# Patient Record
Sex: MALE | Race: WHITE | Employment: UNEMPLOYED | URBAN - METROPOLITAN AREA
[De-identification: names, ages, dates, MRNs, and addresses within clinical notes are randomized per-mention and may not be internally consistent; named-entity substitution may affect disease eponyms.]

---

## 2019-05-08 ENCOUNTER — HOSPITAL ENCOUNTER (EMERGENCY)
Age: 54
Discharge: HOME | End: 2019-05-09
Payer: COMMERCIAL

## 2019-05-08 DIAGNOSIS — Z76.0 MEDICATION REFILL: Primary | ICD-10-CM

## 2019-05-08 LAB
ANION GAP SERPL CALC-SCNC: 10 MMOL/L (ref 7–16)
BASOPHILS # BLD: 0.1 K/UL (ref 0–0.2)
BASOPHILS NFR BLD: 1 % (ref 0–2)
BUN SERPL-MCNC: 13 MG/DL (ref 6–23)
CALCIUM SERPL-MCNC: 9.7 MG/DL (ref 8.3–10.4)
CHLORIDE SERPL-SCNC: 95 MMOL/L (ref 98–107)
CO2 SERPL-SCNC: 27 MMOL/L (ref 21–32)
CREAT SERPL-MCNC: 1.03 MG/DL (ref 0.8–1.5)
DIFFERENTIAL METHOD BLD: NORMAL
EOSINOPHIL # BLD: 0.2 K/UL (ref 0–0.8)
EOSINOPHIL NFR BLD: 2 % (ref 0.5–7.8)
ERYTHROCYTE [DISTWIDTH] IN BLOOD BY AUTOMATED COUNT: 12.9 % (ref 11.9–14.6)
GLUCOSE BLD STRIP.AUTO-MCNC: 428 MG/DL (ref 65–100)
GLUCOSE SERPL-MCNC: 496 MG/DL (ref 65–100)
HCT VFR BLD AUTO: 48.5 % (ref 41.1–50.3)
HGB BLD-MCNC: 16.9 G/DL (ref 13.6–17.2)
IMM GRANULOCYTES # BLD AUTO: 0 K/UL (ref 0–0.5)
IMM GRANULOCYTES NFR BLD AUTO: 0 % (ref 0–5)
LYMPHOCYTES # BLD: 2.5 K/UL (ref 0.5–4.6)
LYMPHOCYTES NFR BLD: 29 % (ref 13–44)
MCH RBC QN AUTO: 30.2 PG (ref 26.1–32.9)
MCHC RBC AUTO-ENTMCNC: 34.8 G/DL (ref 31.4–35)
MCV RBC AUTO: 86.8 FL (ref 79.6–97.8)
MONOCYTES # BLD: 0.5 K/UL (ref 0.1–1.3)
MONOCYTES NFR BLD: 6 % (ref 4–12)
NEUTS SEG # BLD: 5.4 K/UL (ref 1.7–8.2)
NEUTS SEG NFR BLD: 63 % (ref 43–78)
NRBC # BLD: 0 K/UL (ref 0–0.2)
PLATELET # BLD AUTO: 218 K/UL (ref 150–450)
PMV BLD AUTO: 10.8 FL (ref 9.4–12.3)
POTASSIUM SERPL-SCNC: 3.7 MMOL/L (ref 3.5–5.1)
RBC # BLD AUTO: 5.59 M/UL (ref 4.23–5.6)
SODIUM SERPL-SCNC: 132 MMOL/L (ref 136–145)
WBC # BLD AUTO: 8.7 K/UL (ref 4.3–11.1)

## 2019-05-08 PROCEDURE — 80048 BASIC METABOLIC PNL TOTAL CA: CPT

## 2019-05-08 PROCEDURE — 96361 HYDRATE IV INFUSION ADD-ON: CPT

## 2019-05-08 PROCEDURE — 96374 THER/PROPH/DIAG INJ IV PUSH: CPT

## 2019-05-08 PROCEDURE — 74011250636 HC RX REV CODE- 250/636: Performed by: EMERGENCY MEDICINE

## 2019-05-08 PROCEDURE — 81003 URINALYSIS AUTO W/O SCOPE: CPT

## 2019-05-08 PROCEDURE — 74011250636 HC RX REV CODE- 250/636

## 2019-05-08 PROCEDURE — 82962 GLUCOSE BLOOD TEST: CPT

## 2019-05-08 PROCEDURE — 85025 COMPLETE CBC W/AUTO DIFF WBC: CPT

## 2019-05-08 PROCEDURE — 74011636637 HC RX REV CODE- 636/637

## 2019-05-08 PROCEDURE — 99284 EMERGENCY DEPT VISIT MOD MDM: CPT

## 2019-05-08 RX ORDER — SODIUM CHLORIDE 9 MG/ML
1000 INJECTION, SOLUTION INTRAVENOUS ONCE
Status: COMPLETED | OUTPATIENT
Start: 2019-05-08 | End: 2019-05-09

## 2019-05-08 RX ORDER — GABAPENTIN 300 MG/1
300 CAPSULE ORAL 3 TIMES DAILY
COMMUNITY
End: 2020-07-13 | Stop reason: ALTCHOICE

## 2019-05-08 RX ORDER — LOSARTAN POTASSIUM 50 MG/1
50 TABLET ORAL DAILY
COMMUNITY
End: 2019-05-08

## 2019-05-08 RX ORDER — LOSARTAN POTASSIUM 50 MG/1
50 TABLET ORAL DAILY
Qty: 14 TAB | Refills: 0 | Status: SHIPPED | OUTPATIENT
Start: 2019-05-08 | End: 2020-07-13 | Stop reason: ALTCHOICE

## 2019-05-08 RX ADMIN — SODIUM CHLORIDE 1000 ML: 900 INJECTION, SOLUTION INTRAVENOUS at 23:34

## 2019-05-08 RX ADMIN — SODIUM CHLORIDE 1000 ML: 900 INJECTION, SOLUTION INTRAVENOUS at 22:40

## 2019-05-08 RX ADMIN — INSULIN HUMAN 10 UNITS: 100 INJECTION, SOLUTION PARENTERAL at 23:36

## 2019-05-09 VITALS
TEMPERATURE: 97.5 F | WEIGHT: 165 LBS | HEIGHT: 73 IN | DIASTOLIC BLOOD PRESSURE: 93 MMHG | SYSTOLIC BLOOD PRESSURE: 141 MMHG | HEART RATE: 67 BPM | BODY MASS INDEX: 21.87 KG/M2 | OXYGEN SATURATION: 100 % | RESPIRATION RATE: 16 BRPM

## 2019-05-09 LAB — GLUCOSE BLD STRIP.AUTO-MCNC: 199 MG/DL (ref 65–100)

## 2019-05-09 PROCEDURE — 82962 GLUCOSE BLOOD TEST: CPT

## 2019-05-09 NOTE — ED TRIAGE NOTES
Pt states that he is out of his meds, insulin and gabapentin,.   States that he is unable to get his prescriptions because he doesn't have a PMD

## 2019-05-09 NOTE — ED NOTES
I have reviewed discharge instructions with the patient. The patient verbalized understanding. Patient left ED via Discharge Method: ambulatory to Home with  self). Opportunity for questions and clarification provided. Patient given 4 scripts. To continue your aftercare when you leave the hospital, you may receive an automated call from our care team to check in on how you are doing. This is a free service and part of our promise to provide the best care and service to meet your aftercare needs.  If you have questions, or wish to unsubscribe from this service please call [phone]. Thank you for Choosing our [employer] Emergency Department.

## 2019-05-09 NOTE — DISCHARGE INSTRUCTIONS
Patient Education   Please obtain a primary care physician for follow-up and continuity of care. Please not use the emergency department to refill medications. This is best done by your primary care physician to ensure proper dosing and adjustments. Due to the high volume of patients in the emergency department refilling medications from the emergency department is not the best option. Medication Refill: Care Instructions  Your Care Instructions    Medicines are a big part of treatment for many health problems. So it can be upsetting to run out of your medicine. It may even be dangerous to stop a medicine suddenly. The doctor may have given you enough medicine to help you until you can see your regular doctor. The doctor has checked you carefully, but problems can develop later. If you notice any problems or new symptoms, get medical treatment right away. Follow-up care is a key part of your treatment and safety. Be sure to make and go to all appointments, and call your doctor if you are having problems. It's also a good idea to know your test results and keep a list of the medicines you take. How can you care for yourself at home? · Be safe with medicines. Take your medicines exactly as prescribed. · Know when you will run out of your medicine. Use a calendar to remind you to get a refill. Don't wait until you have only a few pills left. · Talk with your doctor or pharmacist about your medicine. Find out what to do if you miss a dose. When should you call for help? Call your doctor now or seek immediate medical care if:    · You have problems with your medicine.    Watch closely for changes in your health, and be sure to contact your doctor if you have any problems. Where can you learn more? Go to http://misael-yazmin.info/. Enter K326 in the search box to learn more about \"Medication Refill: Care Instructions. \"  Current as of: March 28, 2018  Content Version: 11.9  © 0292-1952 [employer]. Care instructions adapted under license by [employer] (which disclaims liability or warranty for this information). If you have questions about a medical condition or this instruction, always ask your healthcare professional. [address] any warranty or liability for your use of this information.

## 2019-05-09 NOTE — ED PROVIDER NOTES
73-year-old and something but diabetic comes to the ED wanting his medications refilled. Patient states that he was told to come to the ED by [facility]. He presents to the ED Wednesday night at 11 PM. He states He states he's been out of his Medications for quite some time. The history is provided by the patient. High Blood Sugar This is a chronic problem. The current episode started more than 1 week ago. The problem occurs constantly. Past Medical History:  
Diagnosis Date  Diabetes ([facility] [address]  GERD (gastroesophageal reflux disease)   
 controlled w/med  HIV (human immunodeficiency virus infection) ([facility]  Ill-defined condition  Infectious disease  Symptomatic cholelithiasis Past Surgical History:  
Procedure Laterality Date  HX APPENDECTOMY  HX CHOLECYSTECTOMY Family History:  
Problem Relation Age of Onset  Cancer Father   
     colon  Heart Disease Brother  Hypertension Brother  Stroke Maternal Grandmother  Cancer Maternal Grandmother   
     brain Social History Socioeconomic History  Marital status: SINGLE Spouse name: Not on file  Number of children: Not on file  Years of education: Not on file  Highest education level: Not on file Occupational History  Not on file Social Needs  Financial resource strain: Not on file  Food insecurity:  
  Worry: Not on file Inability: Not on file  Transportation needs:  
  Medical: Not on file Non-medical: Not on file Tobacco Use  Smoking status: Current Every Day Smoker Packs/day: 1.00 Years: 25.00 Pack years: 25.00  Smokeless tobacco: Never Used Substance and Sexual Activity  Alcohol use: No  
 Drug use: No  
 Sexual activity: Not Currently Lifestyle  Physical activity:  
  Days per week: Not on file Minutes per session: Not on file  Stress: Not on file Relationships  Social connections: Talks on phone: Not on file Gets together: Not on file Attends Hindu service: Not on file Active member of club or organization: Not on file Attends meetings of clubs or organizations: Not on file Relationship status: Not on file  Intimate partner violence:  
  Fear of current or ex partner: Not on file Emotionally abused: Not on file Physically abused: Not on file Forced sexual activity: Not on file Other Topics Concern  Not on file Social History Narrative  Not on file ALLERGIES: Patient has no known allergies. Review of Systems Constitutional: Negative. Negative for activity change. HENT: Negative. Eyes: Negative. Respiratory: Negative. Cardiovascular: Negative. Gastrointestinal: Negative. Genitourinary: Negative. Musculoskeletal: Negative. Skin: Negative. Neurological: Negative. Psychiatric/Behavioral: Negative. All other systems reviewed and are negative. Vitals:  
 05/08/19 2207 BP: 139/90 Pulse: 91  
Resp: 16 Temp: 97.5 °F (36.4 °C) SpO2: 99% Weight: 74.8 kg (165 lb) Height: 6' 1\" (1.854 m) Physical Exam  
Constitutional: He is oriented to person, place, and time. He appears well-developed and well-nourished. No distress. HENT:  
Head: Normocephalic and atraumatic. Right Ear: External ear normal.  
Left Ear: External ear normal.  
Nose: Nose normal.  
Mouth/Throat: Oropharynx is clear and moist. No oropharyngeal exudate. Eyes: Pupils are equal, round, and reactive to light. Conjunctivae and EOM are normal. Right eye exhibits no discharge. Left eye exhibits no discharge. No scleral icterus. Neck: Normal range of motion. Neck supple. No JVD present. No tracheal deviation present. Cardiovascular: Normal rate, regular rhythm and intact distal pulses. Pulmonary/Chest: Effort normal and breath sounds normal. No stridor.  No respiratory distress. He has no wheezes. He exhibits no tenderness. Abdominal: Soft. Bowel sounds are normal. He exhibits no distension and no mass. There is no tenderness. Musculoskeletal: Normal range of motion. He exhibits no edema or tenderness. Neurological: He is alert and oriented to person, place, and time. No cranial nerve deficit. Skin: Skin is warm and dry. No rash noted. He is not diaphoretic. No erythema. No pallor. Psychiatric: He has a normal mood and affect. His behavior is normal. Thought content normal.  
Nursing note and vitals reviewed. MDM Number of Diagnoses or Management Options Diagnosis management comments: Refill of essential medications. Amount and/or Complexity of Data Reviewed Clinical lab tests: ordered and reviewed Tests in the medicine section of CPT®: ordered and reviewed Risk of Complications, Morbidity, and/or Mortality Presenting problems: low Diagnostic procedures: minimal 
Management options: moderate Patient Progress Patient progress: stable Procedures

## 2020-07-13 PROBLEM — Z79.4 TYPE 2 DIABETES MELLITUS WITH HYPERGLYCEMIA, WITH LONG-TERM CURRENT USE OF INSULIN (HCC): Status: ACTIVE | Noted: 2020-07-13

## 2020-07-13 PROBLEM — E78.00 HYPERCHOLESTEROLEMIA: Status: ACTIVE | Noted: 2020-07-13

## 2020-07-13 PROBLEM — B20 HUMAN IMMUNODEFICIENCY VIRUS (HCC): Status: ACTIVE | Noted: 2020-07-13

## 2020-07-13 PROBLEM — E11.65 TYPE 2 DIABETES MELLITUS WITH HYPERGLYCEMIA, WITH LONG-TERM CURRENT USE OF INSULIN (HCC): Status: ACTIVE | Noted: 2020-07-13

## 2020-07-13 PROBLEM — E11.42 PERIPHERAL SENSORY NEUROPATHY DUE TO TYPE 2 DIABETES MELLITUS (HCC): Status: ACTIVE | Noted: 2020-07-13

## 2020-08-05 ENCOUNTER — HOME HEALTH ADMISSION (OUTPATIENT)
Dept: HOME HEALTH SERVICES | Facility: HOME HEALTH | Age: 55
End: 2020-08-05

## 2021-05-26 ENCOUNTER — APPOINTMENT (OUTPATIENT)
Dept: PHYSICAL THERAPY | Age: 56
End: 2021-05-26

## 2021-06-02 ENCOUNTER — APPOINTMENT (OUTPATIENT)
Dept: PHYSICAL THERAPY | Age: 56
End: 2021-06-02

## 2021-06-07 ENCOUNTER — APPOINTMENT (OUTPATIENT)
Dept: PHYSICAL THERAPY | Age: 56
End: 2021-06-07

## 2021-06-09 ENCOUNTER — APPOINTMENT (OUTPATIENT)
Dept: PHYSICAL THERAPY | Age: 56
End: 2021-06-09

## 2021-06-11 ENCOUNTER — APPOINTMENT (OUTPATIENT)
Dept: PHYSICAL THERAPY | Age: 56
End: 2021-06-11

## 2021-06-14 ENCOUNTER — APPOINTMENT (OUTPATIENT)
Dept: PHYSICAL THERAPY | Age: 56
End: 2021-06-14

## 2021-06-16 ENCOUNTER — APPOINTMENT (OUTPATIENT)
Dept: PHYSICAL THERAPY | Age: 56
End: 2021-06-16

## 2021-06-18 ENCOUNTER — APPOINTMENT (OUTPATIENT)
Dept: PHYSICAL THERAPY | Age: 56
End: 2021-06-18

## 2021-06-23 ENCOUNTER — APPOINTMENT (OUTPATIENT)
Dept: PHYSICAL THERAPY | Age: 56
End: 2021-06-23

## 2021-06-28 ENCOUNTER — APPOINTMENT (OUTPATIENT)
Dept: PHYSICAL THERAPY | Age: 56
End: 2021-06-28

## 2021-06-30 ENCOUNTER — APPOINTMENT (OUTPATIENT)
Dept: PHYSICAL THERAPY | Age: 56
End: 2021-06-30

## 2022-03-18 PROBLEM — E78.00 HYPERCHOLESTEROLEMIA: Status: ACTIVE | Noted: 2020-07-13

## 2022-03-19 PROBLEM — E11.65 TYPE 2 DIABETES MELLITUS WITH HYPERGLYCEMIA, WITH LONG-TERM CURRENT USE OF INSULIN (HCC): Status: ACTIVE | Noted: 2020-07-13

## 2022-03-19 PROBLEM — E11.42 PERIPHERAL SENSORY NEUROPATHY DUE TO TYPE 2 DIABETES MELLITUS (HCC): Status: ACTIVE | Noted: 2020-07-13

## 2022-03-19 PROBLEM — Z79.4 TYPE 2 DIABETES MELLITUS WITH HYPERGLYCEMIA, WITH LONG-TERM CURRENT USE OF INSULIN (HCC): Status: ACTIVE | Noted: 2020-07-13

## 2022-03-19 PROBLEM — B20 HUMAN IMMUNODEFICIENCY VIRUS (HCC): Status: ACTIVE | Noted: 2020-07-13

## 2022-05-09 ENCOUNTER — TELEPHONE (OUTPATIENT)
Dept: DIABETES SERVICES | Age: 57
End: 2022-05-09

## 2022-05-09 NOTE — TELEPHONE ENCOUNTER
Received referral for diabetes education. Referral stated we were to call Gabbi to schedule. She wants us to check insurance coverage.
DISPLAY PLAN FREE TEXT

## 2022-05-11 ENCOUNTER — TELEPHONE (OUTPATIENT)
Dept: DIABETES SERVICES | Age: 57
End: 2022-05-11

## 2022-05-11 NOTE — TELEPHONE ENCOUNTER
Obtained insurance coverage info for diabetes education. Called and left message asking for a call back to schedule. Pt is covered at 100%.

## 2022-05-18 ENCOUNTER — TELEPHONE (OUTPATIENT)
Dept: DIABETES SERVICES | Age: 57
End: 2022-05-18

## 2022-05-18 NOTE — TELEPHONE ENCOUNTER
Second call after obtaining insurance coverage. Left message asking for a return call. Pt should be covered at 100%.

## 2022-05-23 ENCOUNTER — FOLLOWUP TELEPHONE ENCOUNTER (OUTPATIENT)
Dept: DIABETES SERVICES | Age: 57
End: 2022-05-23

## 2022-05-23 NOTE — TELEPHONE ENCOUNTER
Third attempt to contact pt regarding diabetes referral. [facility] is full. Will send a letter 5/23/22. If we don't hear by 6/7/22 we will close.

## 2022-06-01 DIAGNOSIS — E11.65 TYPE 2 DIABETES MELLITUS WITH HYPERGLYCEMIA, WITH LONG-TERM CURRENT USE OF INSULIN (HCC): Primary | ICD-10-CM

## 2022-06-01 DIAGNOSIS — Z79.4 TYPE 2 DIABETES MELLITUS WITH HYPERGLYCEMIA, WITH LONG-TERM CURRENT USE OF INSULIN (HCC): Primary | ICD-10-CM

## 2022-06-06 ENCOUNTER — SCHEDULED TELEPHONE ENCOUNTER (OUTPATIENT)
Dept: ENDOCRINOLOGY | Age: 57
End: 2022-06-06
Payer: MEDICARE

## 2022-06-06 DIAGNOSIS — E11.65 TYPE 2 DIABETES MELLITUS WITH HYPERGLYCEMIA, WITH LONG-TERM CURRENT USE OF INSULIN (HCC): Primary | ICD-10-CM

## 2022-06-06 DIAGNOSIS — Z79.4 TYPE 2 DIABETES MELLITUS WITH HYPERGLYCEMIA, WITH LONG-TERM CURRENT USE OF INSULIN (HCC): Primary | ICD-10-CM

## 2022-06-06 PROCEDURE — 99443 PR PHYS/QHP TELEPHONE EVALUATION 21-30 MIN: CPT | Performed by: PHYSICIAN ASSISTANT

## 2022-06-06 RX ORDER — BUPROPION HYDROCHLORIDE 150 MG/1
300 TABLET ORAL DAILY
COMMUNITY
Start: 2022-05-06 | End: 2022-06-06 | Stop reason: DRUGHIGH

## 2022-06-06 RX ORDER — ATORVASTATIN CALCIUM 80 MG/1
1 TABLET, FILM COATED ORAL DAILY
COMMUNITY
Start: 2022-05-31

## 2022-06-06 RX ORDER — INSULIN LISPRO 100 U/ML
INJECTION, SOLUTION SUBCUTANEOUS
Qty: 45 ML | Refills: 3
Start: 2022-06-06 | End: 2022-06-21

## 2022-06-06 RX ORDER — INSULIN DETEMIR 100 [IU]/ML
50 INJECTION, SOLUTION SUBCUTANEOUS NIGHTLY
COMMUNITY
End: 2022-06-06 | Stop reason: SDUPTHER

## 2022-06-06 RX ORDER — TAMSULOSIN HYDROCHLORIDE 0.4 MG/1
1 CAPSULE ORAL DAILY
COMMUNITY
Start: 2022-05-31

## 2022-06-06 RX ORDER — LANCETS 33 GAUGE
EACH MISCELLANEOUS
COMMUNITY
Start: 2020-10-13

## 2022-06-06 RX ORDER — INSULIN LISPRO 100 U/ML
INJECTION, SOLUTION SUBCUTANEOUS
COMMUNITY
End: 2022-06-06 | Stop reason: SDUPTHER

## 2022-06-06 RX ORDER — DICLOFENAC POTASSIUM 50 MG/1
50 TABLET, FILM COATED ORAL AS NEEDED
COMMUNITY
Start: 2022-03-23

## 2022-06-06 RX ORDER — INSULIN DETEMIR 100 [IU]/ML
25 INJECTION, SOLUTION SUBCUTANEOUS 2 TIMES DAILY
Qty: 45 ML | Refills: 3
Start: 2022-06-06 | End: 2022-06-20 | Stop reason: SDUPTHER

## 2022-06-06 RX ORDER — PEN NEEDLE, DIABETIC 32GX 5/32"
NEEDLE, DISPOSABLE MISCELLANEOUS
COMMUNITY
Start: 2021-09-17 | End: 2022-08-18 | Stop reason: SDUPTHER

## 2022-06-06 RX ORDER — TRAMADOL HYDROCHLORIDE 50 MG/1
1 TABLET ORAL AS NEEDED
COMMUNITY
Start: 2022-05-07

## 2022-06-06 RX ORDER — CHLORAL HYDRATE 500 MG
1 CAPSULE ORAL DAILY
COMMUNITY

## 2022-06-06 NOTE — PROGRESS NOTES
Arcenio Verdin is a 65 y.o. male evaluated via audio-only technology on 6/6/2022 for Diabetes (type 2)  . Pt with historically poor compliance meets for virtual follow up. At last visit and his support person, a cousin named Yary Groves came to his appointment and they left with additional information about glycemic control. At that time patient was taking Levemir 3 times per day, he continues to take Levemir 3 times per day with recurrent hypoglycemia. He spends much of the visit complaining about both his chronic pain and frustrations with medical providers. Patient reports that he has not been taking his short acting insulin due to hypoglycemia but is taking his long-acting insulin 3 times per day at a high dose    Current Regimen:  levemir 50 units three times daily  humalog- poor compliance due to hypoglycemia and poor oral intake    Continues to take [place] and [place] without side effects    Eating once a day or less, due to frequent nausea    frequent hypoglycemia in 40-60s  As high as 160        Assessment & Plan:   Type 2 diabetes mellitus with hyperglycemia, with long-term current use of insulin ([facility])  -     ADMELOG SOLOSTAR 100 UNIT/ML SOPN; 10 units with meals plus 2 units per 50 >150 AC/HS (up to 50 units per day), Disp-45 mL, R-3, DAWNO PRINT  -     LEVEMIR FLEXTOUCH 100 UNIT/ML injection pen; Inject 25 Units into the skin 2 times daily, Disp-45 mL, R-3, DAWNO PRINT    1. Type 2 diabetes mellitus with hyperglycemia, with long-term current use of insulin [facility]  Patient with uncontrolled type 2 diabetes who continues to take his insulin incorrectly. Patient is still taking his basal insulin 3 times per day with rare and intermittent use of his prandial insulin. Of note he eats very irregularly and only small amounts. I have asked him to take 25 units of Levemir twice daily as previously discussed.   Although he would certainly benefit from a correction scale for her short acting insulin even with the help of shot sheets he appears to have difficulty comprehending this. Have asked him to take 10 units of prandial insulin before meals and to maintain an adequate blood glucose log for review    - ADMELOG SOLOSTAR 100 UNIT/ML SOPN; 10 units with meals plus 2 units per 50 >150 AC/HS (up to 50 units per day)  Dispense: 45 mL; Refill: 3  - LEVEMIR FLEXTOUCH 100 UNIT/ML injection pen; Inject 25 Units into the skin 2 times daily  Dispense: 45 mL; Refill: 3        Return as scheduled, for Diabetes DM2 Follow-Up. Subjective:       Prior to Admission medications    Medication Sig Start Date End Date Taking? Authorizing Provider   blood glucose test strips (ASCENSIA AUTODISC VI;ONE TOUCH ULTRA TEST VI) strip Check blood glucose 6 times per day. Dx E11.65 10/13/20  Yes Historical Provider, MD   OneTouch Delica Lancets 54Y MISC Check blood glucose 6 times per day. Dx E11.65 10/13/20  Yes Historical Provider, MD   Insulin Pen Needle (BD PEN NEEDLE LUCILLE 2ND GEN) 32G X 4 MM MISC 5 insulin injections per day. Dx E11.65 9/17/21  Yes Historical Provider, MD   [place]-3 1000 MG CAPS Take 1 capsule by mouth daily   Yes Historical Provider, MD   atorvastatin (LIPITOR) 80 MG tablet Take 1 tablet by mouth daily 5/31/22  Yes Historical Provider, MD   diclofenac (CATAFLAM) 50 MG tablet Take 50 mg by mouth as needed Three times a day 3/23/22  Yes Historical Provider, MD   tamsulosin (FLOMAX) 0.4 mg capsule Take 1 capsule by mouth daily 5/31/22  Yes Historical Provider, MD   traMADol (ULTRAM) 50 MG tablet Take 1 tablet by mouth as needed.  Twice a day 5/7/22  Yes Historical Provider, MD   ADMELOG SOLOSTAR 100 UNIT/ML SOPN 10 units with meals plus 2 units per 50 >150 AC/HS (up to 50 units per day) 6/6/22  Yes [place] JOANIE Perez   LEVEMIR FLEXTOUCH 100 UNIT/ML injection pen Inject 25 Units into the skin 2 times daily 6/6/22  Yes Marylee Borg A [place], PA-C   Abacavir-Dolutegravir-Lamivud (TRIUMEQ) 600- MG TABS TAKE ONE TABLET BY MOUTH EVERY DAY Orally Once a day for 30 day(s) 7/16/18  Yes Ar Automatic Reconciliation   buPROPion (WELLBUTRIN XL) 300 MG extended release tablet Take 300 mg by mouth   Yes Ar Automatic Reconciliation   famotidine (PEPCID) 20 MG tablet Take 20 mg by mouth 2 times daily   Yes Ar Automatic Reconciliation   gabapentin (NEURONTIN) 800 MG tablet Take 800 mg by mouth daily. 7/13/20  Yes Ar Automatic Reconciliation   losartan (COZAAR) 25 MG tablet 1 tablet Orally Once a day for 90 days   Yes Ar Automatic Reconciliation   SITagliptin (JANUVIA) 50 MG tablet Take 50 mg by mouth daily 9/17/21  Yes Ar Automatic Reconciliation     Past Medical History:   Diagnosis Date    GERD (gastroesophageal reflux disease)     Human immunodeficiency virus ([facility] [address] 2017    Hypercholesterolemia     Hypertriglyceridemia     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 ([facility] [address] [facility]     Type 2 diabetes mellitus ([facility]      Review of Systems    No data recorded    Charles Tejeda was evaluated through a patient-initiated, synchronous (real-time) audio only encounter. He (or guardian if applicable) is aware that it is a billable service, which includes applicable co-pays, with coverage as determined by his insurance carrier. This visit was conducted with the patient's (and/or Sheron Dandy guardian's) verbal consent. He has not had a related appointment within my department in the past 7 days or scheduled within the next 24 hours. The patient was located in a state where the provider was licensed to provide care. The patient was located at: Home: [address] [place] [address]  The provider was located at:  Facility (Appt Dept): [address] [place] [address] Dr Jessica Saenz [address] [place] [address]    Total Time: minutes: 24 minutes    Catie Acosta PA-C

## 2022-06-20 ENCOUNTER — TELEPHONE (OUTPATIENT)
Dept: ENDOCRINOLOGY | Age: 57
End: 2022-06-20

## 2022-06-20 DIAGNOSIS — Z79.4 TYPE 2 DIABETES MELLITUS WITH HYPERGLYCEMIA, WITH LONG-TERM CURRENT USE OF INSULIN (HCC): ICD-10-CM

## 2022-06-20 DIAGNOSIS — E11.65 TYPE 2 DIABETES MELLITUS WITH HYPERGLYCEMIA, WITH LONG-TERM CURRENT USE OF INSULIN (HCC): ICD-10-CM

## 2022-06-20 RX ORDER — INSULIN DETEMIR 100 [IU]/ML
25 INJECTION, SOLUTION SUBCUTANEOUS 2 TIMES DAILY
Qty: 45 ML | Refills: 3 | Status: SHIPPED | OUTPATIENT
Start: 2022-06-20

## 2022-06-21 ENCOUNTER — TELEPHONE (OUTPATIENT)
Dept: ENDOCRINOLOGY | Age: 57
End: 2022-06-21

## 2022-06-21 DIAGNOSIS — E11.65 TYPE 2 DIABETES MELLITUS WITH HYPERGLYCEMIA, WITH LONG-TERM CURRENT USE OF INSULIN (HCC): Primary | ICD-10-CM

## 2022-06-21 DIAGNOSIS — Z79.4 TYPE 2 DIABETES MELLITUS WITH HYPERGLYCEMIA, WITH LONG-TERM CURRENT USE OF INSULIN (HCC): Primary | ICD-10-CM

## 2022-06-21 RX ORDER — INSULIN LISPRO 100 [IU]/ML
INJECTION, SOLUTION INTRAVENOUS; SUBCUTANEOUS
Qty: 45 ML | Refills: 3 | Status: SHIPPED | OUTPATIENT
Start: 2022-06-21

## 2022-06-21 NOTE — TELEPHONE ENCOUNTER
Called patient and let him know that Humalog was approved and sent to [place] [id number] on file. Patient verbalized understanding and states he is taking medication according to scale.

## 2022-06-21 NOTE — TELEPHONE ENCOUNTER
Patient's insurance faxed over an approval for Humalog u100. Patient has since then been put on Admelog. Patient's pa is approved through 12/31/2022 approved on June 10th.

## 2022-06-21 NOTE — TELEPHONE ENCOUNTER
humalog sent. Please make sure pt understands that the humalog replaces the admelog (in the yellow pen) to take before meals based on the info in the chart.      Please ensure he is taking his insulin as prescribed as this has been an issue in the past.

## 2022-08-15 DIAGNOSIS — E11.65 TYPE 2 DIABETES MELLITUS WITH HYPERGLYCEMIA (HCC): ICD-10-CM

## 2022-08-15 RX ORDER — BLOOD SUGAR DIAGNOSTIC
STRIP MISCELLANEOUS
Qty: 600 STRIP | Refills: 3 | OUTPATIENT
Start: 2022-08-15

## 2022-08-15 RX ORDER — SITAGLIPTIN 50 MG/1
TABLET, FILM COATED ORAL
Qty: 90 TABLET | Refills: 3 | OUTPATIENT
Start: 2022-08-15

## 2022-08-18 DIAGNOSIS — E11.65 TYPE 2 DIABETES MELLITUS WITH HYPERGLYCEMIA (HCC): ICD-10-CM

## 2022-08-18 RX ORDER — BUPROPION HYDROCHLORIDE 150 MG/1
TABLET ORAL
COMMUNITY
Start: 2022-08-14

## 2022-08-18 RX ORDER — SITAGLIPTIN 50 MG/1
50 TABLET, FILM COATED ORAL DAILY
Qty: 90 TABLET | Refills: 3 | Status: SHIPPED | OUTPATIENT
Start: 2022-08-18

## 2022-08-18 RX ORDER — SITAGLIPTIN 50 MG/1
TABLET, FILM COATED ORAL
Qty: 90 TABLET | Refills: 3 | OUTPATIENT
Start: 2022-08-18

## 2022-08-18 RX ORDER — BLOOD SUGAR DIAGNOSTIC
STRIP MISCELLANEOUS
Qty: 600 STRIP | Refills: 3 | OUTPATIENT
Start: 2022-08-18

## 2022-08-18 RX ORDER — PEN NEEDLE, DIABETIC 32GX 5/32"
NEEDLE, DISPOSABLE MISCELLANEOUS
Qty: 500 EACH | Refills: 3 | Status: SHIPPED | OUTPATIENT
Start: 2022-08-18

## 2022-08-18 NOTE — TELEPHONE ENCOUNTER
Patient called and requested refills of his Januvia 50 mg, one touch test strips, and bd pen needles. Patient's pharmacy on file is [facility].

## 2022-08-24 ENCOUNTER — TELEPHONE (OUTPATIENT)
Dept: ENDOCRINOLOGY | Age: 57
End: 2022-08-24

## 2022-08-24 DIAGNOSIS — B20 HUMAN IMMUNODEFICIENCY VIRUS (HCC): ICD-10-CM

## 2022-08-24 DIAGNOSIS — E11.42 PERIPHERAL SENSORY NEUROPATHY DUE TO TYPE 2 DIABETES MELLITUS (HCC): Primary | ICD-10-CM

## 2022-08-24 NOTE — TELEPHONE ENCOUNTER
Sotero Mejia called and stated that Patient's B-12 is being rejected by insurance due to Diagnosis code. Please advise on a new diagnosis code if available.

## 2022-08-29 NOTE — TELEPHONE ENCOUNTER
Returned call to update ICD 10 codes for patient's lab work. Principal  stated that patient does not have a balance at this time and new ICD 10 code is not needed.

## 2022-08-31 ENCOUNTER — OFFICE VISIT (OUTPATIENT)
Dept: ENDOCRINOLOGY | Age: 57
End: 2022-08-31
Payer: MEDICARE

## 2022-08-31 VITALS
WEIGHT: 167 LBS | HEART RATE: 111 BPM | BODY MASS INDEX: 22.13 KG/M2 | SYSTOLIC BLOOD PRESSURE: 120 MMHG | HEIGHT: 73 IN | DIASTOLIC BLOOD PRESSURE: 80 MMHG | OXYGEN SATURATION: 99 %

## 2022-08-31 DIAGNOSIS — E11.42 PERIPHERAL SENSORY NEUROPATHY DUE TO TYPE 2 DIABETES MELLITUS (HCC): ICD-10-CM

## 2022-08-31 DIAGNOSIS — E78.00 HYPERCHOLESTEROLEMIA: ICD-10-CM

## 2022-08-31 DIAGNOSIS — Z79.4 TYPE 2 DIABETES MELLITUS WITH HYPERGLYCEMIA, WITH LONG-TERM CURRENT USE OF INSULIN (HCC): Primary | ICD-10-CM

## 2022-08-31 DIAGNOSIS — E11.65 TYPE 2 DIABETES MELLITUS WITH HYPERGLYCEMIA, WITH LONG-TERM CURRENT USE OF INSULIN (HCC): Primary | ICD-10-CM

## 2022-08-31 DIAGNOSIS — E78.1 HYPERTRIGLYCERIDEMIA: ICD-10-CM

## 2022-08-31 LAB — HBA1C MFR BLD: 11.1 %

## 2022-08-31 PROCEDURE — 4004F PT TOBACCO SCREEN RCVD TLK: CPT | Performed by: PHYSICIAN ASSISTANT

## 2022-08-31 PROCEDURE — 3046F HEMOGLOBIN A1C LEVEL >9.0%: CPT | Performed by: PHYSICIAN ASSISTANT

## 2022-08-31 PROCEDURE — 3017F COLORECTAL CA SCREEN DOC REV: CPT | Performed by: PHYSICIAN ASSISTANT

## 2022-08-31 PROCEDURE — 99214 OFFICE O/P EST MOD 30 MIN: CPT | Performed by: PHYSICIAN ASSISTANT

## 2022-08-31 PROCEDURE — 2022F DILAT RTA XM EVC RTNOPTHY: CPT | Performed by: PHYSICIAN ASSISTANT

## 2022-08-31 PROCEDURE — G8420 CALC BMI NORM PARAMETERS: HCPCS | Performed by: PHYSICIAN ASSISTANT

## 2022-08-31 PROCEDURE — G8427 DOCREV CUR MEDS BY ELIG CLIN: HCPCS | Performed by: PHYSICIAN ASSISTANT

## 2022-08-31 PROCEDURE — 83036 HEMOGLOBIN GLYCOSYLATED A1C: CPT | Performed by: PHYSICIAN ASSISTANT

## 2022-08-31 NOTE — PROGRESS NOTES
[facility]    Jose R Ziegler PA-C  [facility]  [address], [address]  [place]  Phone [phone]  Facsimile [phone]          Gm Veliz is a 65 y.o. male seen 8/31/2022 for follow up evaluation of insulin dependent diabetes         Assessment and Plan:    In office COVID-19 PPE worn and precautions taken    1. Type 2 diabetes mellitus with hyperglycemia, with long-term current use of insulin (HCC)  Patient with uncontrolled diabetes tolerating 40 units of basal insulin per day without recurrent hypoglycemia as he previously noted when taking basal insulin 3 times daily at a higher dose. Patient is only taking his prandial insulin once daily and not adjusting for correction. Given a shot sheet showing 10 units of prandial insulin plus a 1 per 25 greater than 150 correction as well as a correction only shot sheet of 1 per 25 greater than 50 to use fasting and at bedtime as needed. Appropriate use of insulin reviewed at length    At today's visit we discussed sequela associated with uncontrolled diabetes including increased risk of stroke, heart attack, kidney failure, amputation, retinopathy, neuropathy, and nephropathy. Patient was strongly encouraged to comply with treatment regimen as well as dietary and exercise recommendations to aid in control of this chronic disease to help prevent complications associated with uncontrolled diabetes. - AMB POC HEMOGLOBIN A1C    2. 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 Aggravated by profound hypoglycemia at last visit. Now under care of neurology. Would certainly benefit from improved glycemic control    3. Hypercholesterolemia  Would benefit from future fasting labs, defer to PCP    4.  Hypertriglyceridemia  Would benefit from future fasting labs, defer to PCP          López Ortega was seen today for diabetes. 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 He reports good compliance to twice daily Levemir and Januvia. Although he is access to prandial insulin with correction scale he is taking 10 units of prandial insulin before his once daily meal.  His A1c is improved but suboptimal, his frequent and recurrent hypoglycemia has resolved. Current regimen: Levemir 20 units times daily,  Januvia 50mg, Admelog 10 units before one meal a day       5/6/2022   Patient with diabetes on basal bolus insulin regimen plus DPP-4 presents to clinic for follow-up in care of Sarwat sykes. Previous visits have been virtual and patient has reported frustration and poor understanding of his disease process. He suffers from pain that his neurologist today has attributed to his blood sugar including rapid changes in blood sugar. He has difficulty relating how he is taking his insulin appears to be taking 30 units of both long-acting and short acting multiple  times per day with and without meals with no specific regimen      In care of Sarwat sykes today                 9/17/2021   VIRTUAL VISIT   Ajit Paris is a 55 y.o. male who was seen by synchronous (real-time) audio-video technology on 9/17/2021 . The patient provided consent for this audio-video interaction. The [employer] platform was used. Patient was located at their home  and provider in the office. Patient frustrated by delay in timing of virtual visit. Spends much of visit c/o profound neuropathic pain and chronic diarrhea.  Has issues obtaining medication and supplies and reports this impacts his medication compliance       \"i'm on medicaid, the don't give a damn about you on medicaid\" \"i'm unable to get my medicine\"        Current regimen: Levemir 30 units three times daily, Januvia 50mg, Admelog 30 units           6/9/2021   VIRTUAL VISIT   Miguel Flowers is a 55 y.o. male who was seen by synchronous (real-time) audio-video technology on 6/9/2021 . The patient provided consent for this audio-video interaction. The [employer] platform was used. Patient and provider  were located at their individual homes. Has been out of [address] [place] [address] for 1 month, has not received from pharmacy, reports did not have any insulin either, got insulin 3 days ago       suffering from shingles, on valacyclovir            Reason for visit: Miguel Flowers return for follow up evaluation and management of type 2 diabetes. History of Present Illness:       10/5/2020   VIRTUAL VISIT   Miguel Flowers is a 48 y.o. male who was seen by synchronous (real-time) audio-video technology on 10/5/2020 . The patient provided consent for this audio-video interaction. The [employer] platform was used. Patient and provider  were located at their individual homes. Pt with flight of ideas, difficulty to interview, upset with 300mg welbutrin, would prefer 150mg thus discontinued taking. Was seeing someone at [facility] for anxiety, poor access with COVID. Under great stress and isolation due to pandemic. Taking insulin regularly some issues relating dose, frustrated by inability to obtain test strips           DIABETES MELLITUS   Miguel Flowers is here for follow up evaluation and treatment of type 2 diabetes mellitus. Of note, he has HIV and takes antiretroviral therapy.            Previous therapies: Victoza, metformin, januvia       Date of diagnosis: ~2016       Diet: mostly restaurant food       Exercise:  minimal       Body weight trend: decreasing steadily (10 pounds in the last 2 months)       Diabetes education: The patient has received formal diabetes education ([address]). Diabetic complications:                    Retinopathy: Last eye exam was end 2022 and demonstrated no diabetic retinopathy. [facility]  care [facility]. Albuminuria/nephropathy:                           5/8/2019: Serum creatinine 1.03. Urine microalbumin unknown. 05/06/2022 Cr 0.69, , microalbumin/Cr ratio 47                     Neuropathy:  He has numbness in hands and feet and frequently drops objects. He wears  braces on his feet due to foot drop; he falls frequently. He also has hyperesthesia which he attributes to herpes zoster. Home blood glucose monitoring frequency: 1-2 times per day    By recall     Typical Standard Deviation   Fasting Upper 100s As low as 160   AC lunch     AC supper 180    Bedtime 260      Blood glucose levels are uncontrolled, most significant elevations are post prandial        Hypoglycemia: never       Hemoglobin A1c:   3/10/2016: 12.8%.   /24/2017: 9.4%. 7/27/2017: 10.2%. 5/4/2018: 10.9%. 9/7/2018: 14.3%.   12/31/2018: 12.0%.   3/21/2019: 13.2%. 9/30/2019: Greater than 15.5%. 3/6/2020: Greater than 14%. 03/09/2021: 14.1%   05/06/2022: 9.5%  08/31/2022: 11.1%           Other pertinent labs:                   Lipids:                            9/7/2018: Total cholesterol 211, triglycerides 352, HDL 36, . Thyroid:                            5/4/2018: TSH 2.125.     05/06/2022 1.850        Allergies & Medications:  Reviewed in chart. Review of Systems    Vital Signs:  /80   Pulse (!) 111   Ht 6' 1\" (1.854 m)   Wt 167 lb (75.8 kg)   SpO2 99%   BMI 22.03 kg/m²       Physical Exam  Constitutional:       Appearance: Normal appearance. Neck:      Thyroid: No thyromegaly. Cardiovascular:      Rate and Rhythm: Normal rate and regular rhythm.    Pulmonary:      Effort: Pulmonary effort is normal.      Breath sounds: Normal breath sounds. Abdominal:      Palpations: Abdomen is soft. Feet:      Right foot:      Protective Sensation: 3 sites tested. 3 sites sensed. Left foot:      Protective Sensation: 3 sites tested. 3 sites sensed. Lymphadenopathy:      Cervical: No cervical adenopathy. Skin:     General: Skin is warm and dry. Comments: Healing papules of bilateral lower ankles, L>R after mowing lawn   Neurological:      General: No focal deficit present. Mental Status: He is alert. Psychiatric:         Mood and Affect: Mood normal.         Behavior: Behavior normal.         Thought Content: Thought content normal.         Judgment: Judgment normal.           Return 3-4 mo, for Diabetes DM2 Follow-Up. Portions of this note were generated with the assistance of voice recogniton software. As such, some errors in transcription may be present.

## 2022-09-19 RX ORDER — LANCETS 33 GAUGE
EACH MISCELLANEOUS
Refills: 3 | OUTPATIENT
Start: 2022-09-19

## 2022-09-20 RX ORDER — LANCETS 33 GAUGE
EACH MISCELLANEOUS
Refills: 3 | OUTPATIENT
Start: 2022-09-20

## 2022-12-14 ENCOUNTER — PATIENT MESSAGE (OUTPATIENT)
Dept: ENDOCRINOLOGY | Age: 57
End: 2022-12-14

## 2022-12-19 NOTE — TELEPHONE ENCOUNTER
Raghu response:    Congratulations on your improving A1c! In order to be eligible for the continuous meter that checks your sugar all the time or an insulin pump, we need to document in your next visit on 1/10/2023 that you are checking your sugars MULTIPLE times a day and using this information to adjust your insulin by the chart. Bring your meter and any log you use to track your sugars to our visit next month and we will work to get you new supplies. Congratulation on your progress!     Happy [place]!  Rajendra Bose

## 2022-12-19 NOTE — TELEPHONE ENCOUNTER
From: Get Rosa  To: Joe Alaniz  Sent: 12/14/2022 12:19 PM EST  Subject: A1c     Hey today I got a home visit through my insurance provider they did a health assessment and tested a1c which was 8.7 the best score I had I need to ask you a question to see if I could qualify for one of those insulin pumps no sticks to fingers to help me manage better the a1c test score should be assessable for you in my records bc nurses asked me did I want to share my results with medical providers thank you

## 2023-01-10 ENCOUNTER — OFFICE VISIT (OUTPATIENT)
Dept: ENDOCRINOLOGY | Age: 58
End: 2023-01-10
Payer: MEDICARE

## 2023-01-10 VITALS
SYSTOLIC BLOOD PRESSURE: 140 MMHG | BODY MASS INDEX: 23.75 KG/M2 | DIASTOLIC BLOOD PRESSURE: 86 MMHG | OXYGEN SATURATION: 98 % | WEIGHT: 180 LBS | HEART RATE: 93 BPM

## 2023-01-10 DIAGNOSIS — Z79.4 TYPE 2 DIABETES MELLITUS WITH HYPERGLYCEMIA, WITH LONG-TERM CURRENT USE OF INSULIN (HCC): Primary | ICD-10-CM

## 2023-01-10 DIAGNOSIS — E78.00 HYPERCHOLESTEROLEMIA: ICD-10-CM

## 2023-01-10 DIAGNOSIS — E11.42 PERIPHERAL SENSORY NEUROPATHY DUE TO TYPE 2 DIABETES MELLITUS (HCC): ICD-10-CM

## 2023-01-10 DIAGNOSIS — E78.1 HYPERTRIGLYCERIDEMIA: ICD-10-CM

## 2023-01-10 DIAGNOSIS — E11.65 TYPE 2 DIABETES MELLITUS WITH HYPERGLYCEMIA, WITH LONG-TERM CURRENT USE OF INSULIN (HCC): Primary | ICD-10-CM

## 2023-01-10 PROCEDURE — 4004F PT TOBACCO SCREEN RCVD TLK: CPT | Performed by: PHYSICIAN ASSISTANT

## 2023-01-10 PROCEDURE — G8484 FLU IMMUNIZE NO ADMIN: HCPCS | Performed by: PHYSICIAN ASSISTANT

## 2023-01-10 PROCEDURE — 3046F HEMOGLOBIN A1C LEVEL >9.0%: CPT | Performed by: PHYSICIAN ASSISTANT

## 2023-01-10 PROCEDURE — 2022F DILAT RTA XM EVC RTNOPTHY: CPT | Performed by: PHYSICIAN ASSISTANT

## 2023-01-10 PROCEDURE — 3017F COLORECTAL CA SCREEN DOC REV: CPT | Performed by: PHYSICIAN ASSISTANT

## 2023-01-10 PROCEDURE — 99214 OFFICE O/P EST MOD 30 MIN: CPT | Performed by: PHYSICIAN ASSISTANT

## 2023-01-10 PROCEDURE — G8427 DOCREV CUR MEDS BY ELIG CLIN: HCPCS | Performed by: PHYSICIAN ASSISTANT

## 2023-01-10 PROCEDURE — G8420 CALC BMI NORM PARAMETERS: HCPCS | Performed by: PHYSICIAN ASSISTANT

## 2023-01-10 RX ORDER — DULOXETIN HYDROCHLORIDE 30 MG/1
CAPSULE, DELAYED RELEASE ORAL
COMMUNITY
Start: 2022-11-11

## 2023-01-10 RX ORDER — INSULIN LISPRO 100 [IU]/ML
INJECTION, SOLUTION INTRAVENOUS; SUBCUTANEOUS
Qty: 45 ML | Refills: 3 | Status: SHIPPED | OUTPATIENT
Start: 2023-01-10

## 2023-01-10 NOTE — PROGRESS NOTES
[facility]   [facility]   [facility]ROID [facility]IN[facility]    Ozzy Rascon PA-C  [facility]  [address], [address]  [place]  Phone [phone]  Facsimile [phone]          Monroe Marroquin is a 65 y.o. male seen 1/10/2023 for follow up evaluation of type 2 diabetes        Assessment and Plan:    In office COVID-19 PPE worn and precautions taken    1. Type 2 diabetes mellitus with hyperglycemia, with long-term current use of insulin ([address]  Patient with type 2 diabetes exhibiting vastly improved glycemic control with compliance to 25 units of Levemir twice daily and prandial insulin plus correction. Patient is fearful of using prandial insulin when his blood sugar is in range due to resulting hypoglycemia. Blood glucose data limited. We will reduce prandial insulin from 10 units to 5 units June and encourage good compliance. I have given patient a 1 per 25 greater than 150 correction and lieu of his 2 per 50 greater than 150 correction. He was encouraged to use this at the 4 before's. Patient was given shot sheets to help guide treatment regimen. Patient denies any overnight hypoglycemia but I suspect he remains over basal lysed and we will consider down titrating basal insulin as indicated. Focusing on taking prandial insulin appropriately is our current goal.    Patient's progress was applauded and encouraged. He appears motivated to improve but overwhelmed    The beneficiary requires a therapeutic CGM for treatment and management of diabetes mellitus. The beneficiary has been using a home blood glucose monitor and performing multiple daily blood glucose testing. The beneficiary is insulin treated with 3 or more daily injections of insulin or a continuous subcutaneous insulin infusion pump.   The beneficiary's insulin treatment regimen requires frequent adjustments by the beneficiary on the basis of therapeutic CGM testing results.    - HUMALOG KWIKPEN 100 UNIT/ML SOPN; 5 units with meals plus 1 units per 25 >150 AC/HS (up to 50 units per day),  Dispense: 45 mL; Refill: 3    2. Hypercholesterolemia  Would benefit from future fasting labs, defer to PCP    3. Hypertriglyceridemia  Would benefit from future fasting labs, defer to PCP    4. 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 [facility])  Home foot care discussed          Kelly Sawyer was seen today for diabetes. Diagnoses and all orders for this visit:    Type 2 diabetes mellitus with hyperglycemia, with long-term current use of insulin (HCC)  -     HUMALOG KWIKPEN 100 UNIT/ML SOPN; 5 units with meals plus 1 units per 25 >150 AC/HS (up to 50 units per day),    Hypercholesterolemia    Hypertriglyceridemia    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 ([address]          History of Present Illness:        1/10/2023   Interim diabetes HPI:    Doing better. Taking insulin as prescribed. Fearful of taking insulin in glucose is in range    Interim medical history changes:     Lifestyle Update:  Improving appetite     Current Regimen: januvia 50 mg, Levemir 25 units BID, admelog 10 units TIDAC plus correction 1/25>150    Glucose data:    Home blood glucose monitoring frequency: 6 times per day    By recall     Typical Standard Deviation   Fasting ~140    AC lunch ~160    AC supper ~140    Bedtime High 100      Blood glucose levels are uncontrolled, most significant elevations are post prandial    Failed past therapies:     Relevant co morbidities:    Denies HX pancreatitis, DKA, gastroparesis, foot ulcer    Optho:    Last eye exam was end 2021 and demonstrated no diabetic retinopathy. [facility]  care specialist is [facility]. Obesity:         Body mass index is 23.75 kg/m².        increasing steadily with insulin compliance      Wt Readings from Last 3 Encounters:   01/10/23 180 lb (81.6 kg)   08/31/22 167 lb (75.8 kg)   05/06/22 170 lb 6.4 oz (77.3 kg) CardioVascular:    None     Renal:    Under care of nephro? no    On ARB/ACE-I  losartan - 25 MG     5/8/2019: Serum creatinine 1.03. Urine microalbumin unknown. 05/06/2022      Cr 0.69, , microalbumin/Cr ratio 47       Lipids:     Current therapy atorvastatin - 80 MG with good compliance   9/7/2018: Total cholesterol 211, triglycerides 352, HDL 36, . No results found for: CHOL  No results found for: LDLCHOLESTEROL, LDLCALC No results found for: LABVLDL, VLDL No results found for: HDL No results found for: HDL No results found for: TRIG  No results found for: CHOLHDLRATIO      Hemoglobin A1c:  3/10/2016: 12.8%.   /24/2017: 9.4%. 7/27/2017: 10.2%. 5/4/2018: 10.9%. 9/7/2018: 14.3%.   12/31/2018: 12.0%.   3/21/2019: 13.2%. 9/30/2019: Greater than 15.5%. 3/6/2020: Greater than 14%. 03/09/2021: 14.1%   05/06/2022: 9.5%  08/31/2022: 11.1%   Dec 2022 8.7% per insurance eval  Hemoglobin A1C, POC   Date Value Ref Range Status   08/31/2022 11.1 % Final   05/06/2022 9.5 % Final        Thyroid:                     5/4/2018: TSH 2.125.                                  05/06/2022      1.850  Lab Results   Component Value Date/Time    TSH 1.850 05/06/2022 03:23 PM             Allergies & Medications:  Reviewed in chart. Review of Systems    Vital Signs:  BP (!) 140/86 (Site: Left Upper Arm, Position: Sitting)   Pulse 93   Wt 180 lb (81.6 kg)   SpO2 98%   BMI 23.75 kg/m²       Physical Exam  Constitutional:       Appearance: Normal appearance. Neck:      Thyroid: No thyromegaly. Cardiovascular:      Rate and Rhythm: Normal rate and regular rhythm. Pulmonary:      Effort: Pulmonary effort is normal.      Breath sounds: Normal breath sounds. Abdominal:      Palpations: Abdomen is soft. Feet:      Right foot:      Protective Sensation: 3 sites tested. 3 sites sensed. Left foot:      Protective Sensation: 3 sites tested. 3 sites sensed. Lymphadenopathy:      Cervical: No cervical adenopathy. Skin:     General: Skin is warm and dry. Neurological:      General: No focal deficit present. Mental Status: He is alert. Gait: Gait abnormal.      Comments: Ataxic gait, ambulates with cane   Psychiatric:         Mood and Affect: Mood normal.         Behavior: Behavior normal.         Thought Content: Thought content normal.         Judgment: Judgment normal.           Return in about 3 months (around 4/10/2023) for Diabetes DM2 Follow-Up. Portions of this note were generated with the assistance of voice recogniton software. As such, some errors in transcription may be present.

## 2023-03-13 ENCOUNTER — OFFICE VISIT (OUTPATIENT)
Dept: UROLOGY | Age: 58
End: 2023-03-13
Payer: MEDICARE

## 2023-03-13 ENCOUNTER — TELEPHONE (OUTPATIENT)
Dept: UROLOGY | Age: 58
End: 2023-03-13

## 2023-03-13 DIAGNOSIS — N48.6 PEYRONIE'S DISEASE: ICD-10-CM

## 2023-03-13 DIAGNOSIS — N40.1 BENIGN PROSTATIC HYPERPLASIA WITH LOWER URINARY TRACT SYMPTOMS, SYMPTOM DETAILS UNSPECIFIED: Primary | ICD-10-CM

## 2023-03-13 LAB
BILIRUBIN, URINE, POC: NEGATIVE
BLOOD URINE, POC: NORMAL
GLUCOSE URINE, POC: 500
KETONES, URINE, POC: NORMAL
LEUKOCYTE ESTERASE, URINE, POC: NEGATIVE
NITRITE, URINE, POC: NEGATIVE
PH, URINE, POC: 5.5 (ref 4.6–8)
PROTEIN,URINE, POC: NEGATIVE
PSA SERPL-MCNC: 0.3 NG/ML
PVR, POC: 5 CC
SPECIFIC GRAVITY, URINE, POC: 1.02 (ref 1–1.03)
URINALYSIS CLARITY, POC: NORMAL
URINALYSIS COLOR, POC: NORMAL
UROBILINOGEN, POC: NORMAL

## 2023-03-13 PROCEDURE — 3017F COLORECTAL CA SCREEN DOC REV: CPT | Performed by: UROLOGY

## 2023-03-13 PROCEDURE — G8427 DOCREV CUR MEDS BY ELIG CLIN: HCPCS | Performed by: UROLOGY

## 2023-03-13 PROCEDURE — 99204 OFFICE O/P NEW MOD 45 MIN: CPT | Performed by: UROLOGY

## 2023-03-13 PROCEDURE — G8420 CALC BMI NORM PARAMETERS: HCPCS | Performed by: UROLOGY

## 2023-03-13 PROCEDURE — G8484 FLU IMMUNIZE NO ADMIN: HCPCS | Performed by: UROLOGY

## 2023-03-13 PROCEDURE — 81003 URINALYSIS AUTO W/O SCOPE: CPT | Performed by: UROLOGY

## 2023-03-13 PROCEDURE — 4004F PT TOBACCO SCREEN RCVD TLK: CPT | Performed by: UROLOGY

## 2023-03-13 PROCEDURE — 51798 US URINE CAPACITY MEASURE: CPT | Performed by: UROLOGY

## 2023-03-13 ASSESSMENT — ENCOUNTER SYMPTOMS
BACK PAIN: 0
VOMITING: 1
HEARTBURN: 1
COUGH: 1
ABDOMINAL PAIN: 1
EYE PAIN: 0
BLOOD IN STOOL: 0
WHEEZING: 0
NAUSEA: 1
DIARRHEA: 1
SHORTNESS OF BREATH: 0
SKIN LESIONS: 0
INDIGESTION: 1
EYE DISCHARGE: 0
CONSTIPATION: 1

## 2023-03-13 NOTE — PROGRESS NOTES
[facility]  [address] [place] [address] [address], [address] [facility]  [phone]          Aron Freitas  : 1965    Chief Complaint   Patient presents with    New Patient    Benign Prostatic Hypertrophy          HPI     Aron Freitas is a 65 y.o.  male with a PMH of BPH/LUTS and peyronie's disease who is referred to clinic for evaluation. He reports bothersome weak stream, nocturia 5-6, difficulty emptying, urgency, frequency dysuria. He started flomax 0.4 mg QHS with his PCP and has noticed NO improvement. He is very bothered by his symptoms. No hematuria. No retention. No Utis. No urinary incontinence. No history of  surgeries. Has not tried other treatment. As for his peyronie's disease, he reports that he can achieve erections with difficulty and states that they are painful with L 30 degree lateral curvature. Denies any penile trauma/ injury. Denies known cause. States curve is painful and actively changing still. Has been present about 1 year. Of note, he does have a PMH of DM and HIV and is trying to get medical conditions under better control.   Has not had PSA    PVR: 5 cc     IPSS: 32     Lab Results   Component Value Date    PSA 0.3 2023       Past Medical History:   Diagnosis Date    GERD (gastroesophageal reflux disease)     Human immunodeficiency virus ([facility] [address] 2017    Hypercholesterolemia     Hypertriglyceridemia     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 (HCC)     Type 2 diabetes mellitus (HCC)      Past Surgical History:   Procedure Laterality Date    APPENDECTOMY      CHOLECYSTECTOMY       Current Outpatient Medications   Medication Sig Dispense Refill    DULoxetine (CYMBALTA) 30 MG extended release capsule TAKE ONE CAPSULE BY MOUTH EVERY DAY      HUMALOG KWIKPEN 100 UNIT/ML SOPN 5 units with meals plus 1 units per 25 >150 AC/HS (up to 50 units per day), 45 mL 3    buPROPion (WELLBUTRIN XL) 150 MG extended release tablet       Insulin Pen Needle (BD PEN NEEDLE LUCILLE 2ND GEN) 32G X 4 MM MISC 5 insulin injections per day. Dx E11.65 500 each 3    blood glucose test strips (ASCENSIA AUTODISC VI;ONE TOUCH ULTRA TEST VI) strip Check blood glucose 6 times per day. Dx E11.65 600 each 3    JANUVIA 50 MG tablet Take 1 tablet by mouth daily E11.65 90 tablet 3    LEVEMIR FLEXTOUCH 100 UNIT/ML injection pen Inject 25 Units into the skin 2 times daily 45 mL 3    OneTouch Delica Lancets 10T MISC Check blood glucose 6 times per day. Dx E11.65      atorvastatin (LIPITOR) 80 MG tablet Take 1 tablet by mouth daily      diclofenac (CATAFLAM) 50 MG tablet Take 50 mg by mouth as needed Three times a day      tamsulosin (FLOMAX) 0.4 mg capsule Take 1 capsule by mouth daily      Abacavir-Dolutegravir-Lamivud (TRIUMEQ) 600- MG TABS TAKE ONE TABLET BY MOUTH EVERY DAY Orally Once a day for 30 day(s)      famotidine (PEPCID) 20 MG tablet Take 20 mg by mouth 2 times daily      gabapentin (NEURONTIN) 800 MG tablet Take 800 mg by mouth daily. losartan (COZAAR) 25 MG tablet 1 tablet Orally Once a day for 90 days      Omega-3 1000 MG CAPS Take 1 capsule by mouth daily (Patient not taking: Reported on 3/13/2023)       No current facility-administered medications for this visit.      No Known Allergies  Social History     Socioeconomic History    Marital status: Single     Spouse name: Not on file    Number of children: Not on file    Years of education: Not on file    Highest education level: Not on file   Occupational History    Not on file   Tobacco Use    Smoking status: Every Day     Packs/day: 1.00     Types: Cigarettes    Smokeless tobacco: Never   Substance and Sexual Activity    Alcohol use: No    Drug use: No    Sexual activity: Not on file   Other Topics Concern    Not on file   Social History Narrative    Not on file     Social Determinants of Health     Financial Resource Strain: Not on file   Food Insecurity: Not on file Transportation Needs: Not on file   Physical Activity: Not on file   Stress: Not on file   Social Connections: Not on file   Intimate Partner Violence: Not on file   Housing Stability: Not on file     Family History   Problem Relation Age of Onset    Heart Disease Brother     Colon Cancer Father     Hypertension Brother     Stroke Maternal Grandmother     Cancer Maternal Grandmother         brain    Diabetes Paternal Aunt        Review of Systems  Constitutional: Positive for chills, appetite change, malaise/fatigue and headaches. Negative for fever and weight loss. Skin: Positive for itching. Negative for skin lesions and rash. Eyes:  Negative for visual disturbance, eye pain and eye discharge. ENT: Positive for difficulty articulating words, pain swallowing and dry mouth. Negative for high frequency hearing loss. Respiratory: Positive for cough. Negative for blood in sputum, shortness of breath and wheezing. Cardiovascular:  Negative for chest pain, hypertension, irregular heartbeat, leg pain, leg swelling, regular rate and rhythm and varicose veins. GI: Positive for nausea, vomiting, abdominal pain, constipation, diarrhea, indigestion and heartburn. Negative for blood in stool. Genitourinary: Positive for urinary burning, nocturia, slower stream, straining, urgency, leakage w/ urge, frequent urination, incomplete emptying, erectile dysfunction and testicular pain. Negative for hematuria, flank pain, recurrent UTIs, history of urolithiasis, sexually transmitted disease, discharge and urethral stricture. Musculoskeletal: Positive for tenderness, muscle weakness and neck pain. Negative for back pain, bone pain and arthralgias. Neurological: Positive for dizziness, focal weakness and numbness. Negative for seizures and tremors. Psychological: Positive for depression. Negative for psychiatric problem. Endocrine: Positive for cold intolerance, thirst, excessive urination and fatigue.  Negative for heat intolerance. Hem/Lymphatic: Positive for easy bruising. Negative for easy bleeding and frequent infections. Urinalysis  UA - Dipstick  Results for orders placed or performed in visit on 03/13/23   AMB POC URINALYSIS DIP STICK AUTO W/O MICRO   Result Value Ref Range    Color (UA POC)      Clarity (UA POC)      Glucose, Urine,  Negative    Bilirubin, Urine, POC Negative Negative    KETONES, Urine, POC Trace Negative    Specific Gravity, Urine, POC 1.020 1.001 - 1.035    Blood (UA POC) Trace-intact Negative    pH, Urine, POC 5.5 4.6 - 8.0    Protein, Urine, POC Negative Negative    Urobilinogen, POC 0.2 mg/dL     Nitrite, Urine, POC Negative Negative    Leukocyte Esterase, Urine, POC Negative Negative       UA - Micro  WBC - 0  RBC - 0  Bacteria - 0  Epith - 0    There were no vitals taken for this visit. GENERAL: No acute distress, Awake, Alert, Oriented X 3, Gait normal  HEENT: Trachea midline, No gross visual or auditory impairments  CARDIAC: regular rate and rhythm  CHEST AND LUNG: Easy work of breathing, clear to auscultation bilaterally, no cyanosis  ABDOMEN: soft, non tender, non-distended, positive bowel sounds, no organomegaly, no palpable masses, no guarding, no rebound tenderness  : Circumcised phallus with peyronie's plaque at lateral left mid-shaft abnormality, Testicles descended in scrotum bilaterally and without palpable mass/nodule, non-tender, no edema, no skin erythema, vas deferens palpable bilaterally, no hydrocele, no inguinal hernias, no inguinal lymphadenopathy  JORGE LUIS: 20 gram symmetric, smooth without nodules  SKIN: No rash, no erythema, no lacerations or abrasions, no ecchymosis  NEUROLOGIC: cranial nerves 2-12 grossly intact       Assessment and Plan    ICD-10-CM    1.  Benign prostatic hyperplasia with lower urinary tract symptoms, symptom details unspecified  N40.1 AMB POC URINALYSIS DIP STICK AUTO W/O MICRO     AMB POC PVR, KOBY,POST-VOID RES,US,NON-IMAGING     PSA, Diagnostic     PSA, Diagnostic      2. Peyronie's disease  N48.6 AMB POC URINALYSIS DIP STICK AUTO W/O MICRO        LUTS:     We had a long discussion of LUTS today and whether they are due to TRUONG vs. Overactive bladder. He has had NO response to flomax and relatively normal size prostate on JORGE LUIS so we discussed that I think cystoscopy may be the best option to rule out urethral stricture and evaluate for other possible source of his LUTS besides BPH. This will let me know how to better direct his care / treatment. Peyronie's disease:  I discussed the patho-physiology of Peyronie's with pt. today. We then discussed treatment options including doing nothing, NSAID therapy, Xiaflex plaque injection, plication, and excision and grafting procedures. Chances of success and risks of each were discussed. Patient has decided to pursue no treatment at this time, as he is in the active phase of the disease process. I have spent 45 minutes today reviewing previous notes, test results and face to face with the patient as well as documenting. Everardo Mancia M.D.     [facility]  [place] [address] [address] [facility]  Phone: [phone]  Fax: [phone]

## 2023-03-30 ENCOUNTER — PROCEDURE VISIT (OUTPATIENT)
Dept: UROLOGY | Age: 58
End: 2023-03-30
Payer: MEDICARE

## 2023-03-30 DIAGNOSIS — N40.1 BENIGN PROSTATIC HYPERPLASIA WITH LOWER URINARY TRACT SYMPTOMS, SYMPTOM DETAILS UNSPECIFIED: Primary | ICD-10-CM

## 2023-03-30 DIAGNOSIS — N39.41 URGE URINARY INCONTINENCE: ICD-10-CM

## 2023-03-30 DIAGNOSIS — R39.9 LOWER URINARY TRACT SYMPTOMS: ICD-10-CM

## 2023-03-30 LAB
BILIRUBIN, URINE, POC: NEGATIVE
BLOOD URINE, POC: NORMAL
GLUCOSE URINE, POC: >=1000
KETONES, URINE, POC: NEGATIVE
LEUKOCYTE ESTERASE, URINE, POC: NEGATIVE
NITRITE, URINE, POC: NEGATIVE
PH, URINE, POC: 5.5 (ref 4.6–8)
PROTEIN,URINE, POC: NEGATIVE
SPECIFIC GRAVITY, URINE, POC: 1 (ref 1–1.03)
URINALYSIS CLARITY, POC: NORMAL
URINALYSIS COLOR, POC: NORMAL
UROBILINOGEN, POC: NORMAL

## 2023-03-30 PROCEDURE — G8484 FLU IMMUNIZE NO ADMIN: HCPCS | Performed by: UROLOGY

## 2023-03-30 PROCEDURE — G8427 DOCREV CUR MEDS BY ELIG CLIN: HCPCS | Performed by: UROLOGY

## 2023-03-30 PROCEDURE — 99214 OFFICE O/P EST MOD 30 MIN: CPT | Performed by: UROLOGY

## 2023-03-30 PROCEDURE — G8420 CALC BMI NORM PARAMETERS: HCPCS | Performed by: UROLOGY

## 2023-03-30 PROCEDURE — 52000 CYSTOURETHROSCOPY: CPT | Performed by: UROLOGY

## 2023-03-30 PROCEDURE — 3017F COLORECTAL CA SCREEN DOC REV: CPT | Performed by: UROLOGY

## 2023-03-30 PROCEDURE — 4004F PT TOBACCO SCREEN RCVD TLK: CPT | Performed by: UROLOGY

## 2023-03-30 PROCEDURE — 81003 URINALYSIS AUTO W/O SCOPE: CPT | Performed by: UROLOGY

## 2023-03-30 RX ORDER — OXYBUTYNIN CHLORIDE 10 MG/1
10 TABLET, EXTENDED RELEASE ORAL DAILY
Qty: 30 TABLET | Refills: 3 | Status: SHIPPED | OUTPATIENT
Start: 2023-03-30

## 2023-03-30 NOTE — PROGRESS NOTES
System  [address] [facility], [address]  Phone: [phone]  Fax: [phone]

## 2023-05-04 DIAGNOSIS — E11.65 TYPE 2 DIABETES MELLITUS WITH HYPERGLYCEMIA (HCC): ICD-10-CM

## 2023-05-04 RX ORDER — SITAGLIPTIN 50 MG/1
TABLET, FILM COATED ORAL
Qty: 90 TABLET | Refills: 3 | OUTPATIENT
Start: 2023-05-04

## 2023-05-11 ENCOUNTER — OFFICE VISIT (OUTPATIENT)
Dept: ENDOCRINOLOGY | Age: 58
End: 2023-05-11
Payer: MEDICARE

## 2023-05-11 VITALS
DIASTOLIC BLOOD PRESSURE: 81 MMHG | BODY MASS INDEX: 23.22 KG/M2 | HEART RATE: 92 BPM | OXYGEN SATURATION: 97 % | WEIGHT: 176 LBS | SYSTOLIC BLOOD PRESSURE: 137 MMHG

## 2023-05-11 DIAGNOSIS — E11.42 PERIPHERAL SENSORY NEUROPATHY DUE TO TYPE 2 DIABETES MELLITUS (HCC): ICD-10-CM

## 2023-05-11 DIAGNOSIS — Z79.4 TYPE 2 DIABETES MELLITUS WITH HYPERGLYCEMIA, WITH LONG-TERM CURRENT USE OF INSULIN (HCC): Primary | ICD-10-CM

## 2023-05-11 DIAGNOSIS — K59.09 OTHER CONSTIPATION: ICD-10-CM

## 2023-05-11 DIAGNOSIS — E11.65 TYPE 2 DIABETES MELLITUS WITH HYPERGLYCEMIA, WITH LONG-TERM CURRENT USE OF INSULIN (HCC): Primary | ICD-10-CM

## 2023-05-11 DIAGNOSIS — R13.10 DYSPHAGIA, UNSPECIFIED TYPE: ICD-10-CM

## 2023-05-11 LAB
GLUCOSE, POC: 264 MG/DL
HBA1C MFR BLD: 13.5 %

## 2023-05-11 PROCEDURE — 99214 OFFICE O/P EST MOD 30 MIN: CPT | Performed by: PHYSICIAN ASSISTANT

## 2023-05-11 PROCEDURE — 3046F HEMOGLOBIN A1C LEVEL >9.0%: CPT | Performed by: PHYSICIAN ASSISTANT

## 2023-05-11 PROCEDURE — 4004F PT TOBACCO SCREEN RCVD TLK: CPT | Performed by: PHYSICIAN ASSISTANT

## 2023-05-11 PROCEDURE — 82962 GLUCOSE BLOOD TEST: CPT | Performed by: PHYSICIAN ASSISTANT

## 2023-05-11 PROCEDURE — G8427 DOCREV CUR MEDS BY ELIG CLIN: HCPCS | Performed by: PHYSICIAN ASSISTANT

## 2023-05-11 PROCEDURE — 2022F DILAT RTA XM EVC RTNOPTHY: CPT | Performed by: PHYSICIAN ASSISTANT

## 2023-05-11 PROCEDURE — 3017F COLORECTAL CA SCREEN DOC REV: CPT | Performed by: PHYSICIAN ASSISTANT

## 2023-05-11 PROCEDURE — G8420 CALC BMI NORM PARAMETERS: HCPCS | Performed by: PHYSICIAN ASSISTANT

## 2023-05-11 PROCEDURE — 83036 HEMOGLOBIN GLYCOSYLATED A1C: CPT | Performed by: PHYSICIAN ASSISTANT

## 2023-05-11 PROCEDURE — 95251 CONT GLUC MNTR ANALYSIS I&R: CPT | Performed by: PHYSICIAN ASSISTANT

## 2023-05-11 RX ORDER — INSULIN LISPRO 100 [IU]/ML
INJECTION, SOLUTION INTRAVENOUS; SUBCUTANEOUS
Qty: 45 ML | Refills: 3 | Status: SHIPPED | OUTPATIENT
Start: 2023-05-11

## 2023-05-11 RX ORDER — INSULIN DETEMIR 100 [IU]/ML
30 INJECTION, SOLUTION SUBCUTANEOUS 2 TIMES DAILY
Qty: 45 ML | Refills: 3 | Status: SHIPPED | OUTPATIENT
Start: 2023-05-11

## 2023-05-11 RX ORDER — GLUCAGON INJECTION, SOLUTION 1 MG/.2ML
1 INJECTION, SOLUTION SUBCUTANEOUS PRN
Qty: 2 EACH | Refills: 1 | Status: SHIPPED | OUTPATIENT
Start: 2023-05-11

## 2023-05-11 ASSESSMENT — ENCOUNTER SYMPTOMS
DIARRHEA: 1
NAUSEA: 1
VOICE CHANGE: 1
VOMITING: 1
CONSTIPATION: 1
TROUBLE SWALLOWING: 1

## 2023-05-11 NOTE — PROGRESS NOTES
PCP, have a low threshold to seek further evaluation and treatment for this foot wound if condition changes    Blood glucose 264 during today's office visit at 1:52 PM      - AMB POC HEMOGLOBIN A1C  - AMB POC GLUCOSE BLOOD, BY GLUCOSE MONITORING DEVICE  - [address] [facility]  - HM DIABETES FOOT EXAM  - ID CONTINUOUS GLUCOSE MONITORING ANALYSIS I&R  - HUMALOG KWIKPEN 100 UNIT/ML SOPN; 5 units AC snacks,10 units AC meals plus 1 units per 25 >150 AC/HS (up to 50 units per day),  Dispense: 45 mL; Refill: 3  - LEVEMIR FLEXTOUCH 100 UNIT/ML injection pen; Inject 30 Units into the skin 2 times daily  Dispense: 45 mL; Refill: 3      2. 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 ([address]    He is having some GI issues with severe constipation lasting days to weeks. He was unable to do the colon prep that was required of him. We will refer to local GI for further evaluation and treatment I am suspicious he may have autonomic neuropathy and potentially gastroparesis  - Arie Olson MD, Gastroenterology, [place]    3. Other constipation    He is having some GI issues with severe constipation lasting days to weeks. He was unable to do the colon prep that was required of him. We will refer to local GI for further evaluation and treatment I am suspicious he may have autonomic neuropathy and potentially gastroparesis  - Arie Olson MD, Gastroenterology, [place]    4. Dysphagia, unspecified type  Reports dysphagia of pills, liquids, solids. Refer to ENT for further evaluation  - Abelardo Mejia MD, Otolaryngology, [place] was seen today for diabetes.     Diagnoses and all orders for this visit:    Type 2 diabetes mellitus with hyperglycemia, with long-term current use of insulin ([facility])  -     AMB POC HEMOGLOBIN A1C  -     AMB POC GLUCOSE BLOOD, BY GLUCOSE MONITORING DEVICE  -     Cancel: AMB POC GLUCOSE BLOOD, BY GLUCOSE MONITORING DEVICE  -     [address]

## 2023-05-25 ENCOUNTER — FOLLOWUP TELEPHONE ENCOUNTER (OUTPATIENT)
Dept: DIABETES SERVICES | Age: 58
End: 2023-05-25

## 2023-05-31 ENCOUNTER — OFFICE VISIT (OUTPATIENT)
Dept: ENT CLINIC | Age: 58
End: 2023-05-31
Payer: MEDICARE

## 2023-05-31 VITALS — WEIGHT: 175 LBS | HEIGHT: 73 IN | BODY MASS INDEX: 23.19 KG/M2

## 2023-05-31 DIAGNOSIS — R13.10 DYSPHAGIA, UNSPECIFIED TYPE: Primary | ICD-10-CM

## 2023-05-31 PROCEDURE — 4004F PT TOBACCO SCREEN RCVD TLK: CPT | Performed by: OTOLARYNGOLOGY

## 2023-05-31 PROCEDURE — 31575 DIAGNOSTIC LARYNGOSCOPY: CPT | Performed by: OTOLARYNGOLOGY

## 2023-05-31 PROCEDURE — 3017F COLORECTAL CA SCREEN DOC REV: CPT | Performed by: OTOLARYNGOLOGY

## 2023-05-31 PROCEDURE — G8428 CUR MEDS NOT DOCUMENT: HCPCS | Performed by: OTOLARYNGOLOGY

## 2023-05-31 PROCEDURE — 99204 OFFICE O/P NEW MOD 45 MIN: CPT | Performed by: OTOLARYNGOLOGY

## 2023-05-31 PROCEDURE — G8420 CALC BMI NORM PARAMETERS: HCPCS | Performed by: OTOLARYNGOLOGY

## 2023-05-31 RX ORDER — ABACAVIR/LAMIVUDINE/ZIDOVUDINE 150-300 MG
1 TABLET ORAL
COMMUNITY

## 2023-05-31 RX ORDER — FAMOTIDINE 40 MG/1
40 TABLET, FILM COATED ORAL 2 TIMES DAILY
COMMUNITY
Start: 2023-05-03

## 2023-05-31 RX ORDER — DULOXETIN HYDROCHLORIDE 60 MG/1
60 CAPSULE, DELAYED RELEASE ORAL EVERY MORNING
COMMUNITY
Start: 2023-04-05

## 2023-05-31 RX ORDER — BUPROPION HYDROCHLORIDE 300 MG/1
300 TABLET ORAL DAILY
COMMUNITY
Start: 2023-05-08

## 2023-05-31 ASSESSMENT — ENCOUNTER SYMPTOMS
VOMITING: 0
EYE PAIN: 0
COUGH: 0
WHEEZING: 0
COLOR CHANGE: 0
DIARRHEA: 0
TROUBLE SWALLOWING: 1

## 2023-05-31 NOTE — PROGRESS NOTES
injection pen Inject 30 Units into the skin 2 times daily    oxybutynin (DITROPAN XL) 10 MG extended release tablet Take 1 tablet by mouth daily    DULoxetine (CYMBALTA) 30 MG extended release capsule TAKE ONE CAPSULE BY MOUTH EVERY DAY    buPROPion (WELLBUTRIN XL) 150 MG extended release tablet     Insulin Pen Needle (BD PEN NEEDLE LUCILLE 2ND GEN) 32G X 4 MM MISC 5 insulin injections per day. Dx E11.65    blood glucose test strips (ASCENSIA AUTODISC VI;ONE TOUCH ULTRA TEST VI) strip Check blood glucose 6 times per day. Dx E11.65    JANUVIA 50 MG tablet Take 1 tablet by mouth daily V94.88    OneTouch Delica Lancets 29C MISC Check blood glucose 6 times per day. Dx E11.65    Omega-3 1000 MG CAPS Take 1 capsule by mouth daily    atorvastatin (LIPITOR) 80 MG tablet Take 1 tablet by mouth daily    diclofenac (CATAFLAM) 50 MG tablet Take 1 tablet by mouth as needed Three times a day    tamsulosin (FLOMAX) 0.4 mg capsule Take 1 capsule by mouth daily    Abacavir-Dolutegravir-Lamivud (TRIUMEQ) 600- MG TABS TAKE ONE TABLET BY MOUTH EVERY DAY Orally Once a day for 30 day(s)    famotidine (PEPCID) 20 MG tablet Take 1 tablet by mouth 2 times daily    gabapentin (NEURONTIN) 800 MG tablet Take 1 tablet by mouth daily. losartan (COZAAR) 25 MG tablet 1 tablet Orally Once a day for 90 days     No current facility-administered medications for this visit.      Past Medical History:   Diagnosis Date    GERD (gastroesophageal reflux disease)     Human immunodeficiency virus ([facility] 2017    Hypercholesterolemia     Hypertriglyceridemia     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 ([facility]     Type 2 diabetes mellitus (HCC)      Social History     Tobacco Use    Smoking status: Every Day     Packs/day: 1.00     Types: Cigarettes    Smokeless tobacco: Never   Substance Use Topics    Alcohol use: No     Past Surgical History:   Procedure Laterality Date    APPENDECTOMY      CHOLECYSTECTOMY       Family History

## 2023-06-28 ENCOUNTER — FOLLOWUP TELEPHONE ENCOUNTER (OUTPATIENT)
Dept: DIABETES SERVICES | Age: 58
End: 2023-06-28

## 2023-07-25 DIAGNOSIS — N39.41 URGE URINARY INCONTINENCE: ICD-10-CM

## 2023-07-25 DIAGNOSIS — E11.65 TYPE 2 DIABETES MELLITUS WITH HYPERGLYCEMIA (HCC): ICD-10-CM

## 2023-07-25 RX ORDER — SITAGLIPTIN 50 MG/1
TABLET, FILM COATED ORAL
Qty: 90 TABLET | Refills: 3 | OUTPATIENT
Start: 2023-07-25

## 2023-07-25 RX ORDER — BLOOD SUGAR DIAGNOSTIC
STRIP MISCELLANEOUS
Qty: 600 STRIP | Refills: 3 | OUTPATIENT
Start: 2023-07-25

## 2023-07-25 RX ORDER — OXYBUTYNIN CHLORIDE 10 MG/1
TABLET, EXTENDED RELEASE ORAL
Qty: 30 TABLET | Refills: 3 | Status: SHIPPED | OUTPATIENT
Start: 2023-07-25

## 2023-07-26 ENCOUNTER — PATIENT MESSAGE (OUTPATIENT)
Dept: ENDOCRINOLOGY | Age: 58
End: 2023-07-26

## 2023-07-26 DIAGNOSIS — E11.65 TYPE 2 DIABETES MELLITUS WITH HYPERGLYCEMIA (HCC): ICD-10-CM

## 2023-07-26 RX ORDER — SITAGLIPTIN 50 MG/1
50 TABLET, FILM COATED ORAL DAILY
Qty: 90 TABLET | Refills: 1 | Status: SHIPPED | OUTPATIENT
Start: 2023-07-26

## 2023-07-26 NOTE — TELEPHONE ENCOUNTER
From: Bear Negro  To: Alfa Mckoy  Sent: 7/26/2023 9:23 AM EDT  Subject: Meds     My pharmacy called me told me my refills needed authorization test strips and Januvia 50 mg so can you please send authorization

## 2023-07-26 NOTE — TELEPHONE ENCOUNTER
Parul's patient. Maddie is out of the office until mid-August.    I believe patient is requesting a regular refill request. His last rx was written a year ago.   Last OV: 5/11/23  Next OV: 10/11/23

## 2023-08-18 ENCOUNTER — HOSPITAL ENCOUNTER (OUTPATIENT)
Dept: GENERAL RADIOLOGY | Age: 58
End: 2023-08-18
Attending: OTOLARYNGOLOGY
Payer: MEDICARE

## 2023-08-18 DIAGNOSIS — R13.10 DYSPHAGIA, UNSPECIFIED TYPE: ICD-10-CM

## 2023-08-18 PROCEDURE — 74221 X-RAY XM ESOPHAGUS 2CNTRST: CPT

## 2023-08-18 PROCEDURE — 2500000003 HC RX 250 WO HCPCS: Performed by: OTOLARYNGOLOGY

## 2023-08-18 PROCEDURE — 6370000000 HC RX 637 (ALT 250 FOR IP): Performed by: OTOLARYNGOLOGY

## 2023-08-18 RX ADMIN — BARIUM SULFATE 50 ML: 0.6 SUSPENSION ORAL at 13:43

## 2023-08-18 RX ADMIN — BARIUM SULFATE 1 TABLET: 700 TABLET ORAL at 13:43

## 2023-08-18 RX ADMIN — BARIUM SULFATE 140 ML: 980 POWDER, FOR SUSPENSION ORAL at 13:43

## 2023-08-18 RX ADMIN — ANTACID/ANTIFLATULENT 1 EACH: 380; 550; 10; 10 GRANULE, EFFERVESCENT ORAL at 13:43

## 2023-08-22 ENCOUNTER — TELEPHONE (OUTPATIENT)
Dept: ENT CLINIC | Age: 58
End: 2023-08-22

## 2023-08-22 RX ORDER — LANCETS 33 GAUGE
EACH MISCELLANEOUS
Qty: 600 EACH | Refills: 3 | OUTPATIENT
Start: 2023-08-22

## 2023-08-22 NOTE — TELEPHONE ENCOUNTER
Called patient with results of barium swallow study. There was mild reflux and esophageal dysmotility, but no concerning strictures or other lesions. We discussed the possibility of a GI referral for consideration of EGD, but will hold off for now. He will continue to monitor symptoms and also follow conservative measures like slowing down when eating, and taking smaller bites. All of his questions were answered.

## 2023-08-25 ENCOUNTER — PATIENT MESSAGE (OUTPATIENT)
Dept: ENDOCRINOLOGY | Age: 58
End: 2023-08-25

## 2023-08-25 DIAGNOSIS — E11.65 TYPE 2 DIABETES MELLITUS WITH HYPERGLYCEMIA (HCC): ICD-10-CM

## 2023-09-13 RX ORDER — LANCETS 33 GAUGE
EACH MISCELLANEOUS
Qty: 600 EACH | Refills: 3 | Status: SHIPPED | OUTPATIENT
Start: 2023-09-13

## 2023-09-21 ENCOUNTER — HOSPITAL ENCOUNTER (OUTPATIENT)
Dept: WOUND CARE | Age: 58
Discharge: HOME | End: 2023-09-21
Payer: MEDICARE

## 2023-09-21 VITALS
RESPIRATION RATE: 20 BRPM | TEMPERATURE: 97.2 F | SYSTOLIC BLOOD PRESSURE: 160 MMHG | DIASTOLIC BLOOD PRESSURE: 92 MMHG | BODY MASS INDEX: 23.86 KG/M2 | WEIGHT: 180 LBS | HEIGHT: 73 IN | HEART RATE: 83 BPM

## 2023-09-21 DIAGNOSIS — Z79.4 TYPE 2 DIABETES MELLITUS WITH HYPERGLYCEMIA, WITH LONG-TERM CURRENT USE OF INSULIN (HCC): ICD-10-CM

## 2023-09-21 DIAGNOSIS — E11.42 PERIPHERAL SENSORY NEUROPATHY DUE TO TYPE 2 DIABETES MELLITUS (HCC): ICD-10-CM

## 2023-09-21 DIAGNOSIS — E11.621 DIABETIC ULCER OF TOE OF RIGHT FOOT ASSOCIATED WITH TYPE 2 DIABETES MELLITUS, LIMITED TO BREAKDOWN OF SKIN (HCC): ICD-10-CM

## 2023-09-21 DIAGNOSIS — F17.210 CIGARETTE SMOKER: ICD-10-CM

## 2023-09-21 DIAGNOSIS — B20 HUMAN IMMUNODEFICIENCY VIRUS (HCC): ICD-10-CM

## 2023-09-21 DIAGNOSIS — E11.621 DIABETIC ULCER OF TOE OF LEFT FOOT ASSOCIATED WITH TYPE 2 DIABETES MELLITUS, WITH FAT LAYER EXPOSED (HCC): Primary | ICD-10-CM

## 2023-09-21 DIAGNOSIS — E11.65 TYPE 2 DIABETES MELLITUS WITH HYPERGLYCEMIA, WITH LONG-TERM CURRENT USE OF INSULIN (HCC): ICD-10-CM

## 2023-09-21 DIAGNOSIS — L97.522 DIABETIC ULCER OF TOE OF LEFT FOOT ASSOCIATED WITH TYPE 2 DIABETES MELLITUS, WITH FAT LAYER EXPOSED (HCC): Primary | ICD-10-CM

## 2023-09-21 DIAGNOSIS — L97.511 DIABETIC ULCER OF TOE OF RIGHT FOOT ASSOCIATED WITH TYPE 2 DIABETES MELLITUS, LIMITED TO BREAKDOWN OF SKIN (HCC): ICD-10-CM

## 2023-09-21 PROCEDURE — 99203 OFFICE O/P NEW LOW 30 MIN: CPT

## 2023-09-21 PROCEDURE — 11042 DBRDMT SUBQ TIS 1ST 20SQCM/<: CPT

## 2023-09-21 PROCEDURE — 99213 OFFICE O/P EST LOW 20 MIN: CPT

## 2023-09-21 RX ORDER — LIDOCAINE 50 MG/G
OINTMENT TOPICAL ONCE
OUTPATIENT
Start: 2023-09-21 | End: 2023-09-21

## 2023-09-21 RX ORDER — GINSENG 100 MG
CAPSULE ORAL ONCE
OUTPATIENT
Start: 2023-09-21 | End: 2023-09-21

## 2023-09-21 RX ORDER — GENTAMICIN SULFATE 1 MG/G
OINTMENT TOPICAL ONCE
OUTPATIENT
Start: 2023-09-21 | End: 2023-09-21

## 2023-09-21 RX ORDER — LIDOCAINE HYDROCHLORIDE 20 MG/ML
JELLY TOPICAL ONCE
OUTPATIENT
Start: 2023-09-21 | End: 2023-09-21

## 2023-09-21 RX ORDER — LIDOCAINE HYDROCHLORIDE 40 MG/ML
SOLUTION TOPICAL ONCE
OUTPATIENT
Start: 2023-09-21 | End: 2023-09-21

## 2023-09-21 RX ORDER — BACITRACIN ZINC AND POLYMYXIN B SULFATE 500; 1000 [USP'U]/G; [USP'U]/G
OINTMENT TOPICAL ONCE
OUTPATIENT
Start: 2023-09-21 | End: 2023-09-21

## 2023-09-21 RX ORDER — TRIAMCINOLONE ACETONIDE 1 MG/G
OINTMENT TOPICAL ONCE
OUTPATIENT
Start: 2023-09-21 | End: 2023-09-21

## 2023-09-21 RX ORDER — IBUPROFEN 200 MG
TABLET ORAL ONCE
OUTPATIENT
Start: 2023-09-21 | End: 2023-09-21

## 2023-09-21 RX ORDER — LIDOCAINE 40 MG/G
CREAM TOPICAL ONCE
OUTPATIENT
Start: 2023-09-21 | End: 2023-09-21

## 2023-09-21 RX ORDER — SODIUM CHLOR/HYPOCHLOROUS ACID 0.033 %
SOLUTION, IRRIGATION IRRIGATION ONCE
OUTPATIENT
Start: 2023-09-21 | End: 2023-09-21

## 2023-09-21 RX ORDER — BETAMETHASONE DIPROPIONATE 0.05 %
OINTMENT (GRAM) TOPICAL ONCE
OUTPATIENT
Start: 2023-09-21 | End: 2023-09-21

## 2023-09-21 RX ORDER — CLOBETASOL PROPIONATE 0.5 MG/G
OINTMENT TOPICAL ONCE
OUTPATIENT
Start: 2023-09-21 | End: 2023-09-21

## 2023-09-21 RX ORDER — LIDOCAINE HYDROCHLORIDE 20 MG/ML
JELLY TOPICAL ONCE
Status: COMPLETED | OUTPATIENT
Start: 2023-09-21 | End: 2023-09-21

## 2023-09-21 RX ORDER — TRAZODONE HYDROCHLORIDE 50 MG/1
50 TABLET ORAL NIGHTLY
COMMUNITY

## 2023-09-21 RX ADMIN — LIDOCAINE HYDROCHLORIDE: 20 JELLY TOPICAL at 15:28

## 2023-09-21 ASSESSMENT — PAIN DESCRIPTION - DESCRIPTORS: DESCRIPTORS: THROBBING

## 2023-09-21 ASSESSMENT — PAIN DESCRIPTION - LOCATION: LOCATION: TOE (COMMENT WHICH ONE)

## 2023-09-21 ASSESSMENT — PAIN SCALES - GENERAL: PAINLEVEL_OUTOF10: 4

## 2023-09-21 ASSESSMENT — PAIN DESCRIPTION - ORIENTATION: ORIENTATION: RIGHT;LEFT

## 2023-09-21 NOTE — WOUND CARE
Discharge Instructions for  [address]  [facility]  [address], [address]  Phone [phone]   Fax [phone]      NAME:  Debora Mckenzie OF BIRTH:  1965  MEDICAL RECORD NUMBER:  [id number]  DATE:  9/21/2023    Return Appointment:   1 week with Dustin Alba MD      Instructions: Bilateral great toe:  Clean with normal saline or wound cleanser  Apply layer of iodosorb to wounds  Cover with ABD, secure with roll gauze  Change daily and as needed    Please increase dietary protein to at least 60 grams per day to support cell rejuvenation. May use supplements such as Ensure Max, Arya, & Glucerna (samples & coupons provided at first visit). Be sure to spread intake throughout the day for improved absorption. Should you experience increased redness, swelling, pain, foul odor, size of wound(s), or have a temperature over 101 degrees please contact the [address] [place] [address] [address] [place] at [phone] or if after hours contact your primary care physician or go to the hospital emergency department. PLEASE NOTE: IF YOU ARE UNABLE TO OBTAIN WOUND SUPPLIES, CONTINUE TO USE THE SUPPLIES YOU HAVE AVAILABLE UNTIL YOU ARE ABLE TO REACH US. IT IS MOST IMPORTANT TO KEEP THE WOUND COVERED AT ALL TIMES.     Electronically signed Naomi Farr RN on 9/21/2023 at 2:45 PM

## 2023-09-21 NOTE — PROGRESS NOTES
09/21/23 1349   Wound Depth (cm) 0.1 cm 09/21/23 1349   Wound Surface Area (cm^2) 0.5 cm^2 09/21/23 1349   Wound Volume (cm^3) 0.05 cm^3 09/21/23 1349   Post-Procedure Length (cm) 0.5 cm 09/21/23 1349   Post-Procedure Width (cm) 0.1 cm 09/21/23 1349   Post-Procedure Depth (cm) 0.1 cm 09/21/23 1349   Post-Procedure Surface Area (cm^2) 0.05 cm^2 09/21/23 1349   Post-Procedure Volume (cm^3) 0.005 cm^3 09/21/23 1349   Wound Assessment Eschar dry 09/21/23 1349   Drainage Amount None (dry) 09/21/23 1349   Stella-wound Assessment Hyperkeratosis (callous) 09/21/23 1349   Wound Thickness Description not for Pressure Injury Full thickness 09/21/23 1349   Number of days:                       9/21/2023 Initial [facility] visit pre and post debride      Amount and/or Complexity of Data Reviewed and Analyzed:  I reviewed and analyzed all of the unique labs and radiologic studies that are shown below as well as any that are in the HPI, and any that are in the expanded problem list below  *Each unique test, order, or document contributes to the combination of 2 or combination of 3 in Category 1 below. I also independently reviewed radiology images for studies I described above in the HPI or studies I have ordered. For this visit I also reviewed old records and prior notes. No results for input(s): \"WBC\", \"HGB\", \"PLT\", \"NA\", \"K\", \"CL\", \"CO2\", \"BUN\", \"CREA\", \"GLU\", \"INR\", \"APTT\", \"ALT\", \"AML\", \"LCAD\", \"PH\", \"PCO2\", \"PO2\", \"HCO3\" in the last 72 hours. Invalid input(s): \"PTP\", \"TBIL\", \"TBILI\", \"CBIL\", \"SGOT\", \"GPT\", \"AP\", \"LPSE\", \"NH4\", \"TROPT\", \"TROIQ\"  No results for input(s): \"INR\", \"APTT\", \"ALT\", \"AML\" in the last 72 hours.     Invalid input(s): \"PTP\", \"TBIL\", \"TBILI\", \"CBIL\", \"SGOT\", \"GPT\", \"AP\", \"LPSE\"    Most recent blood counts (CBC) review  Lab Results   Component Value Date    WBC 9.4 05/06/2022    HGB 17.1 05/06/2022    HCT 47.9 05/06/2022    MCV 88 05/06/2022     05/06/2022     Most recent chemistry (BMP) test review

## 2023-09-21 NOTE — FLOWSHEET NOTE
09/21/23 1349   Wound 09/21/23 Toe (Comment  which one) Right;Dorsal #1   Date First Assessed/Time First Assessed: 09/21/23 1403   Present on Original Admission: Yes  Wound Approximate Age at First Assessment (Weeks): 6 weeks  Primary Wound Type: Diabetic Ulcer  Location: Toe (Comment  which one)  Wound Location Orientation. .. Wound Image     Wound Etiology Diabetic Gavin 1   Dressing Status Other (Comment)  (open to air)   Wound Cleansed Cleansed with saline   Dressing/Treatment Open to air   Offloading for Diabetic Foot Ulcers Offloading not ordered   Wound Length (cm) 0.6 cm   Wound Width (cm) 0.6 cm   Wound Depth (cm) 0.2 cm   Wound Surface Area (cm^2) 0.36 cm^2   Wound Volume (cm^3) 0.072 cm^3   Post-Procedure Length (cm) 0.2 cm   Post-Procedure Width (cm) 0.2 cm   Post-Procedure Depth (cm) 0.1 cm   Post-Procedure Surface Area (cm^2) 0.04 cm^2   Post-Procedure Volume (cm^3) 0.004 cm^3   Wound Assessment Pink/red   Drainage Amount Small (< 25%)   Drainage Description Serosanguinous   Odor None   Stella-wound Assessment Hyperkeratosis (callous)   Wound Thickness Description not for Pressure Injury Full thickness   Wound 09/21/23 Toe (Comment  which one) Left;Dorsal #2   Date First Assessed/Time First Assessed: 09/21/23 1404   Present on Original Admission: Yes  Wound Approximate Age at First Assessment (Weeks): 6 weeks  Primary Wound Type: Diabetic Ulcer  Location: Toe (Comment  which one)  Wound Location Orientation. ..    Wound Image     Wound Etiology Diabetic Gavin 1   Dressing Status Other (Comment)  (open to air)   Wound Cleansed Cleansed with saline   Dressing/Treatment Open to air   Offloading for Diabetic Foot Ulcers Offloading not ordered   Wound Length (cm) 1.2 cm   Wound Width (cm) 0.6 cm   Wound Depth (cm) 0.8 cm   Wound Surface Area (cm^2) 0.72 cm^2   Wound Volume (cm^3) 0.576 cm^3   Post-Procedure Length (cm) 1.2 cm   Post-Procedure Width (cm) 0.6 cm   Post-Procedure Depth (cm) 0.8 cm

## 2023-10-02 ENCOUNTER — HOSPITAL ENCOUNTER (OUTPATIENT)
Dept: WOUND CARE | Age: 58
Discharge: HOME | End: 2023-10-02
Payer: MEDICARE

## 2023-10-02 VITALS
DIASTOLIC BLOOD PRESSURE: 79 MMHG | BODY MASS INDEX: 23.72 KG/M2 | WEIGHT: 179 LBS | RESPIRATION RATE: 18 BRPM | SYSTOLIC BLOOD PRESSURE: 152 MMHG | HEART RATE: 71 BPM | HEIGHT: 73 IN | TEMPERATURE: 97.8 F

## 2023-10-02 DIAGNOSIS — E11.621 DIABETIC ULCER OF TOE OF RIGHT FOOT ASSOCIATED WITH TYPE 2 DIABETES MELLITUS, LIMITED TO BREAKDOWN OF SKIN (HCC): ICD-10-CM

## 2023-10-02 DIAGNOSIS — E11.621 DIABETIC ULCER OF TOE OF LEFT FOOT ASSOCIATED WITH TYPE 2 DIABETES MELLITUS, WITH FAT LAYER EXPOSED (HCC): Primary | ICD-10-CM

## 2023-10-02 DIAGNOSIS — Z79.4 TYPE 2 DIABETES MELLITUS WITH HYPERGLYCEMIA, WITH LONG-TERM CURRENT USE OF INSULIN (HCC): ICD-10-CM

## 2023-10-02 DIAGNOSIS — L97.511 DIABETIC ULCER OF TOE OF RIGHT FOOT ASSOCIATED WITH TYPE 2 DIABETES MELLITUS, LIMITED TO BREAKDOWN OF SKIN (HCC): ICD-10-CM

## 2023-10-02 DIAGNOSIS — E11.65 TYPE 2 DIABETES MELLITUS WITH HYPERGLYCEMIA, WITH LONG-TERM CURRENT USE OF INSULIN (HCC): ICD-10-CM

## 2023-10-02 DIAGNOSIS — F17.210 CIGARETTE SMOKER: ICD-10-CM

## 2023-10-02 DIAGNOSIS — B20 HUMAN IMMUNODEFICIENCY VIRUS (HCC): ICD-10-CM

## 2023-10-02 DIAGNOSIS — L97.522 DIABETIC ULCER OF TOE OF LEFT FOOT ASSOCIATED WITH TYPE 2 DIABETES MELLITUS, WITH FAT LAYER EXPOSED (HCC): Primary | ICD-10-CM

## 2023-10-02 DIAGNOSIS — E11.42 PERIPHERAL SENSORY NEUROPATHY DUE TO TYPE 2 DIABETES MELLITUS (HCC): ICD-10-CM

## 2023-10-02 PROCEDURE — 11042 DBRDMT SUBQ TIS 1ST 20SQCM/<: CPT

## 2023-10-02 PROCEDURE — 99214 OFFICE O/P EST MOD 30 MIN: CPT | Performed by: SURGERY

## 2023-10-02 NOTE — WOUND CARE
[facility]  [address] [facility]  [address], [address]  Phone [phone]   Fax [phone]      NAME:  Meredith Cabrera OF BIRTH:  1965  MEDICAL RECORD NUMBER:  [id number]  DATE:  10/2/2023    Return Appointment:   1 week with Una Huitron MD      Instructions: Right great toe and left plantar great toe:  Clean with normal saline or wound cleanser  Apply layer of iodosorb to wounds  Cover with 2x2, secure with roll gauze  Change daily and as needed    Left medial great toe:  Clean wounds with saline. Apply acticoat to wound bed. Cover with 2x2 and wrap with rolled gauze. Change 3 times weekly. Be careful to keep wound dressings dry and intact. Please increase dietary protein to at least 60 grams per day to support cell rejuvenation. May use supplements such as Ensure Max, Arya, & Glucerna (samples & coupons provided at first visit). Be sure to spread intake throughout the day for improved absorption. Should you experience increased redness, swelling, pain, foul odor, size of wound(s), or have a temperature over 101 degrees please contact the [facility] [address] [place] at [phone] or if after hours contact your primary care physician or go to the hospital emergency department. PLEASE NOTE: IF YOU ARE UNABLE TO OBTAIN WOUND SUPPLIES, CONTINUE TO USE THE SUPPLIES YOU HAVE AVAILABLE UNTIL YOU ARE ABLE TO REACH US. IT IS MOST IMPORTANT TO KEEP THE WOUND COVERED AT ALL TIMES.     Electronically signed Ida Cates RN on 10/2/2023 at 11:31 AM

## 2023-10-02 NOTE — FLOWSHEET NOTE
10/02/23 1105   Wound 09/21/23 Toe (Comment  which one) Right;Dorsal #1   Date First Assessed/Time First Assessed: 09/21/23 1403   Present on Original Admission: Yes  Wound Approximate Age at First Assessment (Weeks): 6 weeks  Primary Wound Type: Diabetic Ulcer  Location: Toe (Comment  which one)  Wound Location Orientation. .. Wound Image    Wound Etiology Diabetic Gavin 1   Dressing Status Intact   Wound Cleansed Cleansed with saline   Dressing/Treatment Betadine swabs/povidone iodine   Offloading for Diabetic Foot Ulcers Offloading ordered   Wound Length (cm) 0 cm   Wound Width (cm) 0 cm   Wound Depth (cm) 0 cm   Wound Surface Area (cm^2) 0 cm^2   Change in Wound Size % (l*w) 100   Wound Volume (cm^3) 0 cm^3   Wound Healing % 100   Wound Assessment Epithelialization   Drainage Amount None (dry)   Odor None   Stella-wound Assessment Hyperkeratosis (callous)   Wound Thickness Description not for Pressure Injury Full thickness   Wound 09/21/23 Toe (Comment  which one) Left;Dorsal #2   Date First Assessed/Time First Assessed: 09/21/23 1404   Present on Original Admission: Yes  Wound Approximate Age at First Assessment (Weeks): 6 weeks  Primary Wound Type: Diabetic Ulcer  Location: Toe (Comment  which one)  Wound Location Orientation. ..    Wound Image    Wound Etiology Diabetic Gavin 2   Dressing Status Old drainage noted   Wound Cleansed Cleansed with saline   Dressing/Treatment Betadine swabs/povidone iodine   Offloading for Diabetic Foot Ulcers Offloading ordered   Wound Length (cm) 1.6 cm   Wound Width (cm) 0.8 cm   Wound Depth (cm) 0.8 cm   Wound Surface Area (cm^2) 1.28 cm^2   Change in Wound Size % (l*w) -77.78   Wound Volume (cm^3) 1.024 cm^3   Wound Healing % -78   Wound Assessment Slough;Pink/red   Drainage Amount Small (< 25%)   Drainage Description Serosanguinous   Odor None   Stella-wound Assessment Hyperkeratosis (callous)   Wound Thickness Description not for Pressure Injury Full thickness   Wound Toe

## 2023-10-02 NOTE — PROGRESS NOTES
[facility]  Consult Note/H&P/Progress Note      Damon Conrad RECORD NUMBER:  [id number]  AGE: 63 y.o. RACE White (non-)  GENDER: male  : 1965  EPISODE DATE:  10/2/2023       Subjective:      CC (Chief Complaint) and HISTORY of PRESENT ILLNESS (HPI):    Giovani Hurley is a 63 y.o. male who presents today for wound/ulcer evaluation. He is seen on 2023, with greater than 5-month history of open wounds of both feet on the great toes. The started out as calluses which he picked and they developed into ulcer wounds. Since early on, he has been treating these wounds with hydrogen peroxide and Neosporin on a daily basis. His wounds have remained nonhealing. His condition is complicated by uncontrolled type 2 diabetes with hyperglycemia with long-term insulin use. Last hemoglobin A1c in May was greater than 13. Health is also complicated by HIV being treated with antivirals. He smokes several cigarettes per day. He had plain films of the left foot showed no evidence of osteomyelitis or foreign body. He has strong palpable pulses and excellent capillary refill. He has not had vascular studies. Debridements were performed today and well-tolerated. He returns on 10/2/2023. He has stopped using peroxide and Neosporin, but is getting his dressings wet and dirty by working in the yard and poor shoes that are not protecting his feet. He is changing his dressings 6 or 7 times per day and tearing off loose skin creating new wounds. The right toe is fortunately superficial.  He has a new superficial wound on the plantar left great toe. The medial left great toe wound is deep and down into the deep fat. This is a worrisome wound. He continues to smoke. He is also having issues with chronic falls. He is not wearing proper footwear.         PAST MEDICAL HISTORY  Past Medical History:   Diagnosis Date    GERD

## 2023-10-09 ENCOUNTER — HOSPITAL ENCOUNTER (OUTPATIENT)
Dept: WOUND CARE | Age: 58
Discharge: HOME | End: 2023-10-09
Payer: MEDICARE

## 2023-10-09 VITALS
TEMPERATURE: 97.9 F | DIASTOLIC BLOOD PRESSURE: 106 MMHG | RESPIRATION RATE: 18 BRPM | WEIGHT: 178 LBS | SYSTOLIC BLOOD PRESSURE: 144 MMHG | BODY MASS INDEX: 23.59 KG/M2 | HEART RATE: 74 BPM | HEIGHT: 73 IN

## 2023-10-09 DIAGNOSIS — B20 HUMAN IMMUNODEFICIENCY VIRUS (HCC): ICD-10-CM

## 2023-10-09 DIAGNOSIS — E11.42 PERIPHERAL SENSORY NEUROPATHY DUE TO TYPE 2 DIABETES MELLITUS (HCC): ICD-10-CM

## 2023-10-09 DIAGNOSIS — L97.511 DIABETIC ULCER OF TOE OF RIGHT FOOT ASSOCIATED WITH TYPE 2 DIABETES MELLITUS, LIMITED TO BREAKDOWN OF SKIN (HCC): ICD-10-CM

## 2023-10-09 DIAGNOSIS — E11.621 DIABETIC ULCER OF TOE OF RIGHT FOOT ASSOCIATED WITH TYPE 2 DIABETES MELLITUS, LIMITED TO BREAKDOWN OF SKIN (HCC): ICD-10-CM

## 2023-10-09 DIAGNOSIS — F17.210 CIGARETTE SMOKER: ICD-10-CM

## 2023-10-09 DIAGNOSIS — E11.65 TYPE 2 DIABETES MELLITUS WITH HYPERGLYCEMIA, WITH LONG-TERM CURRENT USE OF INSULIN (HCC): ICD-10-CM

## 2023-10-09 DIAGNOSIS — L97.522 DIABETIC ULCER OF TOE OF LEFT FOOT ASSOCIATED WITH TYPE 2 DIABETES MELLITUS, WITH FAT LAYER EXPOSED (HCC): Primary | ICD-10-CM

## 2023-10-09 DIAGNOSIS — E11.621 DIABETIC ULCER OF TOE OF LEFT FOOT ASSOCIATED WITH TYPE 2 DIABETES MELLITUS, WITH FAT LAYER EXPOSED (HCC): Primary | ICD-10-CM

## 2023-10-09 DIAGNOSIS — Z79.4 TYPE 2 DIABETES MELLITUS WITH HYPERGLYCEMIA, WITH LONG-TERM CURRENT USE OF INSULIN (HCC): ICD-10-CM

## 2023-10-09 PROCEDURE — 11042 DBRDMT SUBQ TIS 1ST 20SQCM/<: CPT

## 2023-10-09 PROCEDURE — 99214 OFFICE O/P EST MOD 30 MIN: CPT | Performed by: SURGERY

## 2023-10-09 NOTE — WOUND CARE
[facility]  [facility]  [address], [address]  Phone [phone]   Fax [phone]      NAME:  Marj Gorman OF BIRTH:  1965  MEDICAL RECORD NUMBER:  [id number]  DATE:  10/9/2023    Return Appointment:   1 week with Loretta Mcgill MD      Instructions: Right great toe and left great toe wounds  Clean with Vashe. Apply layer of silvasorb to wounds and cover with xeroform. Cover with 2x2, secure with roll gauze  Change daily and as needed    Apply vaseline to dry skin. Be careful to keep wound dressings dry and intact. Please increase dietary protein to at least 60 grams per day to support cell rejuvenation. May use supplements such as Ensure Max, Arya, & Glucerna (samples & coupons provided at first visit). Be sure to spread intake throughout the day for improved absorption. Should you experience increased redness, swelling, pain, foul odor, size of wound(s), or have a temperature over 101 degrees please contact the [facility] at [phone] or if after hours contact your primary care physician or go to the hospital emergency department. PLEASE NOTE: IF YOU ARE UNABLE TO OBTAIN WOUND SUPPLIES, CONTINUE TO USE THE SUPPLIES YOU HAVE AVAILABLE UNTIL YOU ARE ABLE TO REACH US. IT IS MOST IMPORTANT TO KEEP THE WOUND COVERED AT ALL TIMES.     Electronically signed Hector Brock RN on 10/9/2023 at 11:41 AM

## 2023-10-09 NOTE — FLOWSHEET NOTE
10/09/23 1111   Wound 10/02/23 Toe (Comment  which one) Left;Plantar great toe #4   Date First Assessed/Time First Assessed: 10/02/23 1133   Present on Original Admission: Yes  Primary Wound Type: Diabetic Ulcer  Location: Toe (Comment  which one)  Wound Location Orientation: Left;Plantar  Wound Description (Comments): great toe #4   Wound Image    Wound Etiology Diabetic Gavin 1   Wound Cleansed Cleansed with saline   Dressing/Treatment Alginate with Ag   Offloading for Diabetic Foot Ulcers Offloading ordered   Wound Length (cm) 0 cm   Wound Width (cm) 0 cm   Wound Depth (cm) 0 cm   Wound Surface Area (cm^2) 0 cm^2   Change in Wound Size % (l*w) 100   Wound Volume (cm^3) 0 cm^3   Wound Healing % 100   Wound Assessment Epithelialization   Drainage Amount None (dry)   Odor None   Wound 09/21/23 Toe (Comment  which one) Right;Dorsal #1   Date First Assessed/Time First Assessed: 09/21/23 1403   Present on Original Admission: Yes  Wound Approximate Age at First Assessment (Weeks): 6 weeks  Primary Wound Type: Diabetic Ulcer  Location: Toe (Comment  which one)  Wound Location Orientation. .. Wound Length (cm)  --    Wound Width (cm)  --    Wound Depth (cm)  --    Wound Surface Area (cm^2)  --    Change in Wound Size % (l*w)  --    Wound Volume (cm^3)  --    Wound Healing %  --    Wound 09/21/23 Toe (Comment  which one) Left;Dorsal #2   Date First Assessed/Time First Assessed: 09/21/23 1404   Present on Original Admission: Yes  Wound Approximate Age at First Assessment (Weeks): 6 weeks  Primary Wound Type: Diabetic Ulcer  Location: Toe (Comment  which one)  Wound Location Orientation. ..    Wound Image    Wound Etiology Diabetic Gavin 2   Dressing Status Old drainage noted   Wound Cleansed Cleansed with saline   Dressing/Treatment Alginate   Offloading for Diabetic Foot Ulcers Offloading ordered   Wound Length (cm) 1.3 cm   Wound Width (cm) 0.4 cm   Wound Depth (cm) 0.8 cm   Wound Surface Area (cm^2) 0.52 cm^2

## 2023-10-09 NOTE — PROGRESS NOTES
performed by another physician/other qualified health care professional (not separately reported);     or  Category 3: Discussion of management or test interpretation  ? Discussion of management or test interpretation with external physician/other qualified health care professional/appropriate source (not separately reported)   Moderate risk of morbidity from additional diagnostic testing or treatment  Examples only:  ?Prescription drug management - advanced wound care prescription Rx wound care products  (eg Iodosorb, hydrofera, silvadene, collagen, puracol plus, optiloc, biologics - placenta, Theraskin, Apligraf). Note also that if home health care is involved, they will not apply topical therapies without a prescription/order from physician      ? Decision regarding minor surgery with identified patient or procedure risk factors  ? Decision regarding elective major surgery without identified patient or procedure risk factors   ? Diagnosis or treatment significantly limited by social determinants of health       [id number]  90059 High High  ? 1or more chronic illnesses with severe exacerbation, progression, or side effects of treatment;    or  ?1 acute or chronic illness or injury that poses a threat to life or bodily function   Extensive  (Must meet the requirements of at least 2 out of 3 categories)  Category 1: Tests, documents, or independent historian(s)  ? Any combination of 3 from the following:   ?Review of prior external note(s) from each unique source*;  ?Review of the result(s) of each unique test*;   ?Ordering of each unique test*;   ?Assessment requiring an independent historian(s)    or   Category 2: Independent interpretation of tests   ? Independent interpretation of a test performed by another physician/other qualified health care professional (not separately reported);     or  Category 3: Discussion of management or test interpretation  ? Discussion of management or test interpretation with external

## 2023-10-11 ENCOUNTER — OFFICE VISIT (OUTPATIENT)
Dept: ENDOCRINOLOGY | Age: 58
End: 2023-10-11

## 2023-10-11 VITALS
WEIGHT: 181.6 LBS | DIASTOLIC BLOOD PRESSURE: 89 MMHG | HEART RATE: 88 BPM | SYSTOLIC BLOOD PRESSURE: 128 MMHG | OXYGEN SATURATION: 99 % | BODY MASS INDEX: 23.96 KG/M2

## 2023-10-11 DIAGNOSIS — F17.210 CIGARETTE SMOKER: ICD-10-CM

## 2023-10-11 DIAGNOSIS — Z79.4 TYPE 2 DIABETES MELLITUS WITH HYPERGLYCEMIA, WITH LONG-TERM CURRENT USE OF INSULIN (HCC): Primary | ICD-10-CM

## 2023-10-11 DIAGNOSIS — L97.522 DIABETIC ULCER OF TOE OF LEFT FOOT ASSOCIATED WITH TYPE 2 DIABETES MELLITUS, WITH FAT LAYER EXPOSED (HCC): ICD-10-CM

## 2023-10-11 DIAGNOSIS — L97.511 DIABETIC ULCER OF TOE OF RIGHT FOOT ASSOCIATED WITH TYPE 2 DIABETES MELLITUS, LIMITED TO BREAKDOWN OF SKIN (HCC): ICD-10-CM

## 2023-10-11 DIAGNOSIS — E11.621 DIABETIC ULCER OF TOE OF LEFT FOOT ASSOCIATED WITH TYPE 2 DIABETES MELLITUS, WITH FAT LAYER EXPOSED (HCC): ICD-10-CM

## 2023-10-11 DIAGNOSIS — E11.65 TYPE 2 DIABETES MELLITUS WITH HYPERGLYCEMIA, WITH LONG-TERM CURRENT USE OF INSULIN (HCC): Primary | ICD-10-CM

## 2023-10-11 DIAGNOSIS — E11.621 DIABETIC ULCER OF TOE OF RIGHT FOOT ASSOCIATED WITH TYPE 2 DIABETES MELLITUS, LIMITED TO BREAKDOWN OF SKIN (HCC): ICD-10-CM

## 2023-10-11 LAB — HBA1C MFR BLD: 11.5 %

## 2023-10-11 RX ORDER — INSULIN DEGLUDEC 200 U/ML
INJECTION, SOLUTION SUBCUTANEOUS
Qty: 9 ML | Refills: 11 | Status: SHIPPED | OUTPATIENT
Start: 2023-10-11

## 2023-10-11 RX ORDER — SITAGLIPTIN 50 MG/1
50 TABLET, FILM COATED ORAL DAILY
Qty: 90 TABLET | Refills: 1 | Status: SHIPPED | OUTPATIENT
Start: 2023-10-11

## 2023-10-11 ASSESSMENT — ENCOUNTER SYMPTOMS
DIARRHEA: 1
TROUBLE SWALLOWING: 1
NAUSEA: 1
VOMITING: 1
VOICE CHANGE: 1
CONSTIPATION: 1

## 2023-10-11 NOTE — PROGRESS NOTES
[facility]    Bruno De La Cruz PA-C  [facility]  [address] [facility], [address] [facility], [address] [place] [address] Rd,[address]  Phone [phone]  Facsimile [phone]          Joanna Allan is a 63 y.o. male seen 10/11/2023 for follow up evaluation of type 2 diabetes        Assessment and Plan:    Interpretation of 72 hour glucose monitor: At least 72 hours of data were reviewed. The patient utilizes a abdi 2 continuous glucose monitoring system. The average glucose during the reviewed timeframe was 271. There is a pattern of frequent postprandial hyperglycemia after meals and snacks. 1. Type 2 diabetes mellitus with hyperglycemia, with long-term current use of insulin (HCC)  Glycemic control is suboptimal.  Patient is on low-dose DPP 4 and basal bolus insulin regimen. Unfortunately he is trying to take his Levemir 30 units twice daily. I believe this probably impacts good compliance. We will change this to [place] once daily with plans to start at his current 30 unit dose and slowly uptitrate by 2 units every 4 days until fasting blood glucose is at goal 80-1 25 focus on taking 10 units of prandial insulin before meals, 5 units of insulin before snacks and use a 1 per 25 greater than 150 correction scale at the 4 before's shot sheets were given to help guide insulin treatment decisions. Patient demonstrates proficiency in use during today's visit. Focus on taking insulin BEFORE meals    I will refer to case management to help provide support I believe diabetes education will be helpful however he has multiple barriers to care    At today's visit we discussed sequela associated with uncontrolled diabetes including increased risk of stroke, heart attack, kidney failure, amputation, retinopathy, neuropathy, and nephropathy.   Patient was strongly encouraged to comply with treatment regimen as well as dietary and exercise

## 2023-10-12 ENCOUNTER — CARE COORDINATION (OUTPATIENT)
Dept: CARE COORDINATION | Facility: CLINIC | Age: 58
End: 2023-10-12

## 2023-10-16 ENCOUNTER — HOSPITAL ENCOUNTER (OUTPATIENT)
Dept: WOUND CARE | Age: 58
Discharge: HOME | End: 2023-10-16
Payer: MEDICARE

## 2023-10-16 ENCOUNTER — CARE COORDINATION (OUTPATIENT)
Dept: CARE COORDINATION | Facility: CLINIC | Age: 58
End: 2023-10-16

## 2023-10-16 VITALS
TEMPERATURE: 97.7 F | DIASTOLIC BLOOD PRESSURE: 92 MMHG | HEART RATE: 77 BPM | HEIGHT: 73 IN | BODY MASS INDEX: 24.39 KG/M2 | WEIGHT: 184 LBS | RESPIRATION RATE: 18 BRPM | SYSTOLIC BLOOD PRESSURE: 136 MMHG

## 2023-10-16 DIAGNOSIS — E11.621 DIABETIC ULCER OF TOE OF LEFT FOOT ASSOCIATED WITH TYPE 2 DIABETES MELLITUS, WITH FAT LAYER EXPOSED (HCC): Primary | ICD-10-CM

## 2023-10-16 DIAGNOSIS — E11.621 DIABETIC ULCER OF TOE OF RIGHT FOOT ASSOCIATED WITH TYPE 2 DIABETES MELLITUS, LIMITED TO BREAKDOWN OF SKIN (HCC): ICD-10-CM

## 2023-10-16 DIAGNOSIS — B20 HUMAN IMMUNODEFICIENCY VIRUS (HCC): ICD-10-CM

## 2023-10-16 DIAGNOSIS — L97.522 DIABETIC ULCER OF TOE OF LEFT FOOT ASSOCIATED WITH TYPE 2 DIABETES MELLITUS, WITH FAT LAYER EXPOSED (HCC): Primary | ICD-10-CM

## 2023-10-16 DIAGNOSIS — E11.42 PERIPHERAL SENSORY NEUROPATHY DUE TO TYPE 2 DIABETES MELLITUS (HCC): ICD-10-CM

## 2023-10-16 DIAGNOSIS — F17.210 CIGARETTE SMOKER: ICD-10-CM

## 2023-10-16 DIAGNOSIS — Z79.4 TYPE 2 DIABETES MELLITUS WITH HYPERGLYCEMIA, WITH LONG-TERM CURRENT USE OF INSULIN (HCC): ICD-10-CM

## 2023-10-16 DIAGNOSIS — L97.511 DIABETIC ULCER OF TOE OF RIGHT FOOT ASSOCIATED WITH TYPE 2 DIABETES MELLITUS, LIMITED TO BREAKDOWN OF SKIN (HCC): ICD-10-CM

## 2023-10-16 DIAGNOSIS — E11.65 TYPE 2 DIABETES MELLITUS WITH HYPERGLYCEMIA, WITH LONG-TERM CURRENT USE OF INSULIN (HCC): ICD-10-CM

## 2023-10-16 PROCEDURE — 99214 OFFICE O/P EST MOD 30 MIN: CPT | Performed by: SURGERY

## 2023-10-16 PROCEDURE — 11042 DBRDMT SUBQ TIS 1ST 20SQCM/<: CPT

## 2023-10-16 RX ORDER — TRIAMCINOLONE ACETONIDE 1 MG/G
OINTMENT TOPICAL ONCE
OUTPATIENT
Start: 2023-10-16 | End: 2023-10-16

## 2023-10-16 RX ORDER — GENTAMICIN SULFATE 1 MG/G
OINTMENT TOPICAL ONCE
OUTPATIENT
Start: 2023-10-16 | End: 2023-10-16

## 2023-10-16 RX ORDER — BETAMETHASONE DIPROPIONATE 0.05 %
OINTMENT (GRAM) TOPICAL ONCE
OUTPATIENT
Start: 2023-10-16 | End: 2023-10-16

## 2023-10-16 RX ORDER — CLOBETASOL PROPIONATE 0.5 MG/G
OINTMENT TOPICAL ONCE
OUTPATIENT
Start: 2023-10-16 | End: 2023-10-16

## 2023-10-16 RX ORDER — LIDOCAINE 50 MG/G
OINTMENT TOPICAL ONCE
OUTPATIENT
Start: 2023-10-16 | End: 2023-10-16

## 2023-10-16 RX ORDER — LIDOCAINE 40 MG/G
CREAM TOPICAL ONCE
OUTPATIENT
Start: 2023-10-16 | End: 2023-10-16

## 2023-10-16 RX ORDER — LIDOCAINE HYDROCHLORIDE 20 MG/ML
JELLY TOPICAL ONCE
OUTPATIENT
Start: 2023-10-16 | End: 2023-10-16

## 2023-10-16 RX ORDER — BACITRACIN ZINC AND POLYMYXIN B SULFATE 500; 1000 [USP'U]/G; [USP'U]/G
OINTMENT TOPICAL ONCE
OUTPATIENT
Start: 2023-10-16 | End: 2023-10-16

## 2023-10-16 RX ORDER — GINSENG 100 MG
CAPSULE ORAL ONCE
OUTPATIENT
Start: 2023-10-16 | End: 2023-10-16

## 2023-10-16 RX ORDER — LIDOCAINE HYDROCHLORIDE 40 MG/ML
SOLUTION TOPICAL ONCE
OUTPATIENT
Start: 2023-10-16 | End: 2023-10-16

## 2023-10-16 RX ORDER — SODIUM CHLOR/HYPOCHLOROUS ACID 0.033 %
SOLUTION, IRRIGATION IRRIGATION ONCE
OUTPATIENT
Start: 2023-10-16 | End: 2023-10-16

## 2023-10-16 RX ORDER — IBUPROFEN 200 MG
TABLET ORAL ONCE
OUTPATIENT
Start: 2023-10-16 | End: 2023-10-16

## 2023-10-16 RX ORDER — LIDOCAINE HYDROCHLORIDE 20 MG/ML
JELLY TOPICAL ONCE
Status: COMPLETED | OUTPATIENT
Start: 2023-10-16 | End: 2023-10-16

## 2023-10-16 RX ADMIN — LIDOCAINE HYDROCHLORIDE: 20 JELLY TOPICAL at 09:28

## 2023-10-16 NOTE — DISCHARGE INSTRUCTIONS
Discharge Instructions for  [address], [address] [place] [address]  Phone [phone]   Fax [phone]      NAME:  Jude Santana OF BIRTH:  1965  MEDICAL RECORD NUMBER:  [id number]  DATE:  @ED@    Return Appointment:   2 weeks with Frandy Moya MD      Instructions: Right great toe and left great toe wounds  Clean with Vashe. Apply layer of silvasorb to wounds and cover with xeroform. Cover with 2x2, secure with roll gauze  Change daily and as needed     Apply vaseline to dry skin. Be careful to keep wound dressings dry and intact. Please increase dietary protein to at least 60 grams per day to support cell rejuvenation. May use supplements such as Ensure Max, Arya, & Glucerna (samples & coupons provided at first visit). Be sure to spread intake throughout the day for improved absorption. Should you experience increased redness, swelling, pain, foul odor, size of wound(s), or have a temperature over 101 degrees please contact the [address] [place] [address] [facility] at [phone] or if after hours contact your primary care physician or go to the hospital emergency department. PLEASE NOTE: IF YOU ARE UNABLE TO OBTAIN WOUND SUPPLIES, CONTINUE TO USE THE SUPPLIES YOU HAVE AVAILABLE UNTIL YOU ARE ABLE TO REACH US. IT IS MOST IMPORTANT TO KEEP THE WOUND COVERED AT ALL TIMES.     Electronically signed Jean-Paul García RN on 10/16/2023 at 9:46 AM

## 2023-10-16 NOTE — PROGRESS NOTES
LOCATION:        [facility] XR CHEST 2 VW ACCESSION NO: [id number]  ORDERED BY: Mohsen Alexandra MD    DATE OF EXAM: 2018 16:45  REASON FOR EXAM: R06.02^Shortness of breath^I10~R55^Syncope and collapse^I10    ADMISSION DATE: 2018 16:48        EXAM:  TWO-VIEW CHEST    INDICATION:  Shortness of breath, syncope    COMPARISON:  None      FINDINGS:  Heart size is normal.  Mediastinum is not widened. The lungs are clear. I see no pneumothorax of the pleural abnormality. There has been apparent prior cholecystectomy but no acute intra-abdominal findings. Mild thoracic scoliosis is seen. Bony structures and soft tissues are otherwise unremarkable. Impression  NO ACUTE PROCESS IS DEMONSTRATED        : rafael  TRANSCRIBE TIME/DATE: 2018 05:25 pm  READ BY: BRIANNA Majano MD    THIS IS AN ELECTRONICALLY VERIFIED REPORT  2018 5:25 PM:  BRIANNA Majano MD    The above report was produced by using a computer based typing program that converts spoken words to text. Random mistakes in words identified by the computer are very often initially present. Despite careful review some mistakes may still be found in the final released report. All CT scans at this facility use specific methods available to minimize radiation exposure to our patients. We use  techniques such as dose modulation, iterative reconstruction, and/or weight based dosing when appropriate to reduce radiation dose to as low as reasonably achievable.         CT Result (most recent):  CT ABDOMEN PELVIS W CONTRAST 2018    Narrative  Patient Name: Gregorio Majano  MRN: [id number]    Patient Name: Gregorio Majano MRN: [id number] Page 2 of 2  Diagnostic Imaging Report  CONTAINS CONFIDENTIAL INFO  NOT TO BE DISTRIBUTED TO UNAUTHORIZED [address] [place] [address]    PATIENT NAME: Gregorio Majano MED. REC. NO.: [id number]  : 1965  SEX: Yogesh Drake

## 2023-10-16 NOTE — FLOWSHEET NOTE
Post-Procedure Volume (cm^3) 0.09 cm^3   Wound Assessment Eschar dry   Drainage Amount Small (< 25%)   Drainage Description Serosanguinous   Odor None   Stella-wound Assessment Hyperkeratosis (callous)   Wound Thickness Description not for Pressure Injury Full thickness

## 2023-10-16 NOTE — WOUND CARE
Discharge Instructions for  [address] [place] [id number] [place] [address], [address] [facility]  Phone [phone]   Fax [phone]      NAME:  Alonzo Card OF BIRTH:  1965  MEDICAL RECORD NUMBER:  [id number]  DATE:  10/16/2023    Return Appointment:   2 weeks with Matt Real MD      Instructions: Right great toe and left great toe wounds  Clean with Vashe. Apply layer of silvasorb to wounds and cover with xeroform. Cover with 2x2, secure with roll gauze  Change daily and as needed    Apply vaseline to dry skin. Be careful to keep wound dressings dry and intact. Please increase dietary protein to at least 60 grams per day to support cell rejuvenation. May use supplements such as Ensure Max, Arya, & Glucerna (samples & coupons provided at first visit). Be sure to spread intake throughout the day for improved absorption. Should you experience increased redness, swelling, pain, foul odor, size of wound(s), or have a temperature over 101 degrees please contact the [address] [place] [address] [facility] at [phone] or if after hours contact your primary care physician or go to the hospital emergency department. PLEASE NOTE: IF YOU ARE UNABLE TO OBTAIN WOUND SUPPLIES, CONTINUE TO USE THE SUPPLIES YOU HAVE AVAILABLE UNTIL YOU ARE ABLE TO REACH US. IT IS MOST IMPORTANT TO KEEP THE WOUND COVERED AT ALL TIMES.     Electronically signed Esequiel Marr RN on 10/16/2023 at 9:25 AM

## 2023-10-16 NOTE — CARE COORDINATION
Ambulatory Care Coordination Note  10/16/2023  Multiple messages exchanged with patient beginning 10/14/2023 through 10/15/2023/    Patient Current Location:  Home:   [address] [address]     Incoming text messages from patient who states he experiencing N/Diarrhea following insulin administration which he is doing before meals as instructed. Previously had been taking insulin following his meals which did not precipitate this reaction. Known to this ACM that endocrinology provider out of town for the weekend. Advised patient to resume taking insulin after meals until this ACM reached Endo provider. Also discussed foods to eat that might help stop the diarrhea. 10/16/2023 - spoke with Dereje Mello this a.m. after patient texted he is seeing his PCP early today. Penny Anderson will communicate with PCP on patient's behalf. PLAN:  This ACM will follow up with patient tomorrow. Of note patient also has wound appointment today. Challenges to be reviewed by the provider   Additional needs identified to be addressed with provider: No  See note above. Method of communication with provider: chart routing.     ACM: Sherita Vaughn RN      Future Appointments   Date Time Provider [address]   2/12/2024  2:30 PM Rosa Perez PA-C [facility]

## 2023-10-19 ENCOUNTER — CARE COORDINATION (OUTPATIENT)
Dept: CARE COORDINATION | Facility: CLINIC | Age: 58
End: 2023-10-19

## 2023-10-19 ASSESSMENT — PATIENT HEALTH QUESTIONNAIRE - PHQ9
1. LITTLE INTEREST OR PLEASURE IN DOING THINGS: 1
SUM OF ALL RESPONSES TO PHQ QUESTIONS 1-9: 2
2. FEELING DOWN, DEPRESSED OR HOPELESS: 1
SUM OF ALL RESPONSES TO PHQ QUESTIONS 1-9: 2
SUM OF ALL RESPONSES TO PHQ9 QUESTIONS 1 & 2: 2
SUM OF ALL RESPONSES TO PHQ QUESTIONS 1-9: 2
SUM OF ALL RESPONSES TO PHQ QUESTIONS 1-9: 2

## 2023-10-19 NOTE — CARE COORDINATION
Ambulatory Care Coordination Note  10/19/2023    Patient Current Location:  Home: [address] [address]       Weekly outreach with patient   - diarrhea has lessened   - new medication has come in the mail, patient has started it   - attending all medical appointments   - PCP appointment next week   - keeping a food log, BS improving - advised to please take food log to PCP appointment   - reports he thinks that foot wounds are worsening, saw wound clinic 10/16, today is going to [facility] appointment   - has a partner, Keily Bertrand (together 12 years) but not living together; Keily Bertrand new POA   - psych support through [facility] and has recently joined a group therapy     PLAN:  follow up next week  Patient would really like to see Endocrinology one more time this year > will ask. ACM: Robbi Zambrano RN    Challenges to be reviewed by the provider   Additional needs identified to be addressed with provider: Yes  Is it possible for endocrinology to see him again before the end of the year? Method of communication with provider: chart routing. Lab Results       None                 Goals Addressed                   This Visit's Progress     Patient verbalizes understanding of self -management goals of living with Diabetes. On track     Initial Goal - week 1. Patient will eat small meals/snack 4x daily (has not been eating at all) and will keep a food log.               Future Appointments   Date Time Provider [address]   10/30/2023 11:10 AM Collin Anderson MD [facility]   2/12/2024  2:30 PM Key Perez PA-C [facility]

## 2023-10-23 ENCOUNTER — CARE COORDINATION (OUTPATIENT)
Dept: CARE COORDINATION | Facility: CLINIC | Age: 58
End: 2023-10-23

## 2023-10-25 ENCOUNTER — HOSPITAL ENCOUNTER (OUTPATIENT)
Dept: WOUND CARE | Age: 58
Discharge: HOME | End: 2023-10-25
Payer: MEDICARE

## 2023-10-25 VITALS
WEIGHT: 182 LBS | SYSTOLIC BLOOD PRESSURE: 143 MMHG | HEART RATE: 83 BPM | RESPIRATION RATE: 18 BRPM | DIASTOLIC BLOOD PRESSURE: 90 MMHG | TEMPERATURE: 98.4 F | BODY MASS INDEX: 24.12 KG/M2 | HEIGHT: 73 IN

## 2023-10-25 DIAGNOSIS — E11.42 PERIPHERAL SENSORY NEUROPATHY DUE TO TYPE 2 DIABETES MELLITUS (HCC): ICD-10-CM

## 2023-10-25 DIAGNOSIS — E11.621 DIABETIC ULCER OF TOE OF RIGHT FOOT ASSOCIATED WITH TYPE 2 DIABETES MELLITUS, LIMITED TO BREAKDOWN OF SKIN (HCC): ICD-10-CM

## 2023-10-25 DIAGNOSIS — Z79.4 TYPE 2 DIABETES MELLITUS WITH HYPERGLYCEMIA, WITH LONG-TERM CURRENT USE OF INSULIN (HCC): ICD-10-CM

## 2023-10-25 DIAGNOSIS — F17.210 CIGARETTE SMOKER: ICD-10-CM

## 2023-10-25 DIAGNOSIS — L97.522 DIABETIC ULCER OF TOE OF LEFT FOOT ASSOCIATED WITH TYPE 2 DIABETES MELLITUS, WITH FAT LAYER EXPOSED (HCC): Primary | ICD-10-CM

## 2023-10-25 DIAGNOSIS — L97.514 DIABETIC ULCER OF TOE OF RIGHT FOOT ASSOCIATED WITH TYPE 2 DIABETES MELLITUS, WITH NECROSIS OF BONE (HCC): ICD-10-CM

## 2023-10-25 DIAGNOSIS — L03.032 CELLULITIS OF GREAT TOE OF LEFT FOOT: ICD-10-CM

## 2023-10-25 DIAGNOSIS — E11.621 DIABETIC ULCER OF TOE OF LEFT FOOT ASSOCIATED WITH TYPE 2 DIABETES MELLITUS, WITH FAT LAYER EXPOSED (HCC): Primary | ICD-10-CM

## 2023-10-25 DIAGNOSIS — B20 HUMAN IMMUNODEFICIENCY VIRUS (HCC): ICD-10-CM

## 2023-10-25 DIAGNOSIS — E11.621 DIABETIC ULCER OF TOE OF RIGHT FOOT ASSOCIATED WITH TYPE 2 DIABETES MELLITUS, WITH NECROSIS OF BONE (HCC): ICD-10-CM

## 2023-10-25 DIAGNOSIS — E11.65 TYPE 2 DIABETES MELLITUS WITH HYPERGLYCEMIA, WITH LONG-TERM CURRENT USE OF INSULIN (HCC): ICD-10-CM

## 2023-10-25 DIAGNOSIS — L97.511 DIABETIC ULCER OF TOE OF RIGHT FOOT ASSOCIATED WITH TYPE 2 DIABETES MELLITUS, LIMITED TO BREAKDOWN OF SKIN (HCC): ICD-10-CM

## 2023-10-25 PROCEDURE — 87070 CULTURE OTHR SPECIMN AEROBIC: CPT

## 2023-10-25 PROCEDURE — 87075 CULTR BACTERIA EXCEPT BLOOD: CPT

## 2023-10-25 PROCEDURE — 87176 TISSUE HOMOGENIZATION CULTR: CPT

## 2023-10-25 PROCEDURE — 87077 CULTURE AEROBIC IDENTIFY: CPT

## 2023-10-25 PROCEDURE — 87205 SMEAR GRAM STAIN: CPT

## 2023-10-25 PROCEDURE — 11042 DBRDMT SUBQ TIS 1ST 20SQCM/<: CPT

## 2023-10-25 PROCEDURE — 87186 SC STD MICRODIL/AGAR DIL: CPT

## 2023-10-25 RX ORDER — AMOXICILLIN AND CLAVULANATE POTASSIUM 875; 125 MG/1; MG/1
1 TABLET, FILM COATED ORAL 2 TIMES DAILY
Qty: 20 TABLET | Refills: 0 | Status: SHIPPED | OUTPATIENT
Start: 2023-10-25 | End: 2023-11-04

## 2023-10-25 RX ORDER — GENTAMICIN SULFATE 1 MG/G
OINTMENT TOPICAL ONCE
OUTPATIENT
Start: 2023-10-25 | End: 2023-10-25

## 2023-10-25 RX ORDER — GINSENG 100 MG
CAPSULE ORAL ONCE
OUTPATIENT
Start: 2023-10-25 | End: 2023-10-25

## 2023-10-25 RX ORDER — LIDOCAINE HYDROCHLORIDE 20 MG/ML
JELLY TOPICAL ONCE
Status: COMPLETED | OUTPATIENT
Start: 2023-10-25 | End: 2023-10-25

## 2023-10-25 RX ORDER — CLOBETASOL PROPIONATE 0.5 MG/G
OINTMENT TOPICAL ONCE
OUTPATIENT
Start: 2023-10-25 | End: 2023-10-25

## 2023-10-25 RX ORDER — LIDOCAINE 50 MG/G
OINTMENT TOPICAL ONCE
OUTPATIENT
Start: 2023-10-25 | End: 2023-10-25

## 2023-10-25 RX ORDER — BETAMETHASONE DIPROPIONATE 0.05 %
OINTMENT (GRAM) TOPICAL ONCE
OUTPATIENT
Start: 2023-10-25 | End: 2023-10-25

## 2023-10-25 RX ORDER — LIDOCAINE HYDROCHLORIDE 20 MG/ML
JELLY TOPICAL ONCE
OUTPATIENT
Start: 2023-10-25 | End: 2023-10-25

## 2023-10-25 RX ORDER — DOXYCYCLINE HYCLATE 100 MG
100 TABLET ORAL 2 TIMES DAILY
Qty: 20 TABLET | Refills: 0 | Status: SHIPPED | OUTPATIENT
Start: 2023-10-25 | End: 2023-11-04

## 2023-10-25 RX ORDER — BACITRACIN ZINC AND POLYMYXIN B SULFATE 500; 1000 [USP'U]/G; [USP'U]/G
OINTMENT TOPICAL ONCE
OUTPATIENT
Start: 2023-10-25 | End: 2023-10-25

## 2023-10-25 RX ORDER — TRIAMCINOLONE ACETONIDE 1 MG/G
OINTMENT TOPICAL ONCE
OUTPATIENT
Start: 2023-10-25 | End: 2023-10-25

## 2023-10-25 RX ORDER — SODIUM CHLOR/HYPOCHLOROUS ACID 0.033 %
SOLUTION, IRRIGATION IRRIGATION ONCE
OUTPATIENT
Start: 2023-10-25 | End: 2023-10-25

## 2023-10-25 RX ORDER — IBUPROFEN 200 MG
TABLET ORAL ONCE
OUTPATIENT
Start: 2023-10-25 | End: 2023-10-25

## 2023-10-25 RX ORDER — LIDOCAINE HYDROCHLORIDE 40 MG/ML
SOLUTION TOPICAL ONCE
OUTPATIENT
Start: 2023-10-25 | End: 2023-10-25

## 2023-10-25 RX ORDER — LIDOCAINE 40 MG/G
CREAM TOPICAL ONCE
OUTPATIENT
Start: 2023-10-25 | End: 2023-10-25

## 2023-10-25 RX ADMIN — LIDOCAINE HYDROCHLORIDE: 20 JELLY TOPICAL at 09:30

## 2023-10-25 ASSESSMENT — PAIN DESCRIPTION - LOCATION: LOCATION: TOE (COMMENT WHICH ONE)

## 2023-10-25 ASSESSMENT — PAIN DESCRIPTION - DESCRIPTORS: DESCRIPTORS: THROBBING

## 2023-10-25 ASSESSMENT — PAIN SCALES - GENERAL: PAINLEVEL_OUTOF10: 6

## 2023-10-25 ASSESSMENT — PAIN DESCRIPTION - ORIENTATION: ORIENTATION: RIGHT;LEFT

## 2023-10-25 NOTE — WOUND CARE
over 101 degrees please contact the [address] at [phone] or if after hours contact your primary care physician or go to the hospital emergency department. PLEASE NOTE: IF YOU ARE UNABLE TO OBTAIN WOUND SUPPLIES, CONTINUE TO USE THE SUPPLIES YOU HAVE AVAILABLE UNTIL YOU ARE ABLE TO REACH US. IT IS MOST IMPORTANT TO KEEP THE WOUND COVERED AT ALL TIMES.     Electronically signed Abdirizak Segovia RN on 10/25/2023 at 8:36 AM

## 2023-10-25 NOTE — FLOWSHEET NOTE
10/25/23 0813   Wound 09/21/23 Toe (Comment  which one) Left;Dorsal #2   Date First Assessed/Time First Assessed: 09/21/23 1404   Present on Original Admission: Yes  Wound Approximate Age at First Assessment (Weeks): 6 weeks  Primary Wound Type: Diabetic Ulcer  Location: Toe (Comment  which one)  Wound Location Orientation. .. Wound Image     Wound Etiology Diabetic Gavin 2   Dressing Status Old drainage noted   Wound Cleansed Vashe   Dressing/Treatment Silver dressing   Offloading for Diabetic Foot Ulcers Offloading ordered   Wound Length (cm) 1.5 cm   Wound Width (cm) 0.9 cm   Wound Depth (cm) 1.5 cm   Wound Surface Area (cm^2) 1.35 cm^2   Change in Wound Size % (l*w) -87.5   Wound Volume (cm^3) 2.025 cm^3   Wound Healing % -252   Post-Procedure Length (cm) 1.5 cm   Post-Procedure Width (cm) 0.9 cm   Post-Procedure Depth (cm) 1.5 cm   Post-Procedure Surface Area (cm^2) 1.35 cm^2   Post-Procedure Volume (cm^3) 2.025 cm^3   Wound Assessment Pink/red; Exposed structure bone   Drainage Amount Moderate (25-50%)   Drainage Description Serosanguinous   Odor None   Stella-wound Assessment Blanchable erythema   Wound Thickness Description not for Pressure Injury Full thickness   Wound Toe (Comment  which one) Right; Anterior #3 Tip of toe   No Date First Assessed or Time First Assessed found. Wound Approximate Age at First Assessment (Weeks): 6 weeks  Primary Wound Type: Diabetic Ulcer  Location: Toe (Comment  which one)  Wound Location Orientation: Right; Anterior  Wound Description (C...    Wound Image     Wound Etiology Diabetic Gavin 2   Dressing Status Old drainage noted   Wound Cleansed Vashe   Dressing/Treatment Silver dressing   Offloading for Diabetic Foot Ulcers Offloading ordered   Wound Length (cm) 0.2 cm   Wound Width (cm) 1.5 cm   Wound Depth (cm) 0.2 cm   Wound Surface Area (cm^2) 0.3 cm^2   Change in Wound Size % (l*w) 40   Wound Volume (cm^3) 0.06 cm^3   Wound Healing % -20   Post-Procedure Length (cm)

## 2023-10-27 ENCOUNTER — CARE COORDINATION (OUTPATIENT)
Dept: CARE COORDINATION | Facility: CLINIC | Age: 58
End: 2023-10-27

## 2023-10-27 LAB
BACTERIA SPEC CULT: NORMAL
SERVICE CMNT-IMP: NORMAL

## 2023-10-27 NOTE — CARE COORDINATION
Incoming texted picture from patient. Asking if he should go to the ED. Picture if of left great toe  Patient says foot is hot to the touch, skin peeling off and is very painful. Call to wound center spoke with Marcelina Gonzalez RN. She will call the patient. PLAN - follow up early next week.

## 2023-10-27 NOTE — CARE COORDINATION
Incoming text from patient reporting his blood sugars. - 180 fasting this morning   - 160 after medications, including insulin   - no nausea today    Foot wound very painful  Culture indicating gram negative bacteria   - doxycycline   - augmentin have both been prescribed.     Reminded patient to keep feet clean (no walking barefoot - which he says he is not), bandages as directed by wound care    Referral today to SW - resources as needed for food insecurity, housing, bandage supplies

## 2023-10-28 LAB
BACTERIA SPEC CULT: ABNORMAL
GRAM STN SPEC: ABNORMAL
SERVICE CMNT-IMP: ABNORMAL

## 2023-10-30 ENCOUNTER — HOSPITAL ENCOUNTER (OUTPATIENT)
Dept: WOUND CARE | Age: 58
Discharge: HOME | End: 2023-10-30
Payer: MEDICARE

## 2023-10-30 VITALS
DIASTOLIC BLOOD PRESSURE: 90 MMHG | HEART RATE: 70 BPM | BODY MASS INDEX: 24.78 KG/M2 | SYSTOLIC BLOOD PRESSURE: 141 MMHG | WEIGHT: 187 LBS | HEIGHT: 73 IN

## 2023-10-30 DIAGNOSIS — L03.032 CELLULITIS OF GREAT TOE OF LEFT FOOT: Chronic | ICD-10-CM

## 2023-10-30 DIAGNOSIS — B20 HUMAN IMMUNODEFICIENCY VIRUS (HCC): ICD-10-CM

## 2023-10-30 DIAGNOSIS — E11.621 DIABETIC ULCER OF TOE OF LEFT FOOT ASSOCIATED WITH TYPE 2 DIABETES MELLITUS, WITH NECROSIS OF BONE (HCC): Primary | ICD-10-CM

## 2023-10-30 DIAGNOSIS — E11.621 DIABETIC ULCER OF TOE OF RIGHT FOOT ASSOCIATED WITH TYPE 2 DIABETES MELLITUS, WITH FAT LAYER EXPOSED (HCC): ICD-10-CM

## 2023-10-30 DIAGNOSIS — E11.65 TYPE 2 DIABETES MELLITUS WITH HYPERGLYCEMIA, WITH LONG-TERM CURRENT USE OF INSULIN (HCC): ICD-10-CM

## 2023-10-30 DIAGNOSIS — L97.524 DIABETIC ULCER OF TOE OF LEFT FOOT ASSOCIATED WITH TYPE 2 DIABETES MELLITUS, WITH NECROSIS OF BONE (HCC): Primary | ICD-10-CM

## 2023-10-30 DIAGNOSIS — E11.42 PERIPHERAL SENSORY NEUROPATHY DUE TO TYPE 2 DIABETES MELLITUS (HCC): ICD-10-CM

## 2023-10-30 DIAGNOSIS — F17.210 CIGARETTE SMOKER: ICD-10-CM

## 2023-10-30 DIAGNOSIS — L97.512 DIABETIC ULCER OF TOE OF RIGHT FOOT ASSOCIATED WITH TYPE 2 DIABETES MELLITUS, WITH FAT LAYER EXPOSED (HCC): ICD-10-CM

## 2023-10-30 DIAGNOSIS — Z79.4 TYPE 2 DIABETES MELLITUS WITH HYPERGLYCEMIA, WITH LONG-TERM CURRENT USE OF INSULIN (HCC): ICD-10-CM

## 2023-10-30 PROCEDURE — 11042 DBRDMT SUBQ TIS 1ST 20SQCM/<: CPT

## 2023-10-30 PROCEDURE — 11044 DBRDMT BONE 1ST 20 SQ CM/<: CPT | Performed by: SURGERY

## 2023-10-30 PROCEDURE — 88311 DECALCIFY TISSUE: CPT

## 2023-10-30 PROCEDURE — 87205 SMEAR GRAM STAIN: CPT

## 2023-10-30 PROCEDURE — 88307 TISSUE EXAM BY PATHOLOGIST: CPT

## 2023-10-30 PROCEDURE — 88304 TISSUE EXAM BY PATHOLOGIST: CPT

## 2023-10-30 PROCEDURE — 87075 CULTR BACTERIA EXCEPT BLOOD: CPT

## 2023-10-30 PROCEDURE — 87077 CULTURE AEROBIC IDENTIFY: CPT

## 2023-10-30 PROCEDURE — 87176 TISSUE HOMOGENIZATION CULTR: CPT

## 2023-10-30 PROCEDURE — 87070 CULTURE OTHR SPECIMN AEROBIC: CPT

## 2023-10-30 PROCEDURE — 87186 SC STD MICRODIL/AGAR DIL: CPT

## 2023-10-30 NOTE — PROGRESS NOTES
[address] [place], [place]  Consult Note/H&P/Progress Note      Damon Conrad RECORD NUMBER:  [id number]  AGE: 63 y.o. RACE White (non-)  GENDER: male  : 1965  EPISODE DATE:  10/30/2023       Subjective:      CC (Chief Complaint) and HISTORY of PRESENT ILLNESS (HPI):    David Lu is a 63 y.o. male who presents today for wound/ulcer evaluation. He is seen on 2023, with greater than 5-month history of open wounds of both feet on the great toes. The started out as calluses which he picked and they developed into ulcer wounds. Since early on, he has been treating these wounds with hydrogen peroxide and Neosporin on a daily basis. His wounds have remained nonhealing. His condition is complicated by uncontrolled type 2 diabetes with hyperglycemia with long-term insulin use. Last hemoglobin A1c in May was greater than 13. Health is also complicated by HIV being treated with antivirals. He smokes several cigarettes per day. He had plain films of the left foot showed no evidence of osteomyelitis or foreign body. He has strong palpable pulses and excellent capillary refill. He has not had vascular studies. Debridements were performed today and well-tolerated. He returns on 10/2/2023. He has stopped using peroxide and Neosporin, but is getting his dressings wet and dirty by working in the yard and poor shoes that are not protecting his feet. He is changing his dressings 6 or 7 times per day and tearing off loose skin creating new wounds. The right toe is fortunately superficial.  He has a new superficial wound on the plantar left great toe. The medial left great toe wound is deep and down into the deep fat. This is a worrisome wound. He continues to smoke. He is also having issues with chronic falls. He is not wearing proper footwear. He returns on 10/9/2023. There is minimal improvement.   He is avoiding

## 2023-10-30 NOTE — DISCHARGE INSTRUCTIONS
[facility] at [phone] or if after hours contact your primary care physician or go to the hospital emergency department. PLEASE NOTE: IF YOU ARE UNABLE TO OBTAIN WOUND SUPPLIES, CONTINUE TO USE THE SUPPLIES YOU HAVE AVAILABLE UNTIL YOU ARE ABLE TO REACH US. IT IS MOST IMPORTANT TO KEEP THE WOUND COVERED AT ALL TIMES.     Electronically signed Katie Campbell RN on 10/30/2023 at 12:10 PM

## 2023-10-30 NOTE — FLOWSHEET NOTE
10/30/23 1130   Wound 10/02/23 Toe (Comment  which one) Left;Plantar great toe #4   Date First Assessed/Time First Assessed: 10/02/23 1133   Present on Original Admission: Yes  Primary Wound Type: Diabetic Ulcer  Location: Toe (Comment  which one)  Wound Location Orientation: Left;Plantar  Wound Description (Comments): great toe #4   Wound Image    Wound Etiology Diabetic Gavin 3   Dressing Status Intact   Wound Cleansed Cleansed with saline   Dressing/Treatment Gauze dressing/dressing sponge   Offloading for Diabetic Foot Ulcers Offloading ordered   Wound Length (cm) 2 cm   Wound Width (cm) 1.5 cm   Wound Depth (cm) 0.5 cm   Wound Surface Area (cm^2) 3 cm^2   Change in Wound Size % (l*w) -100   Wound Volume (cm^3) 1.5 cm^3   Wound Healing % -900   Wound Assessment Crary/red;Slough   Drainage Amount Large (50-75% saturated)   Drainage Description Serosanguinous   Odor Mild   Stella-wound Assessment Edematous; Hyperkeratosis (callous); Maceration   Wound Thickness Description not for Pressure Injury Full thickness   Wound 09/21/23 Toe (Comment  which one) Right;Dorsal #1   Date First Assessed/Time First Assessed: 09/21/23 1403   Present on Original Admission: Yes  Wound Approximate Age at First Assessment (Weeks): 6 weeks  Primary Wound Type: Diabetic Ulcer  Location: Toe (Comment  which one)  Wound Location Orientation. ..    Wound Image    Wound Etiology Diabetic Gavin 1   Dressing Status Intact   Wound Cleansed Cleansed with saline   Dressing/Treatment Xeroform   Offloading for Diabetic Foot Ulcers Offloading ordered   Wound Length (cm) 0.2 cm   Wound Width (cm) 2 cm   Wound Depth (cm) 0.1 cm   Wound Surface Area (cm^2) 0.4 cm^2   Change in Wound Size % (l*w) -11.11   Wound Volume (cm^3) 0.04 cm^3   Wound Healing % 44   Wound Assessment Pink/red   Drainage Amount Scant (moist but unmeasurable)   Drainage Description Serosanguinous   Odor None   Stella-wound Assessment Hyperkeratosis (callous)   Wound Thickness

## 2023-10-30 NOTE — WOUND CARE
Discharge Instructions for  [address], [address] [place] [address]  Phone [phone]   Fax [phone]      NAME:  Emily Black OF BIRTH:  1965  MEDICAL RECORD NUMBER:  [id number]  DATE:  10/30/2023    Return Appointment:  1 week with Levi Crespo MD      Instructions: Right great toe and left great toe wounds  Clean with Vashe. Right great toe:  Apply xeroform    Left great toe:  Pack with silver alginate, cut to wound size. Cover with xeroform    Cover with 2x2, secure with roll gauze  Change daily and as needed    Apply vaseline to dry skin. Be careful to keep wound dressings dry and intact. Please increase dietary protein to at least 60 grams per day to support cell rejuvenation. May use supplements such as Ensure Max, Arya, & Glucerna (samples & coupons provided at first visit). Be sure to spread intake throughout the day for improved absorption. Bone culture obtained today, continue antibiotics and will call if need to change once new culture finalizes  MRI ordered. Please call [phone] to speak directly with a  to schedule your appointment if you have not received an automated call within 24 hours. Patient to offload wound with DARCO SHOE WITH PEG ASSIST INSERT while standing or weight bearing. Do not get wound wet. May purchase cast cover at local pharmacy to keep dry in shower. Wound healing is greatly slowed when blood glucose levels are greater than 200. Monitor glucose levels daily to ensure tight glucose control. Smoking greatly slows wound healing by restricting essential blow flow. Decrease smoking with a plan to quit. Increase protein intake to promote wound healing. Protein supplements such as Arya and Ensure are great options. Silver nitrate stick used at today's visit.     Should you experience increased redness, swelling, pain, foul odor, size of wound(s), or have a

## 2023-10-31 ENCOUNTER — HOSPITAL ENCOUNTER (OUTPATIENT)
Dept: MRI IMAGING | Age: 58
Discharge: HOME | End: 2023-11-03
Attending: FAMILY MEDICINE
Payer: MEDICARE

## 2023-10-31 DIAGNOSIS — L03.032 CELLULITIS OF GREAT TOE OF LEFT FOOT: ICD-10-CM

## 2023-10-31 DIAGNOSIS — E11.621 DIABETIC ULCER OF TOE OF LEFT FOOT ASSOCIATED WITH TYPE 2 DIABETES MELLITUS, WITH FAT LAYER EXPOSED (HCC): ICD-10-CM

## 2023-10-31 DIAGNOSIS — L97.522 DIABETIC ULCER OF TOE OF LEFT FOOT ASSOCIATED WITH TYPE 2 DIABETES MELLITUS, WITH FAT LAYER EXPOSED (HCC): ICD-10-CM

## 2023-10-31 PROCEDURE — 73720 MRI LWR EXTREMITY W/O&W/DYE: CPT

## 2023-10-31 PROCEDURE — A9579 GAD-BASE MR CONTRAST NOS,1ML: HCPCS | Performed by: FAMILY MEDICINE

## 2023-10-31 PROCEDURE — 2580000003 HC RX 258: Performed by: FAMILY MEDICINE

## 2023-10-31 PROCEDURE — 6360000004 HC RX CONTRAST MEDICATION: Performed by: FAMILY MEDICINE

## 2023-10-31 RX ORDER — SODIUM CHLORIDE 0.9 % (FLUSH) 0.9 %
10 SYRINGE (ML) INJECTION AS NEEDED
Status: DISCONTINUED | OUTPATIENT
Start: 2023-10-31 | End: 2023-11-04 | Stop reason: HOSPADM

## 2023-10-31 RX ADMIN — GADOTERIDOL 17 ML: 279.3 INJECTION, SOLUTION INTRAVENOUS at 21:32

## 2023-10-31 RX ADMIN — SODIUM CHLORIDE, PRESERVATIVE FREE 10 ML: 5 INJECTION INTRAVENOUS at 21:32

## 2023-11-01 ENCOUNTER — CARE COORDINATION (OUTPATIENT)
Dept: CARE COORDINATION | Facility: CLINIC | Age: 58
End: 2023-11-01

## 2023-11-01 LAB
BACTERIA SPEC CULT: NORMAL
SERVICE CMNT-IMP: NORMAL

## 2023-11-01 NOTE — CARE COORDINATION
Initial Contact Social Work Note - Ambulatory  11/1/2023      Date of referral: 11/27/23  Referral received from: RN Care Management   Reason for referral: Community Resources     Previous SW referral: No  If yes, brief summary of outcome:       Two Identifiers Verified: Yes    Insurance Provider: [facility] Medicare/ Medicaid / [address]:  [place]    The patient is connected with [facility]  The patient does have a family friend who also prepares meals for him as well. [place] Status:  No    Community Providers: SNAP/Food [place]    ADL Assistance Needed: N/A    Housing/Living Concerns or Home Modification Needs: Not at this time. Transportation Concern: Not at this time. Patient has a car. Medication Cost Concern: Not at this time. Medication Adherence Concern: Yes     Financial Concern(s): Yes, but he states that he is doing ok with what his is getting. Income (only if applicable):     Ability to Read/Write: Yes    Advance Care Plan:  Not on file; educated patient    Other:     Identified Needs:  Protein Shake from Medicaid. Social Work Plan:  Contact  with Medicaid   Next Steps:     Method of Communication With Provider (if appropriate): Chart Routing       Goals Addressed                   This Visit's Progress     Supportive resources in place to maintain patient in the community (ie.  Home Health, DME equipment, refer to, medication assistant plan, etc.)        Patient to connect with Medicaid  for help with access to Protein Shakes 12/1/23

## 2023-11-02 ENCOUNTER — ANESTHESIA EVENT (OUTPATIENT)
Dept: SURGERY | Age: 58
End: 2023-11-02
Payer: MEDICARE

## 2023-11-02 ENCOUNTER — CARE COORDINATION (OUTPATIENT)
Dept: CARE COORDINATION | Facility: CLINIC | Age: 58
End: 2023-11-02

## 2023-11-02 ENCOUNTER — HOSPITAL ENCOUNTER (OUTPATIENT)
Dept: WOUND CARE | Age: 58
Discharge: HOME | End: 2023-11-02
Payer: MEDICARE

## 2023-11-02 ENCOUNTER — HOSPITAL ENCOUNTER (INPATIENT)
Age: 58
LOS: 5 days | Discharge: HOME HEALTH | DRG: 629 | End: 2023-11-07
Attending: HOSPITALIST | Admitting: HOSPITALIST
Payer: MEDICARE

## 2023-11-02 VITALS
SYSTOLIC BLOOD PRESSURE: 147 MMHG | DIASTOLIC BLOOD PRESSURE: 124 MMHG | HEART RATE: 85 BPM | HEIGHT: 73 IN | BODY MASS INDEX: 24.65 KG/M2 | WEIGHT: 186 LBS

## 2023-11-02 DIAGNOSIS — L03.032 CELLULITIS OF GREAT TOE OF LEFT FOOT: Chronic | ICD-10-CM

## 2023-11-02 DIAGNOSIS — E11.65 TYPE 2 DIABETES MELLITUS WITH HYPERGLYCEMIA, WITH LONG-TERM CURRENT USE OF INSULIN (HCC): ICD-10-CM

## 2023-11-02 DIAGNOSIS — F17.210 CIGARETTE SMOKER: ICD-10-CM

## 2023-11-02 DIAGNOSIS — M86.172 ACUTE OSTEOMYELITIS OF TOE OF LEFT FOOT (HCC): Primary | ICD-10-CM

## 2023-11-02 DIAGNOSIS — Z79.4 TYPE 2 DIABETES MELLITUS WITH HYPERGLYCEMIA, WITH LONG-TERM CURRENT USE OF INSULIN (HCC): ICD-10-CM

## 2023-11-02 DIAGNOSIS — B20 HUMAN IMMUNODEFICIENCY VIRUS (HCC): ICD-10-CM

## 2023-11-02 DIAGNOSIS — M86.172 ACUTE OSTEOMYELITIS OF TOE OF LEFT FOOT (HCC): ICD-10-CM

## 2023-11-02 DIAGNOSIS — L97.524 DIABETIC ULCER OF TOE OF LEFT FOOT ASSOCIATED WITH TYPE 2 DIABETES MELLITUS, WITH NECROSIS OF BONE (HCC): Primary | ICD-10-CM

## 2023-11-02 DIAGNOSIS — E11.621 DIABETIC ULCER OF TOE OF LEFT FOOT ASSOCIATED WITH TYPE 2 DIABETES MELLITUS, WITH NECROSIS OF BONE (HCC): Primary | ICD-10-CM

## 2023-11-02 DIAGNOSIS — E11.42 PERIPHERAL SENSORY NEUROPATHY DUE TO TYPE 2 DIABETES MELLITUS (HCC): ICD-10-CM

## 2023-11-02 PROBLEM — E78.00 HYPERCHOLESTEROLEMIA: Status: ACTIVE | Noted: 2020-07-13

## 2023-11-02 LAB
ALBUMIN SERPL-MCNC: 2.9 G/DL (ref 3.5–5)
ALBUMIN/GLOB SERPL: 0.6 (ref 0.4–1.6)
ALP SERPL-CCNC: 170 U/L (ref 50–136)
ALT SERPL-CCNC: 12 U/L (ref 12–65)
ANION GAP SERPL CALC-SCNC: 6 MMOL/L (ref 2–11)
AST SERPL-CCNC: 10 U/L (ref 15–37)
BASOPHILS # BLD: 0.1 K/UL (ref 0–0.2)
BASOPHILS NFR BLD: 1 % (ref 0–2)
BILIRUB SERPL-MCNC: 0.2 MG/DL (ref 0.2–1.1)
BUN SERPL-MCNC: 13 MG/DL (ref 6–23)
CALCIUM SERPL-MCNC: 9.5 MG/DL (ref 8.3–10.4)
CHLORIDE SERPL-SCNC: 102 MMOL/L (ref 101–110)
CO2 SERPL-SCNC: 27 MMOL/L (ref 21–32)
CREAT SERPL-MCNC: 1 MG/DL (ref 0.8–1.5)
CRP SERPL-MCNC: 3.1 MG/DL (ref 0–0.9)
DIFFERENTIAL METHOD BLD: ABNORMAL
EOSINOPHIL # BLD: 0.2 K/UL (ref 0–0.8)
EOSINOPHIL NFR BLD: 2 % (ref 0.5–7.8)
ERYTHROCYTE [DISTWIDTH] IN BLOOD BY AUTOMATED COUNT: 13.7 % (ref 11.9–14.6)
ERYTHROCYTE [SEDIMENTATION RATE] IN BLOOD: 31 MM/HR
EST. AVERAGE GLUCOSE BLD GHB EST-MCNC: 260 MG/DL
GLOBULIN SER CALC-MCNC: 4.8 G/DL (ref 2.8–4.5)
GLUCOSE BLD STRIP.AUTO-MCNC: 242 MG/DL (ref 65–100)
GLUCOSE BLD STRIP.AUTO-MCNC: 429 MG/DL (ref 65–100)
GLUCOSE SERPL-MCNC: 360 MG/DL (ref 65–100)
HBA1C MFR BLD: 10.7 % (ref 4.8–5.6)
HCT VFR BLD AUTO: 47.8 % (ref 41.1–50.3)
HGB BLD-MCNC: 15.8 G/DL (ref 13.6–17.2)
IMM GRANULOCYTES # BLD AUTO: 0.1 K/UL (ref 0–0.5)
IMM GRANULOCYTES NFR BLD AUTO: 0 % (ref 0–5)
LACTATE SERPL-SCNC: 1.8 MMOL/L (ref 0.4–2)
LYMPHOCYTES # BLD: 2.2 K/UL (ref 0.5–4.6)
LYMPHOCYTES NFR BLD: 16 % (ref 13–44)
MCH RBC QN AUTO: 29.8 PG (ref 26.1–32.9)
MCHC RBC AUTO-ENTMCNC: 33.1 G/DL (ref 31.4–35)
MCV RBC AUTO: 90.2 FL (ref 82–102)
MONOCYTES # BLD: 0.5 K/UL (ref 0.1–1.3)
MONOCYTES NFR BLD: 4 % (ref 4–12)
NEUTS SEG # BLD: 10.3 K/UL (ref 1.7–8.2)
NEUTS SEG NFR BLD: 77 % (ref 43–78)
NRBC # BLD: 0 K/UL (ref 0–0.2)
PLATELET # BLD AUTO: 398 K/UL (ref 150–450)
PMV BLD AUTO: 10.8 FL (ref 9.4–12.3)
POTASSIUM SERPL-SCNC: 3.9 MMOL/L (ref 3.5–5.1)
PROCALCITONIN SERPL-MCNC: <0.05 NG/ML (ref 0–0.49)
PROT SERPL-MCNC: 7.7 G/DL (ref 6.3–8.2)
RBC # BLD AUTO: 5.3 M/UL (ref 4.23–5.6)
SERVICE CMNT-IMP: ABNORMAL
SERVICE CMNT-IMP: ABNORMAL
SODIUM SERPL-SCNC: 135 MMOL/L (ref 133–143)
WBC # BLD AUTO: 13.4 K/UL (ref 4.3–11.1)

## 2023-11-02 PROCEDURE — 85025 COMPLETE CBC W/AUTO DIFF WBC: CPT

## 2023-11-02 PROCEDURE — 1100000000 HC RM PRIVATE

## 2023-11-02 PROCEDURE — 80053 COMPREHEN METABOLIC PANEL: CPT

## 2023-11-02 PROCEDURE — 2500000003 HC RX 250 WO HCPCS: Performed by: HOSPITALIST

## 2023-11-02 PROCEDURE — 86140 C-REACTIVE PROTEIN: CPT

## 2023-11-02 PROCEDURE — 83605 ASSAY OF LACTIC ACID: CPT

## 2023-11-02 PROCEDURE — 82962 GLUCOSE BLOOD TEST: CPT

## 2023-11-02 PROCEDURE — 6370000000 HC RX 637 (ALT 250 FOR IP): Performed by: HOSPITALIST

## 2023-11-02 PROCEDURE — 87040 BLOOD CULTURE FOR BACTERIA: CPT

## 2023-11-02 PROCEDURE — 99213 OFFICE O/P EST LOW 20 MIN: CPT

## 2023-11-02 PROCEDURE — 84145 PROCALCITONIN (PCT): CPT

## 2023-11-02 PROCEDURE — 6360000002 HC RX W HCPCS: Performed by: HOSPITALIST

## 2023-11-02 PROCEDURE — 36415 COLL VENOUS BLD VENIPUNCTURE: CPT

## 2023-11-02 PROCEDURE — 83036 HEMOGLOBIN GLYCOSYLATED A1C: CPT

## 2023-11-02 PROCEDURE — 99214 OFFICE O/P EST MOD 30 MIN: CPT | Performed by: SURGERY

## 2023-11-02 PROCEDURE — 85652 RBC SED RATE AUTOMATED: CPT

## 2023-11-02 PROCEDURE — 2580000003 HC RX 258: Performed by: HOSPITALIST

## 2023-11-02 RX ORDER — ACETAMINOPHEN 325 MG/1
650 TABLET ORAL EVERY 6 HOURS PRN
Status: DISCONTINUED | OUTPATIENT
Start: 2023-11-02 | End: 2023-11-07 | Stop reason: HOSPADM

## 2023-11-02 RX ORDER — SODIUM CHLORIDE, SODIUM LACTATE, POTASSIUM CHLORIDE, CALCIUM CHLORIDE 600; 310; 30; 20 MG/100ML; MG/100ML; MG/100ML; MG/100ML
INJECTION, SOLUTION INTRAVENOUS CONTINUOUS
Status: ACTIVE | OUTPATIENT
Start: 2023-11-02 | End: 2023-11-03

## 2023-11-02 RX ORDER — ACETAMINOPHEN 650 MG/1
650 SUPPOSITORY RECTAL EVERY 6 HOURS PRN
Status: DISCONTINUED | OUTPATIENT
Start: 2023-11-02 | End: 2023-11-07 | Stop reason: HOSPADM

## 2023-11-02 RX ORDER — TRAZODONE HYDROCHLORIDE 50 MG/1
50 TABLET ORAL NIGHTLY
Status: DISCONTINUED | OUTPATIENT
Start: 2023-11-02 | End: 2023-11-07 | Stop reason: HOSPADM

## 2023-11-02 RX ORDER — ONDANSETRON 2 MG/ML
4 INJECTION INTRAMUSCULAR; INTRAVENOUS EVERY 6 HOURS PRN
Status: DISCONTINUED | OUTPATIENT
Start: 2023-11-02 | End: 2023-11-07 | Stop reason: HOSPADM

## 2023-11-02 RX ORDER — BUPROPION HYDROCHLORIDE 300 MG/1
300 TABLET ORAL DAILY
Status: DISCONTINUED | OUTPATIENT
Start: 2023-11-02 | End: 2023-11-07 | Stop reason: HOSPADM

## 2023-11-02 RX ORDER — ABACAVIR 300 MG/1
600 TABLET ORAL DAILY
Status: DISCONTINUED | OUTPATIENT
Start: 2023-11-03 | End: 2023-11-07 | Stop reason: HOSPADM

## 2023-11-02 RX ORDER — POTASSIUM CHLORIDE 7.45 MG/ML
10 INJECTION INTRAVENOUS PRN
Status: DISCONTINUED | OUTPATIENT
Start: 2023-11-02 | End: 2023-11-07 | Stop reason: HOSPADM

## 2023-11-02 RX ORDER — INSULIN LISPRO 100 [IU]/ML
0-8 INJECTION, SOLUTION INTRAVENOUS; SUBCUTANEOUS
Status: DISCONTINUED | OUTPATIENT
Start: 2023-11-02 | End: 2023-11-07 | Stop reason: HOSPADM

## 2023-11-02 RX ORDER — SODIUM CHLORIDE 9 MG/ML
INJECTION, SOLUTION INTRAVENOUS PRN
Status: DISCONTINUED | OUTPATIENT
Start: 2023-11-02 | End: 2023-11-07 | Stop reason: HOSPADM

## 2023-11-02 RX ORDER — DEXTROSE MONOHYDRATE 100 MG/ML
INJECTION, SOLUTION INTRAVENOUS CONTINUOUS PRN
Status: DISCONTINUED | OUTPATIENT
Start: 2023-11-02 | End: 2023-11-07 | Stop reason: HOSPADM

## 2023-11-02 RX ORDER — LAMIVUDINE 150 MG/1
300 TABLET, FILM COATED ORAL DAILY
Status: DISCONTINUED | OUTPATIENT
Start: 2023-11-03 | End: 2023-11-07 | Stop reason: HOSPADM

## 2023-11-02 RX ORDER — INSULIN LISPRO 100 [IU]/ML
0-4 INJECTION, SOLUTION INTRAVENOUS; SUBCUTANEOUS NIGHTLY
Status: DISCONTINUED | OUTPATIENT
Start: 2023-11-02 | End: 2023-11-07 | Stop reason: HOSPADM

## 2023-11-02 RX ORDER — MAGNESIUM SULFATE IN WATER 40 MG/ML
2000 INJECTION, SOLUTION INTRAVENOUS PRN
Status: DISCONTINUED | OUTPATIENT
Start: 2023-11-02 | End: 2023-11-07 | Stop reason: HOSPADM

## 2023-11-02 RX ORDER — DULOXETIN HYDROCHLORIDE 30 MG/1
60 CAPSULE, DELAYED RELEASE ORAL EVERY MORNING
Status: DISCONTINUED | OUTPATIENT
Start: 2023-11-03 | End: 2023-11-07 | Stop reason: HOSPADM

## 2023-11-02 RX ORDER — GABAPENTIN 400 MG/1
800 CAPSULE ORAL 3 TIMES DAILY
Status: DISCONTINUED | OUTPATIENT
Start: 2023-11-02 | End: 2023-11-07 | Stop reason: HOSPADM

## 2023-11-02 RX ORDER — INSULIN GLARGINE 100 [IU]/ML
20 INJECTION, SOLUTION SUBCUTANEOUS DAILY
Status: DISCONTINUED | OUTPATIENT
Start: 2023-11-02 | End: 2023-11-03

## 2023-11-02 RX ORDER — SODIUM CHLORIDE 0.9 % (FLUSH) 0.9 %
5-40 SYRINGE (ML) INJECTION PRN
Status: DISCONTINUED | OUTPATIENT
Start: 2023-11-02 | End: 2023-11-07 | Stop reason: HOSPADM

## 2023-11-02 RX ORDER — POTASSIUM CHLORIDE 20 MEQ/1
40 TABLET, EXTENDED RELEASE ORAL PRN
Status: DISCONTINUED | OUTPATIENT
Start: 2023-11-02 | End: 2023-11-07 | Stop reason: HOSPADM

## 2023-11-02 RX ORDER — HYDRALAZINE HYDROCHLORIDE 20 MG/ML
10 INJECTION INTRAMUSCULAR; INTRAVENOUS EVERY 6 HOURS PRN
Status: DISCONTINUED | OUTPATIENT
Start: 2023-11-02 | End: 2023-11-07 | Stop reason: HOSPADM

## 2023-11-02 RX ORDER — OXYBUTYNIN CHLORIDE 10 MG/1
1 TABLET, EXTENDED RELEASE ORAL DAILY
Status: DISCONTINUED | OUTPATIENT
Start: 2023-11-02 | End: 2023-11-07 | Stop reason: HOSPADM

## 2023-11-02 RX ORDER — TAMSULOSIN HYDROCHLORIDE 0.4 MG/1
0.4 CAPSULE ORAL DAILY
Status: DISCONTINUED | OUTPATIENT
Start: 2023-11-02 | End: 2023-11-07 | Stop reason: HOSPADM

## 2023-11-02 RX ORDER — ENOXAPARIN SODIUM 100 MG/ML
40 INJECTION SUBCUTANEOUS EVERY 24 HOURS
Status: DISCONTINUED | OUTPATIENT
Start: 2023-11-02 | End: 2023-11-07 | Stop reason: HOSPADM

## 2023-11-02 RX ORDER — FAMOTIDINE 20 MG/1
20 TABLET, FILM COATED ORAL 2 TIMES DAILY
Status: DISCONTINUED | OUTPATIENT
Start: 2023-11-02 | End: 2023-11-07 | Stop reason: HOSPADM

## 2023-11-02 RX ORDER — SODIUM CHLORIDE 0.9 % (FLUSH) 0.9 %
5-40 SYRINGE (ML) INJECTION EVERY 12 HOURS SCHEDULED
Status: DISCONTINUED | OUTPATIENT
Start: 2023-11-02 | End: 2023-11-07 | Stop reason: HOSPADM

## 2023-11-02 RX ORDER — POLYETHYLENE GLYCOL 3350 17 G/17G
17 POWDER, FOR SOLUTION ORAL DAILY PRN
Status: DISCONTINUED | OUTPATIENT
Start: 2023-11-02 | End: 2023-11-07 | Stop reason: HOSPADM

## 2023-11-02 RX ORDER — ATORVASTATIN CALCIUM 40 MG/1
80 TABLET, FILM COATED ORAL EVERY EVENING
Status: DISCONTINUED | OUTPATIENT
Start: 2023-11-02 | End: 2023-11-07 | Stop reason: HOSPADM

## 2023-11-02 RX ORDER — ONDANSETRON 4 MG/1
4 TABLET, ORALLY DISINTEGRATING ORAL EVERY 8 HOURS PRN
Status: DISCONTINUED | OUTPATIENT
Start: 2023-11-02 | End: 2023-11-07 | Stop reason: HOSPADM

## 2023-11-02 RX ADMIN — ATORVASTATIN CALCIUM 80 MG: 40 TABLET, FILM COATED ORAL at 17:15

## 2023-11-02 RX ADMIN — TAMSULOSIN HYDROCHLORIDE 0.4 MG: 0.4 CAPSULE ORAL at 18:08

## 2023-11-02 RX ADMIN — OXYBUTYNIN CHLORIDE 10 MG: 10 TABLET, EXTENDED RELEASE ORAL at 18:08

## 2023-11-02 RX ADMIN — INSULIN GLARGINE 20 UNITS: 100 INJECTION, SOLUTION SUBCUTANEOUS at 18:08

## 2023-11-02 RX ADMIN — TRAZODONE HYDROCHLORIDE 50 MG: 50 TABLET ORAL at 22:09

## 2023-11-02 RX ADMIN — FAMOTIDINE 20 MG: 20 TABLET, FILM COATED ORAL at 22:09

## 2023-11-02 RX ADMIN — GABAPENTIN 800 MG: 400 CAPSULE ORAL at 18:08

## 2023-11-02 RX ADMIN — CEFEPIME 2000 MG: 2 INJECTION, POWDER, FOR SOLUTION INTRAVENOUS at 19:37

## 2023-11-02 RX ADMIN — ENOXAPARIN SODIUM 40 MG: 100 INJECTION SUBCUTANEOUS at 17:15

## 2023-11-02 RX ADMIN — SODIUM CHLORIDE, SODIUM LACTATE, POTASSIUM CHLORIDE, AND CALCIUM CHLORIDE: 600; 310; 30; 20 INJECTION, SOLUTION INTRAVENOUS at 18:46

## 2023-11-02 RX ADMIN — BUPROPION HYDROCHLORIDE 300 MG: 300 TABLET, EXTENDED RELEASE ORAL at 18:08

## 2023-11-02 RX ADMIN — GABAPENTIN 800 MG: 400 CAPSULE ORAL at 22:09

## 2023-11-02 RX ADMIN — VANCOMYCIN HYDROCHLORIDE 2000 MG: 10 INJECTION, POWDER, LYOPHILIZED, FOR SOLUTION INTRAVENOUS at 19:10

## 2023-11-02 RX ADMIN — TUBERCULIN PURIFIED PROTEIN DERIVATIVE 5 UNITS: 5 INJECTION, SOLUTION INTRADERMAL at 18:09

## 2023-11-02 RX ADMIN — INSULIN LISPRO 8 UNITS: 100 INJECTION, SOLUTION INTRAVENOUS; SUBCUTANEOUS at 17:18

## 2023-11-02 ASSESSMENT — PAIN DESCRIPTION - ORIENTATION: ORIENTATION: LEFT

## 2023-11-02 ASSESSMENT — PAIN SCALES - GENERAL: PAINLEVEL_OUTOF10: 3

## 2023-11-02 ASSESSMENT — PAIN DESCRIPTION - LOCATION: LOCATION: TOE (COMMENT WHICH ONE)

## 2023-11-02 ASSESSMENT — PAIN DESCRIPTION - DESCRIPTORS: DESCRIPTORS: THROBBING

## 2023-11-02 NOTE — PROGRESS NOTES
historian(s)  (For the categories of independent interpretation of tests and discussion of management or test interpretation, see moderate or high) Low risk of morbidity from additional diagnostic testing or treatment     94064  43223 Mod Moderate  ? 1or more chronic illnesses with exacerbation, progression, or side effects of treatment;    or  ?2or more stable chronic illnesses;    or  ?1undiagnosed new problem with uncertain prognosis;    or  ?1acute illness with systemic symptoms;    or  ?1acute complicated injury   Moderate  (Must meet the requirements of at least 1 out of 3 categories)  Category 1: Tests, documents, or independent historian(s)  ? Any combination of 3 from the following:   ?Review of prior external note(s) from each unique source*;  ?Review of the result(s) of each unique test*;  ?Ordering of each unique test*;  ?Assessment requiring an independent historian(s)    or  Category 2: Independent interpretation of tests   ? Independent interpretation of a test performed by another physician/other qualified health care professional (not separately reported);     or  Category 3: Discussion of management or test interpretation  ? Discussion of management or test interpretation with external physician/other qualified health care professional/appropriate source (not separately reported)   Moderate risk of morbidity from additional diagnostic testing or treatment  Examples only:  ?Prescription drug management - advanced wound care prescription Rx wound care products  (eg Iodosorb, hydrofera, silvadene, collagen, puracol plus, optiloc, biologics - placenta, Theraskin, Apligraf). Note also that if home health care is involved, they will not apply topical therapies without a prescription/order from physician      ? Decision regarding minor surgery with identified patient or procedure risk factors  ? Decision regarding elective major surgery without identified patient or procedure risk factors   ? Diagnosis or

## 2023-11-02 NOTE — WOUND CARE
Discharge Instructions for  [address] [place] [id number] [place] [address], [address] [facility]  Phone [phone]   Fax [phone]      NAME:  Fran Walker OF BIRTH:  1965  MEDICAL RECORD NUMBER:  [id number]  DATE:  11/2/2023    Return Appointment:  after hospital discharge with Alon Day MD      Instructions: Right great toe and left great toe wounds  Clean with Vashe. Right great toe:  Vaseline daily    Left great toe:  Pack with acticoat      Cover with 2x2, secure with roll gauze  Change daily and as needed    Apply vaseline to dry skin. Be careful to keep wound dressings dry and intact. Scheduled for surgery tomorrow with Dr. Ruben Chavez to wait on phone call from hospital with timing of bed availability for direct admit              Should you experience increased redness, swelling, pain, foul odor, size of wound(s), or have a temperature over 101 degrees please contact the [address] [place] [address] Robinson Middleton at [phone] or if after hours contact your primary care physician or go to the hospital emergency department. PLEASE NOTE: IF YOU ARE UNABLE TO OBTAIN WOUND SUPPLIES, CONTINUE TO USE THE SUPPLIES YOU HAVE AVAILABLE UNTIL YOU ARE ABLE TO REACH US. IT IS MOST IMPORTANT TO KEEP THE WOUND COVERED AT ALL TIMES.     Electronically signed John Goodwin RN on 11/2/2023 at 1:07 PM

## 2023-11-02 NOTE — CARE COORDINATION
Care Coordination Call -    - Ry Beverly RN -6th floor IP CM   - requested Diabetes stop in to see patient, particularly re: nutrition. Patient direct admission for IV antibiotics r/t osteomyelitis Left Great Toe  Planned surgical debridement of same.     PLAN: follow for discharge

## 2023-11-02 NOTE — H&P
Hospitalist History and Physical   Admit Date:  2023  2:50 PM   Name:  Luciano Potter   Age:  63 y.o. Sex:  male  :  1965   MRN:  [id number]   Room:  [facility]    Presenting/Chief Complaint: No chief complaint on file. Reason(s) for Admission: Acute osteomyelitis of toe of left foot [facility]) [M86.172]     History of Present Illness:   Luciano Potter is a 63 y.o. male with medical history of hypertension, insulin-dependent diabetes mellitus type 2, HIV on antiretroviral therapy, tobacco use, was admitted to the hospital as a direct admit from wound clinic. Patient has been followed in the wound clinic for his diabetic foot ulcer. He has 5-month history of open wounds on both feet of the great toes. Started as calluses and then he picked on it developed into an ulcer wound. He has been treated at the wound clinic with hydrogen peroxide Neosporin on a daily basis. However the wound was nonhealing. He also has poorly controlled diabetes with A1c of greater than 13 in May. X-rays of the left foot was done and showed no evidence of osteomyelitis. Due to poor wound healing, he returned to the wound clinic on 10/2/2023. He has stopped using his peroxide and Neosporin and getting his dressings wet and dirty by working in the yard and poor shoes that are not protecting his feet. He has been doing home dressing 6-7 times a day and tearing of loose skin creating new wounds. Debridements were performed at the wound clinic and patient returned on 10/16/2023. He was seen again on 10/25/2023 with increased swelling of the left great toe. Cultures were obtained and positive for Pseudomonas, Klebsiella, group B streptococcus. Was placed on a 10-day course of Augmentin and doxycycline. He returns on 10/30/2023 with still little improvement. His foot and great toe are more swollen and there is increased drainage and involvement of the distal interphalangeal joint.   Bone is easily palpated

## 2023-11-02 NOTE — DISCHARGE INSTRUCTIONS
Discharge Instructions for  [address] [facility]  [address] [facility], [address] [place] [address]  Phone [phone]   Fax [phone]      NAME:  Meredith Cabrera OF BIRTH:  1965  MEDICAL RECORD NUMBER:  [id number]  DATE:  @ED@    Return Appointment:    after discharge from hospital  with Una Huitron MD      Instructions: Right great toe and left great toe wounds  Clean with Vashe. Right great toe:  Vaseline daily     Left great toe:  Pack with acticoat        Cover with 2x2, secure with roll gauze  Change daily and as needed     Apply vaseline to dry skin. Be careful to keep wound dressings dry and intact. Scheduled for surgery tomorrow with Dr. Cj Babcock to wait on phone call from hospital with timing of bed availability for direct admit                     Should you experience increased redness, swelling, pain, foul odor, size of wound(s), or have a temperature over 101 degrees please contact the [address] [place] [address] Robinson Middleton at [phone] or if after hours contact your primary care physician or go to the hospital emergency department. PLEASE NOTE: IF YOU ARE UNABLE TO OBTAIN WOUND SUPPLIES, CONTINUE TO USE THE SUPPLIES YOU HAVE AVAILABLE UNTIL YOU ARE ABLE TO REACH US. IT IS MOST IMPORTANT TO KEEP THE WOUND COVERED AT ALL TIMES.     Electronically signed Vivian Roger RN on 11/2/2023 at 1:09 PM

## 2023-11-02 NOTE — FLOWSHEET NOTE
11/02/23 1248   Wound 10/02/23 Toe (Comment  which one) Left;Plantar great toe #4   Date First Assessed/Time First Assessed: 10/02/23 1133   Present on Original Admission: Yes  Primary Wound Type: Diabetic Ulcer  Location: Toe (Comment  which one)  Wound Location Orientation: Left;Plantar  Wound Description (Comments): great toe #4   Wound Image     Wound Etiology Diabetic Gavin 3   Dressing Status Dry; Old drainage noted   Wound Cleansed Cleansed with saline   Dressing/Treatment Gauze dressing/dressing sponge   Offloading for Diabetic Foot Ulcers Offloading ordered   Wound Length (cm) 1.7 cm   Wound Width (cm) 1 cm   Wound Depth (cm) 1 cm   Wound Surface Area (cm^2) 1.7 cm^2   Change in Wound Size % (l*w) -13.33   Wound Volume (cm^3) 1.7 cm^3   Wound Healing % -1033   Wound Assessment Pink/red;Slough; Exposed structure bone   Drainage Amount Large (50-75% saturated)   Drainage Description Serosanguinous   Odor Mild   Stella-wound Assessment Edematous; Hyperkeratosis (callous); Maceration   Wound Thickness Description not for Pressure Injury Full thickness   Wound 09/21/23 Toe (Comment  which one) Right;Dorsal #1   Date First Assessed/Time First Assessed: 09/21/23 1403   Present on Original Admission: Yes  Wound Approximate Age at First Assessment (Weeks): 6 weeks  Primary Wound Type: Diabetic Ulcer  Location: Toe (Comment  which one)  Wound Location Orientation. ..    Wound Image    Wound Etiology Diabetic Gavin 1   Dressing Status Intact   Wound Cleansed Cleansed with saline   Dressing/Treatment   (vaseline)   Offloading for Diabetic Foot Ulcers Offloading ordered   Wound Length (cm) 0 cm   Wound Width (cm) 0 cm   Wound Depth (cm) 0 cm   Wound Surface Area (cm^2) 0 cm^2   Change in Wound Size % (l*w) 100   Wound Volume (cm^3) 0 cm^3   Wound Healing % 100   Wound Assessment Epithelialization   Drainage Amount None (dry)   Odor None   Stella-wound Assessment Hyperkeratosis (callous)

## 2023-11-02 NOTE — CARE COORDINATION
Multiple calls/texts with patient   - MRI showing osteomyelitis left great toe   - Dr. Lianne Flores will plan to debride patient's toe wound under anesthesia   - explained IV antibiotics to patient (per Dr. Zana Hernandez message to this ACM). - patient to be seen in wound clinic today   - hospital/surgery for tomorrow. PLAN:  outreach as needed and post operatively for certain.

## 2023-11-03 ENCOUNTER — ANESTHESIA (OUTPATIENT)
Dept: SURGERY | Age: 58
End: 2023-11-03
Payer: MEDICARE

## 2023-11-03 LAB
ANION GAP SERPL CALC-SCNC: 5 MMOL/L (ref 2–11)
BACTERIA SPEC CULT: ABNORMAL
BACTERIA SPEC CULT: NORMAL
BASOPHILS # BLD: 0.1 K/UL (ref 0–0.2)
BASOPHILS NFR BLD: 1 % (ref 0–2)
BUN SERPL-MCNC: 16 MG/DL (ref 6–23)
CALCIUM SERPL-MCNC: 8.6 MG/DL (ref 8.3–10.4)
CHLORIDE SERPL-SCNC: 108 MMOL/L (ref 101–110)
CO2 SERPL-SCNC: 26 MMOL/L (ref 21–32)
CREAT SERPL-MCNC: 0.8 MG/DL (ref 0.8–1.5)
DIFFERENTIAL METHOD BLD: NORMAL
EOSINOPHIL # BLD: 0.3 K/UL (ref 0–0.8)
EOSINOPHIL NFR BLD: 3 % (ref 0.5–7.8)
ERYTHROCYTE [DISTWIDTH] IN BLOOD BY AUTOMATED COUNT: 13.7 % (ref 11.9–14.6)
GLUCOSE BLD STRIP.AUTO-MCNC: 188 MG/DL (ref 65–100)
GLUCOSE BLD STRIP.AUTO-MCNC: 287 MG/DL (ref 65–100)
GLUCOSE SERPL-MCNC: 272 MG/DL (ref 65–100)
GRAM STN SPEC: ABNORMAL
GRAM STN SPEC: ABNORMAL
HCT VFR BLD AUTO: 41.4 % (ref 41.1–50.3)
HGB BLD-MCNC: 13.7 G/DL (ref 13.6–17.2)
IMM GRANULOCYTES # BLD AUTO: 0 K/UL (ref 0–0.5)
IMM GRANULOCYTES NFR BLD AUTO: 0 % (ref 0–5)
INR PPP: 0.9
LYMPHOCYTES # BLD: 2.4 K/UL (ref 0.5–4.6)
LYMPHOCYTES NFR BLD: 26 % (ref 13–44)
MCH RBC QN AUTO: 30.2 PG (ref 26.1–32.9)
MCHC RBC AUTO-ENTMCNC: 33.1 G/DL (ref 31.4–35)
MCV RBC AUTO: 91.2 FL (ref 82–102)
MM INDURATION, POC: 0 MM (ref 0–5)
MONOCYTES # BLD: 0.6 K/UL (ref 0.1–1.3)
MONOCYTES NFR BLD: 6 % (ref 4–12)
NEUTS SEG # BLD: 6 K/UL (ref 1.7–8.2)
NEUTS SEG NFR BLD: 64 % (ref 43–78)
NRBC # BLD: 0 K/UL (ref 0–0.2)
PLATELET # BLD AUTO: 375 K/UL (ref 150–450)
PMV BLD AUTO: 10.7 FL (ref 9.4–12.3)
POTASSIUM SERPL-SCNC: 3.9 MMOL/L (ref 3.5–5.1)
PPD, POC: NEGATIVE
PROTHROMBIN TIME: 12.9 SEC (ref 12.6–14.3)
RBC # BLD AUTO: 4.54 M/UL (ref 4.23–5.6)
SERVICE CMNT-IMP: ABNORMAL
SERVICE CMNT-IMP: NORMAL
SODIUM SERPL-SCNC: 139 MMOL/L (ref 133–143)
WBC # BLD AUTO: 9.5 K/UL (ref 4.3–11.1)

## 2023-11-03 PROCEDURE — 6370000000 HC RX 637 (ALT 250 FOR IP): Performed by: FAMILY MEDICINE

## 2023-11-03 PROCEDURE — 36415 COLL VENOUS BLD VENIPUNCTURE: CPT

## 2023-11-03 PROCEDURE — C1713 ANCHOR/SCREW BN/BN,TIS/BN: HCPCS | Performed by: SURGERY

## 2023-11-03 PROCEDURE — 88304 TISSUE EXAM BY PATHOLOGIST: CPT

## 2023-11-03 PROCEDURE — 88311 DECALCIFY TISSUE: CPT

## 2023-11-03 PROCEDURE — 3E0102A INTRODUCTION OF ANTI-INFECTIVE ENVELOPE INTO SUBCUTANEOUS TISSUE, OPEN APPROACH: ICD-10-PCS | Performed by: SURGERY

## 2023-11-03 PROCEDURE — 6360000002 HC RX W HCPCS: Performed by: HOSPITALIST

## 2023-11-03 PROCEDURE — 76942 ECHO GUIDE FOR BIOPSY: CPT | Performed by: ANESTHESIOLOGY

## 2023-11-03 PROCEDURE — 2580000003 HC RX 258: Performed by: HOSPITALIST

## 2023-11-03 PROCEDURE — 2580000003 HC RX 258: Performed by: SURGERY

## 2023-11-03 PROCEDURE — 6360000002 HC RX W HCPCS: Performed by: REGISTERED NURSE

## 2023-11-03 PROCEDURE — 3700000000 HC ANESTHESIA ATTENDED CARE: Performed by: SURGERY

## 2023-11-03 PROCEDURE — A4217 STERILE WATER/SALINE, 500 ML: HCPCS | Performed by: SURGERY

## 2023-11-03 PROCEDURE — 2500000003 HC RX 250 WO HCPCS: Performed by: REGISTERED NURSE

## 2023-11-03 PROCEDURE — 85025 COMPLETE CBC W/AUTO DIFF WBC: CPT

## 2023-11-03 PROCEDURE — 7100000000 HC PACU RECOVERY - FIRST 15 MIN: Performed by: SURGERY

## 2023-11-03 PROCEDURE — 6370000000 HC RX 637 (ALT 250 FOR IP): Performed by: HOSPITALIST

## 2023-11-03 PROCEDURE — 80048 BASIC METABOLIC PNL TOTAL CA: CPT

## 2023-11-03 PROCEDURE — 6360000002 HC RX W HCPCS: Performed by: ANESTHESIOLOGY

## 2023-11-03 PROCEDURE — 85610 PROTHROMBIN TIME: CPT

## 2023-11-03 PROCEDURE — 1100000000 HC RM PRIVATE

## 2023-11-03 PROCEDURE — 0QBR0ZZ EXCISION OF LEFT TOE PHALANX, OPEN APPROACH: ICD-10-PCS | Performed by: SURGERY

## 2023-11-03 PROCEDURE — 3600000002 HC SURGERY LEVEL 2 BASE: Performed by: SURGERY

## 2023-11-03 PROCEDURE — 82962 GLUCOSE BLOOD TEST: CPT

## 2023-11-03 PROCEDURE — 7100000001 HC PACU RECOVERY - ADDTL 15 MIN: Performed by: SURGERY

## 2023-11-03 PROCEDURE — 2580000003 HC RX 258: Performed by: REGISTERED NURSE

## 2023-11-03 PROCEDURE — 3700000001 HC ADD 15 MINUTES (ANESTHESIA): Performed by: SURGERY

## 2023-11-03 PROCEDURE — 2709999900 HC NON-CHARGEABLE SUPPLY: Performed by: SURGERY

## 2023-11-03 PROCEDURE — 3600000012 HC SURGERY LEVEL 2 ADDTL 15MIN: Performed by: SURGERY

## 2023-11-03 DEVICE — STIMULAN® RAPID CURE PROVIDED STERILE FOR SINGLE PATIENT USE. STIMULAN® RAPID CURE CONTAINS CALCIUM SULFATE POWDER AND MIXING SOLUTION IN PRE-MEASURED QUANTITIES SO THAT WHEN MIXED TOGETHER IN A STERILE MIXING BOWL, THE RESULTANT PASTE IS TO BE DIGITALLY PACKED INTO OPEN BONE VOID/GAP TO SET INSITU OR PLACED INTO THE MOULD PROVIDED, THE MIXTURE SETS TO FORM BEADS. THE BIODEGRADABLE, RADIOPAQUE BEADS ARE RESORBED IN APPROXIMATELY 30 – 60 DAYS WHEN USED IN ACCORDANCE WITH THE DEVICE LABELLING. STIMULAN® RAPID CURE IS MANUFACTURED FROM SYNTHETIC IMPLANT GRADE CALCIUM SULFATE DIHYDRATE(CASO4.2H2O) THAT RESORBS AND IS REPLACED WITH BONE DURING THE HEALING PROCESS. ALSO, AS THE BONE VOID FILLER BEADS ARE BIODEGRADABLE AND BIOCOMPATIBLE, THEY MAY BE USED AT AN INFECTED SITE.
Type: IMPLANTABLE DEVICE | Site: FOOT | Status: FUNCTIONAL
Brand: STIMULAN® RAPID CURE

## 2023-11-03 RX ORDER — PROCHLORPERAZINE EDISYLATE 5 MG/ML
5 INJECTION INTRAMUSCULAR; INTRAVENOUS
Status: DISCONTINUED | OUTPATIENT
Start: 2023-11-03 | End: 2023-11-03 | Stop reason: HOSPADM

## 2023-11-03 RX ORDER — HYDROMORPHONE HYDROCHLORIDE 2 MG/ML
0.5 INJECTION, SOLUTION INTRAMUSCULAR; INTRAVENOUS; SUBCUTANEOUS EVERY 5 MIN PRN
Status: DISCONTINUED | OUTPATIENT
Start: 2023-11-03 | End: 2023-11-03 | Stop reason: HOSPADM

## 2023-11-03 RX ORDER — LOSARTAN POTASSIUM 25 MG/1
25 TABLET ORAL DAILY
Status: DISCONTINUED | OUTPATIENT
Start: 2023-11-03 | End: 2023-11-07 | Stop reason: HOSPADM

## 2023-11-03 RX ORDER — INSULIN GLARGINE 100 [IU]/ML
25 INJECTION, SOLUTION SUBCUTANEOUS DAILY
Status: DISCONTINUED | OUTPATIENT
Start: 2023-11-04 | End: 2023-11-07 | Stop reason: HOSPADM

## 2023-11-03 RX ORDER — MAGNESIUM HYDROXIDE 1200 MG/15ML
LIQUID ORAL CONTINUOUS PRN
Status: COMPLETED | OUTPATIENT
Start: 2023-11-03 | End: 2023-11-03

## 2023-11-03 RX ORDER — ROPIVACAINE HYDROCHLORIDE 5 MG/ML
INJECTION, SOLUTION EPIDURAL; INFILTRATION; PERINEURAL
Status: COMPLETED | OUTPATIENT
Start: 2023-11-03 | End: 2023-11-03

## 2023-11-03 RX ORDER — OXYCODONE HYDROCHLORIDE 5 MG/1
5 TABLET ORAL
Status: DISCONTINUED | OUTPATIENT
Start: 2023-11-03 | End: 2023-11-03 | Stop reason: HOSPADM

## 2023-11-03 RX ORDER — LIDOCAINE HYDROCHLORIDE 20 MG/ML
INJECTION, SOLUTION EPIDURAL; INFILTRATION; INTRACAUDAL; PERINEURAL PRN
Status: DISCONTINUED | OUTPATIENT
Start: 2023-11-03 | End: 2023-11-03 | Stop reason: SDUPTHER

## 2023-11-03 RX ORDER — PROPOFOL 10 MG/ML
INJECTION, EMULSION INTRAVENOUS PRN
Status: DISCONTINUED | OUTPATIENT
Start: 2023-11-03 | End: 2023-11-03 | Stop reason: SDUPTHER

## 2023-11-03 RX ADMIN — ABACAVIR 600 MG: 300 TABLET, FILM COATED ORAL at 08:47

## 2023-11-03 RX ADMIN — ACETAMINOPHEN 650 MG: 325 TABLET ORAL at 15:42

## 2023-11-03 RX ADMIN — DULOXETINE HYDROCHLORIDE 60 MG: 30 CAPSULE, DELAYED RELEASE ORAL at 08:47

## 2023-11-03 RX ADMIN — INSULIN LISPRO 4 UNITS: 100 INJECTION, SOLUTION INTRAVENOUS; SUBCUTANEOUS at 08:48

## 2023-11-03 RX ADMIN — SODIUM CHLORIDE, SODIUM LACTATE, POTASSIUM CHLORIDE, AND CALCIUM CHLORIDE: 600; 310; 30; 20 INJECTION, SOLUTION INTRAVENOUS at 03:55

## 2023-11-03 RX ADMIN — GABAPENTIN 800 MG: 400 CAPSULE ORAL at 21:23

## 2023-11-03 RX ADMIN — LIDOCAINE HYDROCHLORIDE 60 MG: 20 INJECTION, SOLUTION EPIDURAL; INFILTRATION; INTRACAUDAL; PERINEURAL at 12:37

## 2023-11-03 RX ADMIN — SODIUM CHLORIDE, SODIUM LACTATE, POTASSIUM CHLORIDE, AND CALCIUM CHLORIDE: 600; 310; 30; 20 INJECTION, SOLUTION INTRAVENOUS at 12:28

## 2023-11-03 RX ADMIN — ONDANSETRON 4 MG: 2 INJECTION INTRAMUSCULAR; INTRAVENOUS at 14:54

## 2023-11-03 RX ADMIN — INSULIN GLARGINE 20 UNITS: 100 INJECTION, SOLUTION SUBCUTANEOUS at 08:49

## 2023-11-03 RX ADMIN — ENOXAPARIN SODIUM 40 MG: 100 INJECTION SUBCUTANEOUS at 17:47

## 2023-11-03 RX ADMIN — VANCOMYCIN HYDROCHLORIDE 1000 MG: 1 INJECTION, POWDER, LYOPHILIZED, FOR SOLUTION INTRAVENOUS at 18:00

## 2023-11-03 RX ADMIN — GABAPENTIN 800 MG: 400 CAPSULE ORAL at 08:47

## 2023-11-03 RX ADMIN — TAMSULOSIN HYDROCHLORIDE 0.4 MG: 0.4 CAPSULE ORAL at 08:47

## 2023-11-03 RX ADMIN — CEFEPIME 2000 MG: 2 INJECTION, POWDER, FOR SOLUTION INTRAVENOUS at 21:18

## 2023-11-03 RX ADMIN — BUPROPION HYDROCHLORIDE 300 MG: 300 TABLET, EXTENDED RELEASE ORAL at 08:47

## 2023-11-03 RX ADMIN — LAMIVUDINE 300 MG: 150 TABLET, FILM COATED ORAL at 08:47

## 2023-11-03 RX ADMIN — LOSARTAN POTASSIUM 25 MG: 25 TABLET, FILM COATED ORAL at 21:25

## 2023-11-03 RX ADMIN — OXYBUTYNIN CHLORIDE 10 MG: 10 TABLET, EXTENDED RELEASE ORAL at 08:47

## 2023-11-03 RX ADMIN — CEFEPIME 2000 MG: 2 INJECTION, POWDER, FOR SOLUTION INTRAVENOUS at 04:00

## 2023-11-03 RX ADMIN — DOLUTEGRAVIR SODIUM 50 MG: 50 TABLET, FILM COATED ORAL at 09:12

## 2023-11-03 RX ADMIN — ATORVASTATIN CALCIUM 80 MG: 40 TABLET, FILM COATED ORAL at 17:48

## 2023-11-03 RX ADMIN — GABAPENTIN 800 MG: 400 CAPSULE ORAL at 15:04

## 2023-11-03 RX ADMIN — CEFEPIME 2000 MG: 2 INJECTION, POWDER, FOR SOLUTION INTRAVENOUS at 12:40

## 2023-11-03 RX ADMIN — PROPOFOL 150 MCG/KG/MIN: 10 INJECTION, EMULSION INTRAVENOUS at 12:38

## 2023-11-03 RX ADMIN — FAMOTIDINE 20 MG: 20 TABLET, FILM COATED ORAL at 08:47

## 2023-11-03 RX ADMIN — VANCOMYCIN HYDROCHLORIDE 1000 MG: 1 INJECTION, POWDER, LYOPHILIZED, FOR SOLUTION INTRAVENOUS at 06:36

## 2023-11-03 RX ADMIN — SODIUM CHLORIDE, PRESERVATIVE FREE 10 ML: 5 INJECTION INTRAVENOUS at 08:48

## 2023-11-03 RX ADMIN — FAMOTIDINE 20 MG: 20 TABLET, FILM COATED ORAL at 21:23

## 2023-11-03 RX ADMIN — PROPOFOL 60 MG: 10 INJECTION, EMULSION INTRAVENOUS at 12:37

## 2023-11-03 RX ADMIN — TRAZODONE HYDROCHLORIDE 50 MG: 50 TABLET ORAL at 21:23

## 2023-11-03 RX ADMIN — MEPIVACAINE HYDROCHLORIDE 10 ML: 15 INJECTION, SOLUTION EPIDURAL; INFILTRATION at 11:29

## 2023-11-03 RX ADMIN — ROPIVACAINE HYDROCHLORIDE 20 ML: 5 INJECTION, SOLUTION EPIDURAL; INFILTRATION; PERINEURAL at 11:29

## 2023-11-03 ASSESSMENT — PAIN SCALES - GENERAL
PAINLEVEL_OUTOF10: 0
PAINLEVEL_OUTOF10: 4
PAINLEVEL_OUTOF10: 0
PAINLEVEL_OUTOF10: 4

## 2023-11-03 ASSESSMENT — PAIN DESCRIPTION - LOCATION
LOCATION: HEAD
LOCATION: HEAD

## 2023-11-03 ASSESSMENT — LIFESTYLE VARIABLES: SMOKING_STATUS: 1

## 2023-11-03 ASSESSMENT — PAIN DESCRIPTION - DESCRIPTORS
DESCRIPTORS: DISCOMFORT
DESCRIPTORS: ACHING;DISCOMFORT

## 2023-11-03 ASSESSMENT — PAIN - FUNCTIONAL ASSESSMENT: PAIN_FUNCTIONAL_ASSESSMENT: 0-10

## 2023-11-03 ASSESSMENT — PAIN DESCRIPTION - ORIENTATION
ORIENTATION: POSTERIOR
ORIENTATION: RIGHT;LEFT

## 2023-11-03 NOTE — CONSULTS
Infectious Disease Consult      Today's Date: 11/3/2023   Admit Date: 11/2/2023  YOB: 1965    Impression:   L DFI/great toe with SA, OM  L great toe wound/tissue culture 10/25/23  positive for Pseudomonas, Klebsiella oxytoca, group B beta hemolytic strep. L great toe wound/tissue culture 10/30/23 positive for Klebsiella oxytoca, pseudomonas aeruginosa, MRSA, and group B beta hemolytic strep. Pathology with acute OM    MRI L foot 11/1/23 with soft tissue ulceration, OM, SA of L great toe, along with abscess with communicating sinus tract/ulcer. HIV  Triumeq at baseline. CD4 571 (33.6%), VL < 20 on 8/15/23   Established with [facility] [address]   DM:  Uncontrolled. Hgb A1C 10.7 on 11/2/23  Tobacco use    Plan:   Continue Vanc and Cefepime for now  Patient pending OR today. Please send operative cultures. Continue abacavir, dolutegravir, and Lamivudine as Triumeq is not on formulary. Follow blood cultures   Recommend optimal blood glucose control and nutrition, along with smoking cessation. ID following      Anti-infectives:   Cefepime   Vancomycin   Augmentin and Doxycycline PTA     Subjective:   Date of Consultation:  November 3, 2023  Date of Admission: 11/2/2023   Referring Provider: Ivonne Major MD    Patient is a 63 y.o. male with PMH of HIV, DM, and tobacco use that was admitted to [facility] as a direct admit from the wound clinic secondary to concern for nonhealing L DFU. Per chart review, patient has had open wounds on the bilateral feet/great toes for at least the past 5 months. Patient has been following with the wound clinic, previously receiving treatment with hydrogen peroxide and Neosporin daily. Patient reportedly stopped utilizing peroxide and Neosporin. Patient had been doing multiple dressing changes per day at home, with the develop of new wounds/skin tearing. Debridements were being done at the wound clinic.   Patient was noted to have increased swelling of the left
Debridement which allowed for bone culture and bone pathology specimens was performed today and tolerated. We will adjust his antibiotic regimen and culture results. Offloading was again emphasized and potential for amputation was discussed. Contact us for any other deterioration. Smoking cessation was again emphasized. He returns on 11/2/2023. He had his MRI which is identifying acute osteomyelitis of the left great toe. There is edema and effusion present. We have arranged for admission to the hospitalist service downtown and will obtain ID consult for antibiotic regimen. I will take him to the operating room tomorrow for debridement with possible placement of absorbable antibiotic beads and wound VAC. We will attempt to salvage his toe though he knows there is a high likelihood that his toe will require amputation. He was admitted to the hospital last evening by the hospitalist service. He was started on IV antibiotics vancomycin and cefepime. His blood glucose is markedly elevated. Plan is for or debridement today. I will plan to place absorbable antibiotic beads as well as a VAC dressing. We discussed proceeding with debridement of left foot, placement of antibiotic beads and VAC placement. I discussed the patient's condition and treatment options with the patient. I discussed risks of surgery in language the patient could understand including bleeding, infection, need for further surgery, abscess, nonhealing, need for amputation, DVT, PE, heart attack, stroke, renal failure, respiratory failure, ventilatory dependence, and death. The patient voiced understanding of all this and all questions were answered. Alternatives to surgery were discussed also and risks of the alternatives. The patient requested that we proceed with surgery. Informed consent was obtained.       Wound Care  Orders Placed This Encounter   Procedures    Culture, Blood 1     Standing Status:   Standing     Number of

## 2023-11-03 NOTE — PLAN OF CARE
Problem: Discharge Planning  Goal: Discharge to home or other facility with appropriate resources  11/3/2023 0200 by Aida Alvarenga RN  Outcome: Progressing  11/2/2023 2008 by Aida Alvarenga RN  Outcome: Progressing  11/2/2023 1815 by Hakan Niño RN  Outcome: Progressing     Problem: Safety - Adult  Goal: Free from fall injury  11/3/2023 0200 by Aida Alvarenga RN  Outcome: Progressing  11/2/2023 2008 by Aida Alvarenga RN  Outcome: Progressing  11/2/2023 1815 by Hakan Niño RN  Outcome: Progressing     Problem: ABCDS Injury Assessment  Goal: Absence of physical injury  11/3/2023 0200 by Aida Alvarenga RN  Outcome: Progressing  11/2/2023 2008 by Aida Alvarenga RN  Outcome: Progressing  11/2/2023 1815 by Hakan Niño RN  Outcome: Progressing     Problem: Pain  Goal: Verbalizes/displays adequate comfort level or baseline comfort level  11/3/2023 0200 by Aida Alvarenga RN  Outcome: Progressing  11/2/2023 2008 by Aida Alvarenga RN  Outcome: Progressing  11/2/2023 1815 by Hakan Niño RN  Outcome: Progressing

## 2023-11-03 NOTE — ANESTHESIA PROCEDURE NOTES
Peripheral Block    Patient location during procedure: pre-op  Reason for block: post-op pain management and at surgeon's request  Start time: 11/3/2023 11:29 AM  End time: 11/3/2023 11:38 AM  Staffing  Performed: anesthesiologist   Anesthesiologist: Tara Ruiz MD  Performed by: Tara Ruiz MD  Authorized by: Tara Ruiz MD    Preanesthetic Checklist  Completed: patient identified, site marked, risks and benefits discussed, equipment checked, pre-op evaluation, timeout performed, anesthesia consent given, oxygen available and monitors applied/VS acknowledged  Peripheral Block   Prep: ChloraPrep  Provider prep: mask and sterile gloves  Patient monitoring: cardiac monitor, continuous pulse ox, oxygen, IV access, frequent blood pressure checks and responsive to questions  Block type: Ankle  Laterality: left  Injection technique: single-shot  Guidance: ultrasound guided    Needle   Needle type: insulated echogenic nerve stimulator needle   Needle gauge: 20 G  Needle localization: ultrasound guidance  Needle length: 5 cm  Assessment   Injection assessment: negative aspiration for heme, no paresthesia on injection, local visualized surrounding nerve on ultrasound and no intravascular symptoms  Slow fractionated injection: yes  Hemodynamics: stable  Real-time US image taken/store: yes  Outcomes: patient tolerated procedure well and uncomplicated    Additional Notes  Risks/benefits/alternatives discussed including damage to nerve or muscle. 3 cc 1% lidocaine local injected at needle insertion site. Needle inserted and placed in close proximity to the nerve under real time ultrasound guidance. Ultrasound was used to visualize the spread of local anesthetic in close proximity to the nerve being blocked. The nerve appeared anatomically normal and there were no abnormal findings. Ultrasound imaged printed and placed on chart.       Medications Administered  mepivacaine (CARBOCAINE) injection 1.5%

## 2023-11-03 NOTE — BRIEF OP NOTE
Brief Postoperative Note      Patient: Jimmy Hammer  YOB: 1965  MRN: [id number]    Date of Procedure: 11/3/2023    Pre-Op Diagnosis Codes:     * Diabetic ulcer of toe of left foot associated with diabetes mellitus type 2, with necrosis of bone and acute osteomyelitis    Post-Op Diagnosis: Same       Procedure(s):  LEFT FOOT DEBRIDEMENT INCISION AND DRAINAGE WITH ANTIBIOTIC BEADS; WOUND VAC PLACEMENT LEFT GREAT TOE    Surgeon(s):  Janeth Mullen MD    Assistant:  * No surgical staff found *    Anesthesia: General    Estimated Blood Loss (mL): Minimal    Complications: None    Specimens:   ID Type Source Tests Collected by Time Destination   1 : Left Great Toe Bone Tissue Tissue CULTURE, ANAEROBIC, CULTURE, TISSUE Navi Gramajo MD 11/3/2023 1304        Implants:  Implant Name Type Inv. Item Serial No.  Lot No. LRB No. Used Action   GRAFT BNE SUB 5CC CA SULF STIMULAN RAP CURE - [id number]  GRAFT BNE SUB 5CC CA SULF STIMULAN RAP CURE  [employer] [id number] Left 1 Implanted         Drains: * No LDAs found *    Findings: Viable toe with medial ulcer and exposed bone. No joint space remaining. Many bone fragments from distal phalanx removed and sent to path. Other soft tissues debrided. No remaining abscess pocket. Copiously irrigated. Calcium sulfate beads with vancomycin and tobramycin placed into deep wound. Adaptic over beads and then VAC at -125 mmHg. Specimen to be sent to pathology. No additional cultures required.     Electronically signed by Janeth Mullen MD on 11/3/2023 at 2:02 PM

## 2023-11-04 LAB
ANION GAP SERPL CALC-SCNC: 3 MMOL/L (ref 2–11)
BASOPHILS # BLD: 0.1 K/UL (ref 0–0.2)
BASOPHILS NFR BLD: 1 % (ref 0–2)
BUN SERPL-MCNC: 11 MG/DL (ref 6–23)
CALCIUM SERPL-MCNC: 8.5 MG/DL (ref 8.3–10.4)
CHLORIDE SERPL-SCNC: 106 MMOL/L (ref 101–110)
CO2 SERPL-SCNC: 29 MMOL/L (ref 21–32)
CREAT SERPL-MCNC: 0.6 MG/DL (ref 0.8–1.5)
DIFFERENTIAL METHOD BLD: ABNORMAL
EOSINOPHIL # BLD: 0.3 K/UL (ref 0–0.8)
EOSINOPHIL NFR BLD: 3 % (ref 0.5–7.8)
ERYTHROCYTE [DISTWIDTH] IN BLOOD BY AUTOMATED COUNT: 13.6 % (ref 11.9–14.6)
GLUCOSE BLD STRIP.AUTO-MCNC: 137 MG/DL (ref 65–100)
GLUCOSE BLD STRIP.AUTO-MCNC: 160 MG/DL (ref 65–100)
GLUCOSE BLD STRIP.AUTO-MCNC: 163 MG/DL (ref 65–100)
GLUCOSE BLD STRIP.AUTO-MCNC: 183 MG/DL (ref 65–100)
GLUCOSE BLD STRIP.AUTO-MCNC: 199 MG/DL (ref 65–100)
GLUCOSE BLD STRIP.AUTO-MCNC: 205 MG/DL (ref 65–100)
GLUCOSE SERPL-MCNC: 132 MG/DL (ref 65–100)
HCT VFR BLD AUTO: 42.7 % (ref 41.1–50.3)
HGB BLD-MCNC: 14.1 G/DL (ref 13.6–17.2)
IMM GRANULOCYTES # BLD AUTO: 0.1 K/UL (ref 0–0.5)
IMM GRANULOCYTES NFR BLD AUTO: 0 % (ref 0–5)
LYMPHOCYTES # BLD: 2.7 K/UL (ref 0.5–4.6)
LYMPHOCYTES NFR BLD: 22 % (ref 13–44)
MCH RBC QN AUTO: 29.9 PG (ref 26.1–32.9)
MCHC RBC AUTO-ENTMCNC: 33 G/DL (ref 31.4–35)
MCV RBC AUTO: 90.5 FL (ref 82–102)
MM INDURATION, POC: 0 MM (ref 0–5)
MONOCYTES # BLD: 0.6 K/UL (ref 0.1–1.3)
MONOCYTES NFR BLD: 5 % (ref 4–12)
NEUTS SEG # BLD: 8.5 K/UL (ref 1.7–8.2)
NEUTS SEG NFR BLD: 69 % (ref 43–78)
NRBC # BLD: 0 K/UL (ref 0–0.2)
PLATELET # BLD AUTO: 367 K/UL (ref 150–450)
PMV BLD AUTO: 10.2 FL (ref 9.4–12.3)
POTASSIUM SERPL-SCNC: 4.2 MMOL/L (ref 3.5–5.1)
PPD, POC: NEGATIVE
RBC # BLD AUTO: 4.72 M/UL (ref 4.23–5.6)
SERVICE CMNT-IMP: ABNORMAL
SODIUM SERPL-SCNC: 138 MMOL/L (ref 133–143)
VANCOMYCIN SERPL-MCNC: 8.4 UG/ML
WBC # BLD AUTO: 12.2 K/UL (ref 4.3–11.1)

## 2023-11-04 PROCEDURE — 6360000002 HC RX W HCPCS: Performed by: HOSPITALIST

## 2023-11-04 PROCEDURE — 85025 COMPLETE CBC W/AUTO DIFF WBC: CPT

## 2023-11-04 PROCEDURE — 80048 BASIC METABOLIC PNL TOTAL CA: CPT

## 2023-11-04 PROCEDURE — 6360000002 HC RX W HCPCS: Performed by: FAMILY MEDICINE

## 2023-11-04 PROCEDURE — 6370000000 HC RX 637 (ALT 250 FOR IP): Performed by: SURGERY

## 2023-11-04 PROCEDURE — 2580000003 HC RX 258: Performed by: FAMILY MEDICINE

## 2023-11-04 PROCEDURE — 80202 ASSAY OF VANCOMYCIN: CPT

## 2023-11-04 PROCEDURE — 6370000000 HC RX 637 (ALT 250 FOR IP): Performed by: HOSPITALIST

## 2023-11-04 PROCEDURE — 6370000000 HC RX 637 (ALT 250 FOR IP): Performed by: FAMILY MEDICINE

## 2023-11-04 PROCEDURE — 2580000003 HC RX 258: Performed by: HOSPITALIST

## 2023-11-04 PROCEDURE — 97162 PT EVAL MOD COMPLEX 30 MIN: CPT

## 2023-11-04 PROCEDURE — 97530 THERAPEUTIC ACTIVITIES: CPT

## 2023-11-04 PROCEDURE — 36415 COLL VENOUS BLD VENIPUNCTURE: CPT

## 2023-11-04 PROCEDURE — 1100000000 HC RM PRIVATE

## 2023-11-04 PROCEDURE — 82962 GLUCOSE BLOOD TEST: CPT

## 2023-11-04 RX ADMIN — BUPROPION HYDROCHLORIDE 300 MG: 300 TABLET, EXTENDED RELEASE ORAL at 09:23

## 2023-11-04 RX ADMIN — INSULIN GLARGINE 25 UNITS: 100 INJECTION, SOLUTION SUBCUTANEOUS at 09:23

## 2023-11-04 RX ADMIN — FAMOTIDINE 20 MG: 20 TABLET, FILM COATED ORAL at 09:23

## 2023-11-04 RX ADMIN — VANCOMYCIN HYDROCHLORIDE 1000 MG: 1 INJECTION, POWDER, LYOPHILIZED, FOR SOLUTION INTRAVENOUS at 06:51

## 2023-11-04 RX ADMIN — GABAPENTIN 800 MG: 400 CAPSULE ORAL at 09:22

## 2023-11-04 RX ADMIN — CEFEPIME 2000 MG: 2 INJECTION, POWDER, FOR SOLUTION INTRAVENOUS at 20:33

## 2023-11-04 RX ADMIN — GABAPENTIN 800 MG: 400 CAPSULE ORAL at 14:11

## 2023-11-04 RX ADMIN — TRAZODONE HYDROCHLORIDE 50 MG: 50 TABLET ORAL at 20:32

## 2023-11-04 RX ADMIN — OXYBUTYNIN CHLORIDE 10 MG: 10 TABLET, EXTENDED RELEASE ORAL at 09:22

## 2023-11-04 RX ADMIN — CEFEPIME 2000 MG: 2 INJECTION, POWDER, FOR SOLUTION INTRAVENOUS at 12:35

## 2023-11-04 RX ADMIN — DULOXETINE HYDROCHLORIDE 60 MG: 30 CAPSULE, DELAYED RELEASE ORAL at 09:22

## 2023-11-04 RX ADMIN — LOSARTAN POTASSIUM 25 MG: 25 TABLET, FILM COATED ORAL at 09:22

## 2023-11-04 RX ADMIN — ATORVASTATIN CALCIUM 80 MG: 40 TABLET, FILM COATED ORAL at 20:41

## 2023-11-04 RX ADMIN — ACETAMINOPHEN 650 MG: 325 TABLET ORAL at 20:32

## 2023-11-04 RX ADMIN — INSULIN LISPRO 2 UNITS: 100 INJECTION, SOLUTION INTRAVENOUS; SUBCUTANEOUS at 17:48

## 2023-11-04 RX ADMIN — DOLUTEGRAVIR SODIUM 50 MG: 50 TABLET, FILM COATED ORAL at 10:40

## 2023-11-04 RX ADMIN — ABACAVIR 600 MG: 300 TABLET, FILM COATED ORAL at 09:22

## 2023-11-04 RX ADMIN — TAMSULOSIN HYDROCHLORIDE 0.4 MG: 0.4 CAPSULE ORAL at 09:22

## 2023-11-04 RX ADMIN — SODIUM CHLORIDE, PRESERVATIVE FREE 10 ML: 5 INJECTION INTRAVENOUS at 20:33

## 2023-11-04 RX ADMIN — GABAPENTIN 800 MG: 400 CAPSULE ORAL at 20:32

## 2023-11-04 RX ADMIN — ENOXAPARIN SODIUM 40 MG: 100 INJECTION SUBCUTANEOUS at 17:49

## 2023-11-04 RX ADMIN — CEFEPIME 2000 MG: 2 INJECTION, POWDER, FOR SOLUTION INTRAVENOUS at 04:21

## 2023-11-04 RX ADMIN — LAMIVUDINE 300 MG: 150 TABLET, FILM COATED ORAL at 09:21

## 2023-11-04 RX ADMIN — VANCOMYCIN HYDROCHLORIDE 1750 MG: 10 INJECTION, POWDER, LYOPHILIZED, FOR SOLUTION INTRAVENOUS at 17:47

## 2023-11-04 RX ADMIN — FAMOTIDINE 20 MG: 20 TABLET, FILM COATED ORAL at 20:32

## 2023-11-04 RX ADMIN — SODIUM CHLORIDE, PRESERVATIVE FREE 10 ML: 5 INJECTION INTRAVENOUS at 09:31

## 2023-11-04 ASSESSMENT — PAIN SCALES - GENERAL
PAINLEVEL_OUTOF10: 5
PAINLEVEL_OUTOF10: 3
PAINLEVEL_OUTOF10: 6

## 2023-11-04 ASSESSMENT — PAIN DESCRIPTION - LOCATION: LOCATION: HEAD

## 2023-11-05 LAB
ANION GAP SERPL CALC-SCNC: 7 MMOL/L (ref 2–11)
BASOPHILS # BLD: 0.1 K/UL (ref 0–0.2)
BASOPHILS NFR BLD: 1 % (ref 0–2)
BUN SERPL-MCNC: 11 MG/DL (ref 6–23)
CALCIUM SERPL-MCNC: 8.8 MG/DL (ref 8.3–10.4)
CHLORIDE SERPL-SCNC: 103 MMOL/L (ref 101–110)
CO2 SERPL-SCNC: 26 MMOL/L (ref 21–32)
CREAT SERPL-MCNC: 0.7 MG/DL (ref 0.8–1.5)
DIFFERENTIAL METHOD BLD: NORMAL
EOSINOPHIL # BLD: 0.3 K/UL (ref 0–0.8)
EOSINOPHIL NFR BLD: 3 % (ref 0.5–7.8)
ERYTHROCYTE [DISTWIDTH] IN BLOOD BY AUTOMATED COUNT: 13.5 % (ref 11.9–14.6)
GLUCOSE BLD STRIP.AUTO-MCNC: 135 MG/DL (ref 65–100)
GLUCOSE BLD STRIP.AUTO-MCNC: 160 MG/DL (ref 65–100)
GLUCOSE BLD STRIP.AUTO-MCNC: 174 MG/DL (ref 65–100)
GLUCOSE BLD STRIP.AUTO-MCNC: 199 MG/DL (ref 65–100)
GLUCOSE BLD STRIP.AUTO-MCNC: 233 MG/DL (ref 65–100)
GLUCOSE SERPL-MCNC: 218 MG/DL (ref 65–100)
HCT VFR BLD AUTO: 44.1 % (ref 41.1–50.3)
HGB BLD-MCNC: 14.5 G/DL (ref 13.6–17.2)
IMM GRANULOCYTES # BLD AUTO: 0 K/UL (ref 0–0.5)
IMM GRANULOCYTES NFR BLD AUTO: 0 % (ref 0–5)
LYMPHOCYTES # BLD: 2.2 K/UL (ref 0.5–4.6)
LYMPHOCYTES NFR BLD: 23 % (ref 13–44)
MCH RBC QN AUTO: 29.6 PG (ref 26.1–32.9)
MCHC RBC AUTO-ENTMCNC: 32.9 G/DL (ref 31.4–35)
MCV RBC AUTO: 90 FL (ref 82–102)
MM INDURATION, POC: 0 MM (ref 0–5)
MONOCYTES # BLD: 0.6 K/UL (ref 0.1–1.3)
MONOCYTES NFR BLD: 7 % (ref 4–12)
NEUTS SEG # BLD: 6.4 K/UL (ref 1.7–8.2)
NEUTS SEG NFR BLD: 66 % (ref 43–78)
NRBC # BLD: 0 K/UL (ref 0–0.2)
PLATELET # BLD AUTO: 373 K/UL (ref 150–450)
PMV BLD AUTO: 10.5 FL (ref 9.4–12.3)
POTASSIUM SERPL-SCNC: 3.7 MMOL/L (ref 3.5–5.1)
PPD, POC: NEGATIVE
RBC # BLD AUTO: 4.9 M/UL (ref 4.23–5.6)
SERVICE CMNT-IMP: ABNORMAL
SODIUM SERPL-SCNC: 136 MMOL/L (ref 133–143)
WBC # BLD AUTO: 9.7 K/UL (ref 4.3–11.1)

## 2023-11-05 PROCEDURE — 6370000000 HC RX 637 (ALT 250 FOR IP): Performed by: HOSPITALIST

## 2023-11-05 PROCEDURE — 2580000003 HC RX 258: Performed by: HOSPITALIST

## 2023-11-05 PROCEDURE — 80048 BASIC METABOLIC PNL TOTAL CA: CPT

## 2023-11-05 PROCEDURE — 97530 THERAPEUTIC ACTIVITIES: CPT

## 2023-11-05 PROCEDURE — 85025 COMPLETE CBC W/AUTO DIFF WBC: CPT

## 2023-11-05 PROCEDURE — 2580000003 HC RX 258: Performed by: FAMILY MEDICINE

## 2023-11-05 PROCEDURE — 6360000002 HC RX W HCPCS: Performed by: FAMILY MEDICINE

## 2023-11-05 PROCEDURE — 6370000000 HC RX 637 (ALT 250 FOR IP): Performed by: INTERNAL MEDICINE

## 2023-11-05 PROCEDURE — 6370000000 HC RX 637 (ALT 250 FOR IP): Performed by: SURGERY

## 2023-11-05 PROCEDURE — 36415 COLL VENOUS BLD VENIPUNCTURE: CPT

## 2023-11-05 PROCEDURE — 1100000000 HC RM PRIVATE

## 2023-11-05 PROCEDURE — 97165 OT EVAL LOW COMPLEX 30 MIN: CPT

## 2023-11-05 PROCEDURE — 6370000000 HC RX 637 (ALT 250 FOR IP): Performed by: FAMILY MEDICINE

## 2023-11-05 PROCEDURE — 6360000002 HC RX W HCPCS: Performed by: HOSPITALIST

## 2023-11-05 RX ORDER — LEVOFLOXACIN 500 MG/1
750 TABLET, FILM COATED ORAL DAILY
Status: DISCONTINUED | OUTPATIENT
Start: 2023-11-05 | End: 2023-11-07 | Stop reason: HOSPADM

## 2023-11-05 RX ADMIN — TRAZODONE HYDROCHLORIDE 50 MG: 50 TABLET ORAL at 21:19

## 2023-11-05 RX ADMIN — BUPROPION HYDROCHLORIDE 300 MG: 300 TABLET, EXTENDED RELEASE ORAL at 09:19

## 2023-11-05 RX ADMIN — ABACAVIR 600 MG: 300 TABLET, FILM COATED ORAL at 09:19

## 2023-11-05 RX ADMIN — GABAPENTIN 800 MG: 400 CAPSULE ORAL at 09:19

## 2023-11-05 RX ADMIN — CEFEPIME 2000 MG: 2 INJECTION, POWDER, FOR SOLUTION INTRAVENOUS at 05:00

## 2023-11-05 RX ADMIN — VANCOMYCIN HYDROCHLORIDE 1750 MG: 10 INJECTION, POWDER, LYOPHILIZED, FOR SOLUTION INTRAVENOUS at 05:47

## 2023-11-05 RX ADMIN — SODIUM CHLORIDE, PRESERVATIVE FREE 10 ML: 5 INJECTION INTRAVENOUS at 21:18

## 2023-11-05 RX ADMIN — ACETAMINOPHEN 650 MG: 325 TABLET ORAL at 21:19

## 2023-11-05 RX ADMIN — FAMOTIDINE 20 MG: 20 TABLET, FILM COATED ORAL at 09:19

## 2023-11-05 RX ADMIN — GABAPENTIN 800 MG: 400 CAPSULE ORAL at 21:19

## 2023-11-05 RX ADMIN — ATORVASTATIN CALCIUM 80 MG: 40 TABLET, FILM COATED ORAL at 17:41

## 2023-11-05 RX ADMIN — INSULIN GLARGINE 25 UNITS: 100 INJECTION, SOLUTION SUBCUTANEOUS at 09:23

## 2023-11-05 RX ADMIN — INSULIN LISPRO 2 UNITS: 100 INJECTION, SOLUTION INTRAVENOUS; SUBCUTANEOUS at 09:17

## 2023-11-05 RX ADMIN — LEVOFLOXACIN 750 MG: 500 TABLET, FILM COATED ORAL at 15:03

## 2023-11-05 RX ADMIN — DOLUTEGRAVIR SODIUM 50 MG: 50 TABLET, FILM COATED ORAL at 09:18

## 2023-11-05 RX ADMIN — SODIUM CHLORIDE, PRESERVATIVE FREE 10 ML: 5 INJECTION INTRAVENOUS at 09:23

## 2023-11-05 RX ADMIN — ENOXAPARIN SODIUM 40 MG: 100 INJECTION SUBCUTANEOUS at 17:43

## 2023-11-05 RX ADMIN — CEFEPIME 2000 MG: 2 INJECTION, POWDER, FOR SOLUTION INTRAVENOUS at 11:54

## 2023-11-05 RX ADMIN — LOSARTAN POTASSIUM 25 MG: 25 TABLET, FILM COATED ORAL at 09:19

## 2023-11-05 RX ADMIN — VANCOMYCIN HYDROCHLORIDE 1750 MG: 10 INJECTION, POWDER, LYOPHILIZED, FOR SOLUTION INTRAVENOUS at 17:54

## 2023-11-05 RX ADMIN — DULOXETINE HYDROCHLORIDE 60 MG: 30 CAPSULE, DELAYED RELEASE ORAL at 09:19

## 2023-11-05 RX ADMIN — GABAPENTIN 800 MG: 400 CAPSULE ORAL at 15:02

## 2023-11-05 RX ADMIN — TAMSULOSIN HYDROCHLORIDE 0.4 MG: 0.4 CAPSULE ORAL at 09:19

## 2023-11-05 RX ADMIN — OXYBUTYNIN CHLORIDE 10 MG: 10 TABLET, EXTENDED RELEASE ORAL at 09:19

## 2023-11-05 RX ADMIN — ONDANSETRON 4 MG: 2 INJECTION INTRAMUSCULAR; INTRAVENOUS at 18:28

## 2023-11-05 RX ADMIN — FAMOTIDINE 20 MG: 20 TABLET, FILM COATED ORAL at 21:19

## 2023-11-05 RX ADMIN — LAMIVUDINE 300 MG: 150 TABLET, FILM COATED ORAL at 09:18

## 2023-11-05 ASSESSMENT — PAIN SCALES - GENERAL: PAINLEVEL_OUTOF10: 4

## 2023-11-06 LAB
ANION GAP SERPL CALC-SCNC: 3 MMOL/L (ref 2–11)
BACTERIA SPEC CULT: NORMAL
BASOPHILS # BLD: 0.1 K/UL (ref 0–0.2)
BASOPHILS NFR BLD: 1 % (ref 0–2)
BUN SERPL-MCNC: 11 MG/DL (ref 6–23)
CALCIUM SERPL-MCNC: 9.2 MG/DL (ref 8.3–10.4)
CHLORIDE SERPL-SCNC: 105 MMOL/L (ref 101–110)
CO2 SERPL-SCNC: 31 MMOL/L (ref 21–32)
CREAT SERPL-MCNC: 0.8 MG/DL (ref 0.8–1.5)
DIFFERENTIAL METHOD BLD: NORMAL
EOSINOPHIL # BLD: 0.4 K/UL (ref 0–0.8)
EOSINOPHIL NFR BLD: 4 % (ref 0.5–7.8)
ERYTHROCYTE [DISTWIDTH] IN BLOOD BY AUTOMATED COUNT: 13.6 % (ref 11.9–14.6)
GLUCOSE BLD STRIP.AUTO-MCNC: 123 MG/DL (ref 65–100)
GLUCOSE BLD STRIP.AUTO-MCNC: 142 MG/DL (ref 65–100)
GLUCOSE BLD STRIP.AUTO-MCNC: 147 MG/DL (ref 65–100)
GLUCOSE BLD STRIP.AUTO-MCNC: 147 MG/DL (ref 65–100)
GLUCOSE SERPL-MCNC: 129 MG/DL (ref 65–100)
HCT VFR BLD AUTO: 45.3 % (ref 41.1–50.3)
HGB BLD-MCNC: 14.9 G/DL (ref 13.6–17.2)
IMM GRANULOCYTES # BLD AUTO: 0 K/UL (ref 0–0.5)
IMM GRANULOCYTES NFR BLD AUTO: 0 % (ref 0–5)
LYMPHOCYTES # BLD: 2.1 K/UL (ref 0.5–4.6)
LYMPHOCYTES NFR BLD: 22 % (ref 13–44)
MCH RBC QN AUTO: 30 PG (ref 26.1–32.9)
MCHC RBC AUTO-ENTMCNC: 32.9 G/DL (ref 31.4–35)
MCV RBC AUTO: 91.1 FL (ref 82–102)
MONOCYTES # BLD: 0.6 K/UL (ref 0.1–1.3)
MONOCYTES NFR BLD: 7 % (ref 4–12)
NEUTS SEG # BLD: 6.5 K/UL (ref 1.7–8.2)
NEUTS SEG NFR BLD: 66 % (ref 43–78)
NRBC # BLD: 0 K/UL (ref 0–0.2)
PLATELET # BLD AUTO: 363 K/UL (ref 150–450)
PMV BLD AUTO: 10.4 FL (ref 9.4–12.3)
POTASSIUM SERPL-SCNC: 3.7 MMOL/L (ref 3.5–5.1)
RBC # BLD AUTO: 4.97 M/UL (ref 4.23–5.6)
SERVICE CMNT-IMP: ABNORMAL
SERVICE CMNT-IMP: NORMAL
SODIUM SERPL-SCNC: 139 MMOL/L (ref 133–143)
VANCOMYCIN SERPL-MCNC: 18.7 UG/ML
WBC # BLD AUTO: 9.7 K/UL (ref 4.3–11.1)

## 2023-11-06 PROCEDURE — 80202 ASSAY OF VANCOMYCIN: CPT

## 2023-11-06 PROCEDURE — 82962 GLUCOSE BLOOD TEST: CPT

## 2023-11-06 PROCEDURE — 6360000002 HC RX W HCPCS: Performed by: HOSPITALIST

## 2023-11-06 PROCEDURE — 80048 BASIC METABOLIC PNL TOTAL CA: CPT

## 2023-11-06 PROCEDURE — 6370000000 HC RX 637 (ALT 250 FOR IP): Performed by: INTERNAL MEDICINE

## 2023-11-06 PROCEDURE — 02HV33Z INSERTION OF INFUSION DEVICE INTO SUPERIOR VENA CAVA, PERCUTANEOUS APPROACH: ICD-10-PCS | Performed by: INTERNAL MEDICINE

## 2023-11-06 PROCEDURE — 6360000002 HC RX W HCPCS: Performed by: FAMILY MEDICINE

## 2023-11-06 PROCEDURE — 6370000000 HC RX 637 (ALT 250 FOR IP): Performed by: HOSPITALIST

## 2023-11-06 PROCEDURE — 36573 INSJ PICC RS&I 5 YR+: CPT

## 2023-11-06 PROCEDURE — C1751 CATH, INF, PER/CENT/MIDLINE: HCPCS

## 2023-11-06 PROCEDURE — 6370000000 HC RX 637 (ALT 250 FOR IP): Performed by: FAMILY MEDICINE

## 2023-11-06 PROCEDURE — 97530 THERAPEUTIC ACTIVITIES: CPT

## 2023-11-06 PROCEDURE — 85025 COMPLETE CBC W/AUTO DIFF WBC: CPT

## 2023-11-06 PROCEDURE — 2580000003 HC RX 258: Performed by: FAMILY MEDICINE

## 2023-11-06 PROCEDURE — 1100000000 HC RM PRIVATE

## 2023-11-06 PROCEDURE — 97605 NEG PRS WND THER DME<=50SQCM: CPT

## 2023-11-06 PROCEDURE — 36415 COLL VENOUS BLD VENIPUNCTURE: CPT

## 2023-11-06 PROCEDURE — 2580000003 HC RX 258: Performed by: HOSPITALIST

## 2023-11-06 PROCEDURE — 6370000000 HC RX 637 (ALT 250 FOR IP): Performed by: SURGERY

## 2023-11-06 RX ORDER — SODIUM CHLORIDE 9 MG/ML
25 INJECTION, SOLUTION INTRAVENOUS PRN
Status: DISCONTINUED | OUTPATIENT
Start: 2023-11-06 | End: 2023-11-07 | Stop reason: HOSPADM

## 2023-11-06 RX ORDER — SODIUM CHLORIDE 0.9 % (FLUSH) 0.9 %
5-40 SYRINGE (ML) INJECTION PRN
Status: DISCONTINUED | OUTPATIENT
Start: 2023-11-06 | End: 2023-11-07 | Stop reason: HOSPADM

## 2023-11-06 RX ORDER — SODIUM CHLORIDE 0.9 % (FLUSH) 0.9 %
5-40 SYRINGE (ML) INJECTION EVERY 12 HOURS SCHEDULED
Status: DISCONTINUED | OUTPATIENT
Start: 2023-11-06 | End: 2023-11-07 | Stop reason: HOSPADM

## 2023-11-06 RX ADMIN — TRAZODONE HYDROCHLORIDE 50 MG: 50 TABLET ORAL at 20:44

## 2023-11-06 RX ADMIN — ATORVASTATIN CALCIUM 80 MG: 40 TABLET, FILM COATED ORAL at 17:49

## 2023-11-06 RX ADMIN — GABAPENTIN 800 MG: 400 CAPSULE ORAL at 14:08

## 2023-11-06 RX ADMIN — LAMIVUDINE 300 MG: 150 TABLET, FILM COATED ORAL at 09:35

## 2023-11-06 RX ADMIN — LEVOFLOXACIN 750 MG: 500 TABLET, FILM COATED ORAL at 09:35

## 2023-11-06 RX ADMIN — VANCOMYCIN HYDROCHLORIDE 1750 MG: 10 INJECTION, POWDER, LYOPHILIZED, FOR SOLUTION INTRAVENOUS at 17:48

## 2023-11-06 RX ADMIN — BUPROPION HYDROCHLORIDE 300 MG: 300 TABLET, EXTENDED RELEASE ORAL at 09:35

## 2023-11-06 RX ADMIN — ENOXAPARIN SODIUM 40 MG: 100 INJECTION SUBCUTANEOUS at 17:49

## 2023-11-06 RX ADMIN — SODIUM CHLORIDE, PRESERVATIVE FREE 10 ML: 5 INJECTION INTRAVENOUS at 20:44

## 2023-11-06 RX ADMIN — DOLUTEGRAVIR SODIUM 50 MG: 50 TABLET, FILM COATED ORAL at 09:42

## 2023-11-06 RX ADMIN — FAMOTIDINE 20 MG: 20 TABLET, FILM COATED ORAL at 20:44

## 2023-11-06 RX ADMIN — ABACAVIR 600 MG: 300 TABLET, FILM COATED ORAL at 09:37

## 2023-11-06 RX ADMIN — SODIUM CHLORIDE, PRESERVATIVE FREE 10 ML: 5 INJECTION INTRAVENOUS at 10:08

## 2023-11-06 RX ADMIN — VANCOMYCIN HYDROCHLORIDE 1750 MG: 10 INJECTION, POWDER, LYOPHILIZED, FOR SOLUTION INTRAVENOUS at 05:57

## 2023-11-06 RX ADMIN — FAMOTIDINE 20 MG: 20 TABLET, FILM COATED ORAL at 09:37

## 2023-11-06 RX ADMIN — GABAPENTIN 800 MG: 400 CAPSULE ORAL at 09:38

## 2023-11-06 RX ADMIN — DULOXETINE HYDROCHLORIDE 60 MG: 30 CAPSULE, DELAYED RELEASE ORAL at 09:35

## 2023-11-06 RX ADMIN — INSULIN GLARGINE 25 UNITS: 100 INJECTION, SOLUTION SUBCUTANEOUS at 09:37

## 2023-11-06 RX ADMIN — TAMSULOSIN HYDROCHLORIDE 0.4 MG: 0.4 CAPSULE ORAL at 10:10

## 2023-11-06 RX ADMIN — LOSARTAN POTASSIUM 25 MG: 25 TABLET, FILM COATED ORAL at 09:35

## 2023-11-06 RX ADMIN — GABAPENTIN 800 MG: 400 CAPSULE ORAL at 20:44

## 2023-11-06 RX ADMIN — OXYBUTYNIN CHLORIDE 10 MG: 10 TABLET, EXTENDED RELEASE ORAL at 09:35

## 2023-11-06 ASSESSMENT — PAIN SCALES - GENERAL
PAINLEVEL_OUTOF10: 0

## 2023-11-06 NOTE — DISCHARGE SUMMARY
Hospitalist Discharge Summary   Admit Date:  2023  2:50 PM   DC Note date: 2023  Name:  Urbano Kern   Age:  63 y.o. Sex:  male  :  1965   MRN:  [id number]   Room:  [facility]  PCP:  Shaye Carreno MD    Presenting Complaint: No chief complaint on file. Initial Admission Diagnosis: Acute osteomyelitis of toe of left foot ([address]) [M86.172]     Problem List for this Hospitalization (present on admission):    Principal Problem:    Acute osteomyelitis of toe of left foot ([address])  Active Problems:    Hypercholesterolemia    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 ([address])    Human immunodeficiency virus ([address])    Type 2 diabetes mellitus with hyperglycemia, with long-term current use of insulin (HCC)    Diabetic ulcer of toe of left foot associated with type 2 diabetes mellitus, with necrosis of bone ([address])  Resolved Problems:    * No resolved hospital problems. *      Hospital Course:  Urbano Kern is a 63 y.o. male with medical history of hypertension, insulin-dependent diabetes mellitus type 2, HIV on antiretroviral therapy, tobacco use, was admitted to the hospital as a direct admit from wound clinic. Patient has been followed in the wound clinic for his diabetic foot ulcer. He has 5-month history of open wounds on both feet of the great toes. Started as calluses and then he picked on it developed into an ulcer wound. He has been treated at the wound clinic with hydrogen peroxide Neosporin on a daily basis. However the wound was nonhealing. He also has poorly controlled diabetes with A1c of greater than 13 in May. X-rays of the left foot was done and showed no evidence of osteomyelitis. Due to poor wound healing, he returned to the wound clinic on 10/2/2023. He has stopped using his peroxide and Neosporin and getting his dressings wet and dirty by working in the yard and poor shoes that are not protecting his feet.   He has been doing home dressing 6-7 times a day

## 2023-11-06 NOTE — DISCHARGE INSTRUCTIONS
Wound vacuum management per home health wound care. IV  antibiotics per infectious disease recommendation. Follow up with Dr. Kacie Springer at the wound clinic Monday 11/13/23 9 am.   Follow up with ID based on the appointment time they schedule for you after discharge - you should receive a phone call.     Contact a provider for uncontrolled pain, fever >=100.4 or other questions/concerns

## 2023-11-06 NOTE — WOUND CARE
Patient seen for wound VAC dressing change. Used layer of adaptic over antibiotic beads then black foam, tracked to dorsal foot. Achieved seal with use of paste strip. Patient tolerated well. Completed clinical portion of VAC form and left for  to complete. Discussed with Dr Del Angel Severe this am prior to dressing change. Will continue to follow. Answered all patient questions. Left great toe

## 2023-11-07 VITALS
WEIGHT: 186 LBS | OXYGEN SATURATION: 91 % | HEART RATE: 58 BPM | BODY MASS INDEX: 24.65 KG/M2 | DIASTOLIC BLOOD PRESSURE: 87 MMHG | TEMPERATURE: 97.7 F | HEIGHT: 73 IN | SYSTOLIC BLOOD PRESSURE: 135 MMHG | RESPIRATION RATE: 16 BRPM

## 2023-11-07 LAB
ANION GAP SERPL CALC-SCNC: 4 MMOL/L (ref 2–11)
BACTERIA SPEC CULT: NORMAL
BACTERIA SPEC CULT: NORMAL
BASOPHILS # BLD: 0.1 K/UL (ref 0–0.2)
BASOPHILS NFR BLD: 1 % (ref 0–2)
BUN SERPL-MCNC: 13 MG/DL (ref 6–23)
CALCIUM SERPL-MCNC: 9.3 MG/DL (ref 8.3–10.4)
CHLORIDE SERPL-SCNC: 106 MMOL/L (ref 101–110)
CO2 SERPL-SCNC: 27 MMOL/L (ref 21–32)
CREAT SERPL-MCNC: 0.7 MG/DL (ref 0.8–1.5)
DIFFERENTIAL METHOD BLD: NORMAL
EOSINOPHIL # BLD: 0.4 K/UL (ref 0–0.8)
EOSINOPHIL NFR BLD: 4 % (ref 0.5–7.8)
ERYTHROCYTE [DISTWIDTH] IN BLOOD BY AUTOMATED COUNT: 13.7 % (ref 11.9–14.6)
GLUCOSE BLD STRIP.AUTO-MCNC: 124 MG/DL (ref 65–100)
GLUCOSE BLD STRIP.AUTO-MCNC: 189 MG/DL (ref 65–100)
GLUCOSE SERPL-MCNC: 118 MG/DL (ref 65–100)
HCT VFR BLD AUTO: 45.2 % (ref 41.1–50.3)
HGB BLD-MCNC: 15.4 G/DL (ref 13.6–17.2)
IMM GRANULOCYTES # BLD AUTO: 0 K/UL (ref 0–0.5)
IMM GRANULOCYTES NFR BLD AUTO: 0 % (ref 0–5)
LYMPHOCYTES # BLD: 2 K/UL (ref 0.5–4.6)
LYMPHOCYTES NFR BLD: 21 % (ref 13–44)
MCH RBC QN AUTO: 30.5 PG (ref 26.1–32.9)
MCHC RBC AUTO-ENTMCNC: 34.1 G/DL (ref 31.4–35)
MCV RBC AUTO: 89.5 FL (ref 82–102)
MONOCYTES # BLD: 0.7 K/UL (ref 0.1–1.3)
MONOCYTES NFR BLD: 7 % (ref 4–12)
NEUTS SEG # BLD: 6.4 K/UL (ref 1.7–8.2)
NEUTS SEG NFR BLD: 67 % (ref 43–78)
NRBC # BLD: 0 K/UL (ref 0–0.2)
PLATELET # BLD AUTO: 332 K/UL (ref 150–450)
PMV BLD AUTO: 10 FL (ref 9.4–12.3)
POTASSIUM SERPL-SCNC: 3.7 MMOL/L (ref 3.5–5.1)
RBC # BLD AUTO: 5.05 M/UL (ref 4.23–5.6)
SERVICE CMNT-IMP: ABNORMAL
SERVICE CMNT-IMP: ABNORMAL
SERVICE CMNT-IMP: NORMAL
SERVICE CMNT-IMP: NORMAL
SODIUM SERPL-SCNC: 137 MMOL/L (ref 133–143)
WBC # BLD AUTO: 9.6 K/UL (ref 4.3–11.1)

## 2023-11-07 PROCEDURE — 2580000003 HC RX 258: Performed by: FAMILY MEDICINE

## 2023-11-07 PROCEDURE — 6370000000 HC RX 637 (ALT 250 FOR IP): Performed by: HOSPITALIST

## 2023-11-07 PROCEDURE — 80048 BASIC METABOLIC PNL TOTAL CA: CPT

## 2023-11-07 PROCEDURE — 82962 GLUCOSE BLOOD TEST: CPT

## 2023-11-07 PROCEDURE — 6370000000 HC RX 637 (ALT 250 FOR IP): Performed by: FAMILY MEDICINE

## 2023-11-07 PROCEDURE — 85025 COMPLETE CBC W/AUTO DIFF WBC: CPT

## 2023-11-07 PROCEDURE — C1751 CATH, INF, PER/CENT/MIDLINE: HCPCS

## 2023-11-07 PROCEDURE — 6360000002 HC RX W HCPCS: Performed by: FAMILY MEDICINE

## 2023-11-07 PROCEDURE — 36573 INSJ PICC RS&I 5 YR+: CPT

## 2023-11-07 PROCEDURE — 6370000000 HC RX 637 (ALT 250 FOR IP): Performed by: INTERNAL MEDICINE

## 2023-11-07 PROCEDURE — 2580000003 HC RX 258: Performed by: HOSPITALIST

## 2023-11-07 PROCEDURE — 6370000000 HC RX 637 (ALT 250 FOR IP): Performed by: SURGERY

## 2023-11-07 PROCEDURE — 97530 THERAPEUTIC ACTIVITIES: CPT

## 2023-11-07 RX ORDER — POLYETHYLENE GLYCOL 3350 17 G/17G
17 POWDER, FOR SOLUTION ORAL DAILY PRN
Qty: 7 EACH | Refills: 0 | Status: ON HOLD | OUTPATIENT
Start: 2023-11-07 | End: 2023-11-14

## 2023-11-07 RX ORDER — LEVOFLOXACIN 750 MG/1
750 TABLET ORAL DAILY
Qty: 38 TABLET | Refills: 0 | Status: ON HOLD | OUTPATIENT
Start: 2023-11-07 | End: 2023-12-15

## 2023-11-07 RX ORDER — OXYCODONE HYDROCHLORIDE 5 MG/1
5 TABLET ORAL EVERY 6 HOURS PRN
Qty: 12 TABLET | Refills: 0 | Status: ON HOLD | OUTPATIENT
Start: 2023-11-07 | End: 2023-11-10

## 2023-11-07 RX ADMIN — LAMIVUDINE 300 MG: 150 TABLET, FILM COATED ORAL at 09:13

## 2023-11-07 RX ADMIN — SODIUM CHLORIDE: 9 INJECTION, SOLUTION INTRAVENOUS at 05:54

## 2023-11-07 RX ADMIN — INSULIN GLARGINE 25 UNITS: 100 INJECTION, SOLUTION SUBCUTANEOUS at 09:18

## 2023-11-07 RX ADMIN — GABAPENTIN 800 MG: 400 CAPSULE ORAL at 09:24

## 2023-11-07 RX ADMIN — LOSARTAN POTASSIUM 25 MG: 25 TABLET, FILM COATED ORAL at 09:14

## 2023-11-07 RX ADMIN — FAMOTIDINE 20 MG: 20 TABLET, FILM COATED ORAL at 09:14

## 2023-11-07 RX ADMIN — ONDANSETRON 4 MG: 4 TABLET, ORALLY DISINTEGRATING ORAL at 05:51

## 2023-11-07 RX ADMIN — DULOXETINE HYDROCHLORIDE 60 MG: 30 CAPSULE, DELAYED RELEASE ORAL at 09:13

## 2023-11-07 RX ADMIN — ABACAVIR 600 MG: 300 TABLET, FILM COATED ORAL at 09:13

## 2023-11-07 RX ADMIN — SODIUM CHLORIDE, PRESERVATIVE FREE 10 ML: 5 INJECTION INTRAVENOUS at 09:29

## 2023-11-07 RX ADMIN — BUPROPION HYDROCHLORIDE 300 MG: 300 TABLET, EXTENDED RELEASE ORAL at 09:14

## 2023-11-07 RX ADMIN — VANCOMYCIN HYDROCHLORIDE 1750 MG: 10 INJECTION, POWDER, LYOPHILIZED, FOR SOLUTION INTRAVENOUS at 05:54

## 2023-11-07 RX ADMIN — GABAPENTIN 800 MG: 400 CAPSULE ORAL at 14:26

## 2023-11-07 RX ADMIN — LEVOFLOXACIN 750 MG: 500 TABLET, FILM COATED ORAL at 09:13

## 2023-11-07 RX ADMIN — DOLUTEGRAVIR SODIUM 50 MG: 50 TABLET, FILM COATED ORAL at 09:25

## 2023-11-07 RX ADMIN — OXYBUTYNIN CHLORIDE 10 MG: 10 TABLET, EXTENDED RELEASE ORAL at 09:13

## 2023-11-07 RX ADMIN — TAMSULOSIN HYDROCHLORIDE 0.4 MG: 0.4 CAPSULE ORAL at 09:14

## 2023-11-07 RX ADMIN — SODIUM CHLORIDE, PRESERVATIVE FREE 10 ML: 5 INJECTION INTRAVENOUS at 09:28

## 2023-11-07 NOTE — PLAN OF CARE
Problem: Discharge Planning  Goal: Discharge to home or other facility with appropriate resources  Outcome: Adequate for Discharge     Problem: Safety - Adult  Goal: Free from fall injury  Outcome: Adequate for Discharge     Problem: ABCDS Injury Assessment  Goal: Absence of physical injury  Outcome: Adequate for Discharge     Problem: Pain  Goal: Verbalizes/displays adequate comfort level or baseline comfort level  Outcome: Adequate for Discharge     Problem: Chronic Conditions and Co-morbidities  Goal: Patient's chronic conditions and co-morbidity symptoms are monitored and maintained or improved  Outcome: Adequate for Discharge

## 2023-11-07 NOTE — WOUND CARE
Home wound vac applied to current dressing, nurse updated. Seal achieved and machine working well. To Discharge home today with home health.

## 2023-11-07 NOTE — CARE COORDINATION
Patient chart reviewed for continued stay. Per MD, surgery and ID to clear patient for discharge. Home health has been set up through [facility]. Faxed information and clinicals to [facility] for delivery of home wound vac. Per Esaw Rides at [address] will likely be delivered tomorrow. Will continue to follow patient's plan of care and assist further with supportive care needs as appropriate.
Pt is for discharge home today with home health, wound vac and infusion services. Referral sent to [facility] yesterday for [address] PT/OT/RN services. Confirmed with Manish Mishra that they are able to accommodate and service patient at discharge. Wound vac delivered to patient's room by [facility]. [facility] [facility] did teaching with patient this AM at bedside. Confirmed with [facility] that they would be delivering abx to patient's home this evening. No additional CM orders received or supportive care needs expressed at this time.
No   Type of Home Care Services None   Patient expects to be discharged to: House   One/Two Story Residence One story   History of falls? 0   Condition of Participation: Discharge Planning   The Plan for Transition of Care is related to the following treatment goals: The patient will return home.

## 2023-11-07 NOTE — PLAN OF CARE
Problem: Discharge Planning  Goal: Discharge to home or other facility with appropriate resources  11/7/2023 1502 by Monica Yang RN  Outcome: Adequate for Discharge  11/7/2023 1501 by Monica Yang RN  Outcome: Adequate for Discharge     Problem: Safety - Adult  Goal: Free from fall injury  11/7/2023 1502 by Monica Yang RN  Outcome: Adequate for Discharge  11/7/2023 1501 by Monica Yang RN  Outcome: Adequate for Discharge     Problem: ABCDS Injury Assessment  Goal: Absence of physical injury  11/7/2023 1502 by Monica Yang RN  Outcome: Adequate for Discharge  11/7/2023 1501 by Monica Yang RN  Outcome: Adequate for Discharge     Problem: Pain  Goal: Verbalizes/displays adequate comfort level or baseline comfort level  11/7/2023 1502 by Monica Yang RN  Outcome: Adequate for Discharge  11/7/2023 1501 by Monica Yang RN  Outcome: Adequate for Discharge     Problem: Chronic Conditions and Co-morbidities  Goal: Patient's chronic conditions and co-morbidity symptoms are monitored and maintained or improved  11/7/2023 1502 by Monica Yang RN  Outcome: Adequate for Discharge  11/7/2023 1501 by Monica Yang RN  Outcome: Adequate for Discharge     Problem: Discharge Planning  Goal: Discharge to home or other facility with appropriate resources  11/7/2023 1502 by Monica Yang RN  Outcome: Adequate for Discharge  11/7/2023 1501 by Monica Yang RN  Outcome: Adequate for Discharge

## 2023-11-07 NOTE — DIABETES MGMT
Patient admitted with Acute osteomyelitis of toe of left foot. Blood glucose ranged 242-429 yesterday with patient receiving Lantus 20 units and Humalog 8 units. Blood glucose this morning was 287. Reviewed patient current regimen: Lantus 20 units daily and Humalog correctional insulin. Noted patient NPO for scheduled procedure today. Patient would likely benefit from an increase in basal insulin as fasting glucose is not at goal.  Provider updated via [facility] regarding recommendations and patient glycemic control. New order received for Lantus 25 units daily.
Patient admitted with acute osteomyelitis of toe of left foot. Blood glucose ranged 123-147 yesterday with patient receiving Lantus 25 units. Blood glucose this morning was 124. Creatinine 0.70. GFR >60. Reviewed patient current regimen: Lantus 25 units daily and Humalog correctional insulin. Glycemic control overall stable on current regimen. Patient previously stated he has all needed diabetes medications and supplies at home. Will follow along.
Patient admitted with acute osteomyelitis of toe of left foot. Blood glucose ranged 135-233 yesterday with patient receiving Lantus 25 units and Humalog 2 units. Blood glucose this morning was 147. Creatinine 0.80. GFR >60. Reviewed patient current regimen: Lantus 25 units daily and Humalog correctional insulin. Fasting glucose improved this AM. Patient seen for follow up diabetes education. Reviewed target blood glucose goals. Reviewed importance of good glycemic control on wound healing. Patient states he has been eating less than 25% of his meals, discussed with provider. New orders received to start Glucerna TID with meals. Patient verbalized understanding to use Glucerna as meal replacement if unable to consume his meal. Patient states his provider would like him to follow up outpatient regarding possible gastroparesis. Discussed dietary tips for gastroparesis. Encouraged patient to sit up for 1 hour post meals to try to reduce nausea. Reviewed plate method with patient. Reviewed portion control and eating small meals consistently. Reviewed basal and bolus insulin. Reviewed option of outpatient diabetes education. Reviewed target A1c as per ADA recommendations. Encouraged compliance with discharge regimen. Encouraged patient to continue to work on lifestyle modifications and to follow up with endocrinology. for further titration of regimen. Patient verbalized understanding and voices no further questions regarding diabetes management at this time.
renal, eye, nerve, and cardiovascular disease. Patient reports smoking. Cessation encouraged. Reviewed effects of smoking on wound healing. Patient voices numbness and tingling in feet. Described proper diabetic foot care and the importance of checking feet daily. Per patient they typically drink diet drinks. Encouraged to increase water intake. Reviewed effects of sweetened beverages on glycemic control and discussed alternative beverages to help improve glycemic control. Patient voices that they do not drink alcoholic beverages. Per patient they are eating every 4 hours and working with his nurse Lisa Campos who he is in contact every day regarding his blood sugars. Educated re: effects of carbohydrates on blood glucose, the \"plate method\" of healthy meal planning, basics of healthy meal plan, Consistent Carbohydrate Diet. Patient voices eating black-eyed peas and english peas. Discussed starchy vegetables have carbohydrates and educated regarding importance of increasing non-starchy vegetables. Discussed cheaper meal options and plate method for portion control and balancing meals. Educated patient regarding the benefits of physical activity (as cleared by provider) on glycemic control. Discussed chair exercises as patient states he has been told he cannot put weight on his left foot. Also explained the relationship between hyperglycemia and infection and delayed healing. Discussed target goals for blood glucose and A1C. Educated patient regarding diabetic medications including mechanism of action, timing, and possible side effects. Patient verbalizes understanding of teaching. Patient would likely benefit from continued diabetes outpatient education. Patient provided with contact information to [facility] program for free diabetes education. Discussed basal versus bolus insulin and prandial versus correctional insulin. Encouraged compliance with discharge regimen.  Encouraged patient to continue to

## 2023-11-08 ENCOUNTER — CARE COORDINATION (OUTPATIENT)
Dept: CARE COORDINATION | Facility: CLINIC | Age: 58
End: 2023-11-08

## 2023-11-08 NOTE — CARE COORDINATION
Outreach in follow up to hospital discharge yesterday:  home with [facility]:  RN, PT, OT   - patient drove himself home   - has to return to  his medications   - has a number of steps (~ 12) down into his apartment   - minimal support, but one friend is a RN who is willing to check on patient   - port in place for IV antibiotics   - wound vac in place; HH to change Q 3 days   - partner is providing food   - BS this morning 125  Reports understanding a compliance with all discharge instructions.     PLAN: follow up call for Monday 11/13

## 2023-11-09 ENCOUNTER — HOSPITAL ENCOUNTER (INPATIENT)
Age: 58
LOS: 8 days | Discharge: HOME | DRG: 683 | End: 2023-11-17
Attending: EMERGENCY MEDICINE | Admitting: STUDENT IN AN ORGANIZED HEALTH CARE EDUCATION/TRAINING PROGRAM
Payer: MEDICARE

## 2023-11-09 ENCOUNTER — HOSPITAL ENCOUNTER (OUTPATIENT)
Dept: WOUND CARE | Age: 58
Discharge: HOME | End: 2023-11-09
Payer: MEDICARE

## 2023-11-09 VITALS
HEIGHT: 73 IN | DIASTOLIC BLOOD PRESSURE: 64 MMHG | BODY MASS INDEX: 26.24 KG/M2 | SYSTOLIC BLOOD PRESSURE: 111 MMHG | HEART RATE: 81 BPM | WEIGHT: 198 LBS | TEMPERATURE: 98 F | RESPIRATION RATE: 18 BRPM

## 2023-11-09 DIAGNOSIS — L03.032 CELLULITIS OF GREAT TOE OF LEFT FOOT: ICD-10-CM

## 2023-11-09 DIAGNOSIS — M86.9 OSTEOMYELITIS OF LEFT FOOT, UNSPECIFIED TYPE (HCC): ICD-10-CM

## 2023-11-09 DIAGNOSIS — N17.9 AKI (ACUTE KIDNEY INJURY) (HCC): Primary | ICD-10-CM

## 2023-11-09 DIAGNOSIS — E11.42 PERIPHERAL SENSORY NEUROPATHY DUE TO TYPE 2 DIABETES MELLITUS (HCC): ICD-10-CM

## 2023-11-09 DIAGNOSIS — E11.69 TYPE 2 DIABETES MELLITUS WITH OTHER SPECIFIED COMPLICATION, UNSPECIFIED WHETHER LONG TERM INSULIN USE (HCC): ICD-10-CM

## 2023-11-09 DIAGNOSIS — E11.65 TYPE 2 DIABETES MELLITUS WITH HYPERGLYCEMIA, WITH LONG-TERM CURRENT USE OF INSULIN (HCC): ICD-10-CM

## 2023-11-09 DIAGNOSIS — B20 HUMAN IMMUNODEFICIENCY VIRUS (HIV) DISEASE (HCC): ICD-10-CM

## 2023-11-09 DIAGNOSIS — L97.524 DIABETIC ULCER OF TOE OF LEFT FOOT ASSOCIATED WITH TYPE 2 DIABETES MELLITUS, WITH NECROSIS OF BONE (HCC): Primary | ICD-10-CM

## 2023-11-09 DIAGNOSIS — E11.621 DIABETIC ULCER OF TOE OF RIGHT FOOT ASSOCIATED WITH TYPE 2 DIABETES MELLITUS, WITH FAT LAYER EXPOSED (HCC): ICD-10-CM

## 2023-11-09 DIAGNOSIS — E11.621 DIABETIC ULCER OF TOE OF LEFT FOOT ASSOCIATED WITH TYPE 2 DIABETES MELLITUS, WITH NECROSIS OF BONE (HCC): Primary | ICD-10-CM

## 2023-11-09 DIAGNOSIS — Z79.4 TYPE 2 DIABETES MELLITUS WITH HYPERGLYCEMIA, WITH LONG-TERM CURRENT USE OF INSULIN (HCC): ICD-10-CM

## 2023-11-09 DIAGNOSIS — L97.512 DIABETIC ULCER OF TOE OF RIGHT FOOT ASSOCIATED WITH TYPE 2 DIABETES MELLITUS, WITH FAT LAYER EXPOSED (HCC): ICD-10-CM

## 2023-11-09 DIAGNOSIS — F17.210 CIGARETTE SMOKER: ICD-10-CM

## 2023-11-09 DIAGNOSIS — B20 HUMAN IMMUNODEFICIENCY VIRUS (HCC): ICD-10-CM

## 2023-11-09 LAB
ANION GAP SERPL CALC-SCNC: 10 MMOL/L (ref 2–11)
BASOPHILS # BLD: 0.1 K/UL (ref 0–0.2)
BASOPHILS NFR BLD: 1 % (ref 0–2)
BUN SERPL-MCNC: 61 MG/DL (ref 6–23)
CALCIUM SERPL-MCNC: 9.1 MG/DL (ref 8.3–10.4)
CHLORIDE SERPL-SCNC: 102 MMOL/L (ref 101–110)
CO2 SERPL-SCNC: 24 MMOL/L (ref 21–32)
CREAT SERPL-MCNC: 5.31 MG/DL (ref 0.8–1.5)
CRP SERPL-MCNC: 6.2 MG/DL (ref 0–0.9)
DIFFERENTIAL METHOD BLD: ABNORMAL
EOSINOPHIL # BLD: 0.4 K/UL (ref 0–0.8)
EOSINOPHIL NFR BLD: 3 % (ref 0.5–7.8)
ERYTHROCYTE [DISTWIDTH] IN BLOOD BY AUTOMATED COUNT: 13.9 % (ref 11.9–14.6)
ERYTHROCYTE [SEDIMENTATION RATE] IN BLOOD: 14 MM/HR (ref 0–15)
GLUCOSE BLD STRIP.AUTO-MCNC: 165 MG/DL (ref 65–100)
GLUCOSE SERPL-MCNC: 96 MG/DL (ref 65–100)
HCT VFR BLD AUTO: 42.5 % (ref 41.1–50.3)
HGB BLD-MCNC: 14.4 G/DL (ref 13.6–17.2)
IMM GRANULOCYTES # BLD AUTO: 0.1 K/UL (ref 0–0.5)
IMM GRANULOCYTES NFR BLD AUTO: 0 % (ref 0–5)
LACTATE SERPL-SCNC: 0.6 MMOL/L (ref 0.4–2)
LYMPHOCYTES # BLD: 1.7 K/UL (ref 0.5–4.6)
LYMPHOCYTES NFR BLD: 13 % (ref 13–44)
MCH RBC QN AUTO: 29.9 PG (ref 26.1–32.9)
MCHC RBC AUTO-ENTMCNC: 33.9 G/DL (ref 31.4–35)
MCV RBC AUTO: 88.2 FL (ref 82–102)
MONOCYTES # BLD: 1.3 K/UL (ref 0.1–1.3)
MONOCYTES NFR BLD: 10 % (ref 4–12)
NEUTS SEG # BLD: 9.5 K/UL (ref 1.7–8.2)
NEUTS SEG NFR BLD: 73 % (ref 43–78)
NRBC # BLD: 0 K/UL (ref 0–0.2)
PLATELET # BLD AUTO: 290 K/UL (ref 150–450)
PMV BLD AUTO: 10.4 FL (ref 9.4–12.3)
POTASSIUM SERPL-SCNC: 3.8 MMOL/L (ref 3.5–5.1)
PROCALCITONIN SERPL-MCNC: 0.32 NG/ML (ref 0–0.49)
RBC # BLD AUTO: 4.82 M/UL (ref 4.23–5.6)
SERVICE CMNT-IMP: ABNORMAL
SODIUM SERPL-SCNC: 136 MMOL/L (ref 133–143)
WBC # BLD AUTO: 13 K/UL (ref 4.3–11.1)

## 2023-11-09 PROCEDURE — 2580000003 HC RX 258: Performed by: EMERGENCY MEDICINE

## 2023-11-09 PROCEDURE — 2580000003 HC RX 258: Performed by: STUDENT IN AN ORGANIZED HEALTH CARE EDUCATION/TRAINING PROGRAM

## 2023-11-09 PROCEDURE — 82962 GLUCOSE BLOOD TEST: CPT

## 2023-11-09 PROCEDURE — 87040 BLOOD CULTURE FOR BACTERIA: CPT

## 2023-11-09 PROCEDURE — 83605 ASSAY OF LACTIC ACID: CPT

## 2023-11-09 PROCEDURE — 86140 C-REACTIVE PROTEIN: CPT

## 2023-11-09 PROCEDURE — 1100000000 HC RM PRIVATE

## 2023-11-09 PROCEDURE — 6360000002 HC RX W HCPCS: Performed by: STUDENT IN AN ORGANIZED HEALTH CARE EDUCATION/TRAINING PROGRAM

## 2023-11-09 PROCEDURE — 80048 BASIC METABOLIC PNL TOTAL CA: CPT

## 2023-11-09 PROCEDURE — 97605 NEG PRS WND THER DME<=50SQCM: CPT

## 2023-11-09 PROCEDURE — 96360 HYDRATION IV INFUSION INIT: CPT

## 2023-11-09 PROCEDURE — 85025 COMPLETE CBC W/AUTO DIFF WBC: CPT

## 2023-11-09 PROCEDURE — 6370000000 HC RX 637 (ALT 250 FOR IP): Performed by: STUDENT IN AN ORGANIZED HEALTH CARE EDUCATION/TRAINING PROGRAM

## 2023-11-09 PROCEDURE — 85652 RBC SED RATE AUTOMATED: CPT

## 2023-11-09 PROCEDURE — 84145 PROCALCITONIN (PCT): CPT

## 2023-11-09 PROCEDURE — 99285 EMERGENCY DEPT VISIT HI MDM: CPT

## 2023-11-09 RX ORDER — HEPARIN SODIUM 5000 [USP'U]/ML
5000 INJECTION, SOLUTION INTRAVENOUS; SUBCUTANEOUS EVERY 8 HOURS SCHEDULED
Status: DISCONTINUED | OUTPATIENT
Start: 2023-11-09 | End: 2023-11-17 | Stop reason: HOSPADM

## 2023-11-09 RX ORDER — LIDOCAINE HYDROCHLORIDE 20 MG/ML
JELLY TOPICAL ONCE
OUTPATIENT
Start: 2023-11-09 | End: 2023-11-09

## 2023-11-09 RX ORDER — GENTAMICIN SULFATE 1 MG/G
OINTMENT TOPICAL ONCE
OUTPATIENT
Start: 2023-11-09 | End: 2023-11-09

## 2023-11-09 RX ORDER — FAMOTIDINE 20 MG/1
20 TABLET, FILM COATED ORAL 2 TIMES DAILY
Status: DISCONTINUED | OUTPATIENT
Start: 2023-11-09 | End: 2023-11-17

## 2023-11-09 RX ORDER — OXYBUTYNIN CHLORIDE 10 MG/1
1 TABLET, EXTENDED RELEASE ORAL DAILY
Status: DISCONTINUED | OUTPATIENT
Start: 2023-11-10 | End: 2023-11-17 | Stop reason: HOSPADM

## 2023-11-09 RX ORDER — ONDANSETRON 4 MG/1
4 TABLET, ORALLY DISINTEGRATING ORAL EVERY 8 HOURS PRN
Status: DISCONTINUED | OUTPATIENT
Start: 2023-11-09 | End: 2023-11-17 | Stop reason: HOSPADM

## 2023-11-09 RX ORDER — GABAPENTIN 400 MG/1
800 CAPSULE ORAL 3 TIMES DAILY
Status: DISCONTINUED | OUTPATIENT
Start: 2023-11-09 | End: 2023-11-17

## 2023-11-09 RX ORDER — ATORVASTATIN CALCIUM 40 MG/1
80 TABLET, FILM COATED ORAL DAILY
Status: DISCONTINUED | OUTPATIENT
Start: 2023-11-10 | End: 2023-11-17 | Stop reason: HOSPADM

## 2023-11-09 RX ORDER — BACITRACIN ZINC AND POLYMYXIN B SULFATE 500; 1000 [USP'U]/G; [USP'U]/G
OINTMENT TOPICAL ONCE
OUTPATIENT
Start: 2023-11-09 | End: 2023-11-09

## 2023-11-09 RX ORDER — TRAZODONE HYDROCHLORIDE 50 MG/1
50 TABLET ORAL NIGHTLY
Status: DISCONTINUED | OUTPATIENT
Start: 2023-11-09 | End: 2023-11-17 | Stop reason: HOSPADM

## 2023-11-09 RX ORDER — BUPROPION HYDROCHLORIDE 150 MG/1
300 TABLET ORAL DAILY
Status: DISCONTINUED | OUTPATIENT
Start: 2023-11-10 | End: 2023-11-17 | Stop reason: HOSPADM

## 2023-11-09 RX ORDER — LOSARTAN POTASSIUM 25 MG/1
25 TABLET ORAL DAILY
Status: DISCONTINUED | OUTPATIENT
Start: 2023-11-10 | End: 2023-11-17 | Stop reason: HOSPADM

## 2023-11-09 RX ORDER — POTASSIUM CHLORIDE 20 MEQ/1
40 TABLET, EXTENDED RELEASE ORAL PRN
Status: DISCONTINUED | OUTPATIENT
Start: 2023-11-09 | End: 2023-11-17 | Stop reason: HOSPADM

## 2023-11-09 RX ORDER — SODIUM CHLORIDE 9 MG/ML
INJECTION, SOLUTION INTRAVENOUS CONTINUOUS
Status: DISCONTINUED | OUTPATIENT
Start: 2023-11-09 | End: 2023-11-11

## 2023-11-09 RX ORDER — TRIAMCINOLONE ACETONIDE 1 MG/G
OINTMENT TOPICAL ONCE
OUTPATIENT
Start: 2023-11-09 | End: 2023-11-09

## 2023-11-09 RX ORDER — CHLORAL HYDRATE 500 MG
1 CAPSULE ORAL DAILY
Status: DISCONTINUED | OUTPATIENT
Start: 2023-11-10 | End: 2023-11-17 | Stop reason: HOSPADM

## 2023-11-09 RX ORDER — SODIUM CHLORIDE 0.9 % (FLUSH) 0.9 %
5-40 SYRINGE (ML) INJECTION PRN
Status: DISCONTINUED | OUTPATIENT
Start: 2023-11-09 | End: 2023-11-17 | Stop reason: HOSPADM

## 2023-11-09 RX ORDER — INSULIN GLARGINE 100 [IU]/ML
25 INJECTION, SOLUTION SUBCUTANEOUS NIGHTLY
Status: DISCONTINUED | OUTPATIENT
Start: 2023-11-09 | End: 2023-11-15

## 2023-11-09 RX ORDER — INSULIN LISPRO 100 [IU]/ML
0-4 INJECTION, SOLUTION INTRAVENOUS; SUBCUTANEOUS NIGHTLY
Status: DISCONTINUED | OUTPATIENT
Start: 2023-11-09 | End: 2023-11-17 | Stop reason: HOSPADM

## 2023-11-09 RX ORDER — OXYCODONE HYDROCHLORIDE 5 MG/1
5 TABLET ORAL EVERY 6 HOURS PRN
Status: DISCONTINUED | OUTPATIENT
Start: 2023-11-09 | End: 2023-11-17 | Stop reason: HOSPADM

## 2023-11-09 RX ORDER — BETAMETHASONE DIPROPIONATE 0.05 %
OINTMENT (GRAM) TOPICAL ONCE
OUTPATIENT
Start: 2023-11-09 | End: 2023-11-09

## 2023-11-09 RX ORDER — SODIUM CHLORIDE 9 MG/ML
INJECTION, SOLUTION INTRAVENOUS PRN
Status: DISCONTINUED | OUTPATIENT
Start: 2023-11-09 | End: 2023-11-17 | Stop reason: HOSPADM

## 2023-11-09 RX ORDER — TAMSULOSIN HYDROCHLORIDE 0.4 MG/1
0.4 CAPSULE ORAL DAILY
Status: DISCONTINUED | OUTPATIENT
Start: 2023-11-10 | End: 2023-11-17 | Stop reason: HOSPADM

## 2023-11-09 RX ORDER — DULOXETIN HYDROCHLORIDE 60 MG/1
60 CAPSULE, DELAYED RELEASE ORAL EVERY MORNING
Status: DISCONTINUED | OUTPATIENT
Start: 2023-11-10 | End: 2023-11-17 | Stop reason: HOSPADM

## 2023-11-09 RX ORDER — POLYETHYLENE GLYCOL 3350 17 G/17G
17 POWDER, FOR SOLUTION ORAL DAILY PRN
Status: DISCONTINUED | OUTPATIENT
Start: 2023-11-09 | End: 2023-11-17 | Stop reason: HOSPADM

## 2023-11-09 RX ORDER — ACETAMINOPHEN 325 MG/1
650 TABLET ORAL EVERY 6 HOURS PRN
Status: DISCONTINUED | OUTPATIENT
Start: 2023-11-09 | End: 2023-11-17 | Stop reason: HOSPADM

## 2023-11-09 RX ORDER — SODIUM CHLOR/HYPOCHLOROUS ACID 0.033 %
SOLUTION, IRRIGATION IRRIGATION ONCE
OUTPATIENT
Start: 2023-11-09 | End: 2023-11-09

## 2023-11-09 RX ORDER — LIDOCAINE 50 MG/G
OINTMENT TOPICAL ONCE
OUTPATIENT
Start: 2023-11-09 | End: 2023-11-09

## 2023-11-09 RX ORDER — GINSENG 100 MG
CAPSULE ORAL ONCE
OUTPATIENT
Start: 2023-11-09 | End: 2023-11-09

## 2023-11-09 RX ORDER — SODIUM CHLORIDE 0.9 % (FLUSH) 0.9 %
5-40 SYRINGE (ML) INJECTION EVERY 12 HOURS SCHEDULED
Status: DISCONTINUED | OUTPATIENT
Start: 2023-11-09 | End: 2023-11-17 | Stop reason: HOSPADM

## 2023-11-09 RX ORDER — IBUPROFEN 200 MG
TABLET ORAL ONCE
OUTPATIENT
Start: 2023-11-09 | End: 2023-11-09

## 2023-11-09 RX ORDER — ONDANSETRON 2 MG/ML
4 INJECTION INTRAMUSCULAR; INTRAVENOUS EVERY 6 HOURS PRN
Status: DISCONTINUED | OUTPATIENT
Start: 2023-11-09 | End: 2023-11-17 | Stop reason: HOSPADM

## 2023-11-09 RX ORDER — INSULIN LISPRO 100 [IU]/ML
0-4 INJECTION, SOLUTION INTRAVENOUS; SUBCUTANEOUS
Status: DISCONTINUED | OUTPATIENT
Start: 2023-11-09 | End: 2023-11-17 | Stop reason: HOSPADM

## 2023-11-09 RX ORDER — POTASSIUM CHLORIDE 7.45 MG/ML
10 INJECTION INTRAVENOUS PRN
Status: DISCONTINUED | OUTPATIENT
Start: 2023-11-09 | End: 2023-11-17 | Stop reason: HOSPADM

## 2023-11-09 RX ORDER — MAGNESIUM SULFATE IN WATER 40 MG/ML
2000 INJECTION, SOLUTION INTRAVENOUS PRN
Status: DISCONTINUED | OUTPATIENT
Start: 2023-11-09 | End: 2023-11-17 | Stop reason: HOSPADM

## 2023-11-09 RX ORDER — LIDOCAINE 40 MG/G
CREAM TOPICAL ONCE
OUTPATIENT
Start: 2023-11-09 | End: 2023-11-09

## 2023-11-09 RX ORDER — ACETAMINOPHEN 650 MG/1
650 SUPPOSITORY RECTAL EVERY 6 HOURS PRN
Status: DISCONTINUED | OUTPATIENT
Start: 2023-11-09 | End: 2023-11-17 | Stop reason: HOSPADM

## 2023-11-09 RX ORDER — CLOBETASOL PROPIONATE 0.5 MG/G
OINTMENT TOPICAL ONCE
OUTPATIENT
Start: 2023-11-09 | End: 2023-11-09

## 2023-11-09 RX ORDER — LIDOCAINE HYDROCHLORIDE 40 MG/ML
SOLUTION TOPICAL ONCE
OUTPATIENT
Start: 2023-11-09 | End: 2023-11-09

## 2023-11-09 RX ORDER — DEXTROSE MONOHYDRATE 100 MG/ML
INJECTION, SOLUTION INTRAVENOUS CONTINUOUS PRN
Status: DISCONTINUED | OUTPATIENT
Start: 2023-11-09 | End: 2023-11-17 | Stop reason: HOSPADM

## 2023-11-09 RX ORDER — 0.9 % SODIUM CHLORIDE 0.9 %
1000 INTRAVENOUS SOLUTION INTRAVENOUS ONCE
Status: COMPLETED | OUTPATIENT
Start: 2023-11-09 | End: 2023-11-09

## 2023-11-09 RX ADMIN — FAMOTIDINE 20 MG: 20 TABLET ORAL at 22:13

## 2023-11-09 RX ADMIN — HEPARIN SODIUM 5000 UNITS: 5000 INJECTION INTRAVENOUS; SUBCUTANEOUS at 22:13

## 2023-11-09 RX ADMIN — INSULIN GLARGINE 25 UNITS: 100 INJECTION, SOLUTION SUBCUTANEOUS at 23:04

## 2023-11-09 RX ADMIN — GABAPENTIN 800 MG: 400 CAPSULE ORAL at 22:12

## 2023-11-09 RX ADMIN — SODIUM CHLORIDE, PRESERVATIVE FREE 10 ML: 5 INJECTION INTRAVENOUS at 22:13

## 2023-11-09 RX ADMIN — SODIUM CHLORIDE 1000 ML: 9 INJECTION, SOLUTION INTRAVENOUS at 16:28

## 2023-11-09 RX ADMIN — TRAZODONE HYDROCHLORIDE 50 MG: 50 TABLET ORAL at 22:13

## 2023-11-09 RX ADMIN — SODIUM CHLORIDE: 9 INJECTION, SOLUTION INTRAVENOUS at 23:06

## 2023-11-09 ASSESSMENT — ENCOUNTER SYMPTOMS
DIARRHEA: 1
NAUSEA: 1
SHORTNESS OF BREATH: 0
VOMITING: 0
COUGH: 0
RHINORRHEA: 0
FACIAL SWELLING: 0
COLOR CHANGE: 0
ABDOMINAL PAIN: 0
EYE REDNESS: 0
EYE DISCHARGE: 0

## 2023-11-09 ASSESSMENT — LIFESTYLE VARIABLES
HOW MANY STANDARD DRINKS CONTAINING ALCOHOL DO YOU HAVE ON A TYPICAL DAY: PATIENT DOES NOT DRINK
HOW OFTEN DO YOU HAVE A DRINK CONTAINING ALCOHOL: NEVER

## 2023-11-09 ASSESSMENT — PATIENT HEALTH QUESTIONNAIRE - PHQ9
SUM OF ALL RESPONSES TO PHQ QUESTIONS 1-9: 0
SUM OF ALL RESPONSES TO PHQ9 QUESTIONS 1 & 2: 0
SUM OF ALL RESPONSES TO PHQ QUESTIONS 1-9: 0
2. FEELING DOWN, DEPRESSED OR HOPELESS: 0
SUM OF ALL RESPONSES TO PHQ QUESTIONS 1-9: 0
SUM OF ALL RESPONSES TO PHQ QUESTIONS 1-9: 0
1. LITTLE INTEREST OR PLEASURE IN DOING THINGS: 0

## 2023-11-09 ASSESSMENT — PAIN SCALES - GENERAL: PAINLEVEL_OUTOF10: 0

## 2023-11-09 ASSESSMENT — PAIN - FUNCTIONAL ASSESSMENT: PAIN_FUNCTIONAL_ASSESSMENT: NONE - DENIES PAIN

## 2023-11-09 NOTE — WOUND CARE
Patient transported via wheelchair from [address] to ER after receiving Infectious Disease phone calls (3) to report to ER for critical lab values. Infectious Disease informed by Emre Alvarez, [address], that patient was reporting to ER. This RN called patient's [address], to hold Friday and Monday Juan Alves appointments. Patient's wound vac has been ordered to stay intact until Monday when patient will follow up with Dr. Erich Ledezma at [address] [place] [address].

## 2023-11-09 NOTE — ED TRIAGE NOTES
Pt was at wound care and got a call that he had an infectious disease that had to deal with his kidneys and that he needed to come to ER

## 2023-11-09 NOTE — WOUND CARE
Negative Pressure Wound Therapy    NAME:  Titus Grullon  YOB: 1965  MEDICAL RECORD NUMBER:  [id number]  DATE:  11/9/2023    Applied Negative Pressure to Left Great Toe wound(s)/ulcer(s). [x] Applied skin barrier prep to saúl-wound. [x] Cut strips of plastic drape to picture frame wound so that saúl-wound is     covered with the drape. [x] If bridging dressing to less prominent site, cover any intact skin that will come in contact with the Negative Pressure Therapy sponge, gauze or channel drain with plastic drape. The sponge should never touch intact skin. [x] Cut sponge, gauze or channel drain to size which will fit into the wound/ulcer bed without being forced. [x] Be sure the sponge is large enough to hold the entire round plastic flange which is attached to the tubing. Never allow flange to be larger than the sponge or it will produce suction damaging intact skin. Total number of individual pieces of foam used within the wound bed: 1    [x] If bridging the dressing away from the primary site, be sure the bridge leads to a piece of sponge large enough to hold the entire flange without allowing any of the flange to overlap onto intact skin. [x] Covered sponge, gauze or channel drain with plastic drape. [x] Cut a hole in this plastic drape directly over the sponge the same size as the plastic drain tubing. [x] Removed plastic liner from flange and apply it directly over the hole you cut. [x] Removed the plastic cover from the flange. [x] Attached the tubing to the wound/ulcer Negative Pressure Therapy and turn it on to be sure a vacuum is created and that there are no leaks. [x] If air leaks occur, use plastic drape to patch them. [x] Secured Negative Pressure Therapy dressing with ace wrap loosely if located on an extremity. Maintain tubing outside of ace wrap. Tubing must not exert pressure on intact skin.     Applied per  Guidelines        Electronically

## 2023-11-09 NOTE — DISCHARGE INSTRUCTIONS
From [facility], go directly to [facility] Emergency Room related to Infectious Disease call regarding lab values. Patient to Call [phone] to inform that he has gone to ER as instructed. PLEASE PROVIDE PATIENT WITH POST OP SHOE. From [facility], go directly to [facility] Emergency Room related to Infectious Disease call regarding critical lab values. Patient to Call [phone] to inform that he has gone to ER as instructed. PLEASE PROVIDE PATIENT WITH POST OP SHOE. Instructions:   Left Great Toe:  Cleanse with Normal Saline, then Vashe  Antibiotic Beads to Wound Bed  Adaptic to cover beads  Extra Thin Duoderm to periwound for window pane  Black Foam to cover Adaptic. NPWT : continuous 125 mmHg. Please leave wound vac intact until Monday at wound center. Tubigrip to Left Lower Leg. Raman Mahajan to communicate with patient regarding status. Patient to communicate with Home Health regarding status. Patient may be in hospital tomorrow, Friday, 11/10/2023, related to critical lab values reported today at 3:00pm,11/09/2023.     Offload Left Foot with POST OP SHOE   Custom Fit Peg Assist Insole into Post Op Shoe

## 2023-11-09 NOTE — WOUND CARE
Discharge Instructions for  [address]  [facility]  [address], [address] [facility]  Phone [phone]   Fax [phone]      NAME:  Rachna Knott OF BIRTH:  1965  MEDICAL RECORD NUMBER:  [id number]  DATE:  11/9/2023    Return Appointment:   Monday, November 13, 2023 with Verner Gondola, MD    From [facility], go directly to [facility] Emergency Room related to Infectious Disease call regarding critical lab values. Patient to Call [phone] to inform that he has gone to ER as instructed. PLEASE PROVIDE PATIENT WITH POST OP SHOE. Instructions:   Left Great Toe:  Cleanse with Normal Saline, then Vashe  Antibiotic Beads to Wound Bed  Adaptic to cover beads  Extra Thin Duoderm to periwound for window pane  Black Foam to cover Adaptic. NPWT : continuous 125 mmHg. Please leave wound vac intact until Monday at wound center. Tubigrip to Left Lower Leg. [address] to communicate with patient regarding status. Patient to communicate with Home Health regarding status. Patient may be in hospital tomorrow, Friday, 11/10/2023, related to critical lab values reported today at 3:00pm,11/09/2023. Offload Left Foot with POST OP SHOE   Custom Fit Peg Assist Insole into Post Op Shoe    Should you experience increased redness, swelling, pain, foul odor, size of wound(s), or have a temperature over 101 degrees please contact the [facility] at [phone] or if after hours contact your primary care physician or go to the hospital emergency department. PLEASE NOTE: IF YOU ARE UNABLE TO OBTAIN WOUND SUPPLIES, CONTINUE TO USE THE SUPPLIES YOU HAVE AVAILABLE UNTIL YOU ARE ABLE TO REACH US. IT IS MOST IMPORTANT TO KEEP THE WOUND COVERED AT ALL TIMES. Electronically signed Gregory Cobb on 11/9/2023 at 3:05 PM

## 2023-11-09 NOTE — FLOWSHEET NOTE
11/09/23 1452   Right Leg Edema Point of Measurement   Leg circumference 36 cm   Ankle circumference 23 cm   Foot circumference 24 cm   Compression Therapy Tubular elastic support bandage   Left Leg Edema Point of Measurement   Leg circumference 38 cm   Ankle circumference 25 cm   Foot circumference 26 cm   Compression Therapy Tubular elastic support bandage   Negative Pressure Wound Therapy Toe (Comment  which one) Left   Placement Date/Time: 11/03/23 1300   Present on Admission/Arrival: Yes  Location: (c) Toe (Comment  which one)  Wound Location Orientation: Left   Wound Type Diabetic foot ulcer;Surgical   Unit Type KCI   Dressing Type Black Foam  (Adaptic followed by Black Foam)   Number of pieces used 1   Number of pieces removed 1   Cycle Continuous   Target Pressure (mmHg) 125   Vac Frequency 3 hours   Canister changed? No   Dressing Status New dressing applied   Dressing Changed Changed/New   Drainage Amount Scant   Drainage Description Serosanguinous   Wound Assessment   (Antibioitc Beads in place)   Stella-wound Assessment Edematous; Blanchable erythema   Odor None   Wound 10/02/23 Toe (Comment  which one) Left;Plantar great toe #4   Date First Assessed/Time First Assessed: 10/02/23 1133   Present on Original Admission: Yes  Primary Wound Type: Diabetic Ulcer  Location: Toe (Comment  which one)  Wound Location Orientation: Left;Plantar  Wound Description (Comments): great toe #4   Wound Image     Wound Etiology Diabetic Gavin 3   Dressing Status Intact   Wound Cleansed Cleansed with saline; Vashe   Dressing/Treatment Negative pressure wound therapy   Offloading for Diabetic Foot Ulcers Offloading ordered   Wound Length (cm) 1.3 cm   Wound Width (cm) 1.4 cm   Wound Depth (cm)   (depth undetermined; antibioitic beads in place)   Wound Surface Area (cm^2) 1.82 cm^2   Change in Wound Size % (l*w) -21.33   Wound Assessment Pink/red   Drainage Amount Small (< 25%)   Drainage Description Serosanguinous   Odor None

## 2023-11-09 NOTE — ED PROVIDER NOTES
Use    Smoking status: Every Day     Packs/day: 1     Types: Cigarettes    Smokeless tobacco: Never    Tobacco comments:     He will try to quit. Vaping Use    Vaping Use: Never used   Substance and Sexual Activity    Alcohol use: No    Drug use: No     Social Determinants of Health     Food Insecurity: Food Insecurity Present (11/2/2023)    Hunger Vital Sign     Worried About Running Out of Food in the Last Year: Sometimes true     Ran Out of Food in the Last Year: Sometimes true   Transportation Needs: No Transportation Needs (11/9/2023)    Transportation Problems ([address] [place] [address])   Recent Concern: Transportation Needs - Unmet Transportation Needs (11/2/2023)    PRAPARE - Transportation     Lack of Transportation (Medical): Yes   Housing Stability: Low Risk  (11/2/2023)    Housing Stability Vital Sign     Unable to Pay for Housing in the Last Year: No     Number of Places Lived in the Last Year: 1     Unstable Housing in the Last Year: No        Previous Medications    ABACAVIR-DOLUTEGRAVIR-LAMIVUD (TRIUMEQ) 600- MG TABS    TAKE ONE TABLET BY MOUTH EVERY DAY Orally Once a day for 30 day(s)    ATORVASTATIN (LIPITOR) 80 MG TABLET    Take 1 tablet by mouth daily    BLOOD GLUCOSE TEST STRIPS (ASCENSIA AUTODISC VI;ONE TOUCH ULTRA TEST VI) STRIP    Check blood glucose 6 times per day. Dx E11.65    BUPROPION (WELLBUTRIN XL) 300 MG EXTENDED RELEASE TABLET    Take 1 tablet by mouth daily    DULOXETINE (CYMBALTA) 60 MG EXTENDED RELEASE CAPSULE    Take 1 capsule by mouth every morning    FAMOTIDINE (PEPCID) 20 MG TABLET    Take 1 tablet by mouth 2 times daily    GABAPENTIN (NEURONTIN) 800 MG TABLET    Take 1 tablet by mouth 3 times daily.     GLUCOSE 4 G CHEWABLE TABLET    Take 4 tablets by mouth as needed for Low blood sugar    GVOKE HYPOPEN 2-PACK 1 MG/0.2ML SOAJ    Inject 1 mg into the skin as needed (severe hypoglycemia)    HUMALOG KWIKPEN 100 UNIT/ML SOPN    5 units AC snacks,10 units AC meals plus 1 units per 25

## 2023-11-10 ENCOUNTER — APPOINTMENT (OUTPATIENT)
Dept: GENERAL RADIOLOGY | Age: 58
DRG: 683 | End: 2023-11-10
Payer: MEDICARE

## 2023-11-10 ENCOUNTER — APPOINTMENT (OUTPATIENT)
Dept: ULTRASOUND IMAGING | Age: 58
DRG: 683 | End: 2023-11-10
Payer: MEDICARE

## 2023-11-10 ENCOUNTER — CARE COORDINATION (OUTPATIENT)
Dept: CARE COORDINATION | Facility: CLINIC | Age: 58
End: 2023-11-10

## 2023-11-10 LAB
ALBUMIN SERPL-MCNC: 2.2 G/DL (ref 3.5–5)
ALBUMIN/GLOB SERPL: 0.7 (ref 0.4–1.6)
ALP SERPL-CCNC: 127 U/L (ref 50–136)
ALT SERPL-CCNC: 13 U/L (ref 12–65)
ANION GAP SERPL CALC-SCNC: 12 MMOL/L (ref 2–11)
APPEARANCE UR: CLEAR
AST SERPL-CCNC: 16 U/L (ref 15–37)
BACTERIA URNS QL MICRO: NEGATIVE /HPF
BASOPHILS # BLD: 0.1 K/UL (ref 0–0.2)
BASOPHILS NFR BLD: 1 % (ref 0–2)
BILIRUB SERPL-MCNC: 0.5 MG/DL (ref 0.2–1.1)
BILIRUB UR QL: NEGATIVE
BUN SERPL-MCNC: 62 MG/DL (ref 6–23)
CALCIUM SERPL-MCNC: 8 MG/DL (ref 8.3–10.4)
CASTS URNS QL MICRO: ABNORMAL /LPF
CHLORIDE SERPL-SCNC: 107 MMOL/L (ref 101–110)
CK SERPL-CCNC: 49 U/L (ref 21–215)
CO2 SERPL-SCNC: 19 MMOL/L (ref 21–32)
COLOR UR: ABNORMAL
CREAT SERPL-MCNC: 5.82 MG/DL (ref 0.8–1.5)
DIFFERENTIAL METHOD BLD: ABNORMAL
EOSINOPHIL # BLD: 0.4 K/UL (ref 0–0.8)
EOSINOPHIL #/AREA URNS HPF: NEGATIVE
EOSINOPHIL NFR BLD: 4 % (ref 0.5–7.8)
EPI CELLS #/AREA URNS HPF: ABNORMAL /HPF
ERYTHROCYTE [DISTWIDTH] IN BLOOD BY AUTOMATED COUNT: 14.2 % (ref 11.9–14.6)
GLOBULIN SER CALC-MCNC: 3.1 G/DL (ref 2.8–4.5)
GLUCOSE BLD STRIP.AUTO-MCNC: 135 MG/DL (ref 65–100)
GLUCOSE BLD STRIP.AUTO-MCNC: 140 MG/DL (ref 65–100)
GLUCOSE BLD STRIP.AUTO-MCNC: 172 MG/DL (ref 65–100)
GLUCOSE BLD STRIP.AUTO-MCNC: 180 MG/DL (ref 65–100)
GLUCOSE SERPL-MCNC: 198 MG/DL (ref 65–100)
GLUCOSE UR STRIP.AUTO-MCNC: NEGATIVE MG/DL
HCT VFR BLD AUTO: 37.7 % (ref 41.1–50.3)
HGB BLD-MCNC: 12.7 G/DL (ref 13.6–17.2)
HGB UR QL STRIP: ABNORMAL
IMM GRANULOCYTES # BLD AUTO: 0 K/UL (ref 0–0.5)
IMM GRANULOCYTES NFR BLD AUTO: 0 % (ref 0–5)
KETONES UR QL STRIP.AUTO: NEGATIVE MG/DL
LEUKOCYTE ESTERASE UR QL STRIP.AUTO: NEGATIVE
LYMPHOCYTES # BLD: 1.9 K/UL (ref 0.5–4.6)
LYMPHOCYTES NFR BLD: 18 % (ref 13–44)
MCH RBC QN AUTO: 29.7 PG (ref 26.1–32.9)
MCHC RBC AUTO-ENTMCNC: 33.7 G/DL (ref 31.4–35)
MCV RBC AUTO: 88.3 FL (ref 82–102)
MONOCYTES # BLD: 1 K/UL (ref 0.1–1.3)
MONOCYTES NFR BLD: 10 % (ref 4–12)
NEUTS SEG # BLD: 6.6 K/UL (ref 1.7–8.2)
NEUTS SEG NFR BLD: 66 % (ref 43–78)
NITRITE UR QL STRIP.AUTO: NEGATIVE
NRBC # BLD: 0 K/UL (ref 0–0.2)
PH UR STRIP: 5 (ref 5–9)
PLATELET # BLD AUTO: 235 K/UL (ref 150–450)
PMV BLD AUTO: 10.9 FL (ref 9.4–12.3)
POTASSIUM SERPL-SCNC: 3.6 MMOL/L (ref 3.5–5.1)
PROT SERPL-MCNC: 5.3 G/DL (ref 6.3–8.2)
PROT UR STRIP-MCNC: 30 MG/DL
RBC # BLD AUTO: 4.27 M/UL (ref 4.23–5.6)
RBC #/AREA URNS HPF: ABNORMAL /HPF
SERVICE CMNT-IMP: ABNORMAL
SODIUM SERPL-SCNC: 138 MMOL/L (ref 133–143)
SP GR UR REFRACTOMETRY: 1.01 (ref 1–1.02)
UROBILINOGEN UR QL STRIP.AUTO: 0.2 EU/DL (ref 0.2–1)
VANCOMYCIN SERPL-MCNC: 37.3 UG/ML
WBC # BLD AUTO: 10 K/UL (ref 4.3–11.1)
WBC URNS QL MICRO: ABNORMAL /HPF

## 2023-11-10 PROCEDURE — 71045 X-RAY EXAM CHEST 1 VIEW: CPT

## 2023-11-10 PROCEDURE — 02HV33Z INSERTION OF INFUSION DEVICE INTO SUPERIOR VENA CAVA, PERCUTANEOUS APPROACH: ICD-10-PCS | Performed by: STUDENT IN AN ORGANIZED HEALTH CARE EDUCATION/TRAINING PROGRAM

## 2023-11-10 PROCEDURE — 82570 ASSAY OF URINE CREATININE: CPT

## 2023-11-10 PROCEDURE — 6360000002 HC RX W HCPCS: Performed by: STUDENT IN AN ORGANIZED HEALTH CARE EDUCATION/TRAINING PROGRAM

## 2023-11-10 PROCEDURE — 82962 GLUCOSE BLOOD TEST: CPT

## 2023-11-10 PROCEDURE — 82550 ASSAY OF CK (CPK): CPT

## 2023-11-10 PROCEDURE — 36415 COLL VENOUS BLD VENIPUNCTURE: CPT

## 2023-11-10 PROCEDURE — 6370000000 HC RX 637 (ALT 250 FOR IP): Performed by: STUDENT IN AN ORGANIZED HEALTH CARE EDUCATION/TRAINING PROGRAM

## 2023-11-10 PROCEDURE — 85025 COMPLETE CBC W/AUTO DIFF WBC: CPT

## 2023-11-10 PROCEDURE — 97161 PT EVAL LOW COMPLEX 20 MIN: CPT

## 2023-11-10 PROCEDURE — 87205 SMEAR GRAM STAIN: CPT

## 2023-11-10 PROCEDURE — 81001 URINALYSIS AUTO W/SCOPE: CPT

## 2023-11-10 PROCEDURE — 97165 OT EVAL LOW COMPLEX 30 MIN: CPT

## 2023-11-10 PROCEDURE — 80053 COMPREHEN METABOLIC PANEL: CPT

## 2023-11-10 PROCEDURE — 80202 ASSAY OF VANCOMYCIN: CPT

## 2023-11-10 PROCEDURE — 1100000000 HC RM PRIVATE

## 2023-11-10 PROCEDURE — 97535 SELF CARE MNGMENT TRAINING: CPT

## 2023-11-10 PROCEDURE — 82043 UR ALBUMIN QUANTITATIVE: CPT

## 2023-11-10 PROCEDURE — 97530 THERAPEUTIC ACTIVITIES: CPT

## 2023-11-10 PROCEDURE — 2580000003 HC RX 258: Performed by: STUDENT IN AN ORGANIZED HEALTH CARE EDUCATION/TRAINING PROGRAM

## 2023-11-10 PROCEDURE — 76770 US EXAM ABDO BACK WALL COMP: CPT

## 2023-11-10 RX ORDER — CIPROFLOXACIN 500 MG/1
500 TABLET, FILM COATED ORAL
Status: DISCONTINUED | OUTPATIENT
Start: 2023-11-11 | End: 2023-11-17

## 2023-11-10 RX ORDER — HYDRALAZINE HYDROCHLORIDE 20 MG/ML
10 INJECTION INTRAMUSCULAR; INTRAVENOUS EVERY 6 HOURS PRN
Status: DISCONTINUED | OUTPATIENT
Start: 2023-11-10 | End: 2023-11-17 | Stop reason: HOSPADM

## 2023-11-10 RX ADMIN — TRAZODONE HYDROCHLORIDE 50 MG: 50 TABLET ORAL at 20:08

## 2023-11-10 RX ADMIN — SODIUM CHLORIDE, PRESERVATIVE FREE 10 ML: 5 INJECTION INTRAVENOUS at 20:10

## 2023-11-10 RX ADMIN — FAMOTIDINE 20 MG: 20 TABLET ORAL at 08:46

## 2023-11-10 RX ADMIN — OXYBUTYNIN CHLORIDE 10 MG: 10 TABLET, EXTENDED RELEASE ORAL at 08:46

## 2023-11-10 RX ADMIN — ONDANSETRON 4 MG: 2 INJECTION INTRAMUSCULAR; INTRAVENOUS at 20:08

## 2023-11-10 RX ADMIN — HEPARIN SODIUM 5000 UNITS: 5000 INJECTION INTRAVENOUS; SUBCUTANEOUS at 15:02

## 2023-11-10 RX ADMIN — INSULIN GLARGINE 25 UNITS: 100 INJECTION, SOLUTION SUBCUTANEOUS at 21:09

## 2023-11-10 RX ADMIN — HEPARIN SODIUM 5000 UNITS: 5000 INJECTION INTRAVENOUS; SUBCUTANEOUS at 06:01

## 2023-11-10 RX ADMIN — GABAPENTIN 800 MG: 400 CAPSULE ORAL at 15:03

## 2023-11-10 RX ADMIN — FAMOTIDINE 20 MG: 20 TABLET ORAL at 20:07

## 2023-11-10 RX ADMIN — SODIUM CHLORIDE: 9 INJECTION, SOLUTION INTRAVENOUS at 20:15

## 2023-11-10 RX ADMIN — GABAPENTIN 800 MG: 400 CAPSULE ORAL at 08:46

## 2023-11-10 RX ADMIN — SODIUM CHLORIDE: 9 INJECTION, SOLUTION INTRAVENOUS at 09:53

## 2023-11-10 RX ADMIN — BUPROPION HYDROCHLORIDE 300 MG: 150 TABLET, EXTENDED RELEASE ORAL at 08:46

## 2023-11-10 RX ADMIN — TAMSULOSIN HYDROCHLORIDE 0.4 MG: 0.4 CAPSULE ORAL at 08:46

## 2023-11-10 RX ADMIN — ATORVASTATIN CALCIUM 80 MG: 40 TABLET, FILM COATED ORAL at 08:46

## 2023-11-10 RX ADMIN — GABAPENTIN 800 MG: 400 CAPSULE ORAL at 20:07

## 2023-11-10 RX ADMIN — DULOXETINE HYDROCHLORIDE 60 MG: 60 CAPSULE, DELAYED RELEASE ORAL at 08:46

## 2023-11-10 RX ADMIN — HEPARIN SODIUM 5000 UNITS: 5000 INJECTION INTRAVENOUS; SUBCUTANEOUS at 21:06

## 2023-11-10 NOTE — PROGRESS NOTES
Patient received as an admission from the ER to room 317. Patient A&Ox4, orientation to room/unit given. Admission database/assessment completed. Bed in low position, call bell in place, will monitor.

## 2023-11-10 NOTE — PROGRESS NOTES
cm^3 11/02/23 1248   Wound Healing % 100 11/02/23 1248   Post-Procedure Length (cm) 0.2 cm 09/21/23 1349   Post-Procedure Width (cm) 0.2 cm 09/21/23 1349   Post-Procedure Depth (cm) 0.1 cm 09/21/23 1349   Post-Procedure Surface Area (cm^2) 0.04 cm^2 09/21/23 1349   Post-Procedure Volume (cm^3) 0.004 cm^3 09/21/23 1349   Wound Assessment Epithelialization 11/02/23 1248   Drainage Amount None (dry) 11/02/23 1248   Drainage Description Serosanguinous 10/30/23 1130   Odor None 11/02/23 1248   Stella-wound Assessment Hyperkeratosis (callous) 11/02/23 1248   Wound Thickness Description not for Pressure Injury Full thickness 10/30/23 1130   Number of days: 49       Wound 09/21/23 Toe (Comment  which one) Left;Dorsal #2 (Active)   Wound Image    10/25/23 0813   Wound Etiology Diabetic Gavin 2 10/25/23 0813   Dressing Status Old drainage noted 10/25/23 0813   Wound Cleansed Vashe 10/25/23 0813   Dressing/Treatment Silver dressing 10/25/23 0813   Offloading for Diabetic Foot Ulcers Offloading ordered 10/25/23 0813   Wound Length (cm) 1.5 cm 10/25/23 0813   Wound Width (cm) 0.9 cm 10/25/23 0813   Wound Depth (cm) 1.5 cm 10/25/23 0813   Wound Surface Area (cm^2) 1.35 cm^2 10/25/23 0813   Change in Wound Size % (l*w) -87.5 10/25/23 0813   Wound Volume (cm^3) 2.025 cm^3 10/25/23 0813   Wound Healing % -252 10/25/23 0813   Post-Procedure Length (cm) 1.5 cm 10/25/23 0813   Post-Procedure Width (cm) 0.9 cm 10/25/23 0813   Post-Procedure Depth (cm) 1.5 cm 10/25/23 0813   Post-Procedure Surface Area (cm^2) 1.35 cm^2 10/25/23 0813   Post-Procedure Volume (cm^3) 2.025 cm^3 10/25/23 0813   Wound Assessment Pink/red; Exposed structure bone 10/25/23 0813   Drainage Amount Moderate (25-50%) 10/25/23 0813   Drainage Description Serosanguinous 10/25/23 0813   Odor None 10/25/23 0813   Stella-wound Assessment Blanchable erythema 10/25/23 0813   Wound Thickness Description not for Pressure Injury Full thickness 10/25/23 0813   Number of days: 49

## 2023-11-10 NOTE — PROGRESS NOTES
TRANSFER - IN REPORT:    Verbal report received from [facility] on Mian Kaaawa  being received from [facility] ED for routine progression of patient care      Report consisted of patient's Situation, Background, Assessment and   Recommendations(SBAR). Information from the following report(s) Nurse Handoff Report, ED Encounter Summary, ED SBAR, and [facility] was reviewed with the receiving nurse. Opportunity for questions and clarification was provided. Assessment completed upon patient's arrival to unit and care assumed.

## 2023-11-10 NOTE — CARE COORDINATION
In person outreach - hospital visit with patient   - allowed patient to talk about everything that has recently occurred. He admits to being very worried and scared. - Triumeq medication at home, hospital does not have this medication apparently. Patient's partner will not be able to bring in from home until later this evening. Mother unable to assist - she recently had back surgery and cannot manage the steps at patient's home. - per patient BS \"good\" this morning. See labs. Discussed with patient a different discharge plan - to please keep an open mind about this. He does not want to be in hospital and a dc plan to LTAC or similar might work best.     - [patient home alone all day, partner present in the evenings. Partner is PCG for his mother and also has a FT job. Off on Sundays only. Care Coordination with BLAZE Rosales/IP .     - [facility] is agency following patient   - made CM aware re: Triumeq unavailable. I&D may adjust dose r/t renal function   - possibility of transfer to DT   - r/o C-Diff r/t diarrhea    PLAN - patient will keep in touch. Of note - he notified this ACM last night via text of his admission. ACM will follow for discharge.

## 2023-11-10 NOTE — ED NOTES
TRANSFER - OUT REPORT:    Verbal report given to [address] on [facility]  being transferred to [place] [address] for routine progression of patient care       Report consisted of patient's Situation, Background, Assessment and   Recommendations(SBAR). Information from the following report(s) ED SBAR was reviewed with the receiving nurse. Lines:   PICC Single Lumen 20/33/61 Right Basilic (Active)       Peripheral IV 11/09/23 Left Antecubital (Active)        Opportunity for questions and clarification was provided.       Patient transported with:  Sowmya Hughes RN  11/09/23 5974

## 2023-11-10 NOTE — CARE COORDINATION
Patient is a readmission to hospital after leaving [facility] [address] [place] [address] on 11-7-23. Patient was discharged to home with [facility] RN, and multiple disciplines, home IVAB w/ intramed Plus and KCI wound vac. Pt sent back to hospital at recommendations of ID. Pt with hx of HIV and active with [facility]. I met with patient's [address] [place] [address] Jason, Rn who is following at home. She is concerned that pt does not have his Triamec HIV medication. Nursing aware. CM to follow and assist with dc plans and pursue an alternate d/c plans other than home. May need to pursue LTACH but may not have appropriate Dx codes required to meet Medicare criteria. Will follow closely. 11/10/23 3065   Service Assessment   Patient Orientation Alert and Oriented   Cognition Alert   History Provided By Patient   Primary Caregiver Self   Accompanied By/Relationship na   Support Systems Spouse/Significant Other   Patient's Healthcare Decision Maker is: Patient Declined (Legal Next of Kin Remains as Decision Maker)   PCP Verified by CM Yes   Last Visit to PCP Within last 3 months   Prior Functional Level Independent in ADLs/IADLs   Current Functional Level Independent in ADLs/IADLs   Can patient return to prior living arrangement Unknown at present   Ability to make needs known: Good   Family able to assist with home care needs: No   Would you like for me to discuss the discharge plan with any other family members/significant others, and if so, who?  No   Financial Resources [employer] None   Social/Functional History   Lives With Alone   Type of Home Mobile home

## 2023-11-10 NOTE — PROGRESS NOTES
Hospitalist Progress Note   Admit Date:  2023  3:58 PM   Name:  Haider Bustamante   Age:  63 y.o. Sex:  male  :  1965   MRN:  [id number]   Room:  [facility]/    Presenting/Chief Complaint: Abnormal Lab     Reason(s) for Admission: Human immunodeficiency virus (HIV) disease ([address] [facility]) [B20]  FAHEEM (acute kidney injury) ([facility] [address] [facility]) [N17.9]  Osteomyelitis of left foot, unspecified type ([facility]) [M86.9]  Type 2 diabetes mellitus with other specified complication, unspecified whether long term insulin use ([facility]) [E11.69]     Hospital Course:    63 y.o. male with medical history of HIV, GERD, hyperlipidemia, type 2 diabetes who presented with acute renal insufficiency. Patient just recently in the hospital 2 days ago discharged for osteomyelitis of left great toe. Patient was previously on Augmentin and doxycycline however failed outpatient antibiotics. During his hospitalization patient underwent surgery of his left great toe and was sent home on IV vancomycin and Levaquin per infectious disease recommendations. Patient was discharged on  and had a creatinine of 0.7 with GFR over 60. Patient was seen at [facility] for his wound healing center where he had dressing change and wound VAC and creatinine there was 4.57 with a GFR of 14. Patient overall not having any complaints on admission. Patient has been having diarrhea every other day with max of 2 episodes on most days. Patient denied any cardiac chest pain, shortness of breath, abdominal pain, fever/chills. Creatinine on labs Emergency Department was 5.31, WBC 13.0. Subjective & 24hr Events:   Patient was seen and examined at the bedside. No new events. Patient feeling well this morning. Without fever/chills. Denies any cardiac chest pain, shortness of breath, Dillon pain, fever/chills. No new complaints.       Assessment & Plan:   FAHEEM (acute kidney injury) ([address] [facility])  - Presentation most consistent with vancomycin nephrotoxic bicarb-citric acid (EFFER-K) effervescent tablet 40 mEq  40 mEq Oral PRN    Or    potassium chloride 10 mEq/100 mL IVPB (Peripheral Line)  10 mEq IntraVENous PRN    heparin (porcine) injection 5,000 Units  5,000 Units SubCUTAneous 3 times per day    insulin lispro (HUMALOG) injection vial 0-4 Units  0-4 Units SubCUTAneous TID WC    insulin lispro (HUMALOG) injection vial 0-4 Units  0-4 Units SubCUTAneous Nightly    insulin glargine (LANTUS) injection vial 25 Units  25 Units SubCUTAneous Nightly    abacavir-dolutegravir-lamivudine (Triumeq) 600- MG TABS- patient supplied (Patient Supplied)  1 tablet Oral Daily    glucose chewable tablet 16 g  4 tablet Oral PRN    dextrose bolus 10% 125 mL  125 mL IntraVENous PRN    Or    dextrose bolus 10% 250 mL  250 mL IntraVENous PRN    glucagon (rDNA) injection 1 mg  1 mg SubCUTAneous PRN    dextrose 10 % infusion   IntraVENous Continuous PRN       Signed:  Saray Mancilla MD    Part of this note may have been written by using a voice dictation software. The note has been proof read but may still contain some grammatical/other typographical errors.

## 2023-11-10 NOTE — WOUND CARE
Noted consult for wound to left foot, pt seen with Dr. Shila Mosher yesterday at outpatient wound clinic, wound vac dressing changed yesterday. Confirmed from Dr. Shila Mosher okay to change dressing again on Monday. Will place orders to do so. Also spoke with primary RN to make sure patient is put on hospital vac while in hospital and may be switched back to home machine upon discharge, RN verbalized understanding.

## 2023-11-11 LAB
ALBUMIN SERPL-MCNC: 2.2 G/DL (ref 3.5–5)
ALBUMIN/GLOB SERPL: 0.6 (ref 0.4–1.6)
ALP SERPL-CCNC: 113 U/L (ref 50–136)
ALT SERPL-CCNC: 13 U/L (ref 12–65)
ANION GAP SERPL CALC-SCNC: 10 MMOL/L (ref 2–11)
AST SERPL-CCNC: 13 U/L (ref 15–37)
BASOPHILS # BLD: 0.1 K/UL (ref 0–0.2)
BASOPHILS NFR BLD: 1 % (ref 0–2)
BILIRUB SERPL-MCNC: 0.3 MG/DL (ref 0.2–1.1)
BUN SERPL-MCNC: 66 MG/DL (ref 6–23)
CALCIUM SERPL-MCNC: 8.3 MG/DL (ref 8.3–10.4)
CHLORIDE SERPL-SCNC: 113 MMOL/L (ref 101–110)
CO2 SERPL-SCNC: 18 MMOL/L (ref 21–32)
CREAT SERPL-MCNC: 6.41 MG/DL (ref 0.8–1.5)
CREAT UR-MCNC: 40 MG/DL
DIFFERENTIAL METHOD BLD: ABNORMAL
EOSINOPHIL # BLD: 0.3 K/UL (ref 0–0.8)
EOSINOPHIL NFR BLD: 4 % (ref 0.5–7.8)
ERYTHROCYTE [DISTWIDTH] IN BLOOD BY AUTOMATED COUNT: 14.3 % (ref 11.9–14.6)
GLOBULIN SER CALC-MCNC: 3.4 G/DL (ref 2.8–4.5)
GLUCOSE BLD STRIP.AUTO-MCNC: 103 MG/DL (ref 65–100)
GLUCOSE BLD STRIP.AUTO-MCNC: 75 MG/DL (ref 65–100)
GLUCOSE BLD STRIP.AUTO-MCNC: 88 MG/DL (ref 65–100)
GLUCOSE BLD STRIP.AUTO-MCNC: 90 MG/DL (ref 65–100)
GLUCOSE SERPL-MCNC: 89 MG/DL (ref 65–100)
HCT VFR BLD AUTO: 37.4 % (ref 41.1–50.3)
HGB BLD-MCNC: 12.5 G/DL (ref 13.6–17.2)
IMM GRANULOCYTES # BLD AUTO: 0 K/UL (ref 0–0.5)
IMM GRANULOCYTES NFR BLD AUTO: 0 % (ref 0–5)
LYMPHOCYTES # BLD: 1.5 K/UL (ref 0.5–4.6)
LYMPHOCYTES NFR BLD: 17 % (ref 13–44)
MCH RBC QN AUTO: 29.8 PG (ref 26.1–32.9)
MCHC RBC AUTO-ENTMCNC: 33.4 G/DL (ref 31.4–35)
MCV RBC AUTO: 89.3 FL (ref 82–102)
MICROALBUMIN UR-MCNC: 32.1 MG/DL
MICROALBUMIN/CREAT UR-RTO: 803 MG/G (ref 0–30)
MONOCYTES # BLD: 0.9 K/UL (ref 0.1–1.3)
MONOCYTES NFR BLD: 10 % (ref 4–12)
NEUTS SEG # BLD: 5.9 K/UL (ref 1.7–8.2)
NEUTS SEG NFR BLD: 68 % (ref 43–78)
NRBC # BLD: 0 K/UL (ref 0–0.2)
PLATELET # BLD AUTO: 225 K/UL (ref 150–450)
PMV BLD AUTO: 11.1 FL (ref 9.4–12.3)
POTASSIUM SERPL-SCNC: 4.2 MMOL/L (ref 3.5–5.1)
PROT SERPL-MCNC: 5.6 G/DL (ref 6.3–8.2)
RBC # BLD AUTO: 4.19 M/UL (ref 4.23–5.6)
SERVICE CMNT-IMP: ABNORMAL
SERVICE CMNT-IMP: NORMAL
SODIUM SERPL-SCNC: 141 MMOL/L (ref 133–143)
VANCOMYCIN SERPL-MCNC: 43.9 UG/ML
WBC # BLD AUTO: 8.7 K/UL (ref 4.3–11.1)

## 2023-11-11 PROCEDURE — 6360000002 HC RX W HCPCS: Performed by: STUDENT IN AN ORGANIZED HEALTH CARE EDUCATION/TRAINING PROGRAM

## 2023-11-11 PROCEDURE — 80053 COMPREHEN METABOLIC PANEL: CPT

## 2023-11-11 PROCEDURE — 1100000000 HC RM PRIVATE

## 2023-11-11 PROCEDURE — 85025 COMPLETE CBC W/AUTO DIFF WBC: CPT

## 2023-11-11 PROCEDURE — 6370000000 HC RX 637 (ALT 250 FOR IP): Performed by: INTERNAL MEDICINE

## 2023-11-11 PROCEDURE — 82962 GLUCOSE BLOOD TEST: CPT

## 2023-11-11 PROCEDURE — 6370000000 HC RX 637 (ALT 250 FOR IP): Performed by: STUDENT IN AN ORGANIZED HEALTH CARE EDUCATION/TRAINING PROGRAM

## 2023-11-11 PROCEDURE — 2580000003 HC RX 258: Performed by: STUDENT IN AN ORGANIZED HEALTH CARE EDUCATION/TRAINING PROGRAM

## 2023-11-11 PROCEDURE — 80202 ASSAY OF VANCOMYCIN: CPT

## 2023-11-11 RX ORDER — MECLIZINE HYDROCHLORIDE 25 MG/1
12.5 TABLET ORAL 3 TIMES DAILY PRN
Status: DISCONTINUED | OUTPATIENT
Start: 2023-11-11 | End: 2023-11-17 | Stop reason: HOSPADM

## 2023-11-11 RX ADMIN — ATORVASTATIN CALCIUM 80 MG: 40 TABLET, FILM COATED ORAL at 09:05

## 2023-11-11 RX ADMIN — MECLIZINE HYDROCHLORIDE 12.5 MG: 25 TABLET ORAL at 14:13

## 2023-11-11 RX ADMIN — SODIUM CHLORIDE, PRESERVATIVE FREE 10 ML: 5 INJECTION INTRAVENOUS at 09:09

## 2023-11-11 RX ADMIN — SODIUM CHLORIDE: 9 INJECTION, SOLUTION INTRAVENOUS at 05:34

## 2023-11-11 RX ADMIN — OXYBUTYNIN CHLORIDE 10 MG: 10 TABLET, EXTENDED RELEASE ORAL at 09:06

## 2023-11-11 RX ADMIN — BUPROPION HYDROCHLORIDE 300 MG: 150 TABLET, EXTENDED RELEASE ORAL at 09:06

## 2023-11-11 RX ADMIN — DULOXETINE HYDROCHLORIDE 60 MG: 60 CAPSULE, DELAYED RELEASE ORAL at 09:06

## 2023-11-11 RX ADMIN — ONDANSETRON 4 MG: 2 INJECTION INTRAMUSCULAR; INTRAVENOUS at 10:29

## 2023-11-11 RX ADMIN — HEPARIN SODIUM 5000 UNITS: 5000 INJECTION INTRAVENOUS; SUBCUTANEOUS at 05:28

## 2023-11-11 RX ADMIN — CIPROFLOXACIN 500 MG: 500 TABLET, FILM COATED ORAL at 09:06

## 2023-11-11 RX ADMIN — HEPARIN SODIUM 5000 UNITS: 5000 INJECTION INTRAVENOUS; SUBCUTANEOUS at 20:17

## 2023-11-11 RX ADMIN — GABAPENTIN 800 MG: 400 CAPSULE ORAL at 20:17

## 2023-11-11 RX ADMIN — GABAPENTIN 800 MG: 400 CAPSULE ORAL at 14:12

## 2023-11-11 RX ADMIN — TRAZODONE HYDROCHLORIDE 50 MG: 50 TABLET ORAL at 20:17

## 2023-11-11 RX ADMIN — GABAPENTIN 800 MG: 400 CAPSULE ORAL at 09:06

## 2023-11-11 RX ADMIN — ONDANSETRON 4 MG: 2 INJECTION INTRAMUSCULAR; INTRAVENOUS at 20:17

## 2023-11-11 RX ADMIN — FAMOTIDINE 20 MG: 20 TABLET ORAL at 09:06

## 2023-11-11 RX ADMIN — SODIUM CHLORIDE, PRESERVATIVE FREE 10 ML: 5 INJECTION INTRAVENOUS at 20:26

## 2023-11-11 RX ADMIN — ACETAMINOPHEN 650 MG: 325 TABLET, FILM COATED ORAL at 20:17

## 2023-11-11 RX ADMIN — HEPARIN SODIUM 5000 UNITS: 5000 INJECTION INTRAVENOUS; SUBCUTANEOUS at 14:12

## 2023-11-11 RX ADMIN — TAMSULOSIN HYDROCHLORIDE 0.4 MG: 0.4 CAPSULE ORAL at 09:06

## 2023-11-11 RX ADMIN — FAMOTIDINE 20 MG: 20 TABLET ORAL at 20:27

## 2023-11-11 ASSESSMENT — PAIN DESCRIPTION - LOCATION: LOCATION: HEAD

## 2023-11-11 ASSESSMENT — PAIN - FUNCTIONAL ASSESSMENT: PAIN_FUNCTIONAL_ASSESSMENT: ACTIVITIES ARE NOT PREVENTED

## 2023-11-11 NOTE — PROGRESS NOTES
Hospitalist Progress Note   Admit Date:  2023  3:58 PM   Name:  Titus Grullon   Age:  63 y.o. Sex:  male  :  1965   MRN:  [id number]   Room:  [facility]/    Presenting/Chief Complaint: Abnormal Lab     Reason(s) for Admission: Human immunodeficiency virus (HIV) disease ([facility]) [B20]  FAHEEM (acute kidney injury) ([address] [facility]) [N17.9]  Osteomyelitis of left foot, unspecified type ([facility]) [M86.9]  Type 2 diabetes mellitus with other specified complication, unspecified whether long term insulin use ([facility]) [E11.69]     Hospital Course:    63 y.o. male with medical history of HIV, GERD, hyperlipidemia, type 2 diabetes who presented with acute renal insufficiency. Patient just recently in the hospital 2 days ago discharged for osteomyelitis of left great toe. Patient was previously on Augmentin and doxycycline however failed outpatient antibiotics. During his hospitalization patient underwent surgery of his left great toe and was sent home on IV vancomycin and Levaquin per infectious disease recommendations. Patient was discharged on  and had a creatinine of 0.7 with GFR over 60. Patient was seen at [facility] for his wound healing center where he had dressing change and wound VAC and creatinine there was 4.57 with a GFR of 14. Patient overall not having any complaints on admission. Patient has been having diarrhea every other day with max of 2 episodes on most days. Patient denied any cardiac chest pain, shortness of breath, abdominal pain, fever/chills. Creatinine on labs Emergency Department was 5.31, WBC 13.0. Subjective & 24hr Events:   Patient seen and examined at the bedside. No events. Diarrhea has stopped. Patient complaining of constipation. No other further complaints or patient. Assessment & Plan:   FAHEEM (acute kidney injury) ([address] )  - Presentation most consistent with vancomycin nephrotoxic secondary to vancomycin level  - Vancomycin on hold.   Infectious following:    Height as of this encounter: 1.854 m (6' 1\"). Weight as of this encounter: 89.8 kg (198 lb). Intake/Output Summary (Last 24 hours) at 11/11/2023 1334  Last data filed at 11/11/2023 1031  Gross per 24 hour   Intake 2031.19 ml   Output 500 ml   Net 1531.19 ml         Physical Exam:   General:    Well nourished. Head:  Normocephalic, atraumatic  Eyes:  Sclerae appear normal.  Pupils equally round. ENT:  Nares appear normal.  Moist oral mucosa  Neck:  No restricted ROM. Trachea midline   CV:   RRR. No m/r/g. No jugular venous distension. Lungs:   CTAB. No wheezing, rhonchi, or rales. Symmetric expansion. Abdomen:   Soft, nontender, nondistended. Extremities: No cyanosis or clubbing. No edema. Left foot wound with wound VAC  Skin:     No rashes and normal coloration. Warm and dry. Neuro:  CN II-XII grossly intact. Psych:  Normal mood and affect.       I have personally reviewed labs and tests:  Recent Labs:  Recent Results (from the past 48 hour(s))   CBC with Auto Differential    Collection Time: 11/09/23  4:04 PM   Result Value Ref Range    WBC 13.0 (H) 4.3 - 11.1 K/uL    RBC 4.82 4.23 - 5.6 M/uL    Hemoglobin 14.4 13.6 - 17.2 g/dL    Hematocrit 42.5 41.1 - 50.3 %    MCV 88.2 82.0 - 102.0 FL    MCH 29.9 26.1 - 32.9 PG    MCHC 33.9 31.4 - 35.0 g/dL    RDW 13.9 11.9 - 14.6 %    Platelets 890 289 - 755 K/uL    MPV 10.4 9.4 - 12.3 FL    nRBC 0.00 0.0 - 0.2 K/uL    Differential Type AUTOMATED      Neutrophils % 73 43 - 78 %    Lymphocytes % 13 13 - 44 %    Monocytes % 10 4.0 - 12.0 %    Eosinophils % 3 0.5 - 7.8 %    Basophils % 1 0.0 - 2.0 %    Immature Granulocytes 0 0.0 - 5.0 %    Neutrophils Absolute 9.5 (H) 1.7 - 8.2 K/UL    Lymphocytes Absolute 1.7 0.5 - 4.6 K/UL    Monocytes Absolute 1.3 0.1 - 1.3 K/UL    Eosinophils Absolute 0.4 0.0 - 0.8 K/UL    Basophils Absolute 0.1 0.0 - 0.2 K/UL    Absolute Immature Granulocyte 0.1 0.0 - 0.5 K/UL   Basic Metabolic Panel    Collection

## 2023-11-11 NOTE — PROGRESS NOTES
11/09/23  1604 11/10/23  0323 11/11/23  0756    138 141   K 3.8 3.6 4.2    107 113*   CO2 24 19* 18*   BUN 61* 62* 66*   CREATININE 5.31* 5.82* 6.41*   GLUCOSE 96 198* 89   CALCIUM 9.1 8.0* 8.3         Lab Results   Component Value Date    CALCIUM 8.3 11/11/2023     Lab Results   Component Value Date    LABALBU 2.2 (L) 11/11/2023         Assessment and Plan:   1. FAHEEM 2/2 Vanc toxicity - non oliguric  Baseline creatinine on November 7 was 0.7  - UA fairly benign. No RBCs, mild proteinuria - FAHEEM consistent with Vanc toxicity  - renal US unrevealing    On IVF 0.9% NS - will stop as patient should be able to have adequate PO intake. - avoid Vanc. Supportive care. No indication for dialysis at this point. Hoping for renal recovery without need for dialysis. 2.  HIV on Triumeq     3. Hypertension - stable     4. DM - per primary     5. Diarrhea - Need to r/o Cdiff     6. Metabolic acidosis - worsening. Due to FAHEEM and diarrhea. If worsens, can start oral bicarb.     High Medical Decision Making  -Patient with 1 acute illness that poses a threat to bodily function  -Reviewed 3+ labs  -Discussion of management with primary team done today      Cheryl Oneill MD  [facility]

## 2023-11-12 PROBLEM — E87.20 METABOLIC ACIDOSIS: Status: ACTIVE | Noted: 2023-11-12

## 2023-11-12 LAB
ALBUMIN SERPL-MCNC: 2.2 G/DL (ref 3.5–5)
ALBUMIN/GLOB SERPL: 0.6 (ref 0.4–1.6)
ALP SERPL-CCNC: 114 U/L (ref 50–136)
ALT SERPL-CCNC: 12 U/L (ref 12–65)
ANION GAP SERPL CALC-SCNC: 11 MMOL/L (ref 2–11)
AST SERPL-CCNC: 14 U/L (ref 15–37)
BASOPHILS # BLD: 0.1 K/UL (ref 0–0.2)
BASOPHILS NFR BLD: 1 % (ref 0–2)
BILIRUB SERPL-MCNC: 0.4 MG/DL (ref 0.2–1.1)
BUN SERPL-MCNC: 68 MG/DL (ref 6–23)
CALCIUM SERPL-MCNC: 8.3 MG/DL (ref 8.3–10.4)
CHLORIDE SERPL-SCNC: 112 MMOL/L (ref 101–110)
CO2 SERPL-SCNC: 17 MMOL/L (ref 21–32)
CREAT SERPL-MCNC: 6.7 MG/DL (ref 0.8–1.5)
DIFFERENTIAL METHOD BLD: ABNORMAL
EOSINOPHIL # BLD: 0.3 K/UL (ref 0–0.8)
EOSINOPHIL NFR BLD: 4 % (ref 0.5–7.8)
ERYTHROCYTE [DISTWIDTH] IN BLOOD BY AUTOMATED COUNT: 14.1 % (ref 11.9–14.6)
GLOBULIN SER CALC-MCNC: 3.5 G/DL (ref 2.8–4.5)
GLUCOSE BLD STRIP.AUTO-MCNC: 114 MG/DL (ref 65–100)
GLUCOSE BLD STRIP.AUTO-MCNC: 156 MG/DL (ref 65–100)
GLUCOSE BLD STRIP.AUTO-MCNC: 78 MG/DL (ref 65–100)
GLUCOSE BLD STRIP.AUTO-MCNC: 81 MG/DL (ref 65–100)
GLUCOSE BLD STRIP.AUTO-MCNC: 89 MG/DL (ref 65–100)
GLUCOSE BLD STRIP.AUTO-MCNC: 95 MG/DL (ref 65–100)
GLUCOSE SERPL-MCNC: 88 MG/DL (ref 65–100)
HCT VFR BLD AUTO: 37.5 % (ref 41.1–50.3)
HGB BLD-MCNC: 12.8 G/DL (ref 13.6–17.2)
IMM GRANULOCYTES # BLD AUTO: 0 K/UL (ref 0–0.5)
IMM GRANULOCYTES NFR BLD AUTO: 0 % (ref 0–5)
LYMPHOCYTES # BLD: 1.6 K/UL (ref 0.5–4.6)
LYMPHOCYTES NFR BLD: 17 % (ref 13–44)
MCH RBC QN AUTO: 30 PG (ref 26.1–32.9)
MCHC RBC AUTO-ENTMCNC: 34.1 G/DL (ref 31.4–35)
MCV RBC AUTO: 87.8 FL (ref 82–102)
MONOCYTES # BLD: 0.7 K/UL (ref 0.1–1.3)
MONOCYTES NFR BLD: 8 % (ref 4–12)
NEUTS SEG # BLD: 6.4 K/UL (ref 1.7–8.2)
NEUTS SEG NFR BLD: 70 % (ref 43–78)
NRBC # BLD: 0 K/UL (ref 0–0.2)
PLATELET # BLD AUTO: 205 K/UL (ref 150–450)
PMV BLD AUTO: 11 FL (ref 9.4–12.3)
POTASSIUM SERPL-SCNC: 4.2 MMOL/L (ref 3.5–5.1)
PROT SERPL-MCNC: 5.7 G/DL (ref 6.3–8.2)
RBC # BLD AUTO: 4.27 M/UL (ref 4.23–5.6)
SERVICE CMNT-IMP: ABNORMAL
SERVICE CMNT-IMP: ABNORMAL
SERVICE CMNT-IMP: NORMAL
SODIUM SERPL-SCNC: 140 MMOL/L (ref 133–143)
VANCOMYCIN SERPL-MCNC: 38.5 UG/ML
WBC # BLD AUTO: 9.2 K/UL (ref 4.3–11.1)

## 2023-11-12 PROCEDURE — 6370000000 HC RX 637 (ALT 250 FOR IP): Performed by: STUDENT IN AN ORGANIZED HEALTH CARE EDUCATION/TRAINING PROGRAM

## 2023-11-12 PROCEDURE — 1100000000 HC RM PRIVATE

## 2023-11-12 PROCEDURE — 82962 GLUCOSE BLOOD TEST: CPT

## 2023-11-12 PROCEDURE — 6370000000 HC RX 637 (ALT 250 FOR IP): Performed by: INTERNAL MEDICINE

## 2023-11-12 PROCEDURE — 2580000003 HC RX 258: Performed by: STUDENT IN AN ORGANIZED HEALTH CARE EDUCATION/TRAINING PROGRAM

## 2023-11-12 PROCEDURE — 80202 ASSAY OF VANCOMYCIN: CPT

## 2023-11-12 PROCEDURE — 6360000002 HC RX W HCPCS: Performed by: STUDENT IN AN ORGANIZED HEALTH CARE EDUCATION/TRAINING PROGRAM

## 2023-11-12 PROCEDURE — 85025 COMPLETE CBC W/AUTO DIFF WBC: CPT

## 2023-11-12 PROCEDURE — 80053 COMPREHEN METABOLIC PANEL: CPT

## 2023-11-12 RX ORDER — SODIUM BICARBONATE 650 MG/1
650 TABLET ORAL 2 TIMES DAILY
Status: DISCONTINUED | OUTPATIENT
Start: 2023-11-12 | End: 2023-11-14

## 2023-11-12 RX ORDER — PROCHLORPERAZINE MALEATE 5 MG/1
5 TABLET ORAL EVERY 6 HOURS PRN
Status: DISCONTINUED | OUTPATIENT
Start: 2023-11-12 | End: 2023-11-17 | Stop reason: HOSPADM

## 2023-11-12 RX ORDER — SODIUM BICARBONATE 650 MG/1
325 TABLET ORAL 2 TIMES DAILY
Status: DISCONTINUED | OUTPATIENT
Start: 2023-11-12 | End: 2023-11-12

## 2023-11-12 RX ADMIN — OXYBUTYNIN CHLORIDE 10 MG: 10 TABLET, EXTENDED RELEASE ORAL at 08:13

## 2023-11-12 RX ADMIN — DULOXETINE HYDROCHLORIDE 60 MG: 60 CAPSULE, DELAYED RELEASE ORAL at 08:13

## 2023-11-12 RX ADMIN — ATORVASTATIN CALCIUM 80 MG: 40 TABLET, FILM COATED ORAL at 08:12

## 2023-11-12 RX ADMIN — INSULIN GLARGINE 25 UNITS: 100 INJECTION, SOLUTION SUBCUTANEOUS at 21:29

## 2023-11-12 RX ADMIN — HEPARIN SODIUM 5000 UNITS: 5000 INJECTION INTRAVENOUS; SUBCUTANEOUS at 21:23

## 2023-11-12 RX ADMIN — ONDANSETRON 4 MG: 4 TABLET, ORALLY DISINTEGRATING ORAL at 12:09

## 2023-11-12 RX ADMIN — ONDANSETRON 4 MG: 2 INJECTION INTRAMUSCULAR; INTRAVENOUS at 02:12

## 2023-11-12 RX ADMIN — CIPROFLOXACIN 500 MG: 500 TABLET, FILM COATED ORAL at 08:12

## 2023-11-12 RX ADMIN — PROCHLORPERAZINE MALEATE 5 MG: 5 TABLET ORAL at 17:06

## 2023-11-12 RX ADMIN — TAMSULOSIN HYDROCHLORIDE 0.4 MG: 0.4 CAPSULE ORAL at 08:12

## 2023-11-12 RX ADMIN — HEPARIN SODIUM 5000 UNITS: 5000 INJECTION INTRAVENOUS; SUBCUTANEOUS at 14:26

## 2023-11-12 RX ADMIN — SODIUM CHLORIDE, PRESERVATIVE FREE 10 ML: 5 INJECTION INTRAVENOUS at 08:17

## 2023-11-12 RX ADMIN — BUPROPION HYDROCHLORIDE 300 MG: 150 TABLET, EXTENDED RELEASE ORAL at 08:13

## 2023-11-12 RX ADMIN — GABAPENTIN 800 MG: 400 CAPSULE ORAL at 08:12

## 2023-11-12 RX ADMIN — GABAPENTIN 800 MG: 400 CAPSULE ORAL at 14:27

## 2023-11-12 RX ADMIN — FAMOTIDINE 20 MG: 20 TABLET ORAL at 08:13

## 2023-11-12 RX ADMIN — SODIUM BICARBONATE 650 MG TABLET 325 MG: at 08:13

## 2023-11-12 RX ADMIN — HEPARIN SODIUM 5000 UNITS: 5000 INJECTION INTRAVENOUS; SUBCUTANEOUS at 06:01

## 2023-11-12 RX ADMIN — GABAPENTIN 800 MG: 400 CAPSULE ORAL at 21:23

## 2023-11-12 RX ADMIN — FAMOTIDINE 20 MG: 20 TABLET ORAL at 21:22

## 2023-11-12 RX ADMIN — SODIUM BICARBONATE 650 MG TABLET 650 MG: at 21:22

## 2023-11-12 RX ADMIN — TRAZODONE HYDROCHLORIDE 50 MG: 50 TABLET ORAL at 21:23

## 2023-11-13 ENCOUNTER — CARE COORDINATION (OUTPATIENT)
Dept: CARE COORDINATION | Facility: CLINIC | Age: 58
End: 2023-11-13

## 2023-11-13 DIAGNOSIS — N39.41 URGE URINARY INCONTINENCE: ICD-10-CM

## 2023-11-13 LAB
ALBUMIN SERPL-MCNC: 2.3 G/DL (ref 3.5–5)
ALBUMIN/GLOB SERPL: 0.6 (ref 0.4–1.6)
ALP SERPL-CCNC: 123 U/L (ref 50–136)
ALT SERPL-CCNC: 10 U/L (ref 12–65)
ANION GAP SERPL CALC-SCNC: 10 MMOL/L (ref 2–11)
AST SERPL-CCNC: 12 U/L (ref 15–37)
BASOPHILS # BLD: 0.1 K/UL (ref 0–0.2)
BASOPHILS NFR BLD: 1 % (ref 0–2)
BILIRUB SERPL-MCNC: 0.3 MG/DL (ref 0.2–1.1)
BUN SERPL-MCNC: 68 MG/DL (ref 6–23)
CALCIUM SERPL-MCNC: 8.4 MG/DL (ref 8.3–10.4)
CHLORIDE SERPL-SCNC: 112 MMOL/L (ref 101–110)
CO2 SERPL-SCNC: 19 MMOL/L (ref 21–32)
CREAT SERPL-MCNC: 6.83 MG/DL (ref 0.8–1.5)
DIFFERENTIAL METHOD BLD: ABNORMAL
EOSINOPHIL # BLD: 0.4 K/UL (ref 0–0.8)
EOSINOPHIL NFR BLD: 4 % (ref 0.5–7.8)
ERYTHROCYTE [DISTWIDTH] IN BLOOD BY AUTOMATED COUNT: 14 % (ref 11.9–14.6)
GLOBULIN SER CALC-MCNC: 3.7 G/DL (ref 2.8–4.5)
GLUCOSE BLD STRIP.AUTO-MCNC: 71 MG/DL (ref 65–100)
GLUCOSE BLD STRIP.AUTO-MCNC: 75 MG/DL (ref 65–100)
GLUCOSE BLD STRIP.AUTO-MCNC: 85 MG/DL (ref 65–100)
GLUCOSE BLD STRIP.AUTO-MCNC: 85 MG/DL (ref 65–100)
GLUCOSE BLD STRIP.AUTO-MCNC: 95 MG/DL (ref 65–100)
GLUCOSE SERPL-MCNC: 75 MG/DL (ref 65–100)
HCT VFR BLD AUTO: 37.8 % (ref 41.1–50.3)
HGB BLD-MCNC: 12.9 G/DL (ref 13.6–17.2)
IMM GRANULOCYTES # BLD AUTO: 0 K/UL (ref 0–0.5)
IMM GRANULOCYTES NFR BLD AUTO: 0 % (ref 0–5)
LYMPHOCYTES # BLD: 1.4 K/UL (ref 0.5–4.6)
LYMPHOCYTES NFR BLD: 16 % (ref 13–44)
MCH RBC QN AUTO: 29.9 PG (ref 26.1–32.9)
MCHC RBC AUTO-ENTMCNC: 34.1 G/DL (ref 31.4–35)
MCV RBC AUTO: 87.7 FL (ref 82–102)
MONOCYTES # BLD: 0.8 K/UL (ref 0.1–1.3)
MONOCYTES NFR BLD: 10 % (ref 4–12)
NEUTS SEG # BLD: 6.1 K/UL (ref 1.7–8.2)
NEUTS SEG NFR BLD: 69 % (ref 43–78)
NRBC # BLD: 0 K/UL (ref 0–0.2)
PLATELET # BLD AUTO: 201 K/UL (ref 150–450)
PMV BLD AUTO: 11.1 FL (ref 9.4–12.3)
POTASSIUM SERPL-SCNC: 4.3 MMOL/L (ref 3.5–5.1)
PROT SERPL-MCNC: 6 G/DL (ref 6.3–8.2)
RBC # BLD AUTO: 4.31 M/UL (ref 4.23–5.6)
SERVICE CMNT-IMP: NORMAL
SODIUM SERPL-SCNC: 141 MMOL/L (ref 133–143)
VANCOMYCIN SERPL-MCNC: 36.3 UG/ML
WBC # BLD AUTO: 8.8 K/UL (ref 4.3–11.1)

## 2023-11-13 PROCEDURE — 80053 COMPREHEN METABOLIC PANEL: CPT

## 2023-11-13 PROCEDURE — 82962 GLUCOSE BLOOD TEST: CPT

## 2023-11-13 PROCEDURE — 6370000000 HC RX 637 (ALT 250 FOR IP): Performed by: STUDENT IN AN ORGANIZED HEALTH CARE EDUCATION/TRAINING PROGRAM

## 2023-11-13 PROCEDURE — 6370000000 HC RX 637 (ALT 250 FOR IP): Performed by: INTERNAL MEDICINE

## 2023-11-13 PROCEDURE — 1100000000 HC RM PRIVATE

## 2023-11-13 PROCEDURE — 85025 COMPLETE CBC W/AUTO DIFF WBC: CPT

## 2023-11-13 PROCEDURE — 2580000003 HC RX 258: Performed by: STUDENT IN AN ORGANIZED HEALTH CARE EDUCATION/TRAINING PROGRAM

## 2023-11-13 PROCEDURE — 97607 NEG PRS WND THR NDME<=50SQCM: CPT

## 2023-11-13 PROCEDURE — 80202 ASSAY OF VANCOMYCIN: CPT

## 2023-11-13 PROCEDURE — 6360000002 HC RX W HCPCS: Performed by: STUDENT IN AN ORGANIZED HEALTH CARE EDUCATION/TRAINING PROGRAM

## 2023-11-13 RX ADMIN — HEPARIN SODIUM 5000 UNITS: 5000 INJECTION INTRAVENOUS; SUBCUTANEOUS at 05:42

## 2023-11-13 RX ADMIN — SODIUM CHLORIDE, PRESERVATIVE FREE 10 ML: 5 INJECTION INTRAVENOUS at 09:14

## 2023-11-13 RX ADMIN — GABAPENTIN 800 MG: 400 CAPSULE ORAL at 09:12

## 2023-11-13 RX ADMIN — HEPARIN SODIUM 5000 UNITS: 5000 INJECTION INTRAVENOUS; SUBCUTANEOUS at 21:06

## 2023-11-13 RX ADMIN — FAMOTIDINE 20 MG: 20 TABLET ORAL at 09:10

## 2023-11-13 RX ADMIN — CIPROFLOXACIN 500 MG: 500 TABLET, FILM COATED ORAL at 09:13

## 2023-11-13 RX ADMIN — HYDRALAZINE HYDROCHLORIDE 10 MG: 20 INJECTION, SOLUTION INTRAMUSCULAR; INTRAVENOUS at 11:36

## 2023-11-13 RX ADMIN — ATORVASTATIN CALCIUM 80 MG: 40 TABLET, FILM COATED ORAL at 09:11

## 2023-11-13 RX ADMIN — TAMSULOSIN HYDROCHLORIDE 0.4 MG: 0.4 CAPSULE ORAL at 09:10

## 2023-11-13 RX ADMIN — HEPARIN SODIUM 5000 UNITS: 5000 INJECTION INTRAVENOUS; SUBCUTANEOUS at 15:15

## 2023-11-13 RX ADMIN — SODIUM CHLORIDE, PRESERVATIVE FREE 10 ML: 5 INJECTION INTRAVENOUS at 21:06

## 2023-11-13 RX ADMIN — OXYBUTYNIN CHLORIDE 10 MG: 10 TABLET, EXTENDED RELEASE ORAL at 09:09

## 2023-11-13 RX ADMIN — TRAZODONE HYDROCHLORIDE 50 MG: 50 TABLET ORAL at 21:06

## 2023-11-13 RX ADMIN — BUPROPION HYDROCHLORIDE 300 MG: 150 TABLET, EXTENDED RELEASE ORAL at 09:11

## 2023-11-13 RX ADMIN — SODIUM BICARBONATE 650 MG TABLET 650 MG: at 09:10

## 2023-11-13 RX ADMIN — GABAPENTIN 800 MG: 400 CAPSULE ORAL at 21:06

## 2023-11-13 RX ADMIN — DULOXETINE HYDROCHLORIDE 60 MG: 60 CAPSULE, DELAYED RELEASE ORAL at 09:13

## 2023-11-13 RX ADMIN — FAMOTIDINE 20 MG: 20 TABLET ORAL at 21:06

## 2023-11-13 RX ADMIN — GABAPENTIN 800 MG: 400 CAPSULE ORAL at 15:10

## 2023-11-13 RX ADMIN — SODIUM BICARBONATE 650 MG TABLET 650 MG: at 21:06

## 2023-11-13 ASSESSMENT — PAIN SCALES - GENERAL
PAINLEVEL_OUTOF10: 0
PAINLEVEL_OUTOF10: 0

## 2023-11-13 NOTE — WOUND CARE
Right foot wound wound vac dressing changed, duoderm to periwound and to bolster seal around toe, covered toe matrix as well. Toe nail bed is clean and pink, well healing. Antibiotic beads in base, 1.2x1cm unable to probe depth with beads in base, bridged to dorsal foot for trac pad. Xeroform fenestrated with scissors to cover beads, then foam with bridge to dorsal foot.

## 2023-11-13 NOTE — PROGRESS NOTES
Hospitalist Progress Note   Admit Date:  2023  3:58 PM   Name:  Rachel Travis   Age:  63 y.o. Sex:  male  :  1965   MRN:  [id number]   Room:  [facility]/    Presenting/Chief Complaint: Abnormal Lab     Reason(s) for Admission: Human immunodeficiency virus (HIV) disease ([facility]) [B20]  FAHEEM (acute kidney injury) ([facility]) [N17.9]  Osteomyelitis of left foot, unspecified type ([address] [place] [address]) [M86.9]  Type 2 diabetes mellitus with other specified complication, unspecified whether long term insulin use ([address] [place] [address]) [E11.69]     Hospital Course:    63 y.o. male with medical history of HIV, GERD, hyperlipidemia, type 2 diabetes who presented with acute renal insufficiency. Patient just recently in the hospital 2 days ago discharged for osteomyelitis of left great toe. Patient was previously on Augmentin and doxycycline however failed outpatient antibiotics. During his hospitalization patient underwent surgery of his left great toe and was sent home on IV vancomycin and Levaquin per infectious disease recommendations. Patient was discharged on  and had a creatinine of 0.7 with GFR over 60. Patient was seen at [facility] for his wound healing center where he had dressing change and wound VAC and creatinine there was 4.57 with a GFR of 14. Patient overall not having any complaints on admission. Patient has been having diarrhea every other day with max of 2 episodes on most days. Patient denied any cardiac chest pain, shortness of breath, abdominal pain, fever/chills. Creatinine on labs Emergency Department was 5.31, WBC 13.0. Subjective & 24hr Events:   Patient was seen and examined at bedside. No overnight events. Some nausea this morning. Assessment & Plan:   FAHEEM (acute kidney injury) ([address] [facility])  - Presentation most consistent with vancomycin nephrotoxic secondary to vancomycin level  - Vancomycin on hold.   Infectious disease recommendations to resume oral Cipro at 500 mg daily  - Glucose    Collection Time: 11/12/23 11:46 AM   Result Value Ref Range    POC Glucose 156 (H) 65 - 100 mg/dL    Performed by: Anabella    POCT Glucose    Collection Time: 11/12/23  4:23 PM   Result Value Ref Range    POC Glucose 89 65 - 100 mg/dL    Performed by: Lum Ora    POCT Glucose    Collection Time: 11/12/23  8:05 PM   Result Value Ref Range    POC Glucose 95 65 - 100 mg/dL    Performed by: Souleymane    Comprehensive Metabolic Panel w/ Reflex to MG    Collection Time: 11/13/23  5:28 AM   Result Value Ref Range    Sodium 141 133 - 143 mmol/L    Potassium 4.3 3.5 - 5.1 mmol/L    Chloride 112 (H) 101 - 110 mmol/L    CO2 19 (L) 21 - 32 mmol/L    Anion Gap 10 2 - 11 mmol/L    Glucose 75 65 - 100 mg/dL    BUN 68 (H) 6 - 23 MG/DL    Creatinine 6.83 (H) 0.8 - 1.5 MG/DL    Est, Glom Filt Rate 9 (L) >60 ml/min/1.73m2    Calcium 8.4 8.3 - 10.4 MG/DL    Total Bilirubin 0.3 0.2 - 1.1 MG/DL    ALT 10 (L) 12 - 65 U/L    AST 12 (L) 15 - 37 U/L    Alk Phosphatase 123 50 - 136 U/L    Total Protein 6.0 (L) 6.3 - 8.2 g/dL    Albumin 2.3 (L) 3.5 - 5.0 g/dL    Globulin 3.7 2.8 - 4.5 g/dL    Albumin/Globulin Ratio 0.6 0.4 - 1.6     CBC with Auto Differential    Collection Time: 11/13/23  5:28 AM   Result Value Ref Range    WBC 8.8 4.3 - 11.1 K/uL    RBC 4.31 4.23 - 5.6 M/uL    Hemoglobin 12.9 (L) 13.6 - 17.2 g/dL    Hematocrit 37.8 (L) 41.1 - 50.3 %    MCV 87.7 82.0 - 102.0 FL    MCH 29.9 26.1 - 32.9 PG    MCHC 34.1 31.4 - 35.0 g/dL    RDW 14.0 11.9 - 14.6 %    Platelets 699 666 - 174 K/uL    MPV 11.1 9.4 - 12.3 FL    nRBC 0.00 0.0 - 0.2 K/uL    Differential Type AUTOMATED      Neutrophils % 69 43 - 78 %    Lymphocytes % 16 13 - 44 %    Monocytes % 10 4.0 - 12.0 %    Eosinophils % 4 0.5 - 7.8 %    Basophils % 1 0.0 - 2.0 %    Immature Granulocytes 0 0.0 - 5.0 %    Neutrophils Absolute 6.1 1.7 - 8.2 K/UL    Lymphocytes Absolute 1.4 0.5 - 4.6 K/UL    Monocytes Absolute 0.8 0.1 - 1.3 K/UL    Eosinophils

## 2023-11-13 NOTE — CARE COORDINATION
Outreach in ongoing follow up for [facility]. Patient currently inpatient  Nauseous nearly all the time  States that his partner brought in his HIV meds  Sleepy this morning - conversation brief  He does not have a dc date as yet.     PLAN:  follow for discharge, care coordination with IP CM as needed

## 2023-11-13 NOTE — PROGRESS NOTES
Infectious Disease Progress Note    Today's Date: 11/13/2023   Admit Date: 11/9/2023 1965    Chart reviewed. Patient not seen   Impression:   #Chronic foot ulcer with OM   > S/p foot debridement on 11/03/23 - cx positive for MRSA, GBS, PsA & kleb oxytoca  > Discharged on Vancomycin and levofloxacin   > Vancomycin level up to 43.9 on 11/11/23 with creatinine bump     Recommendations  > Continue with ciprofloxacin 500mg daily   > Vancomycin random level still high - 36.3 so can hold of on GPC coverage for now  > When vanc level is below therapeutic, will start linezolid    #FAHEEM - ? Vancomycin toxicity   > Creatinine now up to 6.8   > Appreciate nephrology input     # HIV - CD4 - 571 and HIV VL undetectable - 08/2023  > On fixed dose combination ABC/DTG/3TC  > Hopefully, his renal function improves. No plan for dose adjustment for DTG at this point    #Diarrhea  > Appears intermittent   > If still ongoing diarrhea, recommend C.  Diff testing           Microbiology:         Imaging:       Signed By: Natalia Antoine MD     November 13, 2023

## 2023-11-13 NOTE — CARE COORDINATION
Interdisciplinary team meeting with attending, CM, nursing, PT and nutritional services for plan of care Patient not medically stable for d/c. Monitoring renal function. D/C plan pending clinical progress. Prior to admission had [address], was on IV antibiotics with Intramed Plus and KCI wound vac. CM following.

## 2023-11-14 LAB
ALBUMIN SERPL-MCNC: 2.5 G/DL (ref 3.5–5)
ALBUMIN/GLOB SERPL: 0.7 (ref 0.4–1.6)
ALP SERPL-CCNC: 130 U/L (ref 50–136)
ALT SERPL-CCNC: 12 U/L (ref 12–65)
ANION GAP SERPL CALC-SCNC: 12 MMOL/L (ref 2–11)
AST SERPL-CCNC: 14 U/L (ref 15–37)
BACTERIA SPEC CULT: NORMAL
BACTERIA SPEC CULT: NORMAL
BASOPHILS # BLD: 0.1 K/UL (ref 0–0.2)
BASOPHILS NFR BLD: 1 % (ref 0–2)
BILIRUB SERPL-MCNC: 0.4 MG/DL (ref 0.2–1.1)
BUN SERPL-MCNC: 70 MG/DL (ref 6–23)
CALCIUM SERPL-MCNC: 8.8 MG/DL (ref 8.3–10.4)
CHLORIDE SERPL-SCNC: 109 MMOL/L (ref 101–110)
CO2 SERPL-SCNC: 18 MMOL/L (ref 21–32)
CREAT SERPL-MCNC: 6.67 MG/DL (ref 0.8–1.5)
DIFFERENTIAL METHOD BLD: ABNORMAL
EOSINOPHIL # BLD: 0.3 K/UL (ref 0–0.8)
EOSINOPHIL NFR BLD: 4 % (ref 0.5–7.8)
ERYTHROCYTE [DISTWIDTH] IN BLOOD BY AUTOMATED COUNT: 14 % (ref 11.9–14.6)
GLOBULIN SER CALC-MCNC: 3.7 G/DL (ref 2.8–4.5)
GLUCOSE BLD STRIP.AUTO-MCNC: 152 MG/DL (ref 65–100)
GLUCOSE BLD STRIP.AUTO-MCNC: 87 MG/DL (ref 65–100)
GLUCOSE BLD STRIP.AUTO-MCNC: 91 MG/DL (ref 65–100)
GLUCOSE BLD STRIP.AUTO-MCNC: 95 MG/DL (ref 65–100)
GLUCOSE SERPL-MCNC: 66 MG/DL (ref 65–100)
HCT VFR BLD AUTO: 38.2 % (ref 41.1–50.3)
HGB BLD-MCNC: 13 G/DL (ref 13.6–17.2)
IMM GRANULOCYTES # BLD AUTO: 0 K/UL (ref 0–0.5)
IMM GRANULOCYTES NFR BLD AUTO: 0 % (ref 0–5)
LYMPHOCYTES # BLD: 1.2 K/UL (ref 0.5–4.6)
LYMPHOCYTES NFR BLD: 16 % (ref 13–44)
MCH RBC QN AUTO: 29.9 PG (ref 26.1–32.9)
MCHC RBC AUTO-ENTMCNC: 34 G/DL (ref 31.4–35)
MCV RBC AUTO: 87.8 FL (ref 82–102)
MONOCYTES # BLD: 0.7 K/UL (ref 0.1–1.3)
MONOCYTES NFR BLD: 9 % (ref 4–12)
NEUTS SEG # BLD: 5.5 K/UL (ref 1.7–8.2)
NEUTS SEG NFR BLD: 70 % (ref 43–78)
NRBC # BLD: 0 K/UL (ref 0–0.2)
PLATELET # BLD AUTO: 192 K/UL (ref 150–450)
PMV BLD AUTO: 11 FL (ref 9.4–12.3)
POTASSIUM SERPL-SCNC: 4 MMOL/L (ref 3.5–5.1)
PROT SERPL-MCNC: 6.2 G/DL (ref 6.3–8.2)
RBC # BLD AUTO: 4.35 M/UL (ref 4.23–5.6)
SERVICE CMNT-IMP: ABNORMAL
SERVICE CMNT-IMP: NORMAL
SODIUM SERPL-SCNC: 139 MMOL/L (ref 133–143)
VANCOMYCIN SERPL-MCNC: 32 UG/ML
WBC # BLD AUTO: 7.9 K/UL (ref 4.3–11.1)

## 2023-11-14 PROCEDURE — 6370000000 HC RX 637 (ALT 250 FOR IP): Performed by: INTERNAL MEDICINE

## 2023-11-14 PROCEDURE — 85025 COMPLETE CBC W/AUTO DIFF WBC: CPT

## 2023-11-14 PROCEDURE — 80053 COMPREHEN METABOLIC PANEL: CPT

## 2023-11-14 PROCEDURE — 1100000000 HC RM PRIVATE

## 2023-11-14 PROCEDURE — 80202 ASSAY OF VANCOMYCIN: CPT

## 2023-11-14 PROCEDURE — 82962 GLUCOSE BLOOD TEST: CPT

## 2023-11-14 PROCEDURE — 6370000000 HC RX 637 (ALT 250 FOR IP): Performed by: STUDENT IN AN ORGANIZED HEALTH CARE EDUCATION/TRAINING PROGRAM

## 2023-11-14 PROCEDURE — 6360000002 HC RX W HCPCS: Performed by: STUDENT IN AN ORGANIZED HEALTH CARE EDUCATION/TRAINING PROGRAM

## 2023-11-14 RX ORDER — OXYBUTYNIN CHLORIDE 10 MG/1
TABLET, EXTENDED RELEASE ORAL
Qty: 30 TABLET | Refills: 3 | Status: SHIPPED | OUTPATIENT
Start: 2023-11-14

## 2023-11-14 RX ORDER — SODIUM BICARBONATE 650 MG/1
1300 TABLET ORAL 2 TIMES DAILY
Status: DISCONTINUED | OUTPATIENT
Start: 2023-11-14 | End: 2023-11-17 | Stop reason: HOSPADM

## 2023-11-14 RX ADMIN — CIPROFLOXACIN 500 MG: 500 TABLET, FILM COATED ORAL at 08:52

## 2023-11-14 RX ADMIN — TAMSULOSIN HYDROCHLORIDE 0.4 MG: 0.4 CAPSULE ORAL at 08:52

## 2023-11-14 RX ADMIN — HEPARIN SODIUM 5000 UNITS: 5000 INJECTION INTRAVENOUS; SUBCUTANEOUS at 05:23

## 2023-11-14 RX ADMIN — SODIUM BICARBONATE 650 MG TABLET 650 MG: at 08:53

## 2023-11-14 RX ADMIN — DULOXETINE HYDROCHLORIDE 60 MG: 60 CAPSULE, DELAYED RELEASE ORAL at 08:52

## 2023-11-14 RX ADMIN — ATORVASTATIN CALCIUM 80 MG: 40 TABLET, FILM COATED ORAL at 08:53

## 2023-11-14 RX ADMIN — FAMOTIDINE 20 MG: 20 TABLET ORAL at 08:53

## 2023-11-14 RX ADMIN — GABAPENTIN 800 MG: 400 CAPSULE ORAL at 08:53

## 2023-11-14 RX ADMIN — FAMOTIDINE 20 MG: 20 TABLET ORAL at 20:54

## 2023-11-14 RX ADMIN — OXYBUTYNIN CHLORIDE 10 MG: 10 TABLET, EXTENDED RELEASE ORAL at 08:52

## 2023-11-14 RX ADMIN — HEPARIN SODIUM 5000 UNITS: 5000 INJECTION INTRAVENOUS; SUBCUTANEOUS at 20:54

## 2023-11-14 RX ADMIN — HEPARIN SODIUM 5000 UNITS: 5000 INJECTION INTRAVENOUS; SUBCUTANEOUS at 14:00

## 2023-11-14 RX ADMIN — TRAZODONE HYDROCHLORIDE 50 MG: 50 TABLET ORAL at 20:54

## 2023-11-14 RX ADMIN — SODIUM BICARBONATE 650 MG TABLET 1300 MG: at 20:54

## 2023-11-14 RX ADMIN — GABAPENTIN 800 MG: 400 CAPSULE ORAL at 14:00

## 2023-11-14 RX ADMIN — GABAPENTIN 800 MG: 400 CAPSULE ORAL at 20:54

## 2023-11-14 RX ADMIN — BUPROPION HYDROCHLORIDE 300 MG: 150 TABLET, EXTENDED RELEASE ORAL at 08:53

## 2023-11-14 ASSESSMENT — PAIN SCALES - GENERAL: PAINLEVEL_OUTOF10: 0

## 2023-11-14 NOTE — PLAN OF CARE
Problem: Safety - Adult  Goal: Free from fall injury  Outcome: Progressing     Problem: Chronic Conditions and Co-morbidities  Goal: Patient's chronic conditions and co-morbidity symptoms are monitored and maintained or improved  Outcome: Progressing     Problem: Neurosensory - Adult  Goal: Achieves stable or improved neurological status  Outcome: Progressing     Problem: Respiratory - Adult  Goal: Achieves optimal ventilation and oxygenation  Outcome: Progressing     Problem: Cardiovascular - Adult  Goal: Maintains optimal cardiac output and hemodynamic stability  Outcome: Progressing

## 2023-11-14 NOTE — PROGRESS NOTES
Hospitalist Progress Note   Admit Date:  2023  3:58 PM   Name:  Alvin Solomon   Age:  63 y.o. Sex:  male  :  1965   MRN:  [id number]   Room:  [facility]/    Presenting/Chief Complaint: Abnormal Lab     Reason(s) for Admission: Human immunodeficiency virus (HIV) disease ([address]) [B20]  FAHEEM (acute kidney injury) ([address]) [N17.9]  Osteomyelitis of left foot, unspecified type ([facility] [address] [facility]) [M86.9]  Type 2 diabetes mellitus with other specified complication, unspecified whether long term insulin use ([address] [facility]) [E11.69]     Hospital Course:    63 y.o. male with medical history of HIV, GERD, hyperlipidemia, type 2 diabetes who presented with acute renal insufficiency. Patient just recently in the hospital 2 days ago discharged for osteomyelitis of left great toe. Patient was previously on Augmentin and doxycycline however failed outpatient antibiotics. During his hospitalization patient underwent surgery of his left great toe and was sent home on IV vancomycin and Levaquin per infectious disease recommendations. Patient was discharged on  and had a creatinine of 0.7 with GFR over 60. Patient was seen at [facility] for his wound healing center where he had dressing change and wound VAC and creatinine there was 4.57 with a GFR of 14. Patient overall not having any complaints on admission. Patient has been having diarrhea every other day with max of 2 episodes on most days. Patient denied any cardiac chest pain, shortness of breath, abdominal pain, fever/chills. Creatinine on labs Emergency Department was 5.31, WBC 13.0. Subjective & 24hr Events:   Patient was seen and examined at the bedside. No overnight events. Patient overall feeling much better this morning. Not feeling nauseous. Optimistic that kidney function is improving.       Assessment & Plan:   FAHEEM (acute kidney injury) ([address])  - Presentation most consistent with vancomycin nephrotoxic secondary to vancomycin level  -

## 2023-11-15 LAB
ALBUMIN SERPL-MCNC: 2.6 G/DL (ref 3.5–5)
ALBUMIN/GLOB SERPL: 0.7 (ref 0.4–1.6)
ALP SERPL-CCNC: 132 U/L (ref 50–136)
ALT SERPL-CCNC: 13 U/L (ref 12–65)
ANION GAP SERPL CALC-SCNC: 9 MMOL/L (ref 2–11)
AST SERPL-CCNC: 15 U/L (ref 15–37)
BASOPHILS # BLD: 0.1 K/UL (ref 0–0.2)
BASOPHILS NFR BLD: 1 % (ref 0–2)
BILIRUB SERPL-MCNC: 0.4 MG/DL (ref 0.2–1.1)
BUN SERPL-MCNC: 67 MG/DL (ref 6–23)
CALCIUM SERPL-MCNC: 8.8 MG/DL (ref 8.3–10.4)
CHLORIDE SERPL-SCNC: 110 MMOL/L (ref 101–110)
CO2 SERPL-SCNC: 22 MMOL/L (ref 21–32)
CREAT SERPL-MCNC: 6.48 MG/DL (ref 0.8–1.5)
DIFFERENTIAL METHOD BLD: ABNORMAL
EOSINOPHIL # BLD: 0.4 K/UL (ref 0–0.8)
EOSINOPHIL NFR BLD: 5 % (ref 0.5–7.8)
ERYTHROCYTE [DISTWIDTH] IN BLOOD BY AUTOMATED COUNT: 14.3 % (ref 11.9–14.6)
GLOBULIN SER CALC-MCNC: 3.7 G/DL (ref 2.8–4.5)
GLUCOSE BLD STRIP.AUTO-MCNC: 120 MG/DL (ref 65–100)
GLUCOSE BLD STRIP.AUTO-MCNC: 137 MG/DL (ref 65–100)
GLUCOSE BLD STRIP.AUTO-MCNC: 181 MG/DL (ref 65–100)
GLUCOSE BLD STRIP.AUTO-MCNC: 212 MG/DL (ref 65–100)
GLUCOSE SERPL-MCNC: 133 MG/DL (ref 65–100)
HCT VFR BLD AUTO: 38.2 % (ref 41.1–50.3)
HGB BLD-MCNC: 13.2 G/DL (ref 13.6–17.2)
IMM GRANULOCYTES # BLD AUTO: 0 K/UL (ref 0–0.5)
IMM GRANULOCYTES NFR BLD AUTO: 0 % (ref 0–5)
LYMPHOCYTES # BLD: 1.7 K/UL (ref 0.5–4.6)
LYMPHOCYTES NFR BLD: 22 % (ref 13–44)
MCH RBC QN AUTO: 29.9 PG (ref 26.1–32.9)
MCHC RBC AUTO-ENTMCNC: 34.6 G/DL (ref 31.4–35)
MCV RBC AUTO: 86.4 FL (ref 82–102)
MONOCYTES # BLD: 0.8 K/UL (ref 0.1–1.3)
MONOCYTES NFR BLD: 11 % (ref 4–12)
NEUTS SEG # BLD: 4.6 K/UL (ref 1.7–8.2)
NEUTS SEG NFR BLD: 60 % (ref 43–78)
NRBC # BLD: 0 K/UL (ref 0–0.2)
PLATELET # BLD AUTO: 187 K/UL (ref 150–450)
PMV BLD AUTO: 11.5 FL (ref 9.4–12.3)
POTASSIUM SERPL-SCNC: 4.3 MMOL/L (ref 3.5–5.1)
PROT SERPL-MCNC: 6.3 G/DL (ref 6.3–8.2)
RBC # BLD AUTO: 4.42 M/UL (ref 4.23–5.6)
SERVICE CMNT-IMP: ABNORMAL
SODIUM SERPL-SCNC: 141 MMOL/L (ref 133–143)
WBC # BLD AUTO: 7.6 K/UL (ref 4.3–11.1)

## 2023-11-15 PROCEDURE — 6360000002 HC RX W HCPCS: Performed by: STUDENT IN AN ORGANIZED HEALTH CARE EDUCATION/TRAINING PROGRAM

## 2023-11-15 PROCEDURE — 82962 GLUCOSE BLOOD TEST: CPT

## 2023-11-15 PROCEDURE — 97605 NEG PRS WND THER DME<=50SQCM: CPT

## 2023-11-15 PROCEDURE — 1100000000 HC RM PRIVATE

## 2023-11-15 PROCEDURE — 6370000000 HC RX 637 (ALT 250 FOR IP): Performed by: STUDENT IN AN ORGANIZED HEALTH CARE EDUCATION/TRAINING PROGRAM

## 2023-11-15 PROCEDURE — 85025 COMPLETE CBC W/AUTO DIFF WBC: CPT

## 2023-11-15 PROCEDURE — 6370000000 HC RX 637 (ALT 250 FOR IP): Performed by: INTERNAL MEDICINE

## 2023-11-15 PROCEDURE — 80053 COMPREHEN METABOLIC PANEL: CPT

## 2023-11-15 PROCEDURE — 2580000003 HC RX 258: Performed by: STUDENT IN AN ORGANIZED HEALTH CARE EDUCATION/TRAINING PROGRAM

## 2023-11-15 RX ORDER — INSULIN GLARGINE 100 [IU]/ML
15 INJECTION, SOLUTION SUBCUTANEOUS NIGHTLY
Status: DISCONTINUED | OUTPATIENT
Start: 2023-11-15 | End: 2023-11-17 | Stop reason: HOSPADM

## 2023-11-15 RX ADMIN — OXYBUTYNIN CHLORIDE 10 MG: 10 TABLET, EXTENDED RELEASE ORAL at 10:37

## 2023-11-15 RX ADMIN — INSULIN LISPRO 1 UNITS: 100 INJECTION, SOLUTION INTRAVENOUS; SUBCUTANEOUS at 12:17

## 2023-11-15 RX ADMIN — FAMOTIDINE 20 MG: 20 TABLET ORAL at 10:38

## 2023-11-15 RX ADMIN — DULOXETINE HYDROCHLORIDE 60 MG: 60 CAPSULE, DELAYED RELEASE ORAL at 10:38

## 2023-11-15 RX ADMIN — SODIUM BICARBONATE 650 MG TABLET 1300 MG: at 20:59

## 2023-11-15 RX ADMIN — TRAZODONE HYDROCHLORIDE 50 MG: 50 TABLET ORAL at 20:59

## 2023-11-15 RX ADMIN — HEPARIN SODIUM 5000 UNITS: 5000 INJECTION INTRAVENOUS; SUBCUTANEOUS at 05:24

## 2023-11-15 RX ADMIN — SODIUM CHLORIDE, PRESERVATIVE FREE 10 ML: 5 INJECTION INTRAVENOUS at 21:09

## 2023-11-15 RX ADMIN — GABAPENTIN 800 MG: 400 CAPSULE ORAL at 14:00

## 2023-11-15 RX ADMIN — HEPARIN SODIUM 5000 UNITS: 5000 INJECTION INTRAVENOUS; SUBCUTANEOUS at 20:59

## 2023-11-15 RX ADMIN — TAMSULOSIN HYDROCHLORIDE 0.4 MG: 0.4 CAPSULE ORAL at 10:37

## 2023-11-15 RX ADMIN — HEPARIN SODIUM 5000 UNITS: 5000 INJECTION INTRAVENOUS; SUBCUTANEOUS at 14:00

## 2023-11-15 RX ADMIN — CIPROFLOXACIN 500 MG: 500 TABLET, FILM COATED ORAL at 09:00

## 2023-11-15 RX ADMIN — BUPROPION HYDROCHLORIDE 300 MG: 150 TABLET, EXTENDED RELEASE ORAL at 10:38

## 2023-11-15 RX ADMIN — GABAPENTIN 800 MG: 400 CAPSULE ORAL at 20:59

## 2023-11-15 RX ADMIN — GABAPENTIN 800 MG: 400 CAPSULE ORAL at 09:00

## 2023-11-15 RX ADMIN — SODIUM BICARBONATE 650 MG TABLET 1300 MG: at 10:37

## 2023-11-15 RX ADMIN — OXYCODONE HYDROCHLORIDE 5 MG: 5 TABLET ORAL at 20:59

## 2023-11-15 RX ADMIN — FAMOTIDINE 20 MG: 20 TABLET ORAL at 20:59

## 2023-11-15 RX ADMIN — ATORVASTATIN CALCIUM 80 MG: 40 TABLET, FILM COATED ORAL at 10:37

## 2023-11-15 ASSESSMENT — PAIN - FUNCTIONAL ASSESSMENT: PAIN_FUNCTIONAL_ASSESSMENT: ACTIVITIES ARE NOT PREVENTED

## 2023-11-15 ASSESSMENT — PAIN DESCRIPTION - PAIN TYPE: TYPE: ACUTE PAIN

## 2023-11-15 ASSESSMENT — PAIN DESCRIPTION - FREQUENCY: FREQUENCY: INTERMITTENT

## 2023-11-15 ASSESSMENT — PAIN SCALES - GENERAL
PAINLEVEL_OUTOF10: 5
PAINLEVEL_OUTOF10: 0

## 2023-11-15 ASSESSMENT — PAIN DESCRIPTION - ORIENTATION: ORIENTATION: LEFT

## 2023-11-15 ASSESSMENT — PAIN DESCRIPTION - ONSET: ONSET: PROGRESSIVE

## 2023-11-15 ASSESSMENT — PAIN DESCRIPTION - DESCRIPTORS: DESCRIPTORS: ACHING

## 2023-11-15 ASSESSMENT — PAIN DESCRIPTION - LOCATION: LOCATION: LEG

## 2023-11-15 NOTE — WOUND CARE
Patient seen for routine VAC dressing change to great toe wound. Antibiotic beads remain in base. Protective layer placed and new black foam dressing placed and track pad to mid foot. Answered all patient questions. Primary nurse in room and updated. Shown how to switch to home machine should patient get discharged soon.

## 2023-11-15 NOTE — CARE COORDINATION
CM chart review; discussed in IDT rounds. Discharge plan is pending clinical course. Patient was current with [facility] and [facility] for IV antibiotics and wound vac care prior to admission. IV antibiotics are finished and patient will be discharged on oral antibiotics, per attending. Wound vac remains in place. CM will follow.

## 2023-11-15 NOTE — PROGRESS NOTES
Chart reviewed  Renal function appears to be improving   Vanc level- 32, still supra-therapeutic     Will continue to monitor  Oral options to step down to include linezolid, minocycline or clindamycin (history of diarrhea on admission appears to have improved)  > Given that he has 4 weeks to complete therapy, I think minocycline is probably the safer option   > Will start minocycline once vancomycin level less than 20 and complete 4 weeks  > Since renal function is improving will keep current fixed dose combination of ABC/3TC/DTG

## 2023-11-15 NOTE — PLAN OF CARE
Problem: Discharge Planning  Goal: Discharge to home or other facility with appropriate resources  Outcome: Progressing     Problem: Safety - Adult  Goal: Free from fall injury  Outcome: Progressing  Flowsheets (Taken 11/14/2023 2000)  Free From Fall Injury: Based on caregiver fall risk screen, instruct family/caregiver to ask for assistance with transferring infant if caregiver noted to have fall risk factors     Problem: Chronic Conditions and Co-morbidities  Goal: Patient's chronic conditions and co-morbidity symptoms are monitored and maintained or improved  Outcome: Progressing     Problem: Neurosensory - Adult  Goal: Achieves stable or improved neurological status  Outcome: Progressing     Problem: Respiratory - Adult  Goal: Achieves optimal ventilation and oxygenation  Outcome: Progressing     Problem: Cardiovascular - Adult  Goal: Maintains optimal cardiac output and hemodynamic stability  Outcome: Progressing

## 2023-11-15 NOTE — PROGRESS NOTES
(APRESOLINE) injection 10 mg  10 mg IntraVENous Q6H PRN    ciprofloxacin (CIPRO) tablet 500 mg  500 mg Oral Daily    atorvastatin (LIPITOR) tablet 80 mg  80 mg Oral Daily    buPROPion (WELLBUTRIN XL) extended release tablet 300 mg  300 mg Oral Daily    DULoxetine (CYMBALTA) extended release capsule 60 mg  60 mg Oral QAM    famotidine (PEPCID) tablet 20 mg  20 mg Oral BID    gabapentin (NEURONTIN) capsule 800 mg  800 mg Oral TID    [Held by provider] losartan (COZAAR) tablet 25 mg  25 mg Oral Daily    Omega-3 CAPS 1,000 mg (Patient Supplied)  1 capsule Oral Daily    oxybutynin (DITROPAN-XL) extended release tablet 10 mg  1 tablet Oral Daily    tamsulosin (FLOMAX) capsule 0.4 mg  0.4 mg Oral Daily    traZODone (DESYREL) tablet 50 mg  50 mg Oral Nightly    oxyCODONE (ROXICODONE) immediate release tablet 5 mg  5 mg Oral Q6H PRN    sodium chloride flush 0.9 % injection 5-40 mL  5-40 mL IntraVENous 2 times per day    sodium chloride flush 0.9 % injection 5-40 mL  5-40 mL IntraVENous PRN    0.9 % sodium chloride infusion   IntraVENous PRN    ondansetron (ZOFRAN-ODT) disintegrating tablet 4 mg  4 mg Oral Q8H PRN    Or    ondansetron (ZOFRAN) injection 4 mg  4 mg IntraVENous Q6H PRN    polyethylene glycol (GLYCOLAX) packet 17 g  17 g Oral Daily PRN    acetaminophen (TYLENOL) tablet 650 mg  650 mg Oral Q6H PRN    Or    acetaminophen (TYLENOL) suppository 650 mg  650 mg Rectal Q6H PRN    magnesium sulfate 2000 mg in 50 mL IVPB premix  2,000 mg IntraVENous PRN    potassium chloride (KLOR-CON M) extended release tablet 40 mEq  40 mEq Oral PRN    Or    potassium bicarb-citric acid (EFFER-K) effervescent tablet 40 mEq  40 mEq Oral PRN    Or    potassium chloride 10 mEq/100 mL IVPB (Peripheral Line)  10 mEq IntraVENous PRN    heparin (porcine) injection 5,000 Units  5,000 Units SubCUTAneous 3 times per day    insulin lispro (HUMALOG) injection vial 0-4 Units  0-4 Units SubCUTAneous TID WC    insulin lispro (HUMALOG) injection vial

## 2023-11-16 ENCOUNTER — CARE COORDINATION (OUTPATIENT)
Dept: CARE COORDINATION | Facility: CLINIC | Age: 58
End: 2023-11-16

## 2023-11-16 LAB
ALBUMIN SERPL-MCNC: 2.7 G/DL (ref 3.5–5)
ALBUMIN/GLOB SERPL: 0.7 (ref 0.4–1.6)
ALP SERPL-CCNC: 128 U/L (ref 50–136)
ALT SERPL-CCNC: 16 U/L (ref 12–65)
ANION GAP SERPL CALC-SCNC: 8 MMOL/L (ref 2–11)
AST SERPL-CCNC: 16 U/L (ref 15–37)
BASOPHILS # BLD: 0.1 K/UL (ref 0–0.2)
BASOPHILS NFR BLD: 1 % (ref 0–2)
BILIRUB SERPL-MCNC: 0.4 MG/DL (ref 0.2–1.1)
BUN SERPL-MCNC: 55 MG/DL (ref 6–23)
CALCIUM SERPL-MCNC: 8.5 MG/DL (ref 8.3–10.4)
CHLORIDE SERPL-SCNC: 106 MMOL/L (ref 101–110)
CO2 SERPL-SCNC: 24 MMOL/L (ref 21–32)
CREAT SERPL-MCNC: 5.99 MG/DL (ref 0.8–1.5)
DIFFERENTIAL METHOD BLD: ABNORMAL
EOSINOPHIL # BLD: 0.5 K/UL (ref 0–0.8)
EOSINOPHIL NFR BLD: 5 % (ref 0.5–7.8)
ERYTHROCYTE [DISTWIDTH] IN BLOOD BY AUTOMATED COUNT: 14.1 % (ref 11.9–14.6)
GLOBULIN SER CALC-MCNC: 3.7 G/DL (ref 2.8–4.5)
GLUCOSE BLD STRIP.AUTO-MCNC: 125 MG/DL (ref 65–100)
GLUCOSE BLD STRIP.AUTO-MCNC: 132 MG/DL (ref 65–100)
GLUCOSE BLD STRIP.AUTO-MCNC: 172 MG/DL (ref 65–100)
GLUCOSE BLD STRIP.AUTO-MCNC: 99 MG/DL (ref 65–100)
GLUCOSE SERPL-MCNC: 131 MG/DL (ref 65–100)
HCT VFR BLD AUTO: 38.3 % (ref 41.1–50.3)
HGB BLD-MCNC: 12.9 G/DL (ref 13.6–17.2)
IMM GRANULOCYTES # BLD AUTO: 0 K/UL (ref 0–0.5)
IMM GRANULOCYTES NFR BLD AUTO: 0 % (ref 0–5)
LYMPHOCYTES # BLD: 1.1 K/UL (ref 0.5–4.6)
LYMPHOCYTES NFR BLD: 11 % (ref 13–44)
MAGNESIUM SERPL-MCNC: 1.9 MG/DL (ref 1.8–2.4)
MCH RBC QN AUTO: 29.4 PG (ref 26.1–32.9)
MCHC RBC AUTO-ENTMCNC: 33.7 G/DL (ref 31.4–35)
MCV RBC AUTO: 87.2 FL (ref 82–102)
MONOCYTES # BLD: 1 K/UL (ref 0.1–1.3)
MONOCYTES NFR BLD: 10 % (ref 4–12)
NEUTS SEG # BLD: 7.4 K/UL (ref 1.7–8.2)
NEUTS SEG NFR BLD: 73 % (ref 43–78)
NRBC # BLD: 0 K/UL (ref 0–0.2)
PLATELET # BLD AUTO: 163 K/UL (ref 150–450)
PMV BLD AUTO: 11.8 FL (ref 9.4–12.3)
POTASSIUM SERPL-SCNC: 4 MMOL/L (ref 3.5–5.1)
PROT SERPL-MCNC: 6.4 G/DL (ref 6.3–8.2)
RBC # BLD AUTO: 4.39 M/UL (ref 4.23–5.6)
SERVICE CMNT-IMP: ABNORMAL
SERVICE CMNT-IMP: NORMAL
SODIUM SERPL-SCNC: 138 MMOL/L (ref 133–143)
WBC # BLD AUTO: 10 K/UL (ref 4.3–11.1)

## 2023-11-16 PROCEDURE — 6370000000 HC RX 637 (ALT 250 FOR IP): Performed by: INTERNAL MEDICINE

## 2023-11-16 PROCEDURE — 83735 ASSAY OF MAGNESIUM: CPT

## 2023-11-16 PROCEDURE — 6370000000 HC RX 637 (ALT 250 FOR IP): Performed by: HOSPITALIST

## 2023-11-16 PROCEDURE — 80053 COMPREHEN METABOLIC PANEL: CPT

## 2023-11-16 PROCEDURE — 6370000000 HC RX 637 (ALT 250 FOR IP): Performed by: STUDENT IN AN ORGANIZED HEALTH CARE EDUCATION/TRAINING PROGRAM

## 2023-11-16 PROCEDURE — 82962 GLUCOSE BLOOD TEST: CPT

## 2023-11-16 PROCEDURE — 6360000002 HC RX W HCPCS: Performed by: STUDENT IN AN ORGANIZED HEALTH CARE EDUCATION/TRAINING PROGRAM

## 2023-11-16 PROCEDURE — 2580000003 HC RX 258: Performed by: STUDENT IN AN ORGANIZED HEALTH CARE EDUCATION/TRAINING PROGRAM

## 2023-11-16 PROCEDURE — 1100000000 HC RM PRIVATE

## 2023-11-16 PROCEDURE — 85025 COMPLETE CBC W/AUTO DIFF WBC: CPT

## 2023-11-16 RX ORDER — AMLODIPINE BESYLATE 10 MG/1
10 TABLET ORAL DAILY
Status: DISCONTINUED | OUTPATIENT
Start: 2023-11-17 | End: 2023-11-17 | Stop reason: HOSPADM

## 2023-11-16 RX ORDER — AMLODIPINE BESYLATE 5 MG/1
5 TABLET ORAL DAILY
Status: DISCONTINUED | OUTPATIENT
Start: 2023-11-16 | End: 2023-11-16

## 2023-11-16 RX ADMIN — PROCHLORPERAZINE MALEATE 5 MG: 5 TABLET ORAL at 06:20

## 2023-11-16 RX ADMIN — FAMOTIDINE 20 MG: 20 TABLET ORAL at 20:21

## 2023-11-16 RX ADMIN — HEPARIN SODIUM 5000 UNITS: 5000 INJECTION INTRAVENOUS; SUBCUTANEOUS at 20:21

## 2023-11-16 RX ADMIN — TRAZODONE HYDROCHLORIDE 50 MG: 50 TABLET ORAL at 20:21

## 2023-11-16 RX ADMIN — GABAPENTIN 800 MG: 400 CAPSULE ORAL at 14:14

## 2023-11-16 RX ADMIN — SODIUM CHLORIDE, PRESERVATIVE FREE 10 ML: 5 INJECTION INTRAVENOUS at 09:29

## 2023-11-16 RX ADMIN — GABAPENTIN 800 MG: 400 CAPSULE ORAL at 20:21

## 2023-11-16 RX ADMIN — BUPROPION HYDROCHLORIDE 300 MG: 150 TABLET, EXTENDED RELEASE ORAL at 09:29

## 2023-11-16 RX ADMIN — SODIUM CHLORIDE, PRESERVATIVE FREE 10 ML: 5 INJECTION INTRAVENOUS at 20:22

## 2023-11-16 RX ADMIN — AMLODIPINE BESYLATE 5 MG: 5 TABLET ORAL at 10:34

## 2023-11-16 RX ADMIN — FAMOTIDINE 20 MG: 20 TABLET ORAL at 09:29

## 2023-11-16 RX ADMIN — SODIUM BICARBONATE 650 MG TABLET 1300 MG: at 20:21

## 2023-11-16 RX ADMIN — HEPARIN SODIUM 5000 UNITS: 5000 INJECTION INTRAVENOUS; SUBCUTANEOUS at 09:29

## 2023-11-16 RX ADMIN — HEPARIN SODIUM 5000 UNITS: 5000 INJECTION INTRAVENOUS; SUBCUTANEOUS at 14:14

## 2023-11-16 RX ADMIN — TAMSULOSIN HYDROCHLORIDE 0.4 MG: 0.4 CAPSULE ORAL at 09:29

## 2023-11-16 RX ADMIN — OXYBUTYNIN CHLORIDE 10 MG: 10 TABLET, EXTENDED RELEASE ORAL at 09:29

## 2023-11-16 RX ADMIN — DULOXETINE HYDROCHLORIDE 60 MG: 60 CAPSULE, DELAYED RELEASE ORAL at 09:29

## 2023-11-16 RX ADMIN — INSULIN GLARGINE 15 UNITS: 100 INJECTION, SOLUTION SUBCUTANEOUS at 22:13

## 2023-11-16 RX ADMIN — CIPROFLOXACIN 500 MG: 500 TABLET, FILM COATED ORAL at 09:29

## 2023-11-16 RX ADMIN — ATORVASTATIN CALCIUM 80 MG: 40 TABLET, FILM COATED ORAL at 09:29

## 2023-11-16 RX ADMIN — GABAPENTIN 800 MG: 400 CAPSULE ORAL at 09:29

## 2023-11-16 RX ADMIN — SODIUM BICARBONATE 650 MG TABLET 1300 MG: at 09:29

## 2023-11-16 ASSESSMENT — PAIN SCALES - GENERAL: PAINLEVEL_OUTOF10: 0

## 2023-11-16 NOTE — CARE COORDINATION
D/c date still unknown but pt slowly improving per MD in rounds. I went ahead and updated [facility] and sent them an order to resume care on d/c and sent chart info w/ order. CM to follow and assist with dc. Intramed updated as pt will go home on PO. Staff report pt is independent with ambulation. May not need HHPT on d/c -review closer to d/c.

## 2023-11-16 NOTE — PROGRESS NOTES
Chart reviewed  Renal function appears to be improving   Vanc level- 32, still supra-therapeutic     Will continue to monitor  > Given that he has 4 weeks to complete therapy, I think minocycline is probably the safer option   > Start minocycline 100mg BID once vancomycin level less than 20 and complete 4 weeks  > Continue ciprofloxacin and adjust dose to patient's renal function  > EOT - 12/15/23. Keep ID appointment with Dr Ezequiel Nicholson  > Since renal function is improving will keep current fixed dose combination of ABC/3TC/DTG  > Keep scheduled ID appointment.  ID will sign off

## 2023-11-16 NOTE — PROGRESS NOTES
Hospitalist Progress Note   Admit Date:  2023  3:58 PM   Name:  Keturah Corea   Age:  63 y.o. Sex:  male  :  1965   MRN:  [id number]   Room:  [facility]/    Presenting/Chief Complaint: Abnormal Lab     Reason(s) for Admission: Human immunodeficiency virus (HIV) disease ([address] [facility]) [B20]  FAHEEM (acute kidney injury) ([address] [facility]) [N17.9]  Osteomyelitis of left foot, unspecified type ([address] [place] [address]) [M86.9]  Type 2 diabetes mellitus with other specified complication, unspecified whether long term insulin use ([facility]) [E11.69]     Hospital Course: This is a 63 y.o. male with medical history of HIV, GERD, hyperlipidemia, type 2 diabetes who presented with acute renal insufficiency. Patient just recently in the hospital 2 days ago discharged for osteomyelitis of left great toe. Patient was previously on Augmentin and doxycycline however failed outpatient antibiotics. During his hospitalization patient underwent surgery of his left great toe and was sent home on IV vancomycin and Levaquin per infectious disease recommendations. Patient was discharged on  and had a creatinine of 0.7 with GFR over 60. Patient was seen at [facility] for his wound healing center where he had dressing change and wound VAC and creatinine there was 4.57 with a GFR of 14. Patient overall not having any complaints on admission. Patient has been having diarrhea every other day with max of 2 episodes on most days. Patient denied any cardiac chest pain, shortness of breath, abdominal pain, fever/chills. Creatinine on labs Emergency Department was 5.31, WBC 13.0. Subjective & 24hr Events:    Patient is resting in bed. Denies any chest pain or shortness of breath. No fever no chills. No nausea no vomiting. Good urine output. Assessment & Plan: This is  a 61y Male with:    FAHEEM (acute kidney injury) ([address] ) secondary to Vancomycin toxicity:  -Vancomycin level still 32. Nephrology on board.  Appreciate tamsulosin (FLOMAX) capsule 0.4 mg  0.4 mg Oral Daily    traZODone (DESYREL) tablet 50 mg  50 mg Oral Nightly    oxyCODONE (ROXICODONE) immediate release tablet 5 mg  5 mg Oral Q6H PRN    sodium chloride flush 0.9 % injection 5-40 mL  5-40 mL IntraVENous 2 times per day    sodium chloride flush 0.9 % injection 5-40 mL  5-40 mL IntraVENous PRN    0.9 % sodium chloride infusion   IntraVENous PRN    ondansetron (ZOFRAN-ODT) disintegrating tablet 4 mg  4 mg Oral Q8H PRN    Or    ondansetron (ZOFRAN) injection 4 mg  4 mg IntraVENous Q6H PRN    polyethylene glycol (GLYCOLAX) packet 17 g  17 g Oral Daily PRN    acetaminophen (TYLENOL) tablet 650 mg  650 mg Oral Q6H PRN    Or    acetaminophen (TYLENOL) suppository 650 mg  650 mg Rectal Q6H PRN    magnesium sulfate 2000 mg in 50 mL IVPB premix  2,000 mg IntraVENous PRN    potassium chloride (KLOR-CON M) extended release tablet 40 mEq  40 mEq Oral PRN    Or    potassium bicarb-citric acid (EFFER-K) effervescent tablet 40 mEq  40 mEq Oral PRN    Or    potassium chloride 10 mEq/100 mL IVPB (Peripheral Line)  10 mEq IntraVENous PRN    heparin (porcine) injection 5,000 Units  5,000 Units SubCUTAneous 3 times per day    insulin lispro (HUMALOG) injection vial 0-4 Units  0-4 Units SubCUTAneous TID WC    insulin lispro (HUMALOG) injection vial 0-4 Units  0-4 Units SubCUTAneous Nightly    abacavir-dolutegravir-lamivudine (Triumeq) 600- MG TABS- patient supplied (Patient Supplied)  1 tablet Oral Daily    glucose chewable tablet 16 g  4 tablet Oral PRN    dextrose bolus 10% 125 mL  125 mL IntraVENous PRN    Or    dextrose bolus 10% 250 mL  250 mL IntraVENous PRN    glucagon (rDNA) injection 1 mg  1 mg SubCUTAneous PRN    dextrose 10 % infusion   IntraVENous Continuous PRN       Signed:  Karla Connor MD    Part of this note may have been written by using a voice dictation software. The note has been proof read but may still contain some grammatical/other typographical errors.

## 2023-11-16 NOTE — PROGRESS NOTES
Comprehensive Nutrition Assessment    Type and Reason for Visit: Initial, RD Nutrition Re-Screen/LOS  Length of Stay    Nutrition Recommendations/Plan:   Meals and Snacks:  Diet: Continue current order  Nutrition Supplement Therapy:  Medical food supplement therapy:  Initiate Arya twice per day (this provides 90 kcal and 2.5 grams protein per packet) and Ensure Max twice per day (this provides 150 kcal and 30 grams protein per bottle)       Malnutrition Assessment:  Malnutrition Status: At risk for malnutrition (Comment) (wound, intermittent decreased po intake)  NFPE: No visible fat or muscle loss     Nutrition Assessment:  Nutrition History: He has been working with someone to help with his diabetes for the past ~ 1 year and she got him on the following diet. B-yogurt and Premier protein shake, D-Meat and vegetables, sometimes the meat on a sandwich , and Premier shake. He has someone that cooks his dinner meal for him. He reports intermittent periods of pain where he has associated nausea and decreased appetite and intake. He reports his partner was bringing him food during his last admission because he did not like the hospital food. His appetite and eating were close to normal for the days he was home. Weight History:   11/2/2023 186 lbs  186 lbs  186 lbs Stated   10/30/2023 187 lbs Standing scale   10/25/2023 182 lbs Standing scale   10/24/2023 180# IM OV   10/16/2023 184 lbs Standing scale   10/11/2023 181 lbs 10 oz Endo OV   10/9/2023 178 lbs Standing scale   10/2/2023 179 lbs Standing scale   9/21/2023 180 lbs Standing scale   8/29/2023 186# IM OV   6/27/2023 180# IM OV   5/31/2023 175 lbs -   5/11/2023 176 lbs Endo OV   4/18/2023 182# IM OV   1/10/2023 180 lbs Endo OV   No noted weight loss  Nutrition Background:   Wound Type: Diabetic Ulcer   PMH/PSH: HIV, GERD, HLD, DM. Recent osteomyelitis of left great toe-using wound vac.   Presented with altered renal function labs and intermittent

## 2023-11-16 NOTE — CARE COORDINATION
Ambulatory Care Coordination Note  11/16/2023    Patient Current Location:   [facility]      Incoming text from patient. Requests this ACM to intervene so that he can discharge home. Care Coordination call with IP CM Supervisor. Multiple calls with patient. Explained cannot advocate for a DC to home at present:  poor kidney function, diarrhea with r/o for C Diff, Infectious Disease not ready to transition patient to oral antibiotics as yet. Note - Vancomycin levels still high. Patient discouraged but states understanding. Encouraged him to ask questions when physicians come by to see him. Creatinine - 5.99; down from 6.34  GFR 10    PLAN - continue to follow for discharge      Method of communication with provider: chart routing. ACM: Vivek Qureshi, RN      Lab Results       None            Care Coordination Interventions    Referral from Primary Care Provider: Yes  Suggested Interventions and [address] [place]: In Process  Diabetes Education:  In Process  Fall Risk Prevention: Completed  Disease Specific Clinic: In Process          Goals Addressed    None         Future Appointments   Date Time Provider [facility] [address]   2/12/2024  2:30 PM Scott Perez PA-C END [facility]

## 2023-11-16 NOTE — PLAN OF CARE
Problem: Discharge Planning  Goal: Discharge to home or other facility with appropriate resources  Outcome: Progressing     Problem: Safety - Adult  Goal: Free from fall injury  Outcome: Progressing  Flowsheets (Taken 11/15/2023 2000)  Free From Fall Injury: Instruct family/caregiver on patient safety     Problem: Chronic Conditions and Co-morbidities  Goal: Patient's chronic conditions and co-morbidity symptoms are monitored and maintained or improved  Outcome: Progressing     Problem: Neurosensory - Adult  Goal: Achieves stable or improved neurological status  Outcome: Progressing  Flowsheets (Taken 11/15/2023 2000)  Achieves stable or improved neurological status: Assess for and report changes in neurological status

## 2023-11-17 VITALS
OXYGEN SATURATION: 93 % | SYSTOLIC BLOOD PRESSURE: 152 MMHG | WEIGHT: 198 LBS | HEIGHT: 73 IN | DIASTOLIC BLOOD PRESSURE: 82 MMHG | BODY MASS INDEX: 26.24 KG/M2 | RESPIRATION RATE: 20 BRPM | HEART RATE: 54 BPM | TEMPERATURE: 97.7 F

## 2023-11-17 LAB
ALBUMIN SERPL-MCNC: 2.8 G/DL (ref 3.5–5)
ALBUMIN/GLOB SERPL: 0.8 (ref 0.4–1.6)
ALP SERPL-CCNC: 133 U/L (ref 50–136)
ALT SERPL-CCNC: 15 U/L (ref 12–65)
ANION GAP SERPL CALC-SCNC: 6 MMOL/L (ref 2–11)
AST SERPL-CCNC: 16 U/L (ref 15–37)
BASOPHILS # BLD: 0.1 K/UL (ref 0–0.2)
BASOPHILS NFR BLD: 1 % (ref 0–2)
BILIRUB SERPL-MCNC: 0.4 MG/DL (ref 0.2–1.1)
BUN SERPL-MCNC: 54 MG/DL (ref 6–23)
CALCIUM SERPL-MCNC: 8.9 MG/DL (ref 8.3–10.4)
CHLORIDE SERPL-SCNC: 108 MMOL/L (ref 101–110)
CO2 SERPL-SCNC: 26 MMOL/L (ref 21–32)
CREAT SERPL-MCNC: 5.8 MG/DL (ref 0.8–1.5)
DIFFERENTIAL METHOD BLD: ABNORMAL
EOSINOPHIL # BLD: 0.6 K/UL (ref 0–0.8)
EOSINOPHIL NFR BLD: 8 % (ref 0.5–7.8)
ERYTHROCYTE [DISTWIDTH] IN BLOOD BY AUTOMATED COUNT: 14.2 % (ref 11.9–14.6)
GLOBULIN SER CALC-MCNC: 3.5 G/DL (ref 2.8–4.5)
GLUCOSE BLD STRIP.AUTO-MCNC: 102 MG/DL (ref 65–100)
GLUCOSE BLD STRIP.AUTO-MCNC: 166 MG/DL (ref 65–100)
GLUCOSE SERPL-MCNC: 106 MG/DL (ref 65–100)
HCT VFR BLD AUTO: 38.5 % (ref 41.1–50.3)
HGB BLD-MCNC: 13.1 G/DL (ref 13.6–17.2)
IMM GRANULOCYTES # BLD AUTO: 0 K/UL (ref 0–0.5)
IMM GRANULOCYTES NFR BLD AUTO: 0 % (ref 0–5)
LYMPHOCYTES # BLD: 1.7 K/UL (ref 0.5–4.6)
LYMPHOCYTES NFR BLD: 22 % (ref 13–44)
MCH RBC QN AUTO: 29.8 PG (ref 26.1–32.9)
MCHC RBC AUTO-ENTMCNC: 34 G/DL (ref 31.4–35)
MCV RBC AUTO: 87.5 FL (ref 82–102)
MONOCYTES # BLD: 0.8 K/UL (ref 0.1–1.3)
MONOCYTES NFR BLD: 10 % (ref 4–12)
NEUTS SEG # BLD: 4.7 K/UL (ref 1.7–8.2)
NEUTS SEG NFR BLD: 59 % (ref 43–78)
NRBC # BLD: 0 K/UL (ref 0–0.2)
PLATELET # BLD AUTO: 179 K/UL (ref 150–450)
PMV BLD AUTO: 11.1 FL (ref 9.4–12.3)
POTASSIUM SERPL-SCNC: 3.9 MMOL/L (ref 3.5–5.1)
PROT SERPL-MCNC: 6.3 G/DL (ref 6.3–8.2)
RBC # BLD AUTO: 4.4 M/UL (ref 4.23–5.6)
SERVICE CMNT-IMP: ABNORMAL
SERVICE CMNT-IMP: ABNORMAL
SODIUM SERPL-SCNC: 140 MMOL/L (ref 133–143)
VANCOMYCIN SERPL-MCNC: 18.1 UG/ML
WBC # BLD AUTO: 7.9 K/UL (ref 4.3–11.1)

## 2023-11-17 PROCEDURE — 85025 COMPLETE CBC W/AUTO DIFF WBC: CPT

## 2023-11-17 PROCEDURE — 80053 COMPREHEN METABOLIC PANEL: CPT

## 2023-11-17 PROCEDURE — 97605 NEG PRS WND THER DME<=50SQCM: CPT

## 2023-11-17 PROCEDURE — 82962 GLUCOSE BLOOD TEST: CPT

## 2023-11-17 PROCEDURE — 6370000000 HC RX 637 (ALT 250 FOR IP): Performed by: INTERNAL MEDICINE

## 2023-11-17 PROCEDURE — 6370000000 HC RX 637 (ALT 250 FOR IP): Performed by: STUDENT IN AN ORGANIZED HEALTH CARE EDUCATION/TRAINING PROGRAM

## 2023-11-17 PROCEDURE — 6370000000 HC RX 637 (ALT 250 FOR IP): Performed by: NURSE PRACTITIONER

## 2023-11-17 PROCEDURE — 6370000000 HC RX 637 (ALT 250 FOR IP): Performed by: HOSPITALIST

## 2023-11-17 PROCEDURE — 80202 ASSAY OF VANCOMYCIN: CPT

## 2023-11-17 PROCEDURE — 6360000002 HC RX W HCPCS: Performed by: STUDENT IN AN ORGANIZED HEALTH CARE EDUCATION/TRAINING PROGRAM

## 2023-11-17 RX ORDER — MECLIZINE HCL 12.5 MG/1
12.5 TABLET ORAL 3 TIMES DAILY PRN
Qty: 15 TABLET | Refills: 0 | Status: SHIPPED | OUTPATIENT
Start: 2023-11-17 | End: 2023-11-27

## 2023-11-17 RX ORDER — FAMOTIDINE 20 MG/1
20 TABLET, FILM COATED ORAL DAILY
Qty: 30 TABLET | Refills: 0 | Status: SHIPPED | OUTPATIENT
Start: 2023-11-17 | End: 2023-12-17

## 2023-11-17 RX ORDER — MINOCYCLINE HYDROCHLORIDE 100 MG/1
100 CAPSULE ORAL 2 TIMES DAILY
Qty: 55 CAPSULE | Refills: 0 | Status: SHIPPED | OUTPATIENT
Start: 2023-11-17 | End: 2023-12-15

## 2023-11-17 RX ORDER — GABAPENTIN 300 MG/1
300 CAPSULE ORAL 2 TIMES DAILY
Qty: 60 CAPSULE | Refills: 0 | Status: SHIPPED | OUTPATIENT
Start: 2023-11-17 | End: 2023-12-17

## 2023-11-17 RX ORDER — SODIUM BICARBONATE 650 MG/1
1300 TABLET ORAL 2 TIMES DAILY
Qty: 120 TABLET | Refills: 0 | Status: SHIPPED | OUTPATIENT
Start: 2023-11-17 | End: 2023-12-17

## 2023-11-17 RX ORDER — MINOCYCLINE HYDROCHLORIDE 50 MG/1
100 CAPSULE ORAL 2 TIMES DAILY
Status: DISCONTINUED | OUTPATIENT
Start: 2023-11-17 | End: 2023-11-17 | Stop reason: HOSPADM

## 2023-11-17 RX ORDER — CIPROFLOXACIN 750 MG/1
750 TABLET, FILM COATED ORAL
Qty: 27 TABLET | Refills: 0 | Status: SHIPPED | OUTPATIENT
Start: 2023-11-18 | End: 2023-12-15

## 2023-11-17 RX ORDER — AMLODIPINE BESYLATE 10 MG/1
10 TABLET ORAL DAILY
Qty: 30 TABLET | Refills: 0 | Status: SHIPPED | OUTPATIENT
Start: 2023-11-18 | End: 2023-12-18

## 2023-11-17 RX ORDER — INSULIN DEGLUDEC 200 U/ML
INJECTION, SOLUTION SUBCUTANEOUS
Qty: 9 ML | Refills: 11 | Status: SHIPPED | OUTPATIENT
Start: 2023-11-17

## 2023-11-17 RX ORDER — CIPROFLOXACIN 500 MG/1
750 TABLET, FILM COATED ORAL
Status: DISCONTINUED | OUTPATIENT
Start: 2023-11-18 | End: 2023-11-17 | Stop reason: HOSPADM

## 2023-11-17 RX ORDER — FAMOTIDINE 20 MG/1
20 TABLET, FILM COATED ORAL DAILY
Status: DISCONTINUED | OUTPATIENT
Start: 2023-11-18 | End: 2023-11-17 | Stop reason: HOSPADM

## 2023-11-17 RX ORDER — SACCHAROMYCES BOULARDII 250 MG
500 CAPSULE ORAL 2 TIMES DAILY
Qty: 112 CAPSULE | Refills: 0 | Status: SHIPPED | OUTPATIENT
Start: 2023-11-17 | End: 2023-12-15

## 2023-11-17 RX ORDER — GABAPENTIN 300 MG/1
300 CAPSULE ORAL 2 TIMES DAILY
Status: DISCONTINUED | OUTPATIENT
Start: 2023-11-17 | End: 2023-11-17 | Stop reason: HOSPADM

## 2023-11-17 RX ADMIN — HEPARIN SODIUM 5000 UNITS: 5000 INJECTION INTRAVENOUS; SUBCUTANEOUS at 15:06

## 2023-11-17 RX ADMIN — TAMSULOSIN HYDROCHLORIDE 0.4 MG: 0.4 CAPSULE ORAL at 09:48

## 2023-11-17 RX ADMIN — ATORVASTATIN CALCIUM 80 MG: 40 TABLET, FILM COATED ORAL at 09:47

## 2023-11-17 RX ADMIN — DULOXETINE HYDROCHLORIDE 60 MG: 60 CAPSULE, DELAYED RELEASE ORAL at 09:47

## 2023-11-17 RX ADMIN — FAMOTIDINE 20 MG: 20 TABLET ORAL at 09:46

## 2023-11-17 RX ADMIN — HEPARIN SODIUM 5000 UNITS: 5000 INJECTION INTRAVENOUS; SUBCUTANEOUS at 05:41

## 2023-11-17 RX ADMIN — GABAPENTIN 800 MG: 400 CAPSULE ORAL at 09:46

## 2023-11-17 RX ADMIN — MINOCYCLINE HYDROCHLORIDE 100 MG: 50 CAPSULE ORAL at 09:46

## 2023-11-17 RX ADMIN — CIPROFLOXACIN 500 MG: 500 TABLET, FILM COATED ORAL at 09:46

## 2023-11-17 RX ADMIN — AMLODIPINE BESYLATE 10 MG: 10 TABLET ORAL at 09:46

## 2023-11-17 RX ADMIN — SODIUM BICARBONATE 650 MG TABLET 1300 MG: at 09:47

## 2023-11-17 RX ADMIN — OXYBUTYNIN CHLORIDE 10 MG: 10 TABLET, EXTENDED RELEASE ORAL at 09:47

## 2023-11-17 RX ADMIN — BUPROPION HYDROCHLORIDE 300 MG: 150 TABLET, EXTENDED RELEASE ORAL at 09:46

## 2023-11-17 ASSESSMENT — PAIN SCALES - GENERAL: PAINLEVEL_OUTOF10: 0

## 2023-11-17 NOTE — PROGRESS NOTES
Patient received resting in bed. Shift assessment completed. Isolation precautions maintained. Will monitor.

## 2023-11-17 NOTE — DISCHARGE INSTRUCTIONS
Acute Kidney Injury: Care Instructions  Overview     Acute kidney injury (FAHEEM) is a sudden decrease in kidney function. This can happen over a period of hours, days or, in some cases, weeks. FAHEEM used to be called acute renal failure, but kidney failure doesn't always happen with FAHEEM. Common causes of FAHEEM are serious infection, blood loss, and some medicines. When FAHEEM happens, the kidneys have trouble removing waste and excess fluids from the body. The waste and fluids build up and become harmful. Kidney function may return to normal if the cause of FAHEEM is treated quickly. Your chance of a full recovery depends on what caused the problem, how quickly the cause was treated, and what other medical problems you have. You may have a treatment called dialysis. It does the work of healthy kidneys to remove waste and fluids for a short time. Follow-up care is a key part of your treatment and safety. Be sure to make and go to all appointments, and call your doctor if you are having problems. It's also a good idea to know your test results and keep a list of the medicines you take. How can you care for yourself at home? Talk to your doctor about how much fluid you should drink. Eat a balanced diet. Talk to your doctor or a dietitian about what type of diet may be best for you. You may need to limit sodium, potassium, and phosphorus. If you need dialysis, follow the instructions and schedule for dialysis that your doctor gives you. Do not smoke. Smoking can make your condition worse. If you need help quitting, talk to your doctor about stop-smoking programs and medicines. These can increase your chances of quitting for good. Limit alcohol. Review all of your medicines with your doctor. Do not take any medicines, including nonsteroidal anti-inflammatory drugs (NSAIDs), such as ibuprofen (Advil, Motrin) or naproxen (Aleve), unless your doctor says it is safe for you to do so.   Make sure that anyone treating you

## 2023-11-17 NOTE — WOUND CARE
Received call from [facility] they have put in a ticket and aware this is what the patient is waiting on for discharge to deliver the charging cord for home machine, pt is aware he cannot leave until home machine cord is delivered and charged enough to make it home, pt's primary RN also aware. Pt states he is able to switch to home machine when ready.

## 2023-11-17 NOTE — DISCHARGE SUMMARY
Hospitalist Discharge Summary   Admit Date:  2023  3:58 PM   DC Note date: 2023  Name:  Basil Hurt   Age:  63 y.o. Sex:  male  :  1965   MRN:  [id number]   Room:  [facility]  PCP:  Sofi Celis MD    Presenting Complaint: Abnormal Lab     Initial Admission Diagnosis: Human immunodeficiency virus (HIV) disease ([address] [facility]) [B20]  FAHEEM (acute kidney injury) ([address] [facility]) [N17.9]  Osteomyelitis of left foot, unspecified type ([address] [facility]) [M86.9]  Type 2 diabetes mellitus with other specified complication, unspecified whether long term insulin use ([facility]) [E11.69]     Problem List for this Hospitalization (present on admission):    Principal Problem:    FAHEEM (acute kidney injury) ([address] [facility])  Active Problems:    Metabolic acidosis  Resolved Problems:    * No resolved hospital problems. [facility] Course: This is a 63 y.o. male with medical history of HIV, GERD, hyperlipidemia, type 2 diabetes who presented with acute kidney injury. Patient just recently in the hospital 2 days ago discharged for osteomyelitis of left great toe. Patient was previously on Augmentin and doxycycline however failed outpatient antibiotics. During his hospitalization patient underwent surgery of his left great toe and was sent home on IV vancomycin and Levaquin per infectious disease recommendations. Patient was discharged on  and had a creatinine of 0.7 with GFR over 60. Patient was seen at [facility] for his wound healing center where he had dressing change and wound VAC and creatinine there was 4.57 with a GFR of 14. Patient overall not having any complaints on admission. Patient has been having diarrhea every other day with max of 2 episodes on most days. Patient denied any cardiac chest pain, shortness of breath, abdominal pain, fever/chills. Creatinine on labs in the Emergency Department was 5.31, WBC 13.0. Patient has acute kidney injury likely secondary to vancomycin toxicity.   Nephrology was E11.65  Qty: 600 each, Refills: 3      buPROPion (WELLBUTRIN XL) 300 MG extended release tablet Take 1 tablet by mouth daily      DULoxetine (CYMBALTA) 60 MG extended release capsule Take 1 capsule by mouth every morning      GVOKE HYPOPEN 2-PACK 1 MG/0.2ML SOAJ Inject 1 mg into the skin as needed (severe hypoglycemia)  Qty: 2 each, Refills: 1      HUMALOG KWIKPEN 100 UNIT/ML SOPN 5 units AC snacks,10 units AC meals plus 1 units per 25 >150 AC/HS (up to 50 units per day),  Qty: 45 mL, Refills: 3    Associated Diagnoses: Type 2 diabetes mellitus with hyperglycemia, with long-term current use of insulin (HCC)      Insulin Pen Needle (BD PEN NEEDLE LUCILLE 2ND GEN) 32G X 4 MM MISC 5 insulin injections per day. Dx E11.65  Qty: 500 each, Refills: 3    Associated Diagnoses: Type 2 diabetes mellitus with hyperglycemia (HCC)      Omega-3 1000 MG CAPS Take 1 capsule by mouth daily      atorvastatin (LIPITOR) 80 MG tablet Take 1 tablet by mouth daily      tamsulosin (FLOMAX) 0.4 mg capsule Take 1 capsule by mouth daily      Abacavir-Dolutegravir-Lamivud (TRIUMEQ) 600- MG TABS TAKE ONE TABLET BY MOUTH EVERY DAY Orally Once a day for 30 day(s)           STOP taking these medications       oxyCODONE (ROXICODONE) 5 MG immediate release tablet Comments:   Reason for Stopping:         levoFLOXacin (LEVAQUIN) 750 MG tablet Comments:   Reason for Stopping:         polyethylene glycol (GLYCOLAX) 17 g packet Comments:   Reason for Stopping:         gabapentin (NEURONTIN) 800 MG tablet Comments:   Reason for Stopping:         losartan (COZAAR) 25 MG tablet Comments:   Reason for Stopping:               Some of the medications may be marked as \"stop taking\" by the system; but in reality pt or family reported already being off these meds; defer to outpatient/prescribing providers.     Procedures done this admission:  * No surgery found *    Consults this admission:  IP CONSULT TO INFECTIOUS DISEASES  IP CONSULT TO PHARMACY  IP CONSULT

## 2023-11-17 NOTE — WOUND CARE
Pt seen for wound vac dressing change, noted pt has discharge orders in, home machine in room, no charging cord present pt is not sure where it is, changed dressing, and call placed to [facility] for replacement cord. Home vac does not have enough charge to hold until he gets home and there is no guarantee the cord is at home. Pt will need to be connected to hospital wound vac machine until cord and charging of home vac form can be completed. Seal achieved with hospital vac. Pt aware of plan and can only leave if home vac cord arrives and charge is enough for transit home.

## 2023-11-17 NOTE — CARE COORDINATION
Interdisciplinary team meeting with attending, CM, nursing, PT and nutritional services for plan of care Patient medically stable for d/c today. CM met with patient for d/c plan. He is current with [facility] health services. CM spoke with Sandi Samuel at [facility] and she confirms has referral and discussed adding  and she stated she would notify the nurse to follow up and they will resume service. They are aware still has wound vac and will be on oral medications at d/c. Patient aware will need to follow up with nephrology appointment and this is scheduled for Nov 22, 2023. CM notified [facility] of d/c and follow up with nephrology. Patient in agreement with d/c today. Patient states he has his car here at the wound center. He plans to drive home. Waiting for wound care to see patient to do dressing change. Patient to d/c home with [facility] and wound vac.

## 2023-11-17 NOTE — PROGRESS NOTES
Parag Garcia  Admission Date: 11/9/2023         [address] [place] [facility] Progress Note: 11/17/2023    Follow-up for: FAHEEM    The patient's chart is reviewed and the patient is discussed with the staff. Subjective:   Pt seen and examined in room he reports feeling well and eager to go home. Has home health set up apparently.      ROS:  Gen - no fever, no chills  CV - no chest pain, no palpitation  Lung - no shortness of breath, no cough  Abd - no tenderness, no nausea/vomiting  Ext - no edema    Current Facility-Administered Medications   Medication Dose Route Frequency    minocycline (MINOCIN;DYNACIN) capsule 100 mg  100 mg Oral BID    [START ON 11/18/2023] famotidine (PEPCID) tablet 20 mg  20 mg Oral Daily    gabapentin (NEURONTIN) capsule 300 mg  300 mg Oral BID    [START ON 11/18/2023] ciprofloxacin (CIPRO) tablet 750 mg  750 mg Oral Daily    amLODIPine (NORVASC) tablet 10 mg  10 mg Oral Daily    insulin glargine (LANTUS) injection vial 15 Units  15 Units SubCUTAneous Nightly    sodium bicarbonate tablet 1,300 mg  1,300 mg Oral BID    prochlorperazine (COMPAZINE) tablet 5 mg  5 mg Oral Q6H PRN    meclizine (ANTIVERT) tablet 12.5 mg  12.5 mg Oral TID PRN    hydrALAZINE (APRESOLINE) injection 10 mg  10 mg IntraVENous Q6H PRN    atorvastatin (LIPITOR) tablet 80 mg  80 mg Oral Daily    buPROPion (WELLBUTRIN XL) extended release tablet 300 mg  300 mg Oral Daily    DULoxetine (CYMBALTA) extended release capsule 60 mg  60 mg Oral QAM    [Held by provider] losartan (COZAAR) tablet 25 mg  25 mg Oral Daily    Omega-3 CAPS 1,000 mg (Patient Supplied)  1 capsule Oral Daily    oxybutynin (DITROPAN-XL) extended release tablet 10 mg  1 tablet Oral Daily    tamsulosin (FLOMAX) capsule 0.4 mg  0.4 mg Oral Daily    traZODone (DESYREL) tablet 50 mg  50 mg Oral Nightly    oxyCODONE (ROXICODONE) immediate release tablet 5 mg  5 mg Oral Q6H PRN    sodium chloride flush 0.9 % injection 5-40 mL  5-40 mL

## 2023-11-17 NOTE — PROGRESS NOTES
g/dL    Albumin 2.8 (L) 3.5 - 5.0 g/dL    Globulin 3.5 2.8 - 4.5 g/dL    Albumin/Globulin Ratio 0.8 0.4 - 1.6     POCT Glucose    Collection Time: 11/17/23  7:03 AM   Result Value Ref Range    POC Glucose 102 (H) 65 - 100 mg/dL    Performed by: Jose    POCT Glucose    Collection Time: 11/17/23 11:43 AM   Result Value Ref Range    POC Glucose 166 (H) 65 - 100 mg/dL    Performed by: Jose        Current Meds:  Current Facility-Administered Medications   Medication Dose Route Frequency    minocycline (MINOCIN;DYNACIN) capsule 100 mg  100 mg Oral BID    [START ON 11/18/2023] famotidine (PEPCID) tablet 20 mg  20 mg Oral Daily    gabapentin (NEURONTIN) capsule 300 mg  300 mg Oral BID    [START ON 11/18/2023] ciprofloxacin (CIPRO) tablet 750 mg  750 mg Oral Daily    amLODIPine (NORVASC) tablet 10 mg  10 mg Oral Daily    insulin glargine (LANTUS) injection vial 15 Units  15 Units SubCUTAneous Nightly    sodium bicarbonate tablet 1,300 mg  1,300 mg Oral BID    prochlorperazine (COMPAZINE) tablet 5 mg  5 mg Oral Q6H PRN    meclizine (ANTIVERT) tablet 12.5 mg  12.5 mg Oral TID PRN    hydrALAZINE (APRESOLINE) injection 10 mg  10 mg IntraVENous Q6H PRN    atorvastatin (LIPITOR) tablet 80 mg  80 mg Oral Daily    buPROPion (WELLBUTRIN XL) extended release tablet 300 mg  300 mg Oral Daily    DULoxetine (CYMBALTA) extended release capsule 60 mg  60 mg Oral QAM    [Held by provider] losartan (COZAAR) tablet 25 mg  25 mg Oral Daily    Omega-3 CAPS 1,000 mg (Patient Supplied)  1 capsule Oral Daily    oxybutynin (DITROPAN-XL) extended release tablet 10 mg  1 tablet Oral Daily    tamsulosin (FLOMAX) capsule 0.4 mg  0.4 mg Oral Daily    traZODone (DESYREL) tablet 50 mg  50 mg Oral Nightly    oxyCODONE (ROXICODONE) immediate release tablet 5 mg  5 mg Oral Q6H PRN    sodium chloride flush 0.9 % injection 5-40 mL  5-40 mL IntraVENous 2 times per day    sodium chloride flush 0.9 % injection 5-40 mL  5-40 mL IntraVENous PRN    0.9 % sodium chloride infusion   IntraVENous PRN    ondansetron (ZOFRAN-ODT) disintegrating tablet 4 mg  4 mg Oral Q8H PRN    Or    ondansetron (ZOFRAN) injection 4 mg  4 mg IntraVENous Q6H PRN    polyethylene glycol (GLYCOLAX) packet 17 g  17 g Oral Daily PRN    acetaminophen (TYLENOL) tablet 650 mg  650 mg Oral Q6H PRN    Or    acetaminophen (TYLENOL) suppository 650 mg  650 mg Rectal Q6H PRN    magnesium sulfate 2000 mg in 50 mL IVPB premix  2,000 mg IntraVENous PRN    potassium chloride (KLOR-CON M) extended release tablet 40 mEq  40 mEq Oral PRN    Or    potassium bicarb-citric acid (EFFER-K) effervescent tablet 40 mEq  40 mEq Oral PRN    Or    potassium chloride 10 mEq/100 mL IVPB (Peripheral Line)  10 mEq IntraVENous PRN    heparin (porcine) injection 5,000 Units  5,000 Units SubCUTAneous 3 times per day    insulin lispro (HUMALOG) injection vial 0-4 Units  0-4 Units SubCUTAneous TID WC    insulin lispro (HUMALOG) injection vial 0-4 Units  0-4 Units SubCUTAneous Nightly    abacavir-dolutegravir-lamivudine (Triumeq) 600- MG TABS- patient supplied (Patient Supplied)  1 tablet Oral Daily    glucose chewable tablet 16 g  4 tablet Oral PRN    dextrose bolus 10% 125 mL  125 mL IntraVENous PRN    Or    dextrose bolus 10% 250 mL  250 mL IntraVENous PRN    glucagon (rDNA) injection 1 mg  1 mg SubCUTAneous PRN    dextrose 10 % infusion   IntraVENous Continuous PRN       Signed:  Silvia Gilmore MD    Part of this note may have been written by using a voice dictation software. The note has been proof read but may still contain some grammatical/other typographical errors.

## 2023-11-20 ENCOUNTER — CARE COORDINATION (OUTPATIENT)
Dept: CARE COORDINATION | Facility: CLINIC | Age: 58
End: 2023-11-20

## 2023-11-20 NOTE — CARE COORDINATION
Ambulatory Care Coordination Note  11/20/2023    Patient Current Location:    [address]: [address] [address]     Hospitalized 11/9-11/17   - FAHEEM   - Metabolic acidosis    Incoming text from patient  Blood sugars running low:  68 @ 13:00 yesterday. Endocrinology notified.   - Patient taking only 25 Units of insulin (not 35)  Patient is home from the hospital as of Friday 11/18. Discharged home to [facility]. Follow up with Nephrology tomorrow 11/21/2023   - home with wound vac   - start minocycline 100 mg po daily   - amlodipine 10 mg daily    PLAN   - Schedule PCP appointment (patient has called - appt in 3 weeks   - Nephrology on Wednesday 11/22   - provided correct number for infectious disease    Challenges to be reviewed by the provider   Additional needs identified to be addressed with provider: Yes  medications-Please contact patient re: insulin parameters. Method of communication with provider: chart routing. ACM: Kavita Jara RN    Lab Results       None            Care Coordination Interventions    Referral from Primary Care Provider: Yes  Suggested Interventions and [address]: In Process  Diabetes Education: In Process  Fall Risk Prevention: Completed  Disease Specific Clinic: In Process          Goals Addressed                   This Visit's Progress     Patient verbalizes understanding of self -management goals of living with Diabetes. On track     Initial Goal - week 1. Patient will eat small meals/snack 4x daily (has not been eating at all) and will keep a food log. Goal - Week 2. Patient keeping food log, taking new insulin. Attending appointments.                 Future Appointments   Date Time Provider [address]   2/12/2024  2:30 PM Jaswant Perez PA-C END [facility]

## 2023-11-22 ENCOUNTER — CARE COORDINATION (OUTPATIENT)
Dept: CARE COORDINATION | Facility: CLINIC | Age: 58
End: 2023-11-22

## 2023-11-22 NOTE — CARE COORDINATION
Multiple calls with patient re: wound vac. - he reports the [facility] RN is having a lot of trouble changing out the wound vac (taking up to 4 hours last evening)    Call to [address] spoke with Jacob Best RN   - arranged for patient to be seen by [address] on Friday 13:00 - to insure wound vac is correctly applied and to document any progress with wound. Patient has wound physician appointment 12/4.     Discussed blood sugars   - taking 25 units short acting insulin   - has not changed long acting    Discussed Thanksgiving   - patient to stay home with partner   - emphasized diet adherence for holiday meal.    PLAN:  follow up with patient on Friday 11/24

## 2023-11-24 ENCOUNTER — CARE COORDINATION (OUTPATIENT)
Dept: CARE COORDINATION | Facility: CLINIC | Age: 58
End: 2023-11-24

## 2023-11-24 ENCOUNTER — HOSPITAL ENCOUNTER (OUTPATIENT)
Dept: WOUND CARE | Age: 58
Discharge: HOME | End: 2023-11-24
Payer: MEDICARE

## 2023-11-24 VITALS
HEART RATE: 82 BPM | DIASTOLIC BLOOD PRESSURE: 103 MMHG | RESPIRATION RATE: 20 BRPM | TEMPERATURE: 98.4 F | SYSTOLIC BLOOD PRESSURE: 180 MMHG

## 2023-11-24 DIAGNOSIS — L97.524 DIABETIC ULCER OF TOE OF LEFT FOOT ASSOCIATED WITH TYPE 2 DIABETES MELLITUS, WITH NECROSIS OF BONE (HCC): ICD-10-CM

## 2023-11-24 DIAGNOSIS — E11.621 DIABETIC ULCER OF TOE OF LEFT FOOT ASSOCIATED WITH TYPE 2 DIABETES MELLITUS, WITH NECROSIS OF BONE (HCC): ICD-10-CM

## 2023-11-24 DIAGNOSIS — E11.42 PERIPHERAL SENSORY NEUROPATHY DUE TO TYPE 2 DIABETES MELLITUS (HCC): ICD-10-CM

## 2023-11-24 DIAGNOSIS — L97.512 DIABETIC ULCER OF TOE OF RIGHT FOOT ASSOCIATED WITH TYPE 2 DIABETES MELLITUS, WITH FAT LAYER EXPOSED (HCC): ICD-10-CM

## 2023-11-24 DIAGNOSIS — Z79.4 TYPE 2 DIABETES MELLITUS WITH HYPERGLYCEMIA, WITH LONG-TERM CURRENT USE OF INSULIN (HCC): ICD-10-CM

## 2023-11-24 DIAGNOSIS — B20 HUMAN IMMUNODEFICIENCY VIRUS (HCC): ICD-10-CM

## 2023-11-24 DIAGNOSIS — E11.65 TYPE 2 DIABETES MELLITUS WITH HYPERGLYCEMIA, WITH LONG-TERM CURRENT USE OF INSULIN (HCC): ICD-10-CM

## 2023-11-24 DIAGNOSIS — L03.032 CELLULITIS OF GREAT TOE OF LEFT FOOT: Primary | ICD-10-CM

## 2023-11-24 DIAGNOSIS — F17.210 CIGARETTE SMOKER: ICD-10-CM

## 2023-11-24 DIAGNOSIS — E11.621 DIABETIC ULCER OF TOE OF RIGHT FOOT ASSOCIATED WITH TYPE 2 DIABETES MELLITUS, WITH FAT LAYER EXPOSED (HCC): ICD-10-CM

## 2023-11-24 PROCEDURE — 99213 OFFICE O/P EST LOW 20 MIN: CPT

## 2023-11-24 RX ORDER — GENTAMICIN SULFATE 1 MG/G
OINTMENT TOPICAL ONCE
OUTPATIENT
Start: 2023-11-24 | End: 2023-11-24

## 2023-11-24 RX ORDER — LIDOCAINE HYDROCHLORIDE 20 MG/ML
JELLY TOPICAL ONCE
OUTPATIENT
Start: 2023-11-24 | End: 2023-11-24

## 2023-11-24 RX ORDER — TRIAMCINOLONE ACETONIDE 1 MG/G
OINTMENT TOPICAL ONCE
OUTPATIENT
Start: 2023-11-24 | End: 2023-11-24

## 2023-11-24 RX ORDER — SODIUM CHLOR/HYPOCHLOROUS ACID 0.033 %
SOLUTION, IRRIGATION IRRIGATION ONCE
OUTPATIENT
Start: 2023-11-24 | End: 2023-11-24

## 2023-11-24 RX ORDER — CLOBETASOL PROPIONATE 0.5 MG/G
OINTMENT TOPICAL ONCE
OUTPATIENT
Start: 2023-11-24 | End: 2023-11-24

## 2023-11-24 RX ORDER — GINSENG 100 MG
CAPSULE ORAL ONCE
OUTPATIENT
Start: 2023-11-24 | End: 2023-11-24

## 2023-11-24 RX ORDER — LIDOCAINE 40 MG/G
CREAM TOPICAL ONCE
OUTPATIENT
Start: 2023-11-24 | End: 2023-11-24

## 2023-11-24 RX ORDER — LOSARTAN POTASSIUM 25 MG/1
TABLET ORAL
COMMUNITY
Start: 2023-11-21

## 2023-11-24 RX ORDER — LIDOCAINE HYDROCHLORIDE 40 MG/ML
SOLUTION TOPICAL ONCE
OUTPATIENT
Start: 2023-11-24 | End: 2023-11-24

## 2023-11-24 RX ORDER — BACITRACIN ZINC AND POLYMYXIN B SULFATE 500; 1000 [USP'U]/G; [USP'U]/G
OINTMENT TOPICAL ONCE
OUTPATIENT
Start: 2023-11-24 | End: 2023-11-24

## 2023-11-24 RX ORDER — LIDOCAINE 50 MG/G
OINTMENT TOPICAL ONCE
OUTPATIENT
Start: 2023-11-24 | End: 2023-11-24

## 2023-11-24 RX ORDER — IBUPROFEN 200 MG
TABLET ORAL ONCE
OUTPATIENT
Start: 2023-11-24 | End: 2023-11-24

## 2023-11-24 RX ORDER — BETAMETHASONE DIPROPIONATE 0.05 %
OINTMENT (GRAM) TOPICAL ONCE
OUTPATIENT
Start: 2023-11-24 | End: 2023-11-24

## 2023-11-24 NOTE — FLOWSHEET NOTE
11/24/23 1228   Wound 10/02/23 Toe (Comment  which one) Left;Plantar great toe #4   Date First Assessed/Time First Assessed: 10/02/23 1133   Present on Original Admission: Yes  Primary Wound Type: Diabetic Ulcer  Location: Toe (Comment  which one)  Wound Location Orientation: Left;Plantar  Wound Description (Comments): great toe #4   Wound Image    Wound Etiology Diabetic   Dressing Status Clean;Dry; Intact   Dressing/Treatment Gauze dressing/dressing sponge   Wound Length (cm) 0.5 cm   Wound Width (cm) 0.4 cm   Wound Depth (cm) 0.4 cm   Wound Surface Area (cm^2) 0.2 cm^2   Change in Wound Size % (l*w) 86.67   Wound Volume (cm^3) 0.08 cm^3   Wound Healing % 47   Wound Assessment Pink/red   Drainage Amount Moderate (25-50%)   Drainage Description Serosanguinous   Odor None   Stella-wound Assessment Edematous   Wound Thickness Description not for Pressure Injury Full thickness       Per verbal orders from Dr Perla Yip, the wound vac was discontinued. Wound dressed with alginate ag, gauze and roll gauze. Change 3x weekly. Continue to wear an offloading shoe and keep the wound from getting soiled.

## 2023-11-24 NOTE — CARE COORDINATION
Outreach in follow up to [address] [place] [address] [address] appointment   - wound vac is discontinued   - wound itself well healed except for one small spot. - next wound appointment 12/4  Patient spoke with Dr. Casandra Roche today and will be following up with her soon. Blood sugar around 87 today.     PLAN to call Monday 11/27

## 2023-11-28 ENCOUNTER — CARE COORDINATION (OUTPATIENT)
Dept: CARE COORDINATION | Facility: CLINIC | Age: 58
End: 2023-11-28

## 2023-11-28 NOTE — CARE COORDINATION
Ambulatory Care Coordination Note  11/28/2023    Patient Current Location:    Home: [facility] [address]     Follow up outreach with patient. He is in good spirits, states neuropathic pain in his legs is controlled - not hurting anymore. Toe wound is healing well - next to wound clinic 12/5. Blood sugars currently controlled:  65 is lowest a.m. BS and 165 is highest afternoon number. Reports a pretty strict diet (boyfriend doing the cooking)    PLAN:  follow up in approximately 15 days  Route chart to Endocrinology. Challenges to be reviewed by the provider   Additional needs identified to be addressed with provider: No  none               Method of communication with provider: chart routing. ACM: Darcy Hollingsworth RN        Offered patient enrollment in the Remote Patient Monitoring (RPM) program for in-home monitoring: Patient is not eligible for RPM program.    Lab Results       None            Care Coordination Interventions    Referral from Primary Care Provider: Yes  Suggested Interventions and [address]: In Process  Diabetes Education: In Process  Fall Risk Prevention: Completed  Disease Specific Clinic: In Process          Goals Addressed                   This Visit's Progress     Patient verbalizes understanding of self -management goals of living with Diabetes. On track     Initial Goal - week 1. Patient will eat small meals/snack 4x daily (has not been eating at all) and will keep a food log. Goal - Week 2. Patient keeping food log, taking new insulin. Attending appointments. Supportive resources in place to maintain patient in the community (ie.  Home Health, DME equipment, refer to, medication assistant plan, etc.)   On track     Patient to connect with Medicaid  for help with access to Protein Shakes 12/1/23              Future Appointments   Date Time Provider [address] [place] [address]   12/4/2023 11:10 Rodolfo Mcadams MD

## 2023-11-29 ENCOUNTER — TELEPHONE (OUTPATIENT)
Dept: ENDOCRINOLOGY | Age: 58
End: 2023-11-29

## 2023-11-29 NOTE — TELEPHONE ENCOUNTER
Kimberlee Evans PA-C  You 2 days ago          Note          Maryuri Byers, Aracelis Finn PA-C 9 days ago     Jaime on PTO   can someone please check on this patient's insulin parameters. He is running some lows. We tried to call Whitney Iverson because none of the providers that we are in the office at this time heard from her.   We also left a message for the patient  I left a message for the patient again today

## 2023-12-01 NOTE — TELEPHONE ENCOUNTER
Spoke to patient.  He is grateful to [facility] :) says Violetta Gregorio is wonderful\"    Takin of long acting   Not taking much short acting--mostly double digits      Not at home     Will call back with glucose data from [place] and insulin log for treatment adjustment-- please document

## 2023-12-04 ENCOUNTER — HOSPITAL ENCOUNTER (OUTPATIENT)
Dept: WOUND CARE | Age: 58
Discharge: HOME | End: 2023-12-04
Payer: MEDICARE

## 2023-12-04 VITALS
HEART RATE: 91 BPM | HEIGHT: 73 IN | TEMPERATURE: 98.2 F | RESPIRATION RATE: 18 BRPM | DIASTOLIC BLOOD PRESSURE: 117 MMHG | WEIGHT: 182 LBS | SYSTOLIC BLOOD PRESSURE: 192 MMHG | BODY MASS INDEX: 24.12 KG/M2

## 2023-12-04 DIAGNOSIS — N17.9 AKI (ACUTE KIDNEY INJURY) (HCC): ICD-10-CM

## 2023-12-04 DIAGNOSIS — E11.42 PERIPHERAL SENSORY NEUROPATHY DUE TO TYPE 2 DIABETES MELLITUS (HCC): ICD-10-CM

## 2023-12-04 DIAGNOSIS — B20 HUMAN IMMUNODEFICIENCY VIRUS (HCC): ICD-10-CM

## 2023-12-04 DIAGNOSIS — E11.621 DIABETIC ULCER OF TOE OF LEFT FOOT ASSOCIATED WITH TYPE 2 DIABETES MELLITUS, WITH NECROSIS OF BONE (HCC): Primary | ICD-10-CM

## 2023-12-04 DIAGNOSIS — M86.172 ACUTE OSTEOMYELITIS OF TOE OF LEFT FOOT (HCC): ICD-10-CM

## 2023-12-04 DIAGNOSIS — L97.524 DIABETIC ULCER OF TOE OF LEFT FOOT ASSOCIATED WITH TYPE 2 DIABETES MELLITUS, WITH NECROSIS OF BONE (HCC): Primary | ICD-10-CM

## 2023-12-04 PROCEDURE — 97597 DBRDMT OPN WND 1ST 20 CM/<: CPT

## 2023-12-04 NOTE — PROGRESS NOTES
pneumothorax. -Mild streaky left basilar opacity, favored to reflect atelectasis, though  infection is a consideration in the appropriate clinical context. CT Result (most recent):  No results found for this or any previous visit from the past 3650 days. MRI Result (most recent):  MRI FOOT LEFT W WO CONTRAST 10/31/2023    Narrative  EXAMINATION: MRI FOOT LEFT W WO CONTRAST  INDICATION: Diabetic ulcer of the left great toe. Concern for osteomyelitis  COMPARISON: None  CONTRAST: 17 mL intravenous ProHance  TECHNIQUE: Multiplanar multisequence MRI of the left foot before and after  contrast    FINDINGS:  There is a soft tissue ulcer along the medial and plantar aspect of the distal  great toe. The sinus tract extends to the underlying phalanges and  interphalangeal joints. There is circumferential subcutaneous edema about the  great toe with a rim-enhancing fluid collection circumferentially along the  phalanges and interphalangeal joint measuring up to 2.8 cm. There is confluent  T1 hypointense signal abnormality throughout both the proximal and distal  phalanx compatible with acute osteomyelitis. Destructive changes at the  interphalangeal joint compatible with septic arthritis. No discrete synovitis  involving the first MTP joint and no significant signal abnormality involving  the first metatarsal head or hallux sesamoids. No additional signal abnormality concerning for osteomyelitis. No evidence of  fracture or dislocation. There is a complete tear of the flexor hallucis longus  tendon at the level of the distal first metatarsal. The torn tendon is thickened  and mildly retracted. Mild edema throughout the intrinsic musculature most  likely related to denervation. There is subcutaneous edema extending from the  great toe to the dorsum of the foot. No additional organized fluid collection. Impression  1.  Soft tissue ulceration along the medial and plantar distal great toe with  acute osteomyelitis

## 2023-12-04 NOTE — WOUND CARE
Discharge Instructions for  [address]  [address] [facility]  [address], [address]  Phone [phone]   Fax [phone]      NAME:  Rocco Angulo OF BIRTH:  1965  MEDICAL RECORD NUMBER:  [id number]  DATE:  12/4/2023    Return Appointment:   2 weeks with Alvino Santiago MD      Instructions: Left great toe. Clean with saline. Apply iodosorb  to wound bed. Cover with gauze. Change daily. Patient to offload wound with  wide toebox shoes. while standing or weight bearing. Do not get wound wet. May purchase cast cover at local pharmacy to keep dry in shower. Wound healing is greatly slowed when blood glucose levels are greater than 200. Monitor glucose levels daily to ensure tight glucose control. Follow up with PCP if your glucose levels are frequently greater than 200. Smoking greatly slows wound healing by restricting essential blow flow. Decrease smoking with a plan to quit. Increase protein intake to promote wound healing. Protein supplements such as Arya and Ensure are great options. Should you experience increased redness, swelling, pain, foul odor, size of wound(s), or have a temperature over 101 degrees please contact the [address] at [phone] or if after hours contact your primary care physician or go to the hospital emergency department. PLEASE NOTE: IF YOU ARE UNABLE TO OBTAIN WOUND SUPPLIES, CONTINUE TO USE THE SUPPLIES YOU HAVE AVAILABLE UNTIL YOU ARE ABLE TO REACH US. IT IS MOST IMPORTANT TO KEEP THE WOUND COVERED AT ALL TIMES.     Electronically signed Maciel Omalley RN on 12/4/2023 at 11:42 AM

## 2023-12-04 NOTE — FLOWSHEET NOTE
12/04/23 1146   Wound 10/02/23 Toe (Comment  which one) Left;Plantar great toe #4   Date First Assessed/Time First Assessed: 10/02/23 1133   Present on Original Admission: Yes  Primary Wound Type: Diabetic Ulcer  Location: Toe (Comment  which one)  Wound Location Orientation: Left;Plantar  Wound Description (Comments): great toe #4   Wound Image     Wound Etiology Diabetic Gavin 3   Dressing Status New dressing applied   Dressing/Treatment Gauze dressing/dressing sponge  (iodosorb)   Offloading for Diabetic Foot Ulcers Offloading ordered   Wound Length (cm) 0.1 cm   Wound Width (cm) 0.1 cm   Wound Depth (cm) 0.1 cm   Wound Surface Area (cm^2) 0.01 cm^2   Change in Wound Size % (l*w) 99.33   Wound Volume (cm^3) 0.001 cm^3   Wound Healing % 99   Post-Procedure Length (cm) 0.1 cm   Post-Procedure Width (cm) 0.1 cm   Post-Procedure Depth (cm) 0.1 cm   Post-Procedure Surface Area (cm^2) 0.01 cm^2   Post-Procedure Volume (cm^3) 0.001 cm^3   Wound Assessment Dry;Pink/red   Drainage Amount Scant (moist but unmeasurable)   Drainage Description Serosanguinous   Odor None   Stella-wound Assessment Hyperkeratosis (callous)   Wound Thickness Description not for Pressure Injury Full thickness

## 2023-12-05 ENCOUNTER — TELEPHONE (OUTPATIENT)
Dept: ENDOCRINOLOGY | Age: 58
End: 2023-12-05

## 2023-12-12 ENCOUNTER — CARE COORDINATION (OUTPATIENT)
Dept: CARE COORDINATION | Facility: CLINIC | Age: 58
End: 2023-12-12

## 2023-12-12 NOTE — CARE COORDINATION
Ambulatory Care Coordination Note  12/12/2023    Patient Current Location:    Home: [facility] [address] [facility] [address]     Ongoing outreach with patient  Reports high BPs:  150+/100+  Medication review - patient has not been taking amlodipine sine hospital discharge - unable to locate the medication bottle. Able to work with patient's pharmacy to have a bottle shipped out today. Has been having headaches last 2-3 weeks. Patient is logging his blood pressures. BS are  in the mornings. Reviewed insulin administration, is taking as prescribed. Does not understand why he gets this high reading in the morning. Next endo appointment in February - will route this note to Endocrinology provider. Patient finds he is sensitive to smells and is often nauseous. PLAN also to have LPN check in with patient next week re: Bps while this ACM is out on PTO. Method of communication with provider: chart routing. ACM: Darcy Hollingsworth RN      Lab Results       None            Care Coordination Interventions    Referral from Primary Care Provider: Yes  Suggested Interventions and [address]: In Process  Diabetes Education: In Process  Fall Risk Prevention: Completed  Disease Specific Clinic: In Process          Goals Addressed                   This Visit's Progress     Patient verbalizes understanding of self -management goals of living with Diabetes. On track     Initial Goal - week 1. Patient will eat small meals/snack 4x daily (has not been eating at all) and will keep a food log. Goal - Week 2. Patient keeping food log, taking new insulin. Attending appointments. Supportive resources in place to maintain patient in the community (ie.  Home Health, DME equipment, refer to, medication assistant plan, etc.)   On track     Patient to connect with Medicaid  for help with access to Protein Shakes 12/1/23              Future Appointments   Date Time

## 2023-12-12 NOTE — TELEPHONE ENCOUNTER
Thank you!   Endo:  Please have pt read out his abdi reader and fill out the form we made so we can see what is going on with his sugars and adjust to prevent these lows

## 2023-12-18 PROBLEM — L97.523 DIABETIC ULCER OF TOE OF LEFT FOOT ASSOCIATED WITH TYPE 2 DIABETES MELLITUS, WITH NECROSIS OF MUSCLE (HCC): Status: ACTIVE | Noted: 2023-12-18

## 2023-12-18 PROBLEM — M86.672 CHRONIC OSTEOMYELITIS OF TOE OF LEFT FOOT (HCC): Status: ACTIVE | Noted: 2023-12-18

## 2023-12-18 PROBLEM — E11.621 DIABETIC ULCER OF TOE OF LEFT FOOT ASSOCIATED WITH TYPE 2 DIABETES MELLITUS, WITH NECROSIS OF MUSCLE (HCC): Status: ACTIVE | Noted: 2023-12-18

## 2023-12-26 ENCOUNTER — HOSPITAL ENCOUNTER (OUTPATIENT)
Dept: WOUND CARE | Age: 58
Discharge: HOME | End: 2023-12-26
Payer: MEDICARE

## 2023-12-26 ENCOUNTER — CARE COORDINATION (OUTPATIENT)
Dept: CARE COORDINATION | Facility: CLINIC | Age: 58
End: 2023-12-26

## 2023-12-26 VITALS
HEART RATE: 80 BPM | TEMPERATURE: 98.3 F | OXYGEN SATURATION: 100 % | SYSTOLIC BLOOD PRESSURE: 137 MMHG | DIASTOLIC BLOOD PRESSURE: 97 MMHG | RESPIRATION RATE: 18 BRPM

## 2023-12-26 DIAGNOSIS — L97.524 DIABETIC ULCER OF TOE OF LEFT FOOT ASSOCIATED WITH TYPE 2 DIABETES MELLITUS, WITH NECROSIS OF BONE (HCC): Primary | ICD-10-CM

## 2023-12-26 DIAGNOSIS — E11.621 DIABETIC ULCER OF TOE OF LEFT FOOT ASSOCIATED WITH TYPE 2 DIABETES MELLITUS, WITH NECROSIS OF BONE (HCC): Primary | ICD-10-CM

## 2023-12-26 PROBLEM — M86.172 ACUTE OSTEOMYELITIS OF TOE OF LEFT FOOT (HCC): Chronic | Status: ACTIVE | Noted: 2023-11-02

## 2023-12-26 PROBLEM — M86.672 CHRONIC OSTEOMYELITIS OF TOE OF LEFT FOOT (HCC): Chronic | Status: ACTIVE | Noted: 2023-12-18

## 2023-12-26 LAB
GLUCOSE BLD STRIP.AUTO-MCNC: 295 MG/DL (ref 65–100)
GLUCOSE BLD STRIP.AUTO-MCNC: 357 MG/DL (ref 65–100)
SERVICE CMNT-IMP: ABNORMAL
SERVICE CMNT-IMP: ABNORMAL

## 2023-12-26 PROCEDURE — G0277 HBOT, FULL BODY CHAMBER, 30M: HCPCS

## 2023-12-26 PROCEDURE — 99183 HYPERBARIC OXYGEN THERAPY: CPT | Performed by: FAMILY MEDICINE

## 2023-12-26 PROCEDURE — 82962 GLUCOSE BLOOD TEST: CPT

## 2023-12-26 NOTE — CARE COORDINATION
Ambulatory Care Coordination Note  12/26/2023    Patient Current Location:    Home: [facility] [address]     Ongoing outreach for high risk care management. Patient has some concerns:   - inappropriate remarks from [facility]. * advised patient to please request a different . Speak with a supervisor or Dr. Jackie Larkin if needed. - starts hyperbaric oxygen therapy for foot wound today. This involves \"extra expense\" for transportation. And it means he has to hold his insulin because of parameters for maximum results. * advised patient to speak with hyperbaric staff about appointment times that would work best with his insulin regimen. Also he/we need some information about this,  he says he has a pamphlet he needs to read. * advised that I would ask endocrinology and also read about this situation. Of note most recent chart picture indicates wound has worsened since ACM last viewed. Method of communication with provider: chart routing. ACM: Robbi Zambrano RN    Offered patient enrollment in the Remote Patient Monitoring (RPM) program for in-home monitoring: Patient is not eligible for RPM program.    Lab Results       None            Care Coordination Interventions    Referral from Primary Care Provider: Yes  Suggested Interventions and [address]: In Process  Diabetes Education:  In Process  Fall Risk Prevention: Completed  Disease Specific Clinic: In Process          Goals Addressed    None         Future Appointments   Date Time Provider [address]   12/26/2023  3:00 PM Ferny Randolph DO [facility]   12/27/2023  3:00 PM DO ANJEL Strong [facility]   12/28/2023  3:00 PM DO ANJEL Strong [facility]   1/3/2024  3:00 PM MD ANJEL Sullivan [facility]   1/4/2024  2:00 PM MD ANJEL Kovacs [facility]   1/4/2024  3:00 PM MD ANJEL Kovacs [facility]   1/5/2024  3:00 PM Ferny Randolph DO

## 2023-12-27 ENCOUNTER — HOSPITAL ENCOUNTER (OUTPATIENT)
Dept: WOUND CARE | Age: 58
Discharge: HOME | End: 2023-12-27
Payer: MEDICARE

## 2023-12-27 VITALS
TEMPERATURE: 98.6 F | HEART RATE: 86 BPM | RESPIRATION RATE: 18 BRPM | OXYGEN SATURATION: 99 % | SYSTOLIC BLOOD PRESSURE: 134 MMHG | DIASTOLIC BLOOD PRESSURE: 89 MMHG

## 2023-12-27 DIAGNOSIS — E11.621 DIABETIC ULCER OF TOE OF LEFT FOOT ASSOCIATED WITH TYPE 2 DIABETES MELLITUS, WITH NECROSIS OF BONE (HCC): Primary | ICD-10-CM

## 2023-12-27 DIAGNOSIS — L97.524 DIABETIC ULCER OF TOE OF LEFT FOOT ASSOCIATED WITH TYPE 2 DIABETES MELLITUS, WITH NECROSIS OF BONE (HCC): Primary | ICD-10-CM

## 2023-12-27 LAB
GLUCOSE BLD STRIP.AUTO-MCNC: 535 MG/DL (ref 65–100)
SERVICE CMNT-IMP: ABNORMAL

## 2023-12-27 PROCEDURE — G0277 HBOT, FULL BODY CHAMBER, 30M: HCPCS

## 2023-12-27 PROCEDURE — 99183 HYPERBARIC OXYGEN THERAPY: CPT | Performed by: FAMILY MEDICINE

## 2023-12-27 PROCEDURE — 82962 GLUCOSE BLOOD TEST: CPT

## 2023-12-28 ENCOUNTER — HOSPITAL ENCOUNTER (OUTPATIENT)
Dept: WOUND CARE | Age: 58
Discharge: HOME | End: 2023-12-28
Payer: MEDICARE

## 2023-12-28 VITALS
SYSTOLIC BLOOD PRESSURE: 160 MMHG | RESPIRATION RATE: 18 BRPM | TEMPERATURE: 97.5 F | HEART RATE: 86 BPM | DIASTOLIC BLOOD PRESSURE: 99 MMHG | OXYGEN SATURATION: 98 %

## 2023-12-28 DIAGNOSIS — L97.524 DIABETIC ULCER OF TOE OF LEFT FOOT ASSOCIATED WITH TYPE 2 DIABETES MELLITUS, WITH NECROSIS OF BONE (HCC): Primary | ICD-10-CM

## 2023-12-28 DIAGNOSIS — E11.621 DIABETIC ULCER OF TOE OF LEFT FOOT ASSOCIATED WITH TYPE 2 DIABETES MELLITUS, WITH NECROSIS OF BONE (HCC): Primary | ICD-10-CM

## 2023-12-28 LAB
GLUCOSE BLD STRIP.AUTO-MCNC: 324 MG/DL (ref 65–100)
GLUCOSE BLD STRIP.AUTO-MCNC: 370 MG/DL (ref 65–100)
GLUCOSE BLD STRIP.AUTO-MCNC: >600 MG/DL (ref 65–100)
SERVICE CMNT-IMP: ABNORMAL

## 2023-12-28 PROCEDURE — 99183 HYPERBARIC OXYGEN THERAPY: CPT | Performed by: SURGERY

## 2023-12-28 PROCEDURE — G0277 HBOT, FULL BODY CHAMBER, 30M: HCPCS

## 2023-12-28 PROCEDURE — 82962 GLUCOSE BLOOD TEST: CPT

## 2023-12-29 DIAGNOSIS — E11.65 TYPE 2 DIABETES MELLITUS WITH HYPERGLYCEMIA (HCC): ICD-10-CM

## 2024-01-02 DIAGNOSIS — E11.65 TYPE 2 DIABETES MELLITUS WITH HYPERGLYCEMIA (HCC): ICD-10-CM

## 2024-01-02 RX ORDER — LANCETS 33 GAUGE
EACH MISCELLANEOUS
Qty: 600 EACH | Refills: 3 | Status: SHIPPED | OUTPATIENT
Start: 2024-01-02

## 2024-01-03 ENCOUNTER — CARE COORDINATION (OUTPATIENT)
Dept: CARE COORDINATION | Facility: CLINIC | Age: 59
End: 2024-01-03

## 2024-01-03 NOTE — CARE COORDINATION
Ambulatory Care Coordination Note  1/3/2024    Patient Current Location:    Home: [address]  [place] [address]     Multiple calls/texts with patient over the past week.  He reports that his BS are difficult to control since beginning Hyperbaric Oxygen Therapy for foot wound ([facility]).  While inside the chamber, he is unable to wear the \"pods\"  for his glucose monitoring system.   Sessions with wound are in the mornings at 08:45. Scheduled as such so he can take his insulin afterwards.  Wound care is testing sugar before entering the chamber.  Blood sugars about 325 without insulin (reported 12/26).  Incoming text this morning - patient cancelled wound appt for Hyperbaric therapy because he was up in the night with N/D.    Concerns:  Labile BS r/t to change in daily routine  Continuing issues with N/D  Wound to foot - unsure at present about healing progress.    PLAN:  route chart to endocrinology.  Patient's next endocrinology appt in April - on the cancellation list for earlier appointment.    Challenges to be reviewed by the provider   Additional needs identified to be addressed with provider: No  none               Method of communication with provider: chart routing.    ACM: Heather Byers, RN    Lab Results       None            Care Coordination Interventions    Referral from Primary Care Provider: Yes  Suggested Interventions and Community Resources  Behavorial Health: In Process  Diabetes Education: In Process  Fall Risk Prevention: Completed  Disease Specific Clinic: In Process          Goals Addressed                   This Visit's Progress     Patient verbalizes understanding of self -management goals of living with Diabetes.   On track     Initial Goal - week 1.  Patient will eat small meals/snack 4x daily (has not been eating at all) and will keep a food log.  Goal - Week 2.  Patient keeping food log, taking new insulin.  Attending appointments.         Supportive resources in

## 2024-01-04 ENCOUNTER — HOSPITAL ENCOUNTER (OUTPATIENT)
Dept: WOUND CARE | Age: 59
Discharge: HOME | End: 2024-01-04
Payer: MEDICARE

## 2024-01-04 VITALS — BODY MASS INDEX: 24.25 KG/M2 | WEIGHT: 183 LBS | HEIGHT: 73 IN

## 2024-01-04 VITALS
TEMPERATURE: 98.5 F | RESPIRATION RATE: 18 BRPM | DIASTOLIC BLOOD PRESSURE: 100 MMHG | OXYGEN SATURATION: 98 % | SYSTOLIC BLOOD PRESSURE: 158 MMHG | HEART RATE: 91 BPM

## 2024-01-04 DIAGNOSIS — L97.512 DIABETIC ULCER OF TOE OF RIGHT FOOT ASSOCIATED WITH TYPE 2 DIABETES MELLITUS, WITH FAT LAYER EXPOSED (HCC): ICD-10-CM

## 2024-01-04 DIAGNOSIS — L97.523 DIABETIC ULCER OF TOE OF LEFT FOOT ASSOCIATED WITH TYPE 2 DIABETES MELLITUS, WITH NECROSIS OF MUSCLE (HCC): ICD-10-CM

## 2024-01-04 DIAGNOSIS — L97.524 DIABETIC ULCER OF TOE OF LEFT FOOT ASSOCIATED WITH TYPE 2 DIABETES MELLITUS, WITH NECROSIS OF BONE (HCC): Primary | ICD-10-CM

## 2024-01-04 DIAGNOSIS — E11.621 DIABETIC ULCER OF TOE OF LEFT FOOT ASSOCIATED WITH TYPE 2 DIABETES MELLITUS, WITH NECROSIS OF MUSCLE (HCC): ICD-10-CM

## 2024-01-04 DIAGNOSIS — E11.621 DIABETIC ULCER OF TOE OF LEFT FOOT ASSOCIATED WITH TYPE 2 DIABETES MELLITUS, WITH NECROSIS OF BONE (HCC): Primary | ICD-10-CM

## 2024-01-04 DIAGNOSIS — F17.210 CIGARETTE SMOKER: ICD-10-CM

## 2024-01-04 DIAGNOSIS — E11.42 PERIPHERAL SENSORY NEUROPATHY DUE TO TYPE 2 DIABETES MELLITUS (HCC): ICD-10-CM

## 2024-01-04 DIAGNOSIS — Z79.4 TYPE 2 DIABETES MELLITUS WITH HYPERGLYCEMIA, WITH LONG-TERM CURRENT USE OF INSULIN (HCC): ICD-10-CM

## 2024-01-04 DIAGNOSIS — B20 HUMAN IMMUNODEFICIENCY VIRUS (HCC): ICD-10-CM

## 2024-01-04 DIAGNOSIS — L03.032 CELLULITIS OF GREAT TOE OF LEFT FOOT: ICD-10-CM

## 2024-01-04 DIAGNOSIS — M86.172 ACUTE OSTEOMYELITIS OF TOE OF LEFT FOOT (HCC): ICD-10-CM

## 2024-01-04 DIAGNOSIS — M86.672 CHRONIC OSTEOMYELITIS OF TOE OF LEFT FOOT (HCC): ICD-10-CM

## 2024-01-04 DIAGNOSIS — E11.65 TYPE 2 DIABETES MELLITUS WITH HYPERGLYCEMIA, WITH LONG-TERM CURRENT USE OF INSULIN (HCC): ICD-10-CM

## 2024-01-04 DIAGNOSIS — E11.621 DIABETIC ULCER OF TOE OF RIGHT FOOT ASSOCIATED WITH TYPE 2 DIABETES MELLITUS, WITH FAT LAYER EXPOSED (HCC): ICD-10-CM

## 2024-01-04 LAB
GLUCOSE BLD STRIP.AUTO-MCNC: 509 MG/DL (ref 65–100)
GLUCOSE BLD STRIP.AUTO-MCNC: 586 MG/DL (ref 65–100)
SERVICE CMNT-IMP: ABNORMAL
SERVICE CMNT-IMP: ABNORMAL

## 2024-01-04 PROCEDURE — 99183 HYPERBARIC OXYGEN THERAPY: CPT | Performed by: SURGERY

## 2024-01-04 PROCEDURE — G0277 HBOT, FULL BODY CHAMBER, 30M: HCPCS

## 2024-01-04 PROCEDURE — 99214 OFFICE O/P EST MOD 30 MIN: CPT | Performed by: SURGERY

## 2024-01-04 PROCEDURE — 82962 GLUCOSE BLOOD TEST: CPT

## 2024-01-04 PROCEDURE — 11042 DBRDMT SUBQ TIS 1ST 20SQCM/<: CPT

## 2024-01-04 RX ORDER — LIDOCAINE HYDROCHLORIDE 40 MG/ML
SOLUTION TOPICAL ONCE
OUTPATIENT
Start: 2024-01-04 | End: 2024-01-04

## 2024-01-04 RX ORDER — LIDOCAINE HYDROCHLORIDE 20 MG/ML
JELLY TOPICAL ONCE
Status: COMPLETED | OUTPATIENT
Start: 2024-01-04 | End: 2024-01-04

## 2024-01-04 RX ORDER — BETAMETHASONE DIPROPIONATE 0.05 %
OINTMENT (GRAM) TOPICAL ONCE
OUTPATIENT
Start: 2024-01-04 | End: 2024-01-04

## 2024-01-04 RX ORDER — LIDOCAINE 40 MG/G
CREAM TOPICAL ONCE
OUTPATIENT
Start: 2024-01-04 | End: 2024-01-04

## 2024-01-04 RX ORDER — BACITRACIN ZINC AND POLYMYXIN B SULFATE 500; 1000 [USP'U]/G; [USP'U]/G
OINTMENT TOPICAL ONCE
OUTPATIENT
Start: 2024-01-04 | End: 2024-01-04

## 2024-01-04 RX ORDER — MINOCYCLINE HYDROCHLORIDE 100 MG/1
100 CAPSULE ORAL 2 TIMES DAILY
COMMUNITY
Start: 2023-12-18

## 2024-01-04 RX ORDER — SODIUM BICARBONATE 650 MG/1
1300 TABLET ORAL 2 TIMES DAILY
COMMUNITY
Start: 2023-12-05

## 2024-01-04 RX ORDER — TRIAMCINOLONE ACETONIDE 1 MG/G
OINTMENT TOPICAL ONCE
OUTPATIENT
Start: 2024-01-04 | End: 2024-01-04

## 2024-01-04 RX ORDER — GINSENG 100 MG
CAPSULE ORAL ONCE
OUTPATIENT
Start: 2024-01-04 | End: 2024-01-04

## 2024-01-04 RX ORDER — GENTAMICIN SULFATE 1 MG/G
OINTMENT TOPICAL ONCE
OUTPATIENT
Start: 2024-01-04 | End: 2024-01-04

## 2024-01-04 RX ORDER — CIPROFLOXACIN 750 MG/1
750 TABLET, FILM COATED ORAL EVERY 12 HOURS
COMMUNITY
Start: 2023-12-18

## 2024-01-04 RX ORDER — PANTOPRAZOLE SODIUM 40 MG/1
40 TABLET, DELAYED RELEASE ORAL DAILY
COMMUNITY
Start: 2023-12-21

## 2024-01-04 RX ORDER — LIDOCAINE HYDROCHLORIDE 20 MG/ML
JELLY TOPICAL ONCE
OUTPATIENT
Start: 2024-01-04 | End: 2024-01-04

## 2024-01-04 RX ORDER — SODIUM CHLOR/HYPOCHLOROUS ACID 0.033 %
SOLUTION, IRRIGATION IRRIGATION ONCE
OUTPATIENT
Start: 2024-01-04 | End: 2024-01-04

## 2024-01-04 RX ORDER — IBUPROFEN 200 MG
TABLET ORAL ONCE
OUTPATIENT
Start: 2024-01-04 | End: 2024-01-04

## 2024-01-04 RX ORDER — CLOBETASOL PROPIONATE 0.5 MG/G
OINTMENT TOPICAL ONCE
OUTPATIENT
Start: 2024-01-04 | End: 2024-01-04

## 2024-01-04 RX ORDER — LIDOCAINE 50 MG/G
OINTMENT TOPICAL ONCE
OUTPATIENT
Start: 2024-01-04 | End: 2024-01-04

## 2024-01-04 RX ADMIN — LIDOCAINE HYDROCHLORIDE: 20 JELLY TOPICAL at 14:11

## 2024-01-04 ASSESSMENT — PAIN SCALES - GENERAL: PAINLEVEL_OUTOF10: 0

## 2024-01-04 NOTE — FLOWSHEET NOTE
01/04/24 1340   Wound 10/02/23 Toe (Comment  which one) Left;Plantar great toe #4   Date First Assessed/Time First Assessed: 10/02/23 1133   Present on Original Admission: Yes  Primary Wound Type: Diabetic Ulcer  Location: Toe (Comment  which one)  Wound Location Orientation: Left;Plantar  Wound Description (Comments): great toe #4   Wound Image     Wound Etiology Diabetic Gavin 3   Dressing Status Intact   Wound Cleansed Vashe   Offloading for Diabetic Foot Ulcers Offloading ordered;Post op shoe   Wound Length (cm) 0.6 cm   Wound Width (cm) 0.3 cm   Wound Depth (cm) 0.7 cm   Wound Surface Area (cm^2) 0.18 cm^2   Change in Wound Size % (l*w) 88   Wound Volume (cm^3) 0.126 cm^3   Wound Healing % 16   Post-Procedure Length (cm) 0.5 cm   Post-Procedure Width (cm) 0.2 cm   Post-Procedure Depth (cm) 0.5 cm   Post-Procedure Surface Area (cm^2) 0.1 cm^2   Post-Procedure Volume (cm^3) 0.05 cm^3   Wound Assessment Pink/red   Drainage Amount Small (< 25%)   Drainage Description Serosanguinous   Odor None   Stella-wound Assessment Hyperkeratosis (callous)   Margins Undefined edges   Wound Thickness Description not for Pressure Injury Full thickness

## 2024-01-04 NOTE — PROGRESS NOTES
[facility]    [facility]/[place], [place]  Consult Note/H&P/Progress Note      Estevan Valenzuela  MEDICAL RECORD NUMBER:  [id number]  AGE: 58 y.o. RACE White (non-)  GENDER: male  : 1965  EPISODE DATE:  2024       Subjective:      CC (Chief Complaint) and HISTORY of PRESENT ILLNESS (HPI):    Estevan Valenzuela is a 58 y.o. male who presents today for wound/ulcer evaluation.  He is seen on 2023, with greater than 5-month history of open wounds of both feet on the great toes.  The started out as calluses which he picked and they developed into ulcer wounds.  Since early on, he has been treating these wounds with hydrogen peroxide and Neosporin on a daily basis.  His wounds have remained nonhealing.  His condition is complicated by uncontrolled type 2 diabetes with hyperglycemia with long-term insulin use.  Last hemoglobin A1c in May was greater than 13.  Health is also complicated by HIV being treated with antivirals.  He smokes several cigarettes per day.  He had plain films of the left foot showed no evidence of osteomyelitis or foreign body.  He has strong palpable pulses and excellent capillary refill.  He has not had vascular studies.  Debridements were performed today and well-tolerated.    He returns on 10/2/2023.  He has stopped using peroxide and Neosporin, but is getting his dressings wet and dirty by working in the yard and poor shoes that are not protecting his feet.  He is changing his dressings 6 or 7 times per day and tearing off loose skin creating new wounds.  The right toe is fortunately superficial.  He has a new superficial wound on the plantar left great toe.  The medial left great toe wound is deep and down into the deep fat.  This is a worrisome wound.  He continues to smoke.  He is also having issues with chronic falls.  He is not wearing proper footwear.  He returns on 10/9/2023.  There is minimal improvement.  He is avoiding

## 2024-01-04 NOTE — WOUND CARE
Discharge Instructions for  [facility]  [address]  [address]  [place] [address]  Phone [phone]   Fax [phone]      NAME:  Estevan Valenzuela  YOB: 1965  MEDICAL RECORD NUMBER:  [id number]  DATE:  1/4/2024    Return Appointment:   1 week with Efe Gramajo MD      Instructions:   Left great toe.   Clean with saline.  Vashe post debridment.  Apply thin layer of iodosorb to wound bed.  Cover with gauze  Wrap with Rolled Gauze.  Change 3x weekly.     Patient to offload wound with  wide toebox shoes or darco peg shoe  while standing or weight bearing.     Do not get wound wet. May purchase cast cover at local pharmacy to keep dry in shower.  Wound healing is greatly slowed when blood glucose levels are greater than 200. Monitor glucose levels daily to ensure tight glucose control.  Follow up with PCP if your glucose levels are frequently greater than 200.  Smoking greatly slows wound healing by restricting essential blow flow. Decrease smoking with a plan to quit.  Increase protein intake to promote wound healing. Protein supplements such as Arya and Ensure are great options.    Continue antibiotics as prescribed by Infectious Disease.    Continue HBO as prescribed.             Should you experience increased redness, swelling, pain, foul odor, size of wound(s), or have a temperature over 101 degrees please contact the Wound Healing Center at [phone] or if after hours contact your primary care physician or go to the hospital emergency department.    PLEASE NOTE: IF YOU ARE UNABLE TO OBTAIN WOUND SUPPLIES, CONTINUE TO USE THE SUPPLIES YOU HAVE AVAILABLE UNTIL YOU ARE ABLE TO REACH US. IT IS MOST IMPORTANT TO KEEP THE WOUND COVERED AT ALL TIMES.    Electronically signed Maricel Mckoy RN on 1/4/2024 at 2:18 PM

## 2024-01-04 NOTE — DISCHARGE INSTRUCTIONS
Instructions:   Left great toe.   Clean with saline.  Vashe post debridment.  Apply thin layer of iodosorb to wound bed.  Cover with gauze  Wrap with Rolled Gauze.  Change 3x weekly.     Patient to offload wound with  wide toebox shoes or darco peg shoe  while standing or weight bearing.     Do not get wound wet. May purchase cast cover at local pharmacy to keep dry in shower.  Wound healing is greatly slowed when blood glucose levels are greater than 200. Monitor glucose levels daily to ensure tight glucose control.  Follow up with PCP if your glucose levels are frequently greater than 200.  Smoking greatly slows wound healing by restricting essential blow flow. Decrease smoking with a plan to quit.  Increase protein intake to promote wound healing. Protein supplements such as Arya and Ensure are great options.    Continue antibiotics as prescribed by Infectious Disease.

## 2024-01-04 NOTE — PROGRESS NOTES
assistance & direction throughout the HBO procedure today.   MD spoke with patient regarding any problems or questions related to HBO therapy.  Reviewed HBO treatment profile with HBO  and verified no adverse reactions noted.      Plan      Patient placed in a full body Monoplace Chamber #: [id number]  Treatment Start Time: 0904     Pressure Reached Time: 0916  JAC : 2  Number of Air Breaks:        Decompression Time: 1032   Treatment End Time: 1043  Length of Treatment: 90 Minutes  Symptoms Noted During Treatment: None  Total Treatment Time (min): 99  Treatment Completion Status: Treatment completed without issue (D/C instructions reviewed. No questions)      I was present on these premises and immediately available to furnish assistance & direction throughout the HBO Treatment.     Estevan Valenzuela is a 58 y.o. male  successfully completed today's hyperbaric oxygen treatment at [facility] and HBO therapy.  In my clinical judgement, ongoing HBO therapy is necessary at this time to assist with wound healing, preservation of limb, life, or function.  Supervision and attendance of Hyperbaric Oxygen Therapy provided. Continue HBO treatment as outlined in the treatment plan.  Hyperbaric Oxygen: Mr. Valenzuela tolerated: Treatment Number: 4 without issue.    Discharge Instructions were explained and given to Mr. Valenzuela   Electronically signed by GHADA PASCUAL MD on 1/4/2024 at 10:59 AM

## 2024-01-05 ENCOUNTER — HOSPITAL ENCOUNTER (OUTPATIENT)
Dept: WOUND CARE | Age: 59
Discharge: HOME | End: 2024-01-05
Payer: MEDICARE

## 2024-01-05 VITALS
SYSTOLIC BLOOD PRESSURE: 160 MMHG | HEART RATE: 77 BPM | DIASTOLIC BLOOD PRESSURE: 99 MMHG | TEMPERATURE: 97.3 F | OXYGEN SATURATION: 97 % | RESPIRATION RATE: 18 BRPM

## 2024-01-05 DIAGNOSIS — L97.524 DIABETIC ULCER OF TOE OF LEFT FOOT ASSOCIATED WITH TYPE 2 DIABETES MELLITUS, WITH NECROSIS OF BONE (HCC): Primary | ICD-10-CM

## 2024-01-05 DIAGNOSIS — E11.621 DIABETIC ULCER OF TOE OF LEFT FOOT ASSOCIATED WITH TYPE 2 DIABETES MELLITUS, WITH NECROSIS OF BONE (HCC): Primary | ICD-10-CM

## 2024-01-05 LAB
GLUCOSE BLD STRIP.AUTO-MCNC: 333 MG/DL (ref 65–100)
GLUCOSE BLD STRIP.AUTO-MCNC: 382 MG/DL (ref 65–100)
SERVICE CMNT-IMP: ABNORMAL
SERVICE CMNT-IMP: ABNORMAL

## 2024-01-05 PROCEDURE — 82962 GLUCOSE BLOOD TEST: CPT

## 2024-01-05 PROCEDURE — G0277 HBOT, FULL BODY CHAMBER, 30M: HCPCS

## 2024-01-05 ASSESSMENT — PAIN SCALES - GENERAL
PAINLEVEL_OUTOF10: 0
PAINLEVEL_OUTOF10: 0

## 2024-01-08 ENCOUNTER — HOSPITAL ENCOUNTER (OUTPATIENT)
Dept: WOUND CARE | Age: 59
Discharge: HOME | End: 2024-01-08
Payer: MEDICARE

## 2024-01-08 VITALS
OXYGEN SATURATION: 97 % | DIASTOLIC BLOOD PRESSURE: 105 MMHG | SYSTOLIC BLOOD PRESSURE: 161 MMHG | HEART RATE: 83 BPM | TEMPERATURE: 97.5 F | RESPIRATION RATE: 18 BRPM

## 2024-01-08 DIAGNOSIS — L97.524 DIABETIC ULCER OF TOE OF LEFT FOOT ASSOCIATED WITH TYPE 2 DIABETES MELLITUS, WITH NECROSIS OF BONE (HCC): Primary | ICD-10-CM

## 2024-01-08 DIAGNOSIS — E11.621 DIABETIC ULCER OF TOE OF LEFT FOOT ASSOCIATED WITH TYPE 2 DIABETES MELLITUS, WITH NECROSIS OF BONE (HCC): Primary | ICD-10-CM

## 2024-01-08 DIAGNOSIS — M86.672 CHRONIC OSTEOMYELITIS OF TOE OF LEFT FOOT (HCC): ICD-10-CM

## 2024-01-08 LAB
GLUCOSE BLD STRIP.AUTO-MCNC: 345 MG/DL (ref 65–100)
GLUCOSE BLD STRIP.AUTO-MCNC: 360 MG/DL (ref 65–100)
SERVICE CMNT-IMP: ABNORMAL
SERVICE CMNT-IMP: ABNORMAL

## 2024-01-08 PROCEDURE — 82962 GLUCOSE BLOOD TEST: CPT

## 2024-01-08 PROCEDURE — G0277 HBOT, FULL BODY CHAMBER, 30M: HCPCS

## 2024-01-08 PROCEDURE — 99183 HYPERBARIC OXYGEN THERAPY: CPT | Performed by: SURGERY

## 2024-01-08 ASSESSMENT — PAIN SCALES - GENERAL
PAINLEVEL_OUTOF10: 0
PAINLEVEL_OUTOF10: 0

## 2024-01-08 NOTE — PROGRESS NOTES
HBO PROGRESS NOTE  NAME: Estevan Valenzuela   MEDICAL RECORD NUMBER:  [id number]  AGE: 58 y.o. GENDER: male  : 1965  EPISODE DATE:  2024     Subjective   HBO Treatment Number: 6 out of Total Treatments: 40    HBO Diagnosis:           Indications: Chronic Refractory Osteomyelitis to ___ (site) (Left Foot)    Safety checks performed prior to treatment.  See doc flowsheets for documentation.      Objective        Lab Results   Component Value Date/Time    GLUCPOC 264 2023 02:01 PM     Recent Labs     24  0833 24  1045   POCGLU 345* 360*     Pre treatment Vital Signs       Temp: 97.5 °F (36.4 °C)     Pulse: 100     Respirations: 18     BP: (!) 160/90       Post treatment Vital Signs  Temp: 97.5 °F (36.4 °C)  Pulse: 83  Respirations: 18  BP: (!) 161/105    Assessment      HBO Diagnosis:   Problem List Items Addressed This Visit          Endocrine    Diabetic ulcer of toe of left foot associated with type 2 diabetes mellitus, with necrosis of bone (HCC) - Primary (Chronic)    Relevant Orders    Hypoglycemial protocol    POCT glucose    Care order/instruction    Care order/instruction    Care order/instruction    Care order/instruction    Care order/instruction    Care order/instruction    Care order/instruction    Care order/instruction    Notify physician (specify)    Hyperbaric Oxygen Therapy       Other    Chronic osteomyelitis of toe of left foot (HCC) (Chronic)         Physical Exam:  General Appearance: alert, interacfive    Pre Tympanic Membrane Assessment:  Right: Normal  Left: Normal    Post Tympanic Membrane Assessment:  Left: Normal  Right: Normal    Pulmonary/Chest: No audible wheezing      Patient place in a fully body Monoplace serial number         Treatment Start Time: 853 Door Closed      Pressure Reached  JAC : 2 JAC  Number of Air Breaks: none        Decompression Time   Treatment End Time: 1040 Door Opened    Symptoms Noted During Treatment: None    Length of Treatment:

## 2024-01-10 ENCOUNTER — HOSPITAL ENCOUNTER (OUTPATIENT)
Dept: WOUND CARE | Age: 59
Discharge: HOME | End: 2024-01-10
Payer: MEDICARE

## 2024-01-10 VITALS
DIASTOLIC BLOOD PRESSURE: 99 MMHG | SYSTOLIC BLOOD PRESSURE: 149 MMHG | RESPIRATION RATE: 18 BRPM | OXYGEN SATURATION: 99 % | HEART RATE: 80 BPM | TEMPERATURE: 97.7 F

## 2024-01-10 DIAGNOSIS — E11.621 DIABETIC ULCER OF TOE OF LEFT FOOT ASSOCIATED WITH TYPE 2 DIABETES MELLITUS, WITH NECROSIS OF BONE (HCC): Primary | ICD-10-CM

## 2024-01-10 DIAGNOSIS — L97.524 DIABETIC ULCER OF TOE OF LEFT FOOT ASSOCIATED WITH TYPE 2 DIABETES MELLITUS, WITH NECROSIS OF BONE (HCC): Primary | ICD-10-CM

## 2024-01-10 LAB
GLUCOSE BLD STRIP.AUTO-MCNC: 341 MG/DL (ref 65–100)
GLUCOSE BLD STRIP.AUTO-MCNC: 412 MG/DL (ref 65–100)
SERVICE CMNT-IMP: ABNORMAL
SERVICE CMNT-IMP: ABNORMAL

## 2024-01-10 PROCEDURE — 99183 HYPERBARIC OXYGEN THERAPY: CPT | Performed by: FAMILY MEDICINE

## 2024-01-10 PROCEDURE — G0277 HBOT, FULL BODY CHAMBER, 30M: HCPCS

## 2024-01-10 PROCEDURE — 82962 GLUCOSE BLOOD TEST: CPT

## 2024-01-10 ASSESSMENT — PAIN SCALES - GENERAL
PAINLEVEL_OUTOF10: 0
PAINLEVEL_OUTOF10: 0

## 2024-01-11 ENCOUNTER — HOSPITAL ENCOUNTER (OUTPATIENT)
Dept: WOUND CARE | Age: 59
Discharge: HOME | End: 2024-01-11
Payer: MEDICARE

## 2024-01-11 VITALS
SYSTOLIC BLOOD PRESSURE: 160 MMHG | DIASTOLIC BLOOD PRESSURE: 104 MMHG | OXYGEN SATURATION: 100 % | RESPIRATION RATE: 18 BRPM | TEMPERATURE: 97.3 F | HEART RATE: 86 BPM

## 2024-01-11 VITALS
RESPIRATION RATE: 18 BRPM | HEART RATE: 86 BPM | OXYGEN SATURATION: 100 % | SYSTOLIC BLOOD PRESSURE: 160 MMHG | DIASTOLIC BLOOD PRESSURE: 104 MMHG | TEMPERATURE: 97.3 F

## 2024-01-11 DIAGNOSIS — Z79.4 TYPE 2 DIABETES MELLITUS WITH HYPERGLYCEMIA, WITH LONG-TERM CURRENT USE OF INSULIN (HCC): ICD-10-CM

## 2024-01-11 DIAGNOSIS — L97.524 DIABETIC ULCER OF TOE OF LEFT FOOT ASSOCIATED WITH TYPE 2 DIABETES MELLITUS, WITH NECROSIS OF BONE (HCC): Primary | ICD-10-CM

## 2024-01-11 DIAGNOSIS — F17.210 CIGARETTE SMOKER: ICD-10-CM

## 2024-01-11 DIAGNOSIS — E11.65 TYPE 2 DIABETES MELLITUS WITH HYPERGLYCEMIA, WITH LONG-TERM CURRENT USE OF INSULIN (HCC): ICD-10-CM

## 2024-01-11 DIAGNOSIS — E11.621 DIABETIC ULCER OF TOE OF LEFT FOOT ASSOCIATED WITH TYPE 2 DIABETES MELLITUS, WITH NECROSIS OF BONE (HCC): Primary | ICD-10-CM

## 2024-01-11 DIAGNOSIS — M86.672 CHRONIC OSTEOMYELITIS OF TOE OF LEFT FOOT (HCC): ICD-10-CM

## 2024-01-11 LAB
GLUCOSE BLD STRIP.AUTO-MCNC: 182 MG/DL (ref 65–100)
GLUCOSE BLD STRIP.AUTO-MCNC: 219 MG/DL (ref 65–100)
SERVICE CMNT-IMP: ABNORMAL
SERVICE CMNT-IMP: ABNORMAL

## 2024-01-11 PROCEDURE — 99214 OFFICE O/P EST MOD 30 MIN: CPT | Performed by: SURGERY

## 2024-01-11 PROCEDURE — 82962 GLUCOSE BLOOD TEST: CPT

## 2024-01-11 PROCEDURE — 11042 DBRDMT SUBQ TIS 1ST 20SQCM/<: CPT

## 2024-01-11 PROCEDURE — G0277 HBOT, FULL BODY CHAMBER, 30M: HCPCS

## 2024-01-11 NOTE — FLOWSHEET NOTE
01/11/24 1459   Wound 10/02/23 Toe (Comment  which one) Left;Plantar great toe #4   Date First Assessed/Time First Assessed: 10/02/23 1133   Present on Original Admission: Yes  Primary Wound Type: Diabetic Ulcer  Location: Toe (Comment  which one)  Wound Location Orientation: Left;Plantar  Wound Description (Comments): great toe #4   Wound Image     Wound Etiology Diabetic Gavin 3   Dressing Status Intact   Wound Cleansed Vashe   Dressing/Treatment Alginate with Ag   Offloading for Diabetic Foot Ulcers Offloading ordered   Wound Length (cm) 0.6 cm   Wound Width (cm) 0.4 cm   Wound Depth (cm) 0.6 cm   Wound Surface Area (cm^2) 0.24 cm^2   Change in Wound Size % (l*w) 84   Wound Volume (cm^3) 0.144 cm^3   Wound Healing % 4   Post-Procedure Length (cm) 0.6 cm   Post-Procedure Width (cm) 0.4 cm   Post-Procedure Depth (cm) 0.6 cm   Post-Procedure Surface Area (cm^2) 0.24 cm^2   Post-Procedure Volume (cm^3) 0.144 cm^3   Wound Assessment Pink/red   Drainage Amount Small (< 25%)   Drainage Description Serosanguinous   Odor None   Stella-wound Assessment Hyperkeratosis (callous)   Wound Thickness Description not for Pressure Injury Full thickness

## 2024-01-11 NOTE — DISCHARGE INSTRUCTIONS
Left Great Toe.   Clean with saline.  Vashe post debridment.  Apply thin layer of iodosorb to wound bed.  Cover with gauze  Wrap with Rolled Gauze.  Change 3x weekly.     Patient to offload wound with wide toebox shoes or darco peg shoe  while standing or weight bearing.     Do not get wound wet. May purchase cast cover at local pharmacy to keep dry in shower.  Wound healing is greatly slowed when blood glucose levels are greater than 200. Monitor glucose levels daily to ensure tight glucose control.  Follow up with PCP if your glucose levels are frequently greater than 200.  Smoking greatly slows wound healing by restricting essential blow flow. Decrease smoking with a plan to quit.  Increase protein intake to promote wound healing. Protein supplements such as Arya and Ensure are great options.     Continue antibiotics as prescribed by Infectious Disease.     Continue HBO as prescribed.

## 2024-01-11 NOTE — WOUND CARE
Discharge Instructions for  [facility]  [address] [place] [address]  [address]  [place] [address]  Phone [phone]   Fax [phone]      NAME:  Estevan Valenzuela  YOB: 1965  MEDICAL RECORD NUMBER:  [id number]  DATE:  1/11/2024    Return Appointment:   1 week with Efe Gramajo MD      Instructions:   Left Great Toe.   Clean with saline.  Vashe post debridment.  Apply thin layer of iodosorb to wound bed.  Cover with gauze  Wrap with Rolled Gauze.  Change 3x weekly.     Patient to offload wound with wide toebox shoes or darco peg shoe  while standing or weight bearing.     Do not get wound wet. May purchase cast cover at local pharmacy to keep dry in shower.  Wound healing is greatly slowed when blood glucose levels are greater than 200. Monitor glucose levels daily to ensure tight glucose control.  Follow up with PCP if your glucose levels are frequently greater than 200.  Smoking greatly slows wound healing by restricting essential blow flow. Decrease smoking with a plan to quit.  Increase protein intake to promote wound healing. Protein supplements such as Arya and Ensure are great options.     Continue antibiotics as prescribed by Infectious Disease.     Continue HBO as prescribed.         Should you experience increased redness, swelling, pain, foul odor, size of wound(s), or have a temperature over 101 degrees please contact the Wound Healing Center at [phone] or if after hours contact your primary care physician or go to the hospital emergency department.    PLEASE NOTE: IF YOU ARE UNABLE TO OBTAIN WOUND SUPPLIES, CONTINUE TO USE THE SUPPLIES YOU HAVE AVAILABLE UNTIL YOU ARE ABLE TO REACH US. IT IS MOST IMPORTANT TO KEEP THE WOUND COVERED AT ALL TIMES.    Electronically signed Emily HERNANDEZ RN on 1/11/2024 at 3:17 PM

## 2024-01-11 NOTE — PROGRESS NOTES
HBO PROGRESS NOTE      NAME: Estevan Valenzuela  MEDICAL RECORD NUMBER:  [id number]  AGE: 58 y.o.   GENDER: male  : 1965  EPISODE DATE:  1/10/2024     Subjective     HBO Treatment Number: 7 out of Total Treatments: 40    HBO Diagnosis:             Indications: Chronic Refractory Osteomyelitis to ___ (site) (Left Foot)    Safety checks performed prior to treatment.  See doc flowsheets for documentation.    Objective        Recent Labs     01/10/24  1502 24  1239   POCGLU 412* 219*     Pre treatment Vital Signs       Temp: 97.7 °F (36.5 °C)     Pulse: 95     Respirations: 18     BP: (!) 153/98       Post treatment Vital Signs  Temp: 97.7 °F (36.5 °C)  Pulse: 80  Respirations: 18  BP: (!) 149/99    Assessment        HBO Diagnosis:   Problem List Items Addressed This Visit          Endocrine    Diabetic ulcer of toe of left foot associated with type 2 diabetes mellitus, with necrosis of bone (HCC) - Primary (Chronic)    Relevant Orders    POCT glucose       Physical Exam        General Appearance:  alert and oriented to person, place and time, well-developed and well-nourished, in no acute distress    Pre Tympanic Membrane Assessment:  Right: Normal  Left: Normal    Post Tympanic Membrane Assessment:  Left: Normal  Right: Normal    Pulmonary/Chest:  clear to auscultation bilaterally- no wheezes, rales or rhonchi, normal air movement, no respiratory distress    Cardiovascular:  normal    Plan        Patient placed in a full body Monoplace Chamber #: [id number]  Treatment Start Time: 1310     Pressure Reached Time: 1319  JAC : 2  Number of Air Breaks:        Decompression Time: 1450   Treatment End Time: 1458  Length of Treatment: 90 Minutes  Symptoms Noted During Treatment: None  Total Treatment Time (min): 108    Treatment Completion Status: Treatment completed without issue (D/C instructions reviewed no questions)    I was present on these premises and immediately available to furnish assistance &

## 2024-01-12 ENCOUNTER — HOSPITAL ENCOUNTER (OUTPATIENT)
Dept: WOUND CARE | Age: 59
Discharge: HOME | End: 2024-01-12
Payer: MEDICARE

## 2024-01-12 VITALS
HEART RATE: 88 BPM | TEMPERATURE: 97.7 F | SYSTOLIC BLOOD PRESSURE: 153 MMHG | OXYGEN SATURATION: 100 % | DIASTOLIC BLOOD PRESSURE: 96 MMHG | RESPIRATION RATE: 18 BRPM

## 2024-01-12 DIAGNOSIS — L97.524 DIABETIC ULCER OF TOE OF LEFT FOOT ASSOCIATED WITH TYPE 2 DIABETES MELLITUS, WITH NECROSIS OF BONE (HCC): Primary | ICD-10-CM

## 2024-01-12 DIAGNOSIS — E11.621 DIABETIC ULCER OF TOE OF LEFT FOOT ASSOCIATED WITH TYPE 2 DIABETES MELLITUS, WITH NECROSIS OF BONE (HCC): Primary | ICD-10-CM

## 2024-01-12 LAB
GLUCOSE BLD STRIP.AUTO-MCNC: 265 MG/DL (ref 65–100)
GLUCOSE BLD STRIP.AUTO-MCNC: 377 MG/DL (ref 65–100)
SERVICE CMNT-IMP: ABNORMAL
SERVICE CMNT-IMP: ABNORMAL

## 2024-01-12 PROCEDURE — 82962 GLUCOSE BLOOD TEST: CPT

## 2024-01-12 PROCEDURE — G0277 HBOT, FULL BODY CHAMBER, 30M: HCPCS

## 2024-01-12 ASSESSMENT — PAIN SCALES - GENERAL: PAINLEVEL_OUTOF10: 0

## 2024-01-12 NOTE — PROGRESS NOTES
None    Length of Treatment: 90 Minutes         Adverse Event:      I was present on these premises and immediately available to furnish assistance & direction throughout the HBO procedure today.   MD spoke with patient regarding any problems or questions related to HBO therapy.  Reviewed HBO treatment profile with HBO  and verified no adverse reactions noted.      Plan      Patient placed in a full body Monoplace Chamber #: [id number]  Treatment Start Time: 1245     Pressure Reached Time: 1253  JAC : 2  Number of Air Breaks:        Decompression Time: 1426   Treatment End Time: 1432  Length of Treatment: 90 Minutes  Symptoms Noted During Treatment: None  Total Treatment Time (min): 107  Treatment Completion Status: Treatment completed without issue (D/C instructions reviewed no questions)      I was present on these premises and immediately available to furnish assistance & direction throughout the HBO Treatment.     Estevan Valenzuela is a 58 y.o. male  successfully completed today's hyperbaric oxygen treatment at [facility] and HBO therapy.  In my clinical judgement, ongoing HBO therapy is necessary at this time to assist with wound healing, preservation of limb, life, or function.  Supervision and attendance of Hyperbaric Oxygen Therapy provided. Continue HBO treatment as outlined in the treatment plan.  Hyperbaric Oxygen: Mr. Valenzuela tolerated: Treatment Number: 8 without issue.    Discharge Instructions were explained and given to Mr. Valenzuela   Electronically signed by Efe Gramajo MD on 1/11/2024 at 7:30 PM                                                                
   Care order/instruction    Care order/instruction    Care order/instruction    Care order/instruction    Care order/instruction    Notify physician (specify)    Hyperbaric Oxygen Therapy       Other    Chronic osteomyelitis of toe of left foot ([facility]) (Chronic)    Cigarette smoker       [unfilled]    Active Problems:    * No active hospital problems. *  Resolved Problems:    * No resolved hospital problems. *      Patient Active Problem List    Diagnosis Date Noted    Diabetic ulcer of toe of left foot associated with type 2 diabetes mellitus, with necrosis of muscle ([facility]) 12/18/2023    Chronic osteomyelitis of toe of left foot ([facility]) 12/18/2023    Metabolic acidosis 11/12/2023    FAHEEM (acute kidney injury) ([facility]) 11/09/2023    Acute osteomyelitis of toe of left foot ([facility]) 11/02/2023    Cellulitis of great toe of left foot 10/25/2023    Diabetic ulcer of toe of right foot associated with type 2 diabetes mellitus, with fat layer exposed ([facility]) 09/21/2023    Diabetic ulcer of toe of left foot associated with type 2 diabetes mellitus, with necrosis of bone ([facility]) 09/21/2023    Cigarette smoker 09/21/2023    Hypercholesterolemia 07/13/2020    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 ([facility]) 07/13/2020    Human immunodeficiency virus ([facility]) 07/13/2020    Type 2 diabetes mellitus with hyperglycemia, with long-term current use of insulin ([facility]) 07/13/2020    Hypertriglyceridemia            Plan:     Number and Complexity of Problems addressed and   Risks of complications and/or morbidity of management    Treatment Note please see attached Discharge Instructions  Any procedures done today in the wound center (if documented in a separate note) in [facility] are made part of this record by reference  Written patient dismissal instructions given to patient or POA.         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

## 2024-01-16 ENCOUNTER — CARE COORDINATION (OUTPATIENT)
Dept: CARE COORDINATION | Facility: CLINIC | Age: 59
End: 2024-01-16

## 2024-01-16 NOTE — CARE COORDINATION
Outreach for High Risk Case Management   - reports no hyperbaric therapy last 2 days because had to have his car repaired.  Should be able to return tomorrow.   - finding that commitment to Hyperbaric Therapy very time consuming.   - reports that tow wound is healed over, but its appearance is \"deformed.\"   - reports BS \"up and down\" - has been trying to follow instructions sent by Endo (via [employer]) but is still finding that his BS are \"screwy\"    Upcoming Endo appointment for 2/12/2024.  Reminded patient to change his hyperbaric appointment so that he can get to Endo appointment on time.    PLAN:   - continued outreach   - patient has been engaged, contacting this ACM via text as needed

## 2024-01-16 NOTE — TELEPHONE ENCOUNTER
Can we obtain abdi data, via download or the questionnaire I made along with confirmation of how he is taking meds please

## 2024-01-17 ENCOUNTER — HOSPITAL ENCOUNTER (OUTPATIENT)
Dept: WOUND CARE | Age: 59
Discharge: HOME | End: 2024-01-17
Payer: MEDICARE

## 2024-01-17 VITALS
SYSTOLIC BLOOD PRESSURE: 155 MMHG | HEART RATE: 88 BPM | RESPIRATION RATE: 18 BRPM | TEMPERATURE: 97.7 F | DIASTOLIC BLOOD PRESSURE: 89 MMHG | OXYGEN SATURATION: 100 %

## 2024-01-17 DIAGNOSIS — L97.524 DIABETIC ULCER OF TOE OF LEFT FOOT ASSOCIATED WITH TYPE 2 DIABETES MELLITUS, WITH NECROSIS OF BONE (HCC): Primary | ICD-10-CM

## 2024-01-17 DIAGNOSIS — E11.621 DIABETIC ULCER OF TOE OF LEFT FOOT ASSOCIATED WITH TYPE 2 DIABETES MELLITUS, WITH NECROSIS OF BONE (HCC): Primary | ICD-10-CM

## 2024-01-17 LAB
GLUCOSE BLD STRIP.AUTO-MCNC: 321 MG/DL (ref 65–100)
GLUCOSE BLD STRIP.AUTO-MCNC: 376 MG/DL (ref 65–100)
SERVICE CMNT-IMP: ABNORMAL
SERVICE CMNT-IMP: ABNORMAL

## 2024-01-17 PROCEDURE — G0277 HBOT, FULL BODY CHAMBER, 30M: HCPCS

## 2024-01-17 PROCEDURE — 82962 GLUCOSE BLOOD TEST: CPT

## 2024-01-18 ENCOUNTER — HOSPITAL ENCOUNTER (OUTPATIENT)
Dept: WOUND CARE | Age: 59
Discharge: HOME | End: 2024-01-18
Payer: MEDICARE

## 2024-01-18 VITALS
WEIGHT: 182 LBS | HEIGHT: 73 IN | TEMPERATURE: 97.5 F | BODY MASS INDEX: 24.12 KG/M2 | DIASTOLIC BLOOD PRESSURE: 107 MMHG | OXYGEN SATURATION: 99 % | SYSTOLIC BLOOD PRESSURE: 149 MMHG | RESPIRATION RATE: 18 BRPM | HEART RATE: 71 BPM

## 2024-01-18 VITALS
DIASTOLIC BLOOD PRESSURE: 107 MMHG | OXYGEN SATURATION: 99 % | HEART RATE: 71 BPM | TEMPERATURE: 97.5 F | RESPIRATION RATE: 18 BRPM | SYSTOLIC BLOOD PRESSURE: 149 MMHG

## 2024-01-18 DIAGNOSIS — L97.524 DIABETIC ULCER OF TOE OF LEFT FOOT ASSOCIATED WITH TYPE 2 DIABETES MELLITUS, WITH NECROSIS OF BONE (HCC): Primary | ICD-10-CM

## 2024-01-18 DIAGNOSIS — M86.672 CHRONIC OSTEOMYELITIS OF TOE OF LEFT FOOT (HCC): ICD-10-CM

## 2024-01-18 DIAGNOSIS — E11.621 DIABETIC ULCER OF TOE OF LEFT FOOT ASSOCIATED WITH TYPE 2 DIABETES MELLITUS, WITH NECROSIS OF BONE (HCC): Primary | ICD-10-CM

## 2024-01-18 DIAGNOSIS — E11.621 DIABETIC ULCER OF TOE OF LEFT FOOT ASSOCIATED WITH TYPE 2 DIABETES MELLITUS, WITH NECROSIS OF MUSCLE (HCC): ICD-10-CM

## 2024-01-18 DIAGNOSIS — L97.512 DIABETIC ULCER OF TOE OF RIGHT FOOT ASSOCIATED WITH TYPE 2 DIABETES MELLITUS, WITH FAT LAYER EXPOSED (HCC): ICD-10-CM

## 2024-01-18 DIAGNOSIS — M86.172 ACUTE OSTEOMYELITIS OF TOE OF LEFT FOOT (HCC): ICD-10-CM

## 2024-01-18 DIAGNOSIS — E11.65 TYPE 2 DIABETES MELLITUS WITH HYPERGLYCEMIA, WITH LONG-TERM CURRENT USE OF INSULIN (HCC): ICD-10-CM

## 2024-01-18 DIAGNOSIS — Z79.4 TYPE 2 DIABETES MELLITUS WITH HYPERGLYCEMIA, WITH LONG-TERM CURRENT USE OF INSULIN (HCC): ICD-10-CM

## 2024-01-18 DIAGNOSIS — E11.42 PERIPHERAL SENSORY NEUROPATHY DUE TO TYPE 2 DIABETES MELLITUS (HCC): ICD-10-CM

## 2024-01-18 DIAGNOSIS — L03.032 CELLULITIS OF GREAT TOE OF LEFT FOOT: ICD-10-CM

## 2024-01-18 DIAGNOSIS — E11.621 DIABETIC ULCER OF TOE OF RIGHT FOOT ASSOCIATED WITH TYPE 2 DIABETES MELLITUS, WITH FAT LAYER EXPOSED (HCC): ICD-10-CM

## 2024-01-18 DIAGNOSIS — B20 HUMAN IMMUNODEFICIENCY VIRUS (HCC): ICD-10-CM

## 2024-01-18 DIAGNOSIS — L97.523 DIABETIC ULCER OF TOE OF LEFT FOOT ASSOCIATED WITH TYPE 2 DIABETES MELLITUS, WITH NECROSIS OF MUSCLE (HCC): ICD-10-CM

## 2024-01-18 DIAGNOSIS — F17.210 CIGARETTE SMOKER: ICD-10-CM

## 2024-01-18 LAB
GLUCOSE BLD STRIP.AUTO-MCNC: 285 MG/DL (ref 65–100)
GLUCOSE BLD STRIP.AUTO-MCNC: 330 MG/DL (ref 65–100)
SERVICE CMNT-IMP: ABNORMAL
SERVICE CMNT-IMP: ABNORMAL

## 2024-01-18 PROCEDURE — G0277 HBOT, FULL BODY CHAMBER, 30M: HCPCS

## 2024-01-18 PROCEDURE — 11042 DBRDMT SUBQ TIS 1ST 20SQCM/<: CPT

## 2024-01-18 PROCEDURE — 99183 HYPERBARIC OXYGEN THERAPY: CPT | Performed by: SURGERY

## 2024-01-18 PROCEDURE — 82962 GLUCOSE BLOOD TEST: CPT

## 2024-01-18 RX ORDER — LIDOCAINE HYDROCHLORIDE 20 MG/ML
JELLY TOPICAL ONCE
OUTPATIENT
Start: 2024-01-18 | End: 2024-01-18

## 2024-01-18 RX ORDER — GINSENG 100 MG
CAPSULE ORAL ONCE
OUTPATIENT
Start: 2024-01-18 | End: 2024-01-18

## 2024-01-18 RX ORDER — BACITRACIN ZINC AND POLYMYXIN B SULFATE 500; 1000 [USP'U]/G; [USP'U]/G
OINTMENT TOPICAL ONCE
OUTPATIENT
Start: 2024-01-18 | End: 2024-01-18

## 2024-01-18 RX ORDER — LIDOCAINE 50 MG/G
OINTMENT TOPICAL ONCE
OUTPATIENT
Start: 2024-01-18 | End: 2024-01-18

## 2024-01-18 RX ORDER — GENTAMICIN SULFATE 1 MG/G
OINTMENT TOPICAL ONCE
OUTPATIENT
Start: 2024-01-18 | End: 2024-01-18

## 2024-01-18 RX ORDER — IBUPROFEN 200 MG
TABLET ORAL ONCE
OUTPATIENT
Start: 2024-01-18 | End: 2024-01-18

## 2024-01-18 RX ORDER — CLOBETASOL PROPIONATE 0.5 MG/G
OINTMENT TOPICAL ONCE
OUTPATIENT
Start: 2024-01-18 | End: 2024-01-18

## 2024-01-18 RX ORDER — TRIAMCINOLONE ACETONIDE 1 MG/G
OINTMENT TOPICAL ONCE
OUTPATIENT
Start: 2024-01-18 | End: 2024-01-18

## 2024-01-18 RX ORDER — SODIUM CHLOR/HYPOCHLOROUS ACID 0.033 %
SOLUTION, IRRIGATION IRRIGATION ONCE
OUTPATIENT
Start: 2024-01-18 | End: 2024-01-18

## 2024-01-18 RX ORDER — LIDOCAINE HYDROCHLORIDE 40 MG/ML
SOLUTION TOPICAL ONCE
OUTPATIENT
Start: 2024-01-18 | End: 2024-01-18

## 2024-01-18 RX ORDER — BETAMETHASONE DIPROPIONATE 0.05 %
OINTMENT (GRAM) TOPICAL ONCE
OUTPATIENT
Start: 2024-01-18 | End: 2024-01-18

## 2024-01-18 RX ORDER — LIDOCAINE 40 MG/G
CREAM TOPICAL ONCE
OUTPATIENT
Start: 2024-01-18 | End: 2024-01-18

## 2024-01-18 ASSESSMENT — PAIN SCALES - GENERAL
PAINLEVEL_OUTOF10: 0
PAINLEVEL_OUTOF10: 0

## 2024-01-18 NOTE — PROGRESS NOTES
HBO PROGRESS NOTE  NAME: Estevan Valenzuela   MEDICAL RECORD NUMBER:  [id number]  AGE: 58 y.o. GENDER: male  : 1965  EPISODE DATE:  2024     Subjective   HBO Treatment Number: 11 out of Total Treatments: 40    HBO Diagnosis:           Indications: Chronic Refractory Osteomyelitis to ___ (site) (left foot)    Safety checks performed prior to treatment.  See doc flowsheets for documentation.      Objective        Lab Results   Component Value Date/Time    GLUCPOC 264 2023 02:01 PM     Recent Labs     24  1215 24  1413   POCGLU 330* 285*     Pre treatment Vital Signs       Temp: 97.5 °F (36.4 °C)     Pulse: 93     Respirations: 18     BP: (!) 154/108       Post treatment Vital Signs  Temp: 97.5 °F (36.4 °C)  Pulse: 71  Respirations: 18  BP: (!) 149/107    Assessment      HBO Diagnosis:   Problem List Items Addressed This Visit          Endocrine    Diabetic ulcer of toe of left foot associated with type 2 diabetes mellitus, with necrosis of bone (HCC) - Primary (Chronic)    Relevant Orders    Hypoglycemial protocol    POCT glucose    Care order/instruction    Care order/instruction    Care order/instruction    Care order/instruction    Care order/instruction    Care order/instruction    Care order/instruction    Care order/instruction    Notify physician (specify)    Hyperbaric Oxygen Therapy       Other    Chronic osteomyelitis of toe of left foot (HCC) (Chronic)         Physical Exam:  General Appearance: alert, interacfive    Pre Tympanic Membrane Assessment:  Right: Normal  Left: Normal    Post Tympanic Membrane Assessment:  Left: Normal  Right: Normal    Pulmonary/Chest: No audible wheezing      Patient place in a fully body Monoplace serial number         Treatment Start Time: 1221 Door Closed      Pressure Reached  JAC : 2 JAC  Number of Air Breaks: none        Decompression Time   Treatment End Time: 1408 Door Opened    Symptoms Noted During Treatment: None    Length of

## 2024-01-18 NOTE — WOUND CARE
Discharge Instructions for  [facility]  [address] [place] [address]  [address]  [place] [address]  Phone [phone]   Fax [phone]      NAME:  Estevan Valenzuela  YOB: 1965  MEDICAL RECORD NUMBER:  [id number]  DATE:  1/18/2024    Return Appointment:   1 week with Derick Uribe DO      Instructions:   Left Great Toe.   Clean with saline.  Vashe post debridment.  Apply thin layer of iodosorb to wound bed.  Cover with gauze  Wrap with Rolled Gauze.  Change 3x weekly.     Patient to offload wound with wide toebox shoes or darco peg shoe  while standing or weight bearing.     Do not get wound wet. May purchase cast cover at local pharmacy to keep dry in shower.  Wound healing is greatly slowed when blood glucose levels are greater than 200. Monitor glucose levels daily to ensure tight glucose control.  Follow up with PCP if your glucose levels are frequently greater than 200.  Smoking greatly slows wound healing by restricting essential blow flow. Decrease smoking with a plan to quit.  Increase protein intake to promote wound healing. Protein supplements such as Arya and Ensure are great options.     Continue antibiotics as prescribed by Infectious Disease.     Continue HBO as prescribed.         Should you experience increased redness, swelling, pain, foul odor, size of wound(s), or have a temperature over 101 degrees please contact the Wound Healing Center at [phone] or if after hours contact your primary care physician or go to the hospital emergency department.    PLEASE NOTE: IF YOU ARE UNABLE TO OBTAIN WOUND SUPPLIES, CONTINUE TO USE THE SUPPLIES YOU HAVE AVAILABLE UNTIL YOU ARE ABLE TO REACH US. IT IS MOST IMPORTANT TO KEEP THE WOUND COVERED AT ALL TIMES.    Electronically signed Emily HERNANDEZ RN on 1/18/2024 at 3:47 PM

## 2024-01-18 NOTE — FLOWSHEET NOTE
01/18/24 1518   Wound 10/02/23 Toe (Comment  which one) Left;Plantar great toe #4   Date First Assessed/Time First Assessed: 10/02/23 1133   Present on Original Admission: Yes  Primary Wound Type: Diabetic Ulcer  Location: Toe (Comment  which one)  Wound Location Orientation: Left;Plantar  Wound Description (Comments): great toe #4   Wound Image     Wound Etiology Diabetic Gavin 3   Dressing Status Intact   Wound Cleansed Vashe   Dressing/Treatment Alginate with Ag   Offloading for Diabetic Foot Ulcers Offloading ordered   Wound Length (cm) 0.4 cm   Wound Width (cm) 0.2 cm   Wound Depth (cm) 0.4 cm   Wound Surface Area (cm^2) 0.08 cm^2   Change in Wound Size % (l*w) 94.67   Wound Volume (cm^3) 0.032 cm^3   Wound Healing % 79   Post-Procedure Length (cm) 0.5 cm   Post-Procedure Width (cm) 0.4 cm   Post-Procedure Depth (cm) 0.3 cm   Post-Procedure Surface Area (cm^2) 0.2 cm^2   Post-Procedure Volume (cm^3) 0.06 cm^3   Wound Assessment Pink/red   Drainage Amount Small (< 25%)   Drainage Description Serosanguinous   Odor None   Stella-wound Assessment Hyperkeratosis (callous)   Wound Thickness Description not for Pressure Injury Full thickness

## 2024-01-19 NOTE — PROGRESS NOTES
personally performed a face-to-face diagnostic evaluation and management  service on this patient.    I have independently seen the patient.   I have independently obtained the above history from the patient/family.    I have independently examined the patient with above findings.  I have independently reviewed data/labs for this patient and developed the above plan of care (MDM).      Electronically signed by Efe Gramajo MD on 1/18/2024 at 8:05 PM

## 2024-01-22 ENCOUNTER — HOSPITAL ENCOUNTER (OUTPATIENT)
Dept: WOUND CARE | Age: 59
Discharge: HOME | End: 2024-01-22
Payer: MEDICARE

## 2024-01-22 VITALS
DIASTOLIC BLOOD PRESSURE: 95 MMHG | RESPIRATION RATE: 18 BRPM | HEART RATE: 77 BPM | SYSTOLIC BLOOD PRESSURE: 167 MMHG | OXYGEN SATURATION: 100 % | TEMPERATURE: 97.7 F

## 2024-01-22 DIAGNOSIS — E11.621 DIABETIC ULCER OF TOE OF LEFT FOOT ASSOCIATED WITH TYPE 2 DIABETES MELLITUS, WITH NECROSIS OF BONE (HCC): Primary | ICD-10-CM

## 2024-01-22 DIAGNOSIS — L97.524 DIABETIC ULCER OF TOE OF LEFT FOOT ASSOCIATED WITH TYPE 2 DIABETES MELLITUS, WITH NECROSIS OF BONE (HCC): Primary | ICD-10-CM

## 2024-01-22 LAB
GLUCOSE BLD STRIP.AUTO-MCNC: 220 MG/DL (ref 65–100)
GLUCOSE BLD STRIP.AUTO-MCNC: 265 MG/DL (ref 65–100)
SERVICE CMNT-IMP: ABNORMAL
SERVICE CMNT-IMP: ABNORMAL

## 2024-01-22 PROCEDURE — 99183 HYPERBARIC OXYGEN THERAPY: CPT | Performed by: FAMILY MEDICINE

## 2024-01-22 PROCEDURE — G0277 HBOT, FULL BODY CHAMBER, 30M: HCPCS

## 2024-01-22 PROCEDURE — 82962 GLUCOSE BLOOD TEST: CPT

## 2024-01-22 ASSESSMENT — PAIN SCALES - GENERAL: PAINLEVEL_OUTOF10: 0

## 2024-01-23 ENCOUNTER — HOSPITAL ENCOUNTER (OUTPATIENT)
Dept: WOUND CARE | Age: 59
Discharge: HOME | End: 2024-01-23
Payer: MEDICARE

## 2024-01-23 VITALS
HEART RATE: 84 BPM | TEMPERATURE: 97.5 F | DIASTOLIC BLOOD PRESSURE: 102 MMHG | RESPIRATION RATE: 18 BRPM | SYSTOLIC BLOOD PRESSURE: 145 MMHG | OXYGEN SATURATION: 97 %

## 2024-01-23 DIAGNOSIS — L97.524 DIABETIC ULCER OF TOE OF LEFT FOOT ASSOCIATED WITH TYPE 2 DIABETES MELLITUS, WITH NECROSIS OF BONE (HCC): Primary | ICD-10-CM

## 2024-01-23 DIAGNOSIS — E11.621 DIABETIC ULCER OF TOE OF LEFT FOOT ASSOCIATED WITH TYPE 2 DIABETES MELLITUS, WITH NECROSIS OF BONE (HCC): Primary | ICD-10-CM

## 2024-01-23 LAB
GLUCOSE BLD STRIP.AUTO-MCNC: 266 MG/DL (ref 65–100)
GLUCOSE BLD STRIP.AUTO-MCNC: 288 MG/DL (ref 65–100)
SERVICE CMNT-IMP: ABNORMAL
SERVICE CMNT-IMP: ABNORMAL

## 2024-01-23 PROCEDURE — 99183 HYPERBARIC OXYGEN THERAPY: CPT | Performed by: FAMILY MEDICINE

## 2024-01-23 PROCEDURE — G0277 HBOT, FULL BODY CHAMBER, 30M: HCPCS

## 2024-01-23 PROCEDURE — 82962 GLUCOSE BLOOD TEST: CPT

## 2024-01-23 ASSESSMENT — PAIN SCALES - GENERAL
PAINLEVEL_OUTOF10: 0
PAINLEVEL_OUTOF10: 0

## 2024-01-24 ENCOUNTER — TELEPHONE (OUTPATIENT)
Dept: ENDOCRINOLOGY | Age: 59
End: 2024-01-24

## 2024-01-24 ENCOUNTER — HOSPITAL ENCOUNTER (OUTPATIENT)
Dept: WOUND CARE | Age: 59
Discharge: HOME | End: 2024-01-24
Payer: MEDICARE

## 2024-01-24 VITALS
TEMPERATURE: 97.5 F | DIASTOLIC BLOOD PRESSURE: 99 MMHG | OXYGEN SATURATION: 97 % | RESPIRATION RATE: 18 BRPM | SYSTOLIC BLOOD PRESSURE: 149 MMHG | HEART RATE: 69 BPM

## 2024-01-24 DIAGNOSIS — E11.621 DIABETIC ULCER OF TOE OF LEFT FOOT ASSOCIATED WITH TYPE 2 DIABETES MELLITUS, WITH NECROSIS OF BONE (HCC): Primary | ICD-10-CM

## 2024-01-24 DIAGNOSIS — L97.524 DIABETIC ULCER OF TOE OF LEFT FOOT ASSOCIATED WITH TYPE 2 DIABETES MELLITUS, WITH NECROSIS OF BONE (HCC): Primary | ICD-10-CM

## 2024-01-24 LAB
GLUCOSE BLD STRIP.AUTO-MCNC: 237 MG/DL (ref 65–100)
GLUCOSE BLD STRIP.AUTO-MCNC: 253 MG/DL (ref 65–100)
SERVICE CMNT-IMP: ABNORMAL
SERVICE CMNT-IMP: ABNORMAL

## 2024-01-24 PROCEDURE — 99183 HYPERBARIC OXYGEN THERAPY: CPT | Performed by: FAMILY MEDICINE

## 2024-01-24 PROCEDURE — G0277 HBOT, FULL BODY CHAMBER, 30M: HCPCS

## 2024-01-24 PROCEDURE — 82962 GLUCOSE BLOOD TEST: CPT

## 2024-01-24 ASSESSMENT — PAIN SCALES - GENERAL: PAINLEVEL_OUTOF10: 0

## 2024-01-24 NOTE — TELEPHONE ENCOUNTER
Reported hyperglycemia despite significant uptitration of tresiba and compliance with 10 units of prandial insulin TIDAC plus 1/25>150 correction    Increase prandial dose from 10 to 12 units    Shot sheets sent to CM and wound care doc to hopefully distribute to patient. Can print and mail hard copy

## 2024-01-25 ENCOUNTER — HOSPITAL ENCOUNTER (OUTPATIENT)
Dept: WOUND CARE | Age: 59
Discharge: HOME | End: 2024-01-25
Payer: MEDICARE

## 2024-01-25 ENCOUNTER — CARE COORDINATION (OUTPATIENT)
Dept: CARE COORDINATION | Facility: CLINIC | Age: 59
End: 2024-01-25

## 2024-01-25 VITALS
TEMPERATURE: 97.7 F | HEART RATE: 83 BPM | DIASTOLIC BLOOD PRESSURE: 89 MMHG | SYSTOLIC BLOOD PRESSURE: 158 MMHG | OXYGEN SATURATION: 100 % | RESPIRATION RATE: 18 BRPM

## 2024-01-25 VITALS
RESPIRATION RATE: 18 BRPM | TEMPERATURE: 97.7 F | SYSTOLIC BLOOD PRESSURE: 158 MMHG | OXYGEN SATURATION: 100 % | DIASTOLIC BLOOD PRESSURE: 89 MMHG | HEART RATE: 83 BPM

## 2024-01-25 DIAGNOSIS — F17.210 CIGARETTE SMOKER: ICD-10-CM

## 2024-01-25 DIAGNOSIS — Z79.4 TYPE 2 DIABETES MELLITUS WITH HYPERGLYCEMIA, WITH LONG-TERM CURRENT USE OF INSULIN (HCC): ICD-10-CM

## 2024-01-25 DIAGNOSIS — E11.621 DIABETIC ULCER OF TOE OF LEFT FOOT ASSOCIATED WITH TYPE 2 DIABETES MELLITUS, WITH NECROSIS OF BONE (HCC): Primary | ICD-10-CM

## 2024-01-25 DIAGNOSIS — E11.65 TYPE 2 DIABETES MELLITUS WITH HYPERGLYCEMIA, WITH LONG-TERM CURRENT USE OF INSULIN (HCC): ICD-10-CM

## 2024-01-25 DIAGNOSIS — L97.524 DIABETIC ULCER OF TOE OF LEFT FOOT ASSOCIATED WITH TYPE 2 DIABETES MELLITUS, WITH NECROSIS OF BONE (HCC): Primary | ICD-10-CM

## 2024-01-25 DIAGNOSIS — E11.42 PERIPHERAL SENSORY NEUROPATHY DUE TO TYPE 2 DIABETES MELLITUS (HCC): ICD-10-CM

## 2024-01-25 DIAGNOSIS — M86.672 CHRONIC OSTEOMYELITIS OF TOE OF LEFT FOOT (HCC): ICD-10-CM

## 2024-01-25 LAB
GLUCOSE BLD STRIP.AUTO-MCNC: 283 MG/DL (ref 65–100)
GLUCOSE BLD STRIP.AUTO-MCNC: 328 MG/DL (ref 65–100)
SERVICE CMNT-IMP: ABNORMAL
SERVICE CMNT-IMP: ABNORMAL

## 2024-01-25 PROCEDURE — 82962 GLUCOSE BLOOD TEST: CPT

## 2024-01-25 PROCEDURE — 97597 DBRDMT OPN WND 1ST 20 CM/<: CPT

## 2024-01-25 PROCEDURE — 99183 HYPERBARIC OXYGEN THERAPY: CPT | Performed by: SURGERY

## 2024-01-25 PROCEDURE — G0277 HBOT, FULL BODY CHAMBER, 30M: HCPCS

## 2024-01-25 ASSESSMENT — PAIN SCALES - GENERAL: PAINLEVEL_OUTOF10: 0

## 2024-01-25 NOTE — CARE COORDINATION
High Risk Case Management - Care Coordination:   - patient continues to experience hyperglycemia   - per Endocrinology he is to increase prandial dose from 10 to 12 units.    Forwarded \"shot sheets\" from endo provider to patient via email.  Also texted him that email has been sent and that wound doctor also had a copy.    PLAN:  follow up via phone call this afternoon after hyperbaric session.

## 2024-01-25 NOTE — FLOWSHEET NOTE
01/25/24 1506   Wound 10/02/23 Toe (Comment  which one) Left;Plantar great toe #4   Date First Assessed/Time First Assessed: 10/02/23 1133   Present on Original Admission: Yes  Primary Wound Type: Diabetic Ulcer  Location: Toe (Comment  which one)  Wound Location Orientation: Left;Plantar  Wound Description (Comments): great toe #4   Wound Image    Wound Etiology Diabetic Gavin 3   Dressing Status Intact   Wound Cleansed Vashe   Dressing/Treatment Alginate with Ag   Offloading for Diabetic Foot Ulcers Offloading ordered   Wound Length (cm) 0.5 cm   Wound Width (cm) 0.4 cm   Wound Depth (cm) 0.3 cm   Wound Surface Area (cm^2) 0.2 cm^2   Change in Wound Size % (l*w) 86.67   Wound Volume (cm^3) 0.06 cm^3   Wound Healing % 60   Post-Procedure Length (cm) 0.5 cm   Post-Procedure Width (cm) 0.4 cm   Post-Procedure Depth (cm) 0.4 cm   Post-Procedure Surface Area (cm^2) 0.2 cm^2   Post-Procedure Volume (cm^3) 0.08 cm^3   Wound Assessment Pink/red;Dry   Drainage Amount Small (< 25%)   Drainage Description Serosanguinous   Odor None   Stella-wound Assessment Hyperkeratosis (callous)   Wound Thickness Description not for Pressure Injury Full thickness

## 2024-01-25 NOTE — PROGRESS NOTES
premises and immediately available to furnish assistance & direction throughout the HBO procedure today.   MD spoke with patient regarding any problems or questions related to HBO therapy.  Reviewed HBO treatment profile with HBO  and verified no adverse reactions noted.      Plan      Patient placed in a full body Monoplace Chamber #: [id number]  Treatment Start Time: 1247     Pressure Reached Time: 1256  JAC : 2  Number of Air Breaks:        Decompression Time: 1428   Treatment End Time: 1436  Length of Treatment: 90 Minutes  Symptoms Noted During Treatment: None  Total Treatment Time (min): 109  Treatment Completion Status: Treatment completed without issue (D/C instructions reviewed no questions)      I was present on these premises and immediately available to furnish assistance & direction throughout the HBO Treatment.     Estevan Valenzuela is a 58 y.o. male  successfully completed today's hyperbaric oxygen treatment at [facility] and HBO therapy.  In my clinical judgement, ongoing HBO therapy is necessary at this time to assist with wound healing, preservation of limb, life, or function.  Supervision and attendance of Hyperbaric Oxygen Therapy provided. Continue HBO treatment as outlined in the treatment plan.  Hyperbaric Oxygen: Mr. Valenzuela tolerated: Treatment Number: 15 without issue.    Discharge Instructions were explained and given to Mr. Valenzuela   Electronically signed by Efe Gramajo MD on 1/25/2024 at 6:17 PM

## 2024-01-25 NOTE — WOUND CARE
Discharge Instructions for  [facility]  [address] [place] [address]  [address]  [place] [address]  Phone [phone]   Fax [phone]      NAME:  Estevan Valenzuela  YOB: 1965  MEDICAL RECORD NUMBER:  [id number]  DATE:  1/25/2024    Return Appointment:   1 week with Efe Gramajo MD      Instructions:     Left Great Toe.   Clean with saline.  Vashe post debridment.  Apply thin layer of iodosorb to wound bed.  Cover with gauze  Wrap with Rolled Gauze.  Change 3x weekly.     Patient to offload wound with wide toebox shoes or darco peg shoe  while standing or weight bearing.     Do not get wound wet. May purchase cast cover at local pharmacy to keep dry in shower.  Wound healing is greatly slowed when blood glucose levels are greater than 200. Monitor glucose levels daily to ensure tight glucose control.  Follow up with PCP if your glucose levels are frequently greater than 200.  Smoking greatly slows wound healing by restricting essential blow flow. Decrease smoking with a plan to quit.  Increase protein intake to promote wound healing. Protein supplements such as Arya and Ensure are great options.     Continue antibiotics as prescribed by Infectious Disease.     Continue HBO as prescribed.       Should you experience increased redness, swelling, pain, foul odor, size of wound(s), or have a temperature over 101 degrees please contact the Wound Healing Center at [phone] or if after hours contact your primary care physician or go to the hospital emergency department.    PLEASE NOTE: IF YOU ARE UNABLE TO OBTAIN WOUND SUPPLIES, CONTINUE TO USE THE SUPPLIES YOU HAVE AVAILABLE UNTIL YOU ARE ABLE TO REACH US. IT IS MOST IMPORTANT TO KEEP THE WOUND COVERED AT ALL TIMES.    Electronically signed Emily HERNANDEZ RN on 1/25/2024 at 3:38 PM

## 2024-01-26 NOTE — PROGRESS NOTES
?Ordering of each unique test*;   ?Assessment requiring an independent historian(s)    or   Category 2: Independent interpretation of tests   ?Independent interpretation of a test performed by another physician/other qualified health care professional (not separately reported);     or  Category 3: Discussion of management or test interpretation  ?Discussion of management or test interpretation with external physician/other qualified health care professional/appropriate source (not separately reported)   High risk of morbidity from additional diagnostic testing or treatment  Examples only:  ?Drug therapy requiring intensive monitoring for toxicity  ?Decision regarding elective major surgery with identified patient or procedure risk factors  ?Decision regarding emergency major surgery  ?Decision regarding hospitalization  ?Decision not to resuscitate or to de-escalate care because of poor prognosis      Parenteral controlled substances                 I have personally performed a face-to-face diagnostic evaluation and management  service on this patient.    I have independently seen the patient.   I have independently obtained the above history from the patient/family.    I have independently examined the patient with above findings.  I have independently reviewed data/labs for this patient and developed the above plan of care (MDM).      Electronically signed by Efe Gramajo MD on 1/25/2024 at 7:49 PM

## 2024-01-29 ENCOUNTER — HOSPITAL ENCOUNTER (OUTPATIENT)
Dept: WOUND CARE | Age: 59
Discharge: HOME | End: 2024-01-29
Payer: MEDICARE

## 2024-01-29 VITALS
OXYGEN SATURATION: 97 % | RESPIRATION RATE: 18 BRPM | SYSTOLIC BLOOD PRESSURE: 146 MMHG | HEART RATE: 71 BPM | TEMPERATURE: 97.8 F | DIASTOLIC BLOOD PRESSURE: 104 MMHG

## 2024-01-29 DIAGNOSIS — E11.621 DIABETIC ULCER OF TOE OF LEFT FOOT ASSOCIATED WITH TYPE 2 DIABETES MELLITUS, WITH NECROSIS OF BONE (HCC): Primary | ICD-10-CM

## 2024-01-29 DIAGNOSIS — L97.524 DIABETIC ULCER OF TOE OF LEFT FOOT ASSOCIATED WITH TYPE 2 DIABETES MELLITUS, WITH NECROSIS OF BONE (HCC): Primary | ICD-10-CM

## 2024-01-29 LAB
GLUCOSE BLD STRIP.AUTO-MCNC: 295 MG/DL (ref 65–100)
GLUCOSE BLD STRIP.AUTO-MCNC: 336 MG/DL (ref 65–100)
SERVICE CMNT-IMP: ABNORMAL
SERVICE CMNT-IMP: ABNORMAL

## 2024-01-29 PROCEDURE — 99183 HYPERBARIC OXYGEN THERAPY: CPT | Performed by: FAMILY MEDICINE

## 2024-01-29 PROCEDURE — 82962 GLUCOSE BLOOD TEST: CPT

## 2024-01-29 PROCEDURE — G0277 HBOT, FULL BODY CHAMBER, 30M: HCPCS

## 2024-01-29 ASSESSMENT — PAIN SCALES - GENERAL
PAINLEVEL_OUTOF10: 0
PAINLEVEL_OUTOF10: 0

## 2024-01-29 NOTE — PROGRESS NOTES
HBO PROGRESS NOTE      NAME: Estevan Valenzuela  MEDICAL RECORD NUMBER:  [id number]  AGE: 58 y.o.   GENDER: male  : 1965  EPISODE DATE:  2024     Subjective     HBO Treatment Number: 16 out of Total Treatments: 40    HBO Diagnosis:             Indications: Chronic Refractory Osteomyelitis to ___ (site) (Left Great Toe)    Safety checks performed prior to treatment.  See doc flowsheets for documentation.    Objective        Recent Labs     24  1212 24  1409   POCGLU 336* 295*     Pre treatment Vital Signs       Temp: 97.8 °F (36.6 °C)     Pulse: 92     Respirations: 18     BP: (!) 156/107       Post treatment Vital Signs  Temp: 97.8 °F (36.6 °C)  Pulse: 71  Respirations: 18  BP: (!) 146/104    Assessment        HBO Diagnosis:   Problem List Items Addressed This Visit          Endocrine    Diabetic ulcer of toe of left foot associated with type 2 diabetes mellitus, with necrosis of bone (HCC) - Primary (Chronic)    Relevant Orders    Hypoglycemial protocol    POCT glucose    Care order/instruction    Care order/instruction    Care order/instruction    Care order/instruction    Care order/instruction    Care order/instruction    Care order/instruction    Care order/instruction    Notify physician (specify)    Hyperbaric Oxygen Therapy       Physical Exam        General Appearance:  alert and oriented to person, place and time, well-developed and well-nourished, in no acute distress    Pre Tympanic Membrane Assessment:  Right: Normal  Left: Normal    Post Tympanic Membrane Assessment:  Left: Normal  Right: Normal    Pulmonary/Chest:  clear to auscultation bilaterally- no wheezes, rales or rhonchi, normal air movement, no respiratory distress    Cardiovascular:  normal    Plan        Patient placed in a full body Monoplace Chamber #: [id number]  Treatment Start Time: 1219     Pressure Reached Time: 1228  JAC : 2  Number of Air Breaks:        Decompression Time: 1358   Treatment End Time:

## 2024-01-30 ENCOUNTER — HOSPITAL ENCOUNTER (OUTPATIENT)
Dept: WOUND CARE | Age: 59
Discharge: HOME | End: 2024-01-30
Payer: MEDICARE

## 2024-01-30 VITALS
SYSTOLIC BLOOD PRESSURE: 159 MMHG | HEART RATE: 69 BPM | DIASTOLIC BLOOD PRESSURE: 99 MMHG | TEMPERATURE: 97.5 F | OXYGEN SATURATION: 96 % | RESPIRATION RATE: 18 BRPM

## 2024-01-30 DIAGNOSIS — E11.621 DIABETIC ULCER OF TOE OF LEFT FOOT ASSOCIATED WITH TYPE 2 DIABETES MELLITUS, WITH NECROSIS OF BONE (HCC): Primary | ICD-10-CM

## 2024-01-30 DIAGNOSIS — L97.524 DIABETIC ULCER OF TOE OF LEFT FOOT ASSOCIATED WITH TYPE 2 DIABETES MELLITUS, WITH NECROSIS OF BONE (HCC): Primary | ICD-10-CM

## 2024-01-30 LAB
GLUCOSE BLD STRIP.AUTO-MCNC: 276 MG/DL (ref 65–100)
GLUCOSE BLD STRIP.AUTO-MCNC: 293 MG/DL (ref 65–100)
SERVICE CMNT-IMP: ABNORMAL
SERVICE CMNT-IMP: ABNORMAL

## 2024-01-30 PROCEDURE — G0277 HBOT, FULL BODY CHAMBER, 30M: HCPCS

## 2024-01-30 PROCEDURE — 82962 GLUCOSE BLOOD TEST: CPT

## 2024-01-30 ASSESSMENT — PAIN SCALES - GENERAL: PAINLEVEL_OUTOF10: 0

## 2024-01-31 ENCOUNTER — HOSPITAL ENCOUNTER (OUTPATIENT)
Dept: WOUND CARE | Age: 59
Discharge: HOME | End: 2024-01-31
Payer: MEDICARE

## 2024-01-31 VITALS
RESPIRATION RATE: 18 BRPM | DIASTOLIC BLOOD PRESSURE: 105 MMHG | TEMPERATURE: 97.7 F | SYSTOLIC BLOOD PRESSURE: 166 MMHG | HEART RATE: 71 BPM | OXYGEN SATURATION: 100 %

## 2024-01-31 DIAGNOSIS — L97.524 DIABETIC ULCER OF TOE OF LEFT FOOT ASSOCIATED WITH TYPE 2 DIABETES MELLITUS, WITH NECROSIS OF BONE (HCC): Primary | ICD-10-CM

## 2024-01-31 DIAGNOSIS — E11.621 DIABETIC ULCER OF TOE OF LEFT FOOT ASSOCIATED WITH TYPE 2 DIABETES MELLITUS, WITH NECROSIS OF BONE (HCC): Primary | ICD-10-CM

## 2024-01-31 LAB
GLUCOSE BLD STRIP.AUTO-MCNC: 277 MG/DL (ref 65–100)
GLUCOSE BLD STRIP.AUTO-MCNC: 296 MG/DL (ref 65–100)
SERVICE CMNT-IMP: ABNORMAL
SERVICE CMNT-IMP: ABNORMAL

## 2024-01-31 PROCEDURE — G0277 HBOT, FULL BODY CHAMBER, 30M: HCPCS

## 2024-01-31 PROCEDURE — 82962 GLUCOSE BLOOD TEST: CPT

## 2024-01-31 ASSESSMENT — PAIN SCALES - GENERAL
PAINLEVEL_OUTOF10: 0
PAINLEVEL_OUTOF10: 0

## 2024-02-01 ENCOUNTER — HOSPITAL ENCOUNTER (OUTPATIENT)
Dept: WOUND CARE | Age: 59
Discharge: HOME | End: 2024-02-01
Payer: MEDICARE

## 2024-02-01 VITALS
SYSTOLIC BLOOD PRESSURE: 172 MMHG | HEART RATE: 67 BPM | TEMPERATURE: 97.8 F | DIASTOLIC BLOOD PRESSURE: 89 MMHG | OXYGEN SATURATION: 92 % | RESPIRATION RATE: 18 BRPM

## 2024-02-01 VITALS
TEMPERATURE: 97.8 F | DIASTOLIC BLOOD PRESSURE: 89 MMHG | RESPIRATION RATE: 18 BRPM | OXYGEN SATURATION: 92 % | HEART RATE: 67 BPM | SYSTOLIC BLOOD PRESSURE: 172 MMHG

## 2024-02-01 DIAGNOSIS — L97.524 DIABETIC ULCER OF TOE OF LEFT FOOT ASSOCIATED WITH TYPE 2 DIABETES MELLITUS, WITH NECROSIS OF BONE (HCC): Primary | ICD-10-CM

## 2024-02-01 DIAGNOSIS — E11.42 PERIPHERAL SENSORY NEUROPATHY DUE TO TYPE 2 DIABETES MELLITUS (HCC): ICD-10-CM

## 2024-02-01 DIAGNOSIS — F17.210 CIGARETTE SMOKER: ICD-10-CM

## 2024-02-01 DIAGNOSIS — E11.621 DIABETIC ULCER OF TOE OF LEFT FOOT ASSOCIATED WITH TYPE 2 DIABETES MELLITUS, WITH NECROSIS OF BONE (HCC): Primary | ICD-10-CM

## 2024-02-01 DIAGNOSIS — E11.65 TYPE 2 DIABETES MELLITUS WITH HYPERGLYCEMIA, WITH LONG-TERM CURRENT USE OF INSULIN (HCC): ICD-10-CM

## 2024-02-01 DIAGNOSIS — E11.621 DIABETIC ULCER OF TOE OF RIGHT FOOT ASSOCIATED WITH TYPE 2 DIABETES MELLITUS, WITH FAT LAYER EXPOSED (HCC): ICD-10-CM

## 2024-02-01 DIAGNOSIS — L97.512 DIABETIC ULCER OF TOE OF RIGHT FOOT ASSOCIATED WITH TYPE 2 DIABETES MELLITUS, WITH FAT LAYER EXPOSED (HCC): ICD-10-CM

## 2024-02-01 DIAGNOSIS — L97.523 DIABETIC ULCER OF TOE OF LEFT FOOT ASSOCIATED WITH TYPE 2 DIABETES MELLITUS, WITH NECROSIS OF MUSCLE (HCC): ICD-10-CM

## 2024-02-01 DIAGNOSIS — E11.621 DIABETIC ULCER OF TOE OF LEFT FOOT ASSOCIATED WITH TYPE 2 DIABETES MELLITUS, WITH NECROSIS OF MUSCLE (HCC): ICD-10-CM

## 2024-02-01 DIAGNOSIS — M86.672 CHRONIC OSTEOMYELITIS OF TOE OF LEFT FOOT (HCC): ICD-10-CM

## 2024-02-01 DIAGNOSIS — L03.032 CELLULITIS OF GREAT TOE OF LEFT FOOT: ICD-10-CM

## 2024-02-01 DIAGNOSIS — Z79.4 TYPE 2 DIABETES MELLITUS WITH HYPERGLYCEMIA, WITH LONG-TERM CURRENT USE OF INSULIN (HCC): ICD-10-CM

## 2024-02-01 DIAGNOSIS — M86.172 ACUTE OSTEOMYELITIS OF TOE OF LEFT FOOT (HCC): ICD-10-CM

## 2024-02-01 DIAGNOSIS — B20 HUMAN IMMUNODEFICIENCY VIRUS (HCC): ICD-10-CM

## 2024-02-01 LAB
GLUCOSE BLD STRIP.AUTO-MCNC: 233 MG/DL (ref 65–100)
GLUCOSE BLD STRIP.AUTO-MCNC: 317 MG/DL (ref 65–100)
SERVICE CMNT-IMP: ABNORMAL
SERVICE CMNT-IMP: ABNORMAL

## 2024-02-01 PROCEDURE — 82962 GLUCOSE BLOOD TEST: CPT

## 2024-02-01 PROCEDURE — G0277 HBOT, FULL BODY CHAMBER, 30M: HCPCS

## 2024-02-01 PROCEDURE — 99213 OFFICE O/P EST LOW 20 MIN: CPT

## 2024-02-01 RX ORDER — LIDOCAINE 40 MG/G
CREAM TOPICAL ONCE
OUTPATIENT
Start: 2024-02-01 | End: 2024-02-01

## 2024-02-01 RX ORDER — BACITRACIN ZINC AND POLYMYXIN B SULFATE 500; 1000 [USP'U]/G; [USP'U]/G
OINTMENT TOPICAL ONCE
OUTPATIENT
Start: 2024-02-01 | End: 2024-02-01

## 2024-02-01 RX ORDER — SODIUM CHLOR/HYPOCHLOROUS ACID 0.033 %
SOLUTION, IRRIGATION IRRIGATION ONCE
OUTPATIENT
Start: 2024-02-01 | End: 2024-02-01

## 2024-02-01 RX ORDER — LIDOCAINE HYDROCHLORIDE 20 MG/ML
JELLY TOPICAL ONCE
OUTPATIENT
Start: 2024-02-01 | End: 2024-02-01

## 2024-02-01 RX ORDER — BETAMETHASONE DIPROPIONATE 0.05 %
OINTMENT (GRAM) TOPICAL ONCE
OUTPATIENT
Start: 2024-02-01 | End: 2024-02-01

## 2024-02-01 RX ORDER — TRIAMCINOLONE ACETONIDE 1 MG/G
OINTMENT TOPICAL ONCE
OUTPATIENT
Start: 2024-02-01 | End: 2024-02-01

## 2024-02-01 RX ORDER — LIDOCAINE 50 MG/G
OINTMENT TOPICAL ONCE
OUTPATIENT
Start: 2024-02-01 | End: 2024-02-01

## 2024-02-01 RX ORDER — CLOBETASOL PROPIONATE 0.5 MG/G
OINTMENT TOPICAL ONCE
OUTPATIENT
Start: 2024-02-01 | End: 2024-02-01

## 2024-02-01 RX ORDER — LIDOCAINE HYDROCHLORIDE 40 MG/ML
SOLUTION TOPICAL ONCE
OUTPATIENT
Start: 2024-02-01 | End: 2024-02-01

## 2024-02-01 RX ORDER — IBUPROFEN 200 MG
TABLET ORAL ONCE
OUTPATIENT
Start: 2024-02-01 | End: 2024-02-01

## 2024-02-01 RX ORDER — GINSENG 100 MG
CAPSULE ORAL ONCE
OUTPATIENT
Start: 2024-02-01 | End: 2024-02-01

## 2024-02-01 RX ORDER — GENTAMICIN SULFATE 1 MG/G
OINTMENT TOPICAL ONCE
OUTPATIENT
Start: 2024-02-01 | End: 2024-02-01

## 2024-02-01 ASSESSMENT — PAIN SCALES - GENERAL
PAINLEVEL_OUTOF10: 0
PAINLEVEL_OUTOF10: 0

## 2024-02-01 NOTE — FLOWSHEET NOTE
02/01/24 1534   Wound 10/02/23 Toe (Comment  which one) Left;Plantar great toe #4   Date First Assessed/Time First Assessed: 10/02/23 1133   Present on Original Admission: Yes  Primary Wound Type: Diabetic Ulcer  Location: Toe (Comment  which one)  Wound Location Orientation: Left;Plantar  Wound Description (Comments): great toe #4   Wound Image    Wound Etiology Diabetic Gavin 3   Dressing Status Intact   Wound Cleansed Vashe   Dressing/Treatment Other (comment)  (Iodosorb, rolled gauze)   Offloading for Diabetic Foot Ulcers Offloading ordered   Wound Length (cm) 0.3 cm   Wound Width (cm) 0.2 cm   Wound Depth (cm) 0.2 cm   Wound Surface Area (cm^2) 0.06 cm^2   Change in Wound Size % (l*w) 96   Wound Volume (cm^3) 0.012 cm^3   Wound Healing % 92   Wound Assessment Pink/red   Drainage Amount Scant (moist but unmeasurable)   Drainage Description Serosanguinous   Odor None   Stella-wound Assessment Hyperkeratosis (callous)   Wound Thickness Description not for Pressure Injury Full thickness

## 2024-02-01 NOTE — PROGRESS NOTES
HBO PROGRESS NOTE  NAME: Estevan Valenzuela   MEDICAL RECORD NUMBER:  [id number]  AGE: 58 y.o. GENDER: male  : 1965  EPISODE DATE:  2024     Subjective   HBO Treatment Number: 19 out of Total Treatments: 40    HBO Diagnosis:           Indications: Chronic Refractory Osteomyelitis to ___ (site) (Left great toe)    Safety checks performed prior to treatment.  See doc flowsheets for documentation.      Objective        Lab Results   Component Value Date/Time    GLUCPOC 264 2023 02:01 PM     Recent Labs     24  1221 24  1427   POCGLU 317* 233*     Pre treatment Vital Signs       Temp: 97.8 °F (36.6 °C)     Pulse: 90     Respirations: 18     BP: (!) 141/102       Post treatment Vital Signs  Temp: 97.8 °F (36.6 °C)  Pulse: 67  Respirations: 18  BP: (!) 172/89    Assessment      HBO Diagnosis:   Problem List Items Addressed This Visit          Endocrine    Diabetic ulcer of toe of left foot associated with type 2 diabetes mellitus, with necrosis of bone (HCC) - Primary (Chronic)    Relevant Orders    Hypoglycemial protocol    POCT glucose    Care order/instruction    Care order/instruction    Care order/instruction    Care order/instruction    Care order/instruction    Care order/instruction    Care order/instruction    Care order/instruction    Notify physician (specify)    Hyperbaric Oxygen Therapy         Physical Exam:  General Appearance: alert, interacfive    Pre Tympanic Membrane Assessment:  Right: Normal  Left: Normal    Post Tympanic Membrane Assessment:  Left: Normal  Right: Normal    Pulmonary/Chest: No audible wheezing      Patient place in a fully body Monoplace serial number         Treatment Start Time: 1236 Door Closed      Pressure Reached  JAC : 2 JAC  Number of Air Breaks: none        Decompression Time   Treatment End Time: 1423 Door Opened    Symptoms Noted During Treatment: None    Length of Treatment: 90 Minutes         Adverse Event:      I was present on these

## 2024-02-01 NOTE — DISCHARGE INSTRUCTIONS
Left Great Toe.   Clean with saline or Vashe.  Apply thin layer of iodosorb to wound bed.  Cover with gauze  Wrap with Rolled Gauze.  Change daily.     Patient to offload wound with wide toebox shoes or darco peg shoe  while standing or weight bearing.     Do not get wound wet. May purchase cast cover at local pharmacy to keep dry in shower.  Wound healing is greatly slowed when blood glucose levels are greater than 200. Monitor glucose levels daily to ensure tight glucose control.  Follow up with PCP if your glucose levels are frequently greater than 200.    Smoking greatly slows wound healing by restricting essential blow flow. Decrease smoking with a plan to quit.    Increase protein intake to promote wound healing. Protein supplements such as Arya and Ensure are great options.     Continue antibiotics as prescribed by Infectious Disease.     Continue HBO as prescribed.

## 2024-02-01 NOTE — WOUND CARE
Discharge Instructions for  [facility]  [address] [place] [address]  [address]  [place] [address]  Phone [phone]   Fax [phone]      NAME:  Estevan Valenzuela  YOB: 1965  MEDICAL RECORD NUMBER:  [id number]  DATE:  2/1/2024    Return Appointment:   1 week with Efe Gramajo MD      Instructions:   Left Great Toe.   Clean with saline or Vashe.  Apply thin layer of iodosorb to wound bed.  Cover with gauze  Wrap with Rolled Gauze.  Change daily.     Patient to offload wound with wide toebox shoes or darco peg shoe  while standing or weight bearing.     Do not get wound wet. May purchase cast cover at local pharmacy to keep dry in shower.  Wound healing is greatly slowed when blood glucose levels are greater than 200. Monitor glucose levels daily to ensure tight glucose control.  Follow up with PCP if your glucose levels are frequently greater than 200.    Smoking greatly slows wound healing by restricting essential blow flow. Decrease smoking with a plan to quit.    Increase protein intake to promote wound healing. Protein supplements such as Arya and Ensure are great options.     Continue antibiotics as prescribed by Infectious Disease.     Continue HBO as prescribed.               Should you experience increased redness, swelling, pain, foul odor, size of wound(s), or have a temperature over 101 degrees please contact the Wound Healing Center at [phone] or if after hours contact your primary care physician or go to the hospital emergency department.    PLEASE NOTE: IF YOU ARE UNABLE TO OBTAIN WOUND SUPPLIES, CONTINUE TO USE THE SUPPLIES YOU HAVE AVAILABLE UNTIL YOU ARE ABLE TO REACH US. IT IS MOST IMPORTANT TO KEEP THE WOUND COVERED AT ALL TIMES.    Electronically signed Emily HERNANDEZ RN on 2/1/2024 at 3:42 PM

## 2024-02-02 ENCOUNTER — HOSPITAL ENCOUNTER (OUTPATIENT)
Dept: WOUND CARE | Age: 59
Discharge: HOME | End: 2024-02-02
Payer: MEDICARE

## 2024-02-02 VITALS
SYSTOLIC BLOOD PRESSURE: 165 MMHG | TEMPERATURE: 97.9 F | HEART RATE: 62 BPM | OXYGEN SATURATION: 99 % | DIASTOLIC BLOOD PRESSURE: 100 MMHG | RESPIRATION RATE: 18 BRPM

## 2024-02-02 DIAGNOSIS — E11.621 DIABETIC ULCER OF TOE OF LEFT FOOT ASSOCIATED WITH TYPE 2 DIABETES MELLITUS, WITH NECROSIS OF BONE (HCC): Primary | ICD-10-CM

## 2024-02-02 DIAGNOSIS — L97.524 DIABETIC ULCER OF TOE OF LEFT FOOT ASSOCIATED WITH TYPE 2 DIABETES MELLITUS, WITH NECROSIS OF BONE (HCC): Primary | ICD-10-CM

## 2024-02-02 LAB
GLUCOSE BLD STRIP.AUTO-MCNC: 227 MG/DL (ref 65–100)
GLUCOSE BLD STRIP.AUTO-MCNC: 247 MG/DL (ref 65–100)
SERVICE CMNT-IMP: ABNORMAL
SERVICE CMNT-IMP: ABNORMAL

## 2024-02-02 PROCEDURE — G0277 HBOT, FULL BODY CHAMBER, 30M: HCPCS

## 2024-02-02 PROCEDURE — 82962 GLUCOSE BLOOD TEST: CPT

## 2024-02-02 ASSESSMENT — PAIN SCALES - GENERAL
PAINLEVEL_OUTOF10: 0
PAINLEVEL_OUTOF10: 0

## 2024-02-02 NOTE — PROGRESS NOTES
[facility]  Hyperbaric Oxygen Therapy   Utilization Review    Estevan Valenzuela  MEDICAL RECORD NUMBER:  [id number]  AGE: 58 y.o.   GENDER: male  : 1965  EPISODE DATE:  2024      HBO Diagnosis:                Estevan Valenzuela is a 58 y.o. male  Who is currently receiving hyperbaric oxygen therapy at [facility].    Mr. Valenzuela has currently received: Treatment Number: 20 out of Total Treatments: 40    In my clinical judgement, ongoing HBO therapy is  necessary at this time.  Mr. Valenzuela hasshown improvement with hyperbaric oxygen therapy as evidenced by improved wound healing            The patient/caregiver verbalized understanding of the Utilization Review for HBOT and has agreed to the plan.     Electronically signed by GHADA PASCUAL MD on 2024 at 12:49 PM

## 2024-02-02 NOTE — PROGRESS NOTES
HBO PROGRESS NOTE  NAME: Estevan Valenzuela   MEDICAL RECORD NUMBER:  [id number]  AGE: 58 y.o. GENDER: male  : 1965  EPISODE DATE:  2024     Subjective   HBO Treatment Number: 20 out of Total Treatments: 40    HBO Diagnosis:           Indications: Chronic Refractory Osteomyelitis to ___ (site) (Left Great toe)    Safety checks performed prior to treatment.  See doc flowsheets for documentation.      Objective        Lab Results   Component Value Date/Time    GLUCPOC 264 2023 02:01 PM     Recent Labs     24  1427 24  1223   POCGLU 233* 247*     Pre treatment Vital Signs       Temp: 97.9 °F (36.6 °C)     Pulse: 84     Respirations: 18     BP: (!) 185/102       Post treatment Vital Signs  Temp: 97.9 °F (36.6 °C)  Pulse: 62  Respirations: 18  BP: (!) 165/100    Assessment      HBO Diagnosis:   Problem List Items Addressed This Visit          Endocrine    Diabetic ulcer of toe of left foot associated with type 2 diabetes mellitus, with necrosis of bone (HCC) - Primary (Chronic)    Relevant Orders    Hypoglycemial protocol    POCT glucose    Care order/instruction    Care order/instruction    Care order/instruction    Care order/instruction    Care order/instruction    Care order/instruction    Care order/instruction    Care order/instruction    Notify physician (specify)    Hyperbaric Oxygen Therapy         Physical Exam:  General Appearance: alert, interacfive    Pre Tympanic Membrane Assessment:  Right: Normal  Left: Normal    Post Tympanic Membrane Assessment:          Pulmonary/Chest: No audible wheezing      Patient place in a fully body Monoplace serial number         Treatment Start Time: 1232 Door Closed      Pressure Reached  JAC : 2 JAC  Number of Air Breaks: none        Decompression Time   Treatment End Time: 1419 Door Opened    Symptoms Noted During Treatment: None    Length of Treatment: 90 Minutes         Adverse Event:      I was present on these premises and immediately

## 2024-02-02 NOTE — PROGRESS NOTES
[facility]    [facility]/[place], [place]  Consult Note/H&P/Progress Note      Estevan Valenzuela  MEDICAL RECORD NUMBER:  [id number]  AGE: 58 y.o. RACE White (non-)  GENDER: male  : 1965  EPISODE DATE:  2024       Subjective:      CC (Chief Complaint) and HISTORY of PRESENT ILLNESS (HPI):    Estevan Valenzuela is a 58 y.o. male who presents today for wound/ulcer evaluation.  He is seen on 2023, with greater than 5-month history of open wounds of both feet on the great toes.  The started out as calluses which he picked and they developed into ulcer wounds.  Since early on, he has been treating these wounds with hydrogen peroxide and Neosporin on a daily basis.  His wounds have remained nonhealing.  His condition is complicated by uncontrolled type 2 diabetes with hyperglycemia with long-term insulin use.  Last hemoglobin A1c in May was greater than 13.  Health is also complicated by HIV being treated with antivirals.  He smokes several cigarettes per day.  He had plain films of the left foot showed no evidence of osteomyelitis or foreign body.  He has strong palpable pulses and excellent capillary refill.  He has not had vascular studies.  Debridements were performed today and well-tolerated.    He returns on 10/2/2023.  He has stopped using peroxide and Neosporin, but is getting his dressings wet and dirty by working in the yard and poor shoes that are not protecting his feet.  He is changing his dressings 6 or 7 times per day and tearing off loose skin creating new wounds.  The right toe is fortunately superficial.  He has a new superficial wound on the plantar left great toe.  The medial left great toe wound is deep and down into the deep fat.  This is a worrisome wound.  He continues to smoke.  He is also having issues with chronic falls.  He is not wearing proper footwear.  He returns on 10/9/2023.  There is minimal improvement.  He is avoiding

## 2024-02-05 ENCOUNTER — HOSPITAL ENCOUNTER (OUTPATIENT)
Dept: WOUND CARE | Age: 59
Discharge: HOME | End: 2024-02-05
Payer: MEDICARE

## 2024-02-05 VITALS
SYSTOLIC BLOOD PRESSURE: 137 MMHG | OXYGEN SATURATION: 98 % | DIASTOLIC BLOOD PRESSURE: 127 MMHG | RESPIRATION RATE: 18 BRPM | HEART RATE: 68 BPM | TEMPERATURE: 97.8 F

## 2024-02-05 DIAGNOSIS — L97.524 DIABETIC ULCER OF TOE OF LEFT FOOT ASSOCIATED WITH TYPE 2 DIABETES MELLITUS, WITH NECROSIS OF BONE (HCC): Primary | ICD-10-CM

## 2024-02-05 DIAGNOSIS — E11.621 DIABETIC ULCER OF TOE OF LEFT FOOT ASSOCIATED WITH TYPE 2 DIABETES MELLITUS, WITH NECROSIS OF BONE (HCC): Primary | ICD-10-CM

## 2024-02-05 LAB
GLUCOSE BLD STRIP.AUTO-MCNC: 176 MG/DL (ref 65–100)
GLUCOSE BLD STRIP.AUTO-MCNC: 228 MG/DL (ref 65–100)
SERVICE CMNT-IMP: ABNORMAL
SERVICE CMNT-IMP: ABNORMAL

## 2024-02-05 PROCEDURE — 99183 HYPERBARIC OXYGEN THERAPY: CPT | Performed by: FAMILY MEDICINE

## 2024-02-05 PROCEDURE — 82962 GLUCOSE BLOOD TEST: CPT

## 2024-02-05 PROCEDURE — G0277 HBOT, FULL BODY CHAMBER, 30M: HCPCS

## 2024-02-05 ASSESSMENT — PAIN SCALES - GENERAL: PAINLEVEL_OUTOF10: 0

## 2024-02-06 ENCOUNTER — HOSPITAL ENCOUNTER (OUTPATIENT)
Dept: WOUND CARE | Age: 59
Discharge: HOME | End: 2024-02-06
Payer: MEDICARE

## 2024-02-06 VITALS
TEMPERATURE: 98.1 F | HEART RATE: 69 BPM | DIASTOLIC BLOOD PRESSURE: 103 MMHG | RESPIRATION RATE: 19 BRPM | OXYGEN SATURATION: 100 % | SYSTOLIC BLOOD PRESSURE: 156 MMHG

## 2024-02-06 DIAGNOSIS — E11.621 DIABETIC ULCER OF TOE OF LEFT FOOT ASSOCIATED WITH TYPE 2 DIABETES MELLITUS, WITH NECROSIS OF BONE (HCC): Primary | ICD-10-CM

## 2024-02-06 DIAGNOSIS — L97.524 DIABETIC ULCER OF TOE OF LEFT FOOT ASSOCIATED WITH TYPE 2 DIABETES MELLITUS, WITH NECROSIS OF BONE (HCC): Primary | ICD-10-CM

## 2024-02-06 LAB
GLUCOSE BLD STRIP.AUTO-MCNC: 185 MG/DL (ref 65–100)
GLUCOSE BLD STRIP.AUTO-MCNC: 236 MG/DL (ref 65–100)
SERVICE CMNT-IMP: ABNORMAL
SERVICE CMNT-IMP: ABNORMAL

## 2024-02-06 PROCEDURE — 99183 HYPERBARIC OXYGEN THERAPY: CPT | Performed by: FAMILY MEDICINE

## 2024-02-06 PROCEDURE — G0277 HBOT, FULL BODY CHAMBER, 30M: HCPCS

## 2024-02-06 PROCEDURE — 82962 GLUCOSE BLOOD TEST: CPT

## 2024-02-06 ASSESSMENT — PAIN SCALES - GENERAL
PAINLEVEL_OUTOF10: 0
PAINLEVEL_OUTOF10: 0

## 2024-02-07 ENCOUNTER — HOSPITAL ENCOUNTER (OUTPATIENT)
Dept: WOUND CARE | Age: 59
Discharge: HOME | End: 2024-02-07
Payer: MEDICARE

## 2024-02-07 VITALS
DIASTOLIC BLOOD PRESSURE: 98 MMHG | HEART RATE: 87 BPM | TEMPERATURE: 97.5 F | SYSTOLIC BLOOD PRESSURE: 151 MMHG | OXYGEN SATURATION: 100 % | RESPIRATION RATE: 18 BRPM

## 2024-02-07 DIAGNOSIS — E11.621 DIABETIC ULCER OF TOE OF LEFT FOOT ASSOCIATED WITH TYPE 2 DIABETES MELLITUS, WITH NECROSIS OF BONE (HCC): Primary | ICD-10-CM

## 2024-02-07 DIAGNOSIS — L97.524 DIABETIC ULCER OF TOE OF LEFT FOOT ASSOCIATED WITH TYPE 2 DIABETES MELLITUS, WITH NECROSIS OF BONE (HCC): Primary | ICD-10-CM

## 2024-02-07 LAB
GLUCOSE BLD STRIP.AUTO-MCNC: 160 MG/DL (ref 65–100)
GLUCOSE BLD STRIP.AUTO-MCNC: 191 MG/DL (ref 65–100)
SERVICE CMNT-IMP: ABNORMAL
SERVICE CMNT-IMP: ABNORMAL

## 2024-02-07 PROCEDURE — 99183 HYPERBARIC OXYGEN THERAPY: CPT | Performed by: FAMILY MEDICINE

## 2024-02-07 PROCEDURE — G0277 HBOT, FULL BODY CHAMBER, 30M: HCPCS

## 2024-02-07 PROCEDURE — 82962 GLUCOSE BLOOD TEST: CPT

## 2024-02-07 ASSESSMENT — PAIN SCALES - GENERAL
PAINLEVEL_OUTOF10: 0
PAINLEVEL_OUTOF10: 0

## 2024-02-08 ENCOUNTER — HOSPITAL ENCOUNTER (OUTPATIENT)
Dept: WOUND CARE | Age: 59
Discharge: HOME | End: 2024-02-08
Payer: MEDICARE

## 2024-02-08 VITALS
HEART RATE: 78 BPM | DIASTOLIC BLOOD PRESSURE: 97 MMHG | OXYGEN SATURATION: 97 % | RESPIRATION RATE: 18 BRPM | TEMPERATURE: 97.7 F | SYSTOLIC BLOOD PRESSURE: 160 MMHG

## 2024-02-08 VITALS
DIASTOLIC BLOOD PRESSURE: 80 MMHG | RESPIRATION RATE: 18 BRPM | HEART RATE: 85 BPM | TEMPERATURE: 97.7 F | OXYGEN SATURATION: 99 % | SYSTOLIC BLOOD PRESSURE: 104 MMHG

## 2024-02-08 DIAGNOSIS — E11.621 DIABETIC ULCER OF TOE OF LEFT FOOT ASSOCIATED WITH TYPE 2 DIABETES MELLITUS, WITH NECROSIS OF BONE (HCC): Primary | ICD-10-CM

## 2024-02-08 DIAGNOSIS — F17.210 CIGARETTE SMOKER: ICD-10-CM

## 2024-02-08 DIAGNOSIS — M86.672 CHRONIC OSTEOMYELITIS OF TOE OF LEFT FOOT (HCC): ICD-10-CM

## 2024-02-08 DIAGNOSIS — B20 HUMAN IMMUNODEFICIENCY VIRUS (HCC): ICD-10-CM

## 2024-02-08 DIAGNOSIS — L97.524 DIABETIC ULCER OF TOE OF LEFT FOOT ASSOCIATED WITH TYPE 2 DIABETES MELLITUS, WITH NECROSIS OF BONE (HCC): Primary | ICD-10-CM

## 2024-02-08 LAB
GLUCOSE BLD STRIP.AUTO-MCNC: 248 MG/DL (ref 65–100)
GLUCOSE BLD STRIP.AUTO-MCNC: 251 MG/DL (ref 65–100)
SERVICE CMNT-IMP: ABNORMAL
SERVICE CMNT-IMP: ABNORMAL

## 2024-02-08 PROCEDURE — G0277 HBOT, FULL BODY CHAMBER, 30M: HCPCS

## 2024-02-08 PROCEDURE — 99213 OFFICE O/P EST LOW 20 MIN: CPT

## 2024-02-08 PROCEDURE — 82962 GLUCOSE BLOOD TEST: CPT

## 2024-02-08 ASSESSMENT — PAIN SCALES - GENERAL: PAINLEVEL_OUTOF10: 0

## 2024-02-08 NOTE — FLOWSHEET NOTE
02/08/24 1400   Wound 10/02/23 Toe (Comment  which one) Left;Plantar great toe #4   Date First Assessed/Time First Assessed: 10/02/23 1133   Present on Original Admission: Yes  Primary Wound Type: Diabetic Ulcer  Location: Toe (Comment  which one)  Wound Location Orientation: Left;Plantar  Wound Description (Comments): great toe #4   Wound Image    Wound Etiology Diabetic Gavin 3   Dressing Status Intact   Wound Cleansed Vashe   Dressing/Treatment Other (comment)  (iodosorb)   Offloading for Diabetic Foot Ulcers Offloading ordered   Wound Length (cm) 0.2 cm   Wound Width (cm) 0.2 cm   Wound Depth (cm) 2 cm   Wound Surface Area (cm^2) 0.04 cm^2   Change in Wound Size % (l*w) 97.33   Wound Volume (cm^3) 0.08 cm^3   Wound Healing % 47   Wound Assessment Pink/red   Drainage Amount None (dry)   Drainage Description Serosanguinous   Odor None   Stella-wound Assessment Hyperkeratosis (callous)   Wound Thickness Description not for Pressure Injury Full thickness

## 2024-02-08 NOTE — PROGRESS NOTES
HBO PROGRESS NOTE  NAME: Estevan Valenzuela   MEDICAL RECORD NUMBER:  [id number]  AGE: 58 y.o. GENDER: male  : 1965  EPISODE DATE:  2024     Subjective   HBO Treatment Number: 24 out of Total Treatments: 40    HBO Diagnosis:           Indications: Chronic Refractory Osteomyelitis to ___ (site) (left great toe)    Safety checks performed prior to treatment.  See doc flowsheets for documentation.      Objective        Lab Results   Component Value Date/Time    GLUCPOC 264 2023 02:01 PM     Recent Labs     24  1220 24  1500   POCGLU 251* 248*     Pre treatment Vital Signs       Temp: 97.7 °F (36.5 °C)     Pulse: 85     Respirations: 18     BP: 104/80       Post treatment Vital Signs  Temp: 97.7 °F (36.5 °C)  Pulse: 78  Respirations: 18  BP: (!) 160/97    Assessment      HBO Diagnosis:   Problem List Items Addressed This Visit          Endocrine    Diabetic ulcer of toe of left foot associated with type 2 diabetes mellitus, with necrosis of bone (HCC) - Primary (Chronic)    Relevant Orders    Hypoglycemial protocol    POCT glucose    Care order/instruction    Care order/instruction    Care order/instruction    Care order/instruction    Care order/instruction    Care order/instruction    Care order/instruction    Care order/instruction    Notify physician (specify)    Hyperbaric Oxygen Therapy       Other    Chronic osteomyelitis of toe of left foot (HCC) (Chronic)         Physical Exam:  General Appearance: alert, interacfive    Pre Tympanic Membrane Assessment:  Right: Normal  Left: Normal    Post Tympanic Membrane Assessment:   Right: Normal   Left: Normal    Pulmonary/Chest: No audible wheezing      Patient place in a fully body Monoplace serial number         Treatment Start Time: 1303 Door Closed      Pressure Reached  JAC : 2 JAC  Number of Air Breaks: none        Decompression Time   Treatment End Time: 1450 Door Opened    Symptoms Noted During Treatment: None    Length of

## 2024-02-08 NOTE — WOUND CARE
Discharge Instructions for  [facility]  [address] [place] [address]  [address]  [place] [address]  Phone [phone]   Fax [phone]      NAME:  Estevan Valenzuela  YOB: 1965  MEDICAL RECORD NUMBER:  [id number]  DATE:  2/8/2024    Return Appointment:   1 week with Efe Gramajo MD      Instructions:   Left Great Toe.   Clean with saline or Vashe.  Apply thin layer of Iodorob or Gold Bond Diabetic lotion to wound.   Cover with gauze  Wrap with Rolled Gauze.  Change daily.     Patient to offload wound with wide toebox shoes or darco peg shoe  while standing or weight bearing.     Do not get wound wet. May purchase cast cover at local pharmacy to keep dry in shower.  Wound healing is greatly slowed when blood glucose levels are greater than 200. Monitor glucose levels daily to ensure tight glucose control.  Follow up with PCP if your glucose levels are frequently greater than 200.     Smoking greatly slows wound healing by restricting essential blow flow. Decrease smoking with a plan to quit.     Increase protein intake to promote wound healing. Protein supplements such as Arya and Ensure are great options.     Continue antibiotics as prescribed by Infectious Disease.     Continue HBO as prescribed.            Should you experience increased redness, swelling, pain, foul odor, size of wound(s), or have a temperature over 101 degrees please contact the [facility] at [phone] or if after hours contact your primary care physician or go to the hospital emergency department.    PLEASE NOTE: IF YOU ARE UNABLE TO OBTAIN WOUND SUPPLIES, CONTINUE TO USE THE SUPPLIES YOU HAVE AVAILABLE UNTIL YOU ARE ABLE TO REACH US. IT IS MOST IMPORTANT TO KEEP THE WOUND COVERED AT ALL TIMES.    Electronically signed Emily HERNANDEZ RN on 2/8/2024 at 2:02 PM

## 2024-02-09 ENCOUNTER — HOSPITAL ENCOUNTER (OUTPATIENT)
Dept: WOUND CARE | Age: 59
Discharge: HOME | End: 2024-02-09
Payer: MEDICARE

## 2024-02-09 VITALS
TEMPERATURE: 97.5 F | OXYGEN SATURATION: 98 % | RESPIRATION RATE: 19 BRPM | SYSTOLIC BLOOD PRESSURE: 166 MMHG | HEART RATE: 72 BPM | DIASTOLIC BLOOD PRESSURE: 109 MMHG

## 2024-02-09 DIAGNOSIS — E11.621 DIABETIC ULCER OF TOE OF LEFT FOOT ASSOCIATED WITH TYPE 2 DIABETES MELLITUS, WITH NECROSIS OF BONE (HCC): Primary | ICD-10-CM

## 2024-02-09 DIAGNOSIS — L97.524 DIABETIC ULCER OF TOE OF LEFT FOOT ASSOCIATED WITH TYPE 2 DIABETES MELLITUS, WITH NECROSIS OF BONE (HCC): Primary | ICD-10-CM

## 2024-02-09 LAB
GLUCOSE BLD STRIP.AUTO-MCNC: 180 MG/DL (ref 65–100)
GLUCOSE BLD STRIP.AUTO-MCNC: 181 MG/DL (ref 65–100)
SERVICE CMNT-IMP: ABNORMAL
SERVICE CMNT-IMP: ABNORMAL

## 2024-02-09 PROCEDURE — G0277 HBOT, FULL BODY CHAMBER, 30M: HCPCS

## 2024-02-09 PROCEDURE — 99183 HYPERBARIC OXYGEN THERAPY: CPT | Performed by: FAMILY MEDICINE

## 2024-02-09 PROCEDURE — 82962 GLUCOSE BLOOD TEST: CPT

## 2024-02-09 ASSESSMENT — PAIN SCALES - GENERAL
PAINLEVEL_OUTOF10: 0
PAINLEVEL_OUTOF10: 0

## 2024-02-09 NOTE — PROGRESS NOTES
obvious neck masses  Resp:  Breathing is non-labored; normal rate and effort; no audible wheezing.    CV: RRR;  no JVD; No evidence of cyanosis of the upper extremities. The extremities are perfused without embolic sign, splinter hemorrhages, or petechia.   GI: soft and non-distended without acute abnormality noted.    Musculoskeletal: unremarkable with normal function. No embolic signs or cyanosis.   Neuro:  Oriented; moves all 4; no focal deficits  Psychiatric: Judgement and insight are within normal limits for the wound care population of patients. Patient is oriented to person, place, and time. Recent and remote memory are within normal limits. Mood and affect are within normal limits.     Integumentary (Skin/Wounds)  See inspection of wound(s) below. There are no other skin areas of palpable concern.   See wound center documentation including photos in the [facility] Wound Template made part of this record by reference.         Wound Care Documentation:    Negative Pressure Wound Therapy Toe (Comment  which one) Left (Active)   Wound Type Diabetic foot ulcer;Surgical 11/09/23 1452   Unit Type KCI 11/09/23 1452   Dressing Type Black Foam 11/09/23 1452   Number of pieces used 1 11/09/23 1452   Number of pieces removed 1 11/09/23 1452   Cycle Continuous 11/09/23 1452   Target Pressure (mmHg) 125 11/09/23 1452   Vac Frequency 3 hours 11/09/23 1452   Canister changed? No 11/09/23 1452   Dressing Status New dressing applied 11/09/23 1452   Dressing Changed Changed/New 11/09/23 1452   Drainage Amount Scant 11/09/23 1452   Drainage Description Serosanguinous 11/09/23 1452   Stella-wound Assessment Edematous;Blanchable erythema 11/09/23 1452   Odor None 11/09/23 1452   Number of days: 97       Wound 09/21/23 Toe (Comment  which one) Left;Dorsal #2 (Active)   Wound Image    10/25/23 0813   Wound Etiology Diabetic Gavin 2 10/25/23 0813   Dressing Status Old drainage noted 10/25/23 0813   Wound Cleansed Vashe

## 2024-02-10 NOTE — PROGRESS NOTES
HBO PROGRESS NOTE      NAME: Estevan Valenzuela  MEDICAL RECORD NUMBER:  [id number]  AGE: 58 y.o.   GENDER: male  : 1965  EPISODE DATE:  2024     Subjective     HBO Treatment Number: 22 out of Total Treatments: 40    HBO Diagnosis:             Indications: Chronic Refractory Osteomyelitis to ___ (site) (left great toe)    Safety checks performed prior to treatment.  See doc flowsheets for documentation.    Objective        Recent Labs     24  1212 24  1432   POCGLU 181* 180*     Pre treatment Vital Signs       Temp: 98.1 °F (36.7 °C)     Pulse: 79     Respirations: 18     BP: (!) 148/93       Post treatment Vital Signs  Temp: 98.1 °F (36.7 °C)  Pulse: 69  Respirations: 19  BP: (!) 156/103    Assessment        HBO Diagnosis:   Problem List Items Addressed This Visit          Endocrine    Diabetic ulcer of toe of left foot associated with type 2 diabetes mellitus, with necrosis of bone (HCC) - Primary (Chronic)    Relevant Orders    POCT glucose       Physical Exam        General Appearance:  alert and oriented to person, place and time, well-developed and well-nourished, in no acute distress    Pre Tympanic Membrane Assessment:  Right: Normal  Left: Normal    Post Tympanic Membrane Assessment:  Left: Normal  Right: Normal    Pulmonary/Chest:  clear to auscultation bilaterally- no wheezes, rales or rhonchi, normal air movement, no respiratory distress    Cardiovascular:  normal    Plan        Patient placed in a full body Monoplace Chamber #: [id number]  Treatment Start Time: 1302     Pressure Reached Time: 1311  JAC : 2  Number of Air Breaks:        Decompression Time: 1442   Treatment End Time: 1450  Length of Treatment: 90 Minutes  Symptoms Noted During Treatment: None  Total Treatment Time (min): 108    Treatment Completion Status: Treatment completed without issue (D/C instructions reviewed no questions)    I was present on these premises and immediately available to furnish assistance &

## 2024-02-10 NOTE — PROGRESS NOTES
HBO PROGRESS NOTE      NAME: Estevan Valenzuela  MEDICAL RECORD NUMBER:  [id number]  AGE: 58 y.o.   GENDER: male  : 1965  EPISODE DATE:  2024     Subjective     HBO Treatment Number: 23 out of Total Treatments: 40    HBO Diagnosis:             Indications: Chronic Refractory Osteomyelitis to ___ (site) (left great toe)    Safety checks performed prior to treatment.  See doc flowsheets for documentation.    Objective        Recent Labs     24  1212 24  1432   POCGLU 181* 180*     Pre treatment Vital Signs       Temp: 97.5 °F (36.4 °C)     Pulse: 95     Respirations: 18     BP: (!) 158/109       Post treatment Vital Signs  Temp: 97.5 °F (36.4 °C)  Pulse: 87  Respirations: 18  BP: (!) 151/98    Assessment        HBO Diagnosis:   Problem List Items Addressed This Visit          Endocrine    Diabetic ulcer of toe of left foot associated with type 2 diabetes mellitus, with necrosis of bone (HCC) - Primary (Chronic)    Relevant Orders    POCT glucose       Physical Exam        General Appearance:  alert and oriented to person, place and time, well-developed and well-nourished, in no acute distress    Pre Tympanic Membrane Assessment:  Right: Normal  Left: Normal    Post Tympanic Membrane Assessment:  Left: Normal  Right: Normal    Pulmonary/Chest:  clear to auscultation bilaterally- no wheezes, rales or rhonchi, normal air movement, no respiratory distress    Cardiovascular:  normal    Plan        Patient placed in a full body Monoplace Chamber #: [id number]  Treatment Start Time: 1230     Pressure Reached Time: 1239  JAC : 2  Number of Air Breaks:        Decompression Time: 1410   Treatment End Time: 1417  Length of Treatment: 90 Minutes  Symptoms Noted During Treatment: None  Total Treatment Time (min): 107    Treatment Completion Status: Treatment completed without issue (D/C instructions reviewed no questions)    I was present on these premises and immediately available to furnish assistance &

## 2024-02-10 NOTE — PROGRESS NOTES
HBO PROGRESS NOTE      NAME: Estevan Valenzuela  MEDICAL RECORD NUMBER:  [id number]  AGE: 58 y.o.   GENDER: male  : 1965  EPISODE DATE:  2024     Subjective     HBO Treatment Number: 21 out of Total Treatments: 40    HBO Diagnosis:             Indications: Chronic Refractory Osteomyelitis to ___ (site) (left great toe)    Safety checks performed prior to treatment.  See doc flowsheets for documentation.    Objective        Recent Labs     24  1212 24  1432   POCGLU 181* 180*     Pre treatment Vital Signs       Temp: 97.8 °F (36.6 °C)     Pulse: 85     Respirations: 18     BP: (!) 135/93       Post treatment Vital Signs  Temp: 97.8 °F (36.6 °C)  Pulse: 68  Respirations: 18  BP: (!) 137/127    Assessment        HBO Diagnosis:   Problem List Items Addressed This Visit          Endocrine    Diabetic ulcer of toe of left foot associated with type 2 diabetes mellitus, with necrosis of bone (HCC) - Primary (Chronic)    Relevant Orders    POCT glucose       Physical Exam        General Appearance:  alert and oriented to person, place and time, well-developed and well-nourished, in no acute distress    Pre Tympanic Membrane Assessment:  Right: Normal  Left: Normal    Post Tympanic Membrane Assessment:  Left: Normal  Right: Normal    Pulmonary/Chest:  clear to auscultation bilaterally- no wheezes, rales or rhonchi, normal air movement, no respiratory distress    Cardiovascular:  normal    Plan        Patient placed in a full body Monoplace Chamber #: [id number]  Treatment Start Time: 1255     Pressure Reached Time: 1304  JAC : 2  Number of Air Breaks:        Decompression Time: 1435   Treatment End Time: 1442  Length of Treatment: 90 Minutes     Total Treatment Time (min): 107    Treatment Completion Status: Treatment completed without issue (D/C instructions reviewed no questions)    I was present on these premises and immediately available to furnish assistance & direction throughout the HBO

## 2024-02-12 ENCOUNTER — HOSPITAL ENCOUNTER (OUTPATIENT)
Dept: WOUND CARE | Age: 59
Discharge: HOME | End: 2024-02-12
Payer: MEDICARE

## 2024-02-12 ENCOUNTER — CARE COORDINATION (OUTPATIENT)
Dept: CARE COORDINATION | Facility: CLINIC | Age: 59
End: 2024-02-12

## 2024-02-12 ENCOUNTER — OFFICE VISIT (OUTPATIENT)
Dept: ENDOCRINOLOGY | Age: 59
End: 2024-02-12
Payer: MEDICARE

## 2024-02-12 VITALS
HEIGHT: 73 IN | OXYGEN SATURATION: 95 % | BODY MASS INDEX: 25.05 KG/M2 | HEART RATE: 88 BPM | DIASTOLIC BLOOD PRESSURE: 96 MMHG | RESPIRATION RATE: 16 BRPM | SYSTOLIC BLOOD PRESSURE: 158 MMHG | WEIGHT: 189 LBS

## 2024-02-12 VITALS
HEART RATE: 64 BPM | SYSTOLIC BLOOD PRESSURE: 160 MMHG | DIASTOLIC BLOOD PRESSURE: 103 MMHG | OXYGEN SATURATION: 98 % | TEMPERATURE: 97.7 F | RESPIRATION RATE: 20 BRPM

## 2024-02-12 DIAGNOSIS — E11.621 DIABETIC ULCER OF TOE OF LEFT FOOT ASSOCIATED WITH TYPE 2 DIABETES MELLITUS, WITH NECROSIS OF BONE (HCC): Primary | ICD-10-CM

## 2024-02-12 DIAGNOSIS — E78.1 HYPERTRIGLYCERIDEMIA: ICD-10-CM

## 2024-02-12 DIAGNOSIS — L97.522 DIABETIC ULCER OF TOE OF LEFT FOOT ASSOCIATED WITH TYPE 2 DIABETES MELLITUS, WITH FAT LAYER EXPOSED (HCC): ICD-10-CM

## 2024-02-12 DIAGNOSIS — E11.621 DIABETIC ULCER OF TOE OF LEFT FOOT ASSOCIATED WITH TYPE 2 DIABETES MELLITUS, WITH FAT LAYER EXPOSED (HCC): ICD-10-CM

## 2024-02-12 DIAGNOSIS — E78.00 HYPERCHOLESTEROLEMIA: ICD-10-CM

## 2024-02-12 DIAGNOSIS — Z87.891 PERSONAL HISTORY OF TOBACCO USE, PRESENTING HAZARDS TO HEALTH: ICD-10-CM

## 2024-02-12 DIAGNOSIS — Z79.4 TYPE 2 DIABETES MELLITUS WITH HYPERGLYCEMIA, WITH LONG-TERM CURRENT USE OF INSULIN (HCC): Primary | ICD-10-CM

## 2024-02-12 DIAGNOSIS — E11.65 TYPE 2 DIABETES MELLITUS WITH HYPERGLYCEMIA, WITH LONG-TERM CURRENT USE OF INSULIN (HCC): Primary | ICD-10-CM

## 2024-02-12 DIAGNOSIS — L97.524 DIABETIC ULCER OF TOE OF LEFT FOOT ASSOCIATED WITH TYPE 2 DIABETES MELLITUS, WITH NECROSIS OF BONE (HCC): Primary | ICD-10-CM

## 2024-02-12 LAB
GLUCOSE BLD STRIP.AUTO-MCNC: 212 MG/DL (ref 65–100)
GLUCOSE BLD STRIP.AUTO-MCNC: 241 MG/DL (ref 65–100)
HBA1C MFR BLD: 11.2 %
SERVICE CMNT-IMP: ABNORMAL
SERVICE CMNT-IMP: ABNORMAL

## 2024-02-12 PROCEDURE — G2211 COMPLEX E/M VISIT ADD ON: HCPCS | Performed by: PHYSICIAN ASSISTANT

## 2024-02-12 PROCEDURE — 2022F DILAT RTA XM EVC RTNOPTHY: CPT | Performed by: PHYSICIAN ASSISTANT

## 2024-02-12 PROCEDURE — 82962 GLUCOSE BLOOD TEST: CPT

## 2024-02-12 PROCEDURE — 3017F COLORECTAL CA SCREEN DOC REV: CPT | Performed by: PHYSICIAN ASSISTANT

## 2024-02-12 PROCEDURE — 83036 HEMOGLOBIN GLYCOSYLATED A1C: CPT | Performed by: PHYSICIAN ASSISTANT

## 2024-02-12 PROCEDURE — 99183 HYPERBARIC OXYGEN THERAPY: CPT | Performed by: SURGERY

## 2024-02-12 PROCEDURE — G8420 CALC BMI NORM PARAMETERS: HCPCS | Performed by: PHYSICIAN ASSISTANT

## 2024-02-12 PROCEDURE — G8484 FLU IMMUNIZE NO ADMIN: HCPCS | Performed by: PHYSICIAN ASSISTANT

## 2024-02-12 PROCEDURE — G8427 DOCREV CUR MEDS BY ELIG CLIN: HCPCS | Performed by: PHYSICIAN ASSISTANT

## 2024-02-12 PROCEDURE — 4004F PT TOBACCO SCREEN RCVD TLK: CPT | Performed by: PHYSICIAN ASSISTANT

## 2024-02-12 PROCEDURE — 99214 OFFICE O/P EST MOD 30 MIN: CPT | Performed by: PHYSICIAN ASSISTANT

## 2024-02-12 PROCEDURE — G0277 HBOT, FULL BODY CHAMBER, 30M: HCPCS

## 2024-02-12 PROCEDURE — 99406 BEHAV CHNG SMOKING 3-10 MIN: CPT | Performed by: PHYSICIAN ASSISTANT

## 2024-02-12 PROCEDURE — 3046F HEMOGLOBIN A1C LEVEL >9.0%: CPT | Performed by: PHYSICIAN ASSISTANT

## 2024-02-12 RX ORDER — INSULIN DEGLUDEC 200 U/ML
44 INJECTION, SOLUTION SUBCUTANEOUS DAILY
Qty: 21 ML | Refills: 3 | Status: SHIPPED | OUTPATIENT
Start: 2024-02-12 | End: 2024-02-12 | Stop reason: SDUPTHER

## 2024-02-12 RX ORDER — INSULIN DEGLUDEC 200 U/ML
44 INJECTION, SOLUTION SUBCUTANEOUS DAILY
Qty: 21 ML | Refills: 3 | Status: SHIPPED | OUTPATIENT
Start: 2024-02-12

## 2024-02-12 RX ORDER — BUPROPION HYDROCHLORIDE 150 MG/1
150 TABLET, EXTENDED RELEASE ORAL 2 TIMES DAILY
Qty: 180 TABLET | Refills: 1 | Status: SHIPPED | OUTPATIENT
Start: 2024-02-12

## 2024-02-12 RX ORDER — INSULIN LISPRO 100 [IU]/ML
INJECTION, SOLUTION INTRAVENOUS; SUBCUTANEOUS
Qty: 45 ML | Refills: 3 | Status: SHIPPED | OUTPATIENT
Start: 2024-02-12

## 2024-02-12 NOTE — CARE COORDINATION
.Ambulatory Care Coordination Note  2/12/2024    Patient Current Location:   In person visit with patient at Endocrinology      Ongoing outreach for High Risk Care Management.  Joined patient at endocrinology visit.   - HgbA1c = 11.2   - checking BS 4x daily.  Recent poor BS control partially due to daily appointment with wound center.  These appointments are NOT on a regular schedule, making meal planning and insulin administration a challenge.   - learned today, that patient is drinking 2-1 L bottles of diet soda per day!  Discussed with provider and will reinforce moving forward.   - patient's insulin injections have been primarily into his right side.  Provider stressed this needs to be rotates.  ACM will reinforce.   - also patient needs to hold insulin pen in place for 6 seconds ([place]) to make sure all the insulin is pushed into his SQ tissue.   - continues to have bouts of diarrhea.  Patient reports regular intake of fruit.  Will reinforce need for meals to include fiber/fat/protein.     - Smoking cessation discussed.  'script sent for Wellbutrin which has helped in the past.   - Sliding scale reviewed for \"with\" meals and when not eating.   - Discussed diabetes education (when wound center appointments are complete).  Today encouraged patient to start talking with his partner about attending these with him.. ACM may attend also.    Patient follow up now every 3 months.      Challenges to be reviewed by the provider   Additional needs identified to be addressed with provider: No  none               Method of communication with provider: chart routing.    ACM: Heather Beyrs RN    Lab Results       None            Care Coordination Interventions    Referral from Primary Care Provider: Yes  Suggested Interventions and Community Resources  Behavorial Health: In Process  Diabetes Education: In Process  Fall Risk Prevention: Completed  Disease Specific Clinic: In Process          Goals Addressed

## 2024-02-12 NOTE — PROGRESS NOTES
debridement. Has been in hyperbaric chamber    Miss some prandial injections, removes pen just after injecting, has not yet been to diabetes education          Lab Results   Component Value Date    LABA1C 10.7 (H) 11/02/2023     Lab Results   Component Value Date    MALBCR 803 (H) 11/10/2023    CREATININE 5.80 (H) 11/17/2023            Current Regimen: Tresiba 40 units, Humalog 12 units plus 1/25>150      Glucose data:    Home blood glucose monitoring frequency: 4 times per day    By review of home glucose log     Typical Standard Deviation   Fasting 200 As low as 176   AC lunch 200    AC supper 200    Bedtime High 100- low 200      Blood glucose levels are uncontrolled, most significant elevations are constant        Failed past therapies:     Relevant co morbidities:    Denies HX pancreatitis, DKA, gastroparesis, foot ulcer    Optho:    Last eye exam was March 2023 and demonstrated no diabetic retinopathy.  Eye  care specialist is [facility].     Obesity:         Body mass index is 24.94 kg/m².       increasing steadily with insulin compliance      Wt Readings from Last 3 Encounters:   02/12/24 85.7 kg (189 lb)   01/18/24 82.6 kg (182 lb)   01/04/24 83 kg (183 lb)         CardioVascular:    None     Renal:    Under care of nephro? no    On ARB/ACE-I  losartan - 25 MG     5/8/2019: Serum creatinine 1.03.                           Urine microalbumin unknown.                       05/06/2022      Cr 0.69, , microalbumin/Cr ratio 47       Lipids:     Current therapy atorvastatin - 80 MG with good compliance   9/7/2018: Total cholesterol 211, triglycerides 352, HDL 36, .        No results found for: \"CHOL\"  No results found for: \"LDLCHOLESTEROL\", \"LDLCALC\" No results found for: \"VLDL\" No results found for: \"HDL\" No results found for: \"HDL\" No results found for: \"TRIG\"  No results found for: \"CHOLHDLRATIO\"      Hemoglobin A1c:  3/10/2016: 12.8%.   /24/2017: 9.4%.   7/27/2017: 10.2%.   5/4/2018: 10.9%.

## 2024-02-12 NOTE — PROGRESS NOTES
HBO PROGRESS NOTE  NAME: Estevan Valenzuela   MEDICAL RECORD NUMBER:  [id number]  AGE: 58 y.o. GENDER: male  : 1965  EPISODE DATE:  2024     Subjective   HBO Treatment Number: 26 out of Total Treatments: 40    HBO Diagnosis:           Indications: Chronic Refractory Osteomyelitis to ___ (site) (left great toe)    Safety checks performed prior to treatment.  See doc flowsheets for documentation.      Objective        Lab Results   Component Value Date/Time    GLUCPOC 264 2023 02:01 PM     Recent Labs     24  1432 24  1039   POCGLU 180* 241*     Pre treatment Vital Signs       Temp: 97.7 °F (36.5 °C)     Pulse: 72     Respirations: 19     BP: (!) 147/101       Post treatment Vital Signs  Temp: 97.7 °F (36.5 °C)  Pulse: 64  Respirations: 20  BP: (!) 160/103    Assessment      HBO Diagnosis:   Problem List Items Addressed This Visit          Endocrine    Diabetic ulcer of toe of left foot associated with type 2 diabetes mellitus, with necrosis of bone (HCC) - Primary (Chronic)    Relevant Orders    Hypoglycemial protocol    POCT glucose    Care order/instruction    Care order/instruction    Care order/instruction    Care order/instruction    Care order/instruction    Care order/instruction    Care order/instruction    Care order/instruction    Notify physician (specify)    Hyperbaric Oxygen Therapy         Physical Exam:  General Appearance: alert, interacfive    Pre Tympanic Membrane Assessment:  Right: Normal  Left: Normal    Post Tympanic Membrane Assessment:  Left: NormalRight: Normal  Right: NormalLeft: Normal    Pulmonary/Chest: No audible wheezing      Patient place in a fully body Monoplace serial number         Treatment Start Time: 1056 Door Closed      Pressure Reached  JAC : 2 JAC  Number of Air Breaks: none        Decompression Time   Treatment End Time: 1243 Door Opened    Symptoms Noted During Treatment: None    Length of Treatment: 90 Minutes         Adverse Event:      I

## 2024-02-13 ENCOUNTER — HOSPITAL ENCOUNTER (OUTPATIENT)
Dept: WOUND CARE | Age: 59
Discharge: HOME | End: 2024-02-13
Payer: MEDICARE

## 2024-02-13 ENCOUNTER — TELEPHONE (OUTPATIENT)
Dept: DIABETES SERVICES | Age: 59
End: 2024-02-13

## 2024-02-13 VITALS
SYSTOLIC BLOOD PRESSURE: 168 MMHG | TEMPERATURE: 97.3 F | RESPIRATION RATE: 18 BRPM | HEART RATE: 77 BPM | OXYGEN SATURATION: 98 % | DIASTOLIC BLOOD PRESSURE: 90 MMHG

## 2024-02-13 DIAGNOSIS — E11.621 DIABETIC ULCER OF TOE OF LEFT FOOT ASSOCIATED WITH TYPE 2 DIABETES MELLITUS, WITH NECROSIS OF BONE (HCC): Primary | ICD-10-CM

## 2024-02-13 DIAGNOSIS — L97.524 DIABETIC ULCER OF TOE OF LEFT FOOT ASSOCIATED WITH TYPE 2 DIABETES MELLITUS, WITH NECROSIS OF BONE (HCC): Primary | ICD-10-CM

## 2024-02-13 LAB
GLUCOSE BLD STRIP.AUTO-MCNC: 280 MG/DL (ref 65–100)
GLUCOSE BLD STRIP.AUTO-MCNC: 304 MG/DL (ref 65–100)
SERVICE CMNT-IMP: ABNORMAL
SERVICE CMNT-IMP: ABNORMAL

## 2024-02-13 PROCEDURE — 82962 GLUCOSE BLOOD TEST: CPT

## 2024-02-13 PROCEDURE — G0277 HBOT, FULL BODY CHAMBER, 30M: HCPCS

## 2024-02-14 ENCOUNTER — HOSPITAL ENCOUNTER (OUTPATIENT)
Dept: WOUND CARE | Age: 59
Discharge: HOME | End: 2024-02-14
Payer: MEDICARE

## 2024-02-14 VITALS
RESPIRATION RATE: 18 BRPM | SYSTOLIC BLOOD PRESSURE: 168 MMHG | HEART RATE: 71 BPM | OXYGEN SATURATION: 99 % | TEMPERATURE: 97.3 F | DIASTOLIC BLOOD PRESSURE: 108 MMHG

## 2024-02-14 DIAGNOSIS — E11.621 DIABETIC ULCER OF TOE OF LEFT FOOT ASSOCIATED WITH TYPE 2 DIABETES MELLITUS, WITH NECROSIS OF BONE (HCC): Primary | ICD-10-CM

## 2024-02-14 DIAGNOSIS — L97.524 DIABETIC ULCER OF TOE OF LEFT FOOT ASSOCIATED WITH TYPE 2 DIABETES MELLITUS, WITH NECROSIS OF BONE (HCC): Primary | ICD-10-CM

## 2024-02-14 LAB
GLUCOSE BLD STRIP.AUTO-MCNC: 240 MG/DL (ref 65–100)
GLUCOSE BLD STRIP.AUTO-MCNC: 243 MG/DL (ref 65–100)
SERVICE CMNT-IMP: ABNORMAL
SERVICE CMNT-IMP: ABNORMAL

## 2024-02-14 PROCEDURE — G0277 HBOT, FULL BODY CHAMBER, 30M: HCPCS

## 2024-02-14 PROCEDURE — 82962 GLUCOSE BLOOD TEST: CPT

## 2024-02-15 ENCOUNTER — HOSPITAL ENCOUNTER (OUTPATIENT)
Dept: WOUND CARE | Age: 59
Discharge: HOME | End: 2024-02-15
Payer: MEDICARE

## 2024-02-15 VITALS
SYSTOLIC BLOOD PRESSURE: 179 MMHG | TEMPERATURE: 97.6 F | OXYGEN SATURATION: 100 % | HEART RATE: 70 BPM | RESPIRATION RATE: 18 BRPM | DIASTOLIC BLOOD PRESSURE: 109 MMHG

## 2024-02-15 VITALS
RESPIRATION RATE: 18 BRPM | HEART RATE: 69 BPM | TEMPERATURE: 97.6 F | OXYGEN SATURATION: 100 % | SYSTOLIC BLOOD PRESSURE: 153 MMHG | DIASTOLIC BLOOD PRESSURE: 102 MMHG

## 2024-02-15 DIAGNOSIS — L97.524 DIABETIC ULCER OF TOE OF LEFT FOOT ASSOCIATED WITH TYPE 2 DIABETES MELLITUS, WITH NECROSIS OF BONE (HCC): Primary | ICD-10-CM

## 2024-02-15 DIAGNOSIS — E11.621 DIABETIC ULCER OF TOE OF LEFT FOOT ASSOCIATED WITH TYPE 2 DIABETES MELLITUS, WITH NECROSIS OF BONE (HCC): Primary | ICD-10-CM

## 2024-02-15 LAB
GLUCOSE BLD STRIP.AUTO-MCNC: 245 MG/DL (ref 65–100)
GLUCOSE BLD STRIP.AUTO-MCNC: 328 MG/DL (ref 65–100)
SERVICE CMNT-IMP: ABNORMAL
SERVICE CMNT-IMP: ABNORMAL

## 2024-02-15 PROCEDURE — G0277 HBOT, FULL BODY CHAMBER, 30M: HCPCS

## 2024-02-15 PROCEDURE — 82962 GLUCOSE BLOOD TEST: CPT

## 2024-02-15 PROCEDURE — 99213 OFFICE O/P EST LOW 20 MIN: CPT

## 2024-02-15 NOTE — PROGRESS NOTES
HBO PROGRESS NOTE  NAME: Estevan Valenzuela   MEDICAL RECORD NUMBER:  [id number]  AGE: 58 y.o. GENDER: male  : 1965  EPISODE DATE:  2/15/2024     Subjective   HBO Treatment Number: 29 out of Total Treatments: 40    HBO Diagnosis:           Indications: Chronic Refractory Osteomyelitis to ___ (site) (Left Great Toe)    Safety checks performed prior to treatment.  See doc flowsheets for documentation.      Objective        Lab Results   Component Value Date/Time    GLUCPOC 264 2023 02:01 PM     Recent Labs     02/15/24  1215 02/15/24  1444   POCGLU 328* 245*     Pre treatment Vital Signs       Temp: 97.6 °F (36.4 °C)     Pulse: 69     Respirations: 18     BP: (!) 153/102       Post treatment Vital Signs  Temp: 97.6 °F (36.4 °C)  Pulse: 70  Respirations: 18  BP: (!) 179/109    Assessment      HBO Diagnosis:   Problem List Items Addressed This Visit          Endocrine    Diabetic ulcer of toe of left foot associated with type 2 diabetes mellitus, with necrosis of bone (HCC) - Primary (Chronic)    Relevant Orders    Hypoglycemial protocol    POCT glucose    Care order/instruction    Care order/instruction    Care order/instruction    Care order/instruction    Care order/instruction    Care order/instruction    Care order/instruction    Care order/instruction    Notify physician (specify)    Hyperbaric Oxygen Therapy         Physical Exam:  General Appearance: alert, interacfive    Pre Tympanic Membrane Assessment:  Right: Normal  Left: Normal    Post Tympanic Membrane Assessment:  Left: NormalRight: Normal  Right: NormalLeft: Normal    Pulmonary/Chest: No audible wheezing      Patient place in a fully body Monoplace serial number         Treatment Start Time: 1246 Door Closed      Pressure Reached  JAC : 2 JAC  Number of Air Breaks: none        Decompression Time   Treatment End Time: 1434 Door Opened    Symptoms Noted During Treatment: None    Length of Treatment: 90 Minutes         Adverse Event:      I

## 2024-02-15 NOTE — DISCHARGE INSTRUCTIONS
Left Great Toe.   Clean with saline or Vashe.  Apply Gold Bond Diabetic lotion to wound.   Cover with white sock  Apply daily.      Patient to offload wound with wide toebox shoes or darco peg shoe  while standing or weight bearing.     Do not get wound wet. May purchase cast cover at local pharmacy to keep dry in shower.  Wound healing is greatly slowed when blood glucose levels are greater than 200. Monitor glucose levels daily to ensure tight glucose control.  Follow up with PCP if your glucose levels are frequently greater than 200.     Smoking greatly slows wound healing by restricting essential blow flow. Decrease smoking with a plan to quit.     Increase protein intake to promote wound healing. Protein supplements such as Arya and Ensure are great options.     Continue antibiotics as prescribed by Infectious Disease.     Continue HBO as prescribed.

## 2024-02-15 NOTE — FLOWSHEET NOTE
02/15/24 1337   Wound 10/02/23 Toe (Comment  which one) Left;Plantar great toe #4   Date First Assessed/Time First Assessed: 10/02/23 1133   Present on Original Admission: Yes  Primary Wound Type: Diabetic Ulcer  Location: Toe (Comment  which one)  Wound Location Orientation: Left;Plantar  Wound Description (Comments): great toe #4   Wound Image    Wound Etiology Diabetic Gavin 3   Dressing Status Intact   Wound Cleansed Vashe   Dressing/Treatment Other (comment)  (iodosorb, rolled gauze)   Offloading for Diabetic Foot Ulcers Offloading ordered   Wound Length (cm) 0.2 cm   Wound Width (cm) 0.2 cm   Wound Depth (cm) 0.1 cm   Wound Surface Area (cm^2) 0.04 cm^2   Change in Wound Size % (l*w) 97.33   Wound Volume (cm^3) 0.004 cm^3   Wound Healing % 97   Wound Assessment Pink/red   Drainage Amount None (dry)   Odor None   Stella-wound Assessment Hyperkeratosis (callous)   Wound Thickness Description not for Pressure Injury Full thickness

## 2024-02-15 NOTE — WOUND CARE
Discharge Instructions for  [facility]  [address] [place] [address]  [address]  [place] [address]  Phone [phone]   Fax [phone]      NAME:  Estevan Valenzuela  YOB: 1965  MEDICAL RECORD NUMBER:  [id number]  DATE:  2/15/2024    Return Appointment:   1 week with Derick Uribe DO      Instructions:     Left Great Toe.   Clean with saline or Vashe.  Apply Gold Bond Diabetic lotion to wound.   Cover with white sock  Apply daily.      Patient to offload wound with wide toebox shoes or darco peg shoe  while standing or weight bearing.     Do not get wound wet. May purchase cast cover at local pharmacy to keep dry in shower.  Wound healing is greatly slowed when blood glucose levels are greater than 200. Monitor glucose levels daily to ensure tight glucose control.  Follow up with PCP if your glucose levels are frequently greater than 200.     Smoking greatly slows wound healing by restricting essential blow flow. Decrease smoking with a plan to quit.     Increase protein intake to promote wound healing. Protein supplements such as Arya and Ensure are great options.     Continue antibiotics as prescribed by Infectious Disease.     Continue HBO as prescribed.         Should you experience increased redness, swelling, pain, foul odor, size of wound(s), or have a temperature over 101 degrees please contact the Wound Healing Center at [phone] or if after hours contact your primary care physician or go to the hospital emergency department.    PLEASE NOTE: IF YOU ARE UNABLE TO OBTAIN WOUND SUPPLIES, CONTINUE TO USE THE SUPPLIES YOU HAVE AVAILABLE UNTIL YOU ARE ABLE TO REACH US. IT IS MOST IMPORTANT TO KEEP THE WOUND COVERED AT ALL TIMES.    Electronically signed Emily HERNANDEZ RN on 2/15/2024 at 1:37 PM

## 2024-02-16 ENCOUNTER — HOSPITAL ENCOUNTER (OUTPATIENT)
Dept: WOUND CARE | Age: 59
Discharge: HOME | End: 2024-02-16
Payer: MEDICARE

## 2024-02-16 VITALS
TEMPERATURE: 97.4 F | OXYGEN SATURATION: 100 % | RESPIRATION RATE: 18 BRPM | DIASTOLIC BLOOD PRESSURE: 105 MMHG | SYSTOLIC BLOOD PRESSURE: 171 MMHG | HEART RATE: 64 BPM

## 2024-02-16 DIAGNOSIS — E11.621 DIABETIC ULCER OF TOE OF LEFT FOOT ASSOCIATED WITH TYPE 2 DIABETES MELLITUS, WITH NECROSIS OF BONE (HCC): Primary | ICD-10-CM

## 2024-02-16 DIAGNOSIS — L97.524 DIABETIC ULCER OF TOE OF LEFT FOOT ASSOCIATED WITH TYPE 2 DIABETES MELLITUS, WITH NECROSIS OF BONE (HCC): Primary | ICD-10-CM

## 2024-02-16 LAB
GLUCOSE BLD STRIP.AUTO-MCNC: 291 MG/DL (ref 65–100)
GLUCOSE BLD STRIP.AUTO-MCNC: 306 MG/DL (ref 65–100)
SERVICE CMNT-IMP: ABNORMAL
SERVICE CMNT-IMP: ABNORMAL

## 2024-02-16 PROCEDURE — 82962 GLUCOSE BLOOD TEST: CPT

## 2024-02-16 PROCEDURE — G0277 HBOT, FULL BODY CHAMBER, 30M: HCPCS

## 2024-02-19 ENCOUNTER — HOSPITAL ENCOUNTER (OUTPATIENT)
Dept: WOUND CARE | Age: 59
Discharge: HOME | End: 2024-02-19
Payer: MEDICARE

## 2024-02-19 VITALS
DIASTOLIC BLOOD PRESSURE: 107 MMHG | HEART RATE: 69 BPM | OXYGEN SATURATION: 99 % | SYSTOLIC BLOOD PRESSURE: 176 MMHG | RESPIRATION RATE: 18 BRPM | TEMPERATURE: 97.5 F

## 2024-02-19 DIAGNOSIS — E11.621 DIABETIC ULCER OF TOE OF LEFT FOOT ASSOCIATED WITH TYPE 2 DIABETES MELLITUS, WITH NECROSIS OF BONE (HCC): Primary | ICD-10-CM

## 2024-02-19 DIAGNOSIS — L97.524 DIABETIC ULCER OF TOE OF LEFT FOOT ASSOCIATED WITH TYPE 2 DIABETES MELLITUS, WITH NECROSIS OF BONE (HCC): Primary | ICD-10-CM

## 2024-02-19 LAB
GLUCOSE BLD STRIP.AUTO-MCNC: 246 MG/DL (ref 65–100)
GLUCOSE BLD STRIP.AUTO-MCNC: 279 MG/DL (ref 65–100)
SERVICE CMNT-IMP: ABNORMAL
SERVICE CMNT-IMP: ABNORMAL

## 2024-02-19 PROCEDURE — 99183 HYPERBARIC OXYGEN THERAPY: CPT | Performed by: FAMILY MEDICINE

## 2024-02-19 PROCEDURE — G0277 HBOT, FULL BODY CHAMBER, 30M: HCPCS

## 2024-02-19 PROCEDURE — 82962 GLUCOSE BLOOD TEST: CPT

## 2024-02-19 ASSESSMENT — PAIN SCALES - GENERAL: PAINLEVEL_OUTOF10: 0

## 2024-02-20 ENCOUNTER — HOSPITAL ENCOUNTER (OUTPATIENT)
Dept: WOUND CARE | Age: 59
Discharge: HOME | End: 2024-02-20
Payer: MEDICARE

## 2024-02-20 VITALS
DIASTOLIC BLOOD PRESSURE: 98 MMHG | RESPIRATION RATE: 18 BRPM | HEART RATE: 76 BPM | SYSTOLIC BLOOD PRESSURE: 160 MMHG | TEMPERATURE: 97.5 F | OXYGEN SATURATION: 100 %

## 2024-02-20 DIAGNOSIS — E11.621 DIABETIC ULCER OF TOE OF LEFT FOOT ASSOCIATED WITH TYPE 2 DIABETES MELLITUS, WITH NECROSIS OF BONE (HCC): Primary | ICD-10-CM

## 2024-02-20 DIAGNOSIS — L97.524 DIABETIC ULCER OF TOE OF LEFT FOOT ASSOCIATED WITH TYPE 2 DIABETES MELLITUS, WITH NECROSIS OF BONE (HCC): Primary | ICD-10-CM

## 2024-02-20 LAB
GLUCOSE BLD STRIP.AUTO-MCNC: 188 MG/DL (ref 65–100)
GLUCOSE BLD STRIP.AUTO-MCNC: 208 MG/DL (ref 65–100)
SERVICE CMNT-IMP: ABNORMAL
SERVICE CMNT-IMP: ABNORMAL

## 2024-02-20 PROCEDURE — G0277 HBOT, FULL BODY CHAMBER, 30M: HCPCS

## 2024-02-20 PROCEDURE — 99183 HYPERBARIC OXYGEN THERAPY: CPT | Performed by: FAMILY MEDICINE

## 2024-02-20 PROCEDURE — 82962 GLUCOSE BLOOD TEST: CPT

## 2024-02-20 ASSESSMENT — PAIN SCALES - GENERAL: PAINLEVEL_OUTOF10: 0

## 2024-02-20 NOTE — PROGRESS NOTES
HBO PROGRESS NOTE      NAME: Estevan Valenzuela  MEDICAL RECORD NUMBER:  [id number]  AGE: 58 y.o.   GENDER: male  : 1965  EPISODE DATE:  2024     Subjective     HBO Treatment Number: 32 out of Total Treatments: 40    HBO Diagnosis:             Indications: Chronic Refractory Osteomyelitis to ___ (site) (Left Great Toe)    Safety checks performed prior to treatment.  See doc flowsheets for documentation.    Objective        Recent Labs     24  1148 24  1346   POCGLU 208* 188*     Pre treatment Vital Signs       Temp: 97.5 °F (36.4 °C)     Pulse: (!) 112     Respirations: 18     BP: (!) 157/109       Post treatment Vital Signs  Temp: 97.5 °F (36.4 °C)  Pulse: 76  Respirations: 18  BP: (!) 160/98    Assessment        HBO Diagnosis:   Problem List Items Addressed This Visit          Endocrine    Diabetic ulcer of toe of left foot associated with type 2 diabetes mellitus, with necrosis of bone (HCC) - Primary (Chronic)    Relevant Orders    Notify physician (specify)    Hyperbaric Oxygen Therapy       Physical Exam        General Appearance:  alert and oriented to person, place and time, well-developed and well-nourished, in no acute distress    Pre Tympanic Membrane Assessment:  Right: Normal  Left: Normal    Post Tympanic Membrane Assessment:  Left: Normal  Right: Normal    Pulmonary/Chest:  clear to auscultation bilaterally- no wheezes, rales or rhonchi, normal air movement, no respiratory distress    Cardiovascular:  normal    Plan        Patient placed in a full body Monoplace Chamber #: [id number]  Treatment Start Time: 1154     Pressure Reached Time: 1203  JAC : 2  Number of Air Breaks:        Decompression Time: 1334   Treatment End Time: 1340  Length of Treatment: 90 Minutes  Symptoms Noted During Treatment: None  Total Treatment Time (min): 106    Treatment Completion Status: Treatment completed without issue (D/C instructions reviewed with patient. No questions)    I was present on

## 2024-02-21 ENCOUNTER — CARE COORDINATION (OUTPATIENT)
Dept: CARE COORDINATION | Facility: CLINIC | Age: 59
End: 2024-02-21

## 2024-02-21 ENCOUNTER — HOSPITAL ENCOUNTER (OUTPATIENT)
Dept: WOUND CARE | Age: 59
Discharge: HOME | End: 2024-02-21
Payer: MEDICARE

## 2024-02-21 VITALS
HEART RATE: 73 BPM | SYSTOLIC BLOOD PRESSURE: 163 MMHG | RESPIRATION RATE: 17 BRPM | TEMPERATURE: 97.9 F | DIASTOLIC BLOOD PRESSURE: 126 MMHG | OXYGEN SATURATION: 99 %

## 2024-02-21 DIAGNOSIS — L97.524 DIABETIC ULCER OF TOE OF LEFT FOOT ASSOCIATED WITH TYPE 2 DIABETES MELLITUS, WITH NECROSIS OF BONE (HCC): Primary | ICD-10-CM

## 2024-02-21 DIAGNOSIS — E11.621 DIABETIC ULCER OF TOE OF LEFT FOOT ASSOCIATED WITH TYPE 2 DIABETES MELLITUS, WITH NECROSIS OF BONE (HCC): Primary | ICD-10-CM

## 2024-02-21 LAB
GLUCOSE BLD STRIP.AUTO-MCNC: 216 MG/DL (ref 65–100)
GLUCOSE BLD STRIP.AUTO-MCNC: 263 MG/DL (ref 65–100)
SERVICE CMNT-IMP: ABNORMAL
SERVICE CMNT-IMP: ABNORMAL

## 2024-02-21 PROCEDURE — G0277 HBOT, FULL BODY CHAMBER, 30M: HCPCS

## 2024-02-21 PROCEDURE — 82962 GLUCOSE BLOOD TEST: CPT

## 2024-02-21 PROCEDURE — 99183 HYPERBARIC OXYGEN THERAPY: CPT | Performed by: FAMILY MEDICINE

## 2024-02-21 ASSESSMENT — PAIN SCALES - GENERAL
PAINLEVEL_OUTOF10: 0
PAINLEVEL_OUTOF10: 0

## 2024-02-21 NOTE — PROGRESS NOTES
HBO PROGRESS NOTE      NAME: Estevan Valenzuela  MEDICAL RECORD NUMBER:  [id number]  AGE: 58 y.o.   GENDER: male  : 1965  EPISODE DATE:  2024     Subjective     HBO Treatment Number: 33 out of Total Treatments: 40    HBO Diagnosis:             Indications: Chronic Refractory Osteomyelitis to ___ (site) (left great toe)    Safety checks performed prior to treatment.  See doc flowsheets for documentation.    Objective        Recent Labs     24  1031 24  1252   POCGLU 216* 263*     Pre treatment Vital Signs       Temp: 97.9 °F (36.6 °C)     Pulse: 86     Respirations: 18     BP: (!) 166/101       Post treatment Vital Signs  Temp: 97.9 °F (36.6 °C)  Pulse: 73  Respirations: 17  BP: (!) 163/126    Assessment        HBO Diagnosis:   Problem List Items Addressed This Visit          Endocrine    * (Principal) Diabetic ulcer of toe of left foot associated with type 2 diabetes mellitus, with necrosis of bone (HCC) - Primary (Chronic)    Relevant Orders    Hypoglycemial protocol    POCT glucose    Care order/instruction    Care order/instruction    Care order/instruction    Care order/instruction    Care order/instruction    Care order/instruction    Care order/instruction    Care order/instruction    Notify physician (specify)    Hyperbaric Oxygen Therapy       Physical Exam        General Appearance:  alert and oriented to person, place and time, well-developed and well-nourished, in no acute distress    Pre Tympanic Membrane Assessment:  Right: Normal  Left: Normal    Post Tympanic Membrane Assessment:  Left: Normal  Right: Normal    Pulmonary/Chest:  clear to auscultation bilaterally- no wheezes, rales or rhonchi, normal air movement, no respiratory distress    Cardiovascular:  normal    Plan        Patient placed in a full body Monoplace Chamber #: [id number]  Treatment Start Time: 1101     Pressure Reached Time: 1110  JAC : 2  Number of Air Breaks:        Decompression Time: 1241   Treatment End

## 2024-02-21 NOTE — CARE COORDINATION
Initial Goal - week 1.  Patient will eat small meals/snack 4x daily (has not been eating at all) and will keep a food log.  Goal - Week 2.  Patient keeping food log, taking new insulin.  Attending appointments.         Supportive resources in place to maintain patient in the community (ie. Home Health, DME equipment, refer to, medication assistant plan, etc.)   On track     Patient to connect with Medicaid  for help with access to [employer] 12/1/23              Future Appointments   Date Time Provider Department Center   2/22/2024  1:00 PM Efe Gramajo MD [facility]   2/22/2024  3:10 PM Efe Gramajo MD [facility]   2/23/2024  1:00 PM Derick Uribe DO [facility]   2/26/2024  1:00 PM Derick Uribe DO [facility]   2/27/2024  1:00 PM Derick Uribe DO [facility]   2/28/2024  1:00 PM Derick Uribe DO [facility]   2/29/2024  1:00 PM Efe Gramajo MD [facility]   3/1/2024  1:00 PM Derick Uribe DO [facility]   5/15/2024  3:00 PM Parul Perez PA-C [facility]   5/22/2024 11:15 AM Aric Shafer MD [facility]   8/12/2024  3:00 PM Parul Perez PA-C [facility]

## 2024-02-22 ENCOUNTER — HOSPITAL ENCOUNTER (OUTPATIENT)
Dept: WOUND CARE | Age: 59
Discharge: HOME | End: 2024-02-22
Payer: MEDICARE

## 2024-02-22 VITALS
TEMPERATURE: 97.3 F | RESPIRATION RATE: 18 BRPM | HEART RATE: 65 BPM | SYSTOLIC BLOOD PRESSURE: 177 MMHG | OXYGEN SATURATION: 100 % | DIASTOLIC BLOOD PRESSURE: 102 MMHG

## 2024-02-22 VITALS
OXYGEN SATURATION: 100 % | RESPIRATION RATE: 18 BRPM | TEMPERATURE: 97.3 F | SYSTOLIC BLOOD PRESSURE: 177 MMHG | HEART RATE: 65 BPM | DIASTOLIC BLOOD PRESSURE: 102 MMHG

## 2024-02-22 DIAGNOSIS — Z79.4 TYPE 2 DIABETES MELLITUS WITH HYPERGLYCEMIA, WITH LONG-TERM CURRENT USE OF INSULIN (HCC): ICD-10-CM

## 2024-02-22 DIAGNOSIS — M86.672 CHRONIC OSTEOMYELITIS OF TOE OF LEFT FOOT (HCC): ICD-10-CM

## 2024-02-22 DIAGNOSIS — E11.65 TYPE 2 DIABETES MELLITUS WITH HYPERGLYCEMIA, WITH LONG-TERM CURRENT USE OF INSULIN (HCC): ICD-10-CM

## 2024-02-22 DIAGNOSIS — L97.524 DIABETIC ULCER OF TOE OF LEFT FOOT ASSOCIATED WITH TYPE 2 DIABETES MELLITUS, WITH NECROSIS OF BONE (HCC): Primary | ICD-10-CM

## 2024-02-22 DIAGNOSIS — B20 HUMAN IMMUNODEFICIENCY VIRUS (HCC): ICD-10-CM

## 2024-02-22 DIAGNOSIS — E11.621 DIABETIC ULCER OF TOE OF LEFT FOOT ASSOCIATED WITH TYPE 2 DIABETES MELLITUS, WITH NECROSIS OF BONE (HCC): Primary | ICD-10-CM

## 2024-02-22 DIAGNOSIS — F17.210 CIGARETTE SMOKER: ICD-10-CM

## 2024-02-22 DIAGNOSIS — E11.42 PERIPHERAL SENSORY NEUROPATHY DUE TO TYPE 2 DIABETES MELLITUS (HCC): ICD-10-CM

## 2024-02-22 LAB
GLUCOSE BLD STRIP.AUTO-MCNC: 223 MG/DL (ref 65–100)
GLUCOSE BLD STRIP.AUTO-MCNC: 234 MG/DL (ref 65–100)
SERVICE CMNT-IMP: ABNORMAL
SERVICE CMNT-IMP: ABNORMAL

## 2024-02-22 PROCEDURE — G0277 HBOT, FULL BODY CHAMBER, 30M: HCPCS

## 2024-02-22 PROCEDURE — 99213 OFFICE O/P EST LOW 20 MIN: CPT

## 2024-02-22 PROCEDURE — 82962 GLUCOSE BLOOD TEST: CPT

## 2024-02-22 PROCEDURE — 99183 HYPERBARIC OXYGEN THERAPY: CPT | Performed by: SURGERY

## 2024-02-22 ASSESSMENT — PAIN SCALES - GENERAL: PAINLEVEL_OUTOF10: 0

## 2024-02-22 NOTE — FLOWSHEET NOTE
02/22/24 1457   Wound 10/02/23 Toe (Comment  which one) Left;Plantar great toe #4   Date First Assessed/Time First Assessed: 10/02/23 1133   Present on Original Admission: Yes  Primary Wound Type: Diabetic Ulcer  Location: Toe (Comment  which one)  Wound Location Orientation: Left;Plantar  Wound Description (Comments): great toe #4   Wound Image    Wound Etiology Diabetic Gavin 3   Dressing Status Intact   Wound Cleansed Vashe   Dressing/Treatment Other (comment)  (goldbond)   Offloading for Diabetic Foot Ulcers Offloading ordered   Wound Length (cm) 0.1 cm   Wound Width (cm) 0.1 cm   Wound Depth (cm) 0.1 cm   Wound Surface Area (cm^2) 0.01 cm^2   Change in Wound Size % (l*w) 99.33   Wound Volume (cm^3) 0.001 cm^3   Wound Healing % 99   Wound Assessment Pink/red   Drainage Amount None (dry)   Odor None   Stella-wound Assessment Hyperkeratosis (callous)   Wound Thickness Description not for Pressure Injury Full thickness

## 2024-02-22 NOTE — WOUND CARE
Discharge Instructions for  [facility]  [address] [place] [address]  [address]  [place] [address]  Phone [phone]   Fax [phone]      NAME:  Estevan Valenzuela  YOB: 1965  MEDICAL RECORD NUMBER:  [id number]  DATE:  2/22/2024    Return Appointment:   1 week with Efe Gramajo MD      Instructions:   Left Great Toe.   Clean with saline or Vashe.  Apply Gold Bond Diabetic lotion to wound.   Cover with white sock  Apply daily.      Patient to offload wound with wide toebox shoes or darco peg shoe  while standing or weight bearing.     Do not get wound wet. May purchase cast cover at local pharmacy to keep dry in shower.  Wound healing is greatly slowed when blood glucose levels are greater than 200. Monitor glucose levels daily to ensure tight glucose control.  Follow up with PCP if your glucose levels are frequently greater than 200.     Smoking greatly slows wound healing by restricting essential blow flow. Decrease smoking with a plan to quit.     Increase protein intake to promote wound healing. Protein supplements such as Arya and Ensure are great options.     Continue antibiotics as prescribed by Infectious Disease.     Continue HBO as prescribed.         Should you experience increased redness, swelling, pain, foul odor, size of wound(s), or have a temperature over 101 degrees please contact the Wound Healing Center at [phone] or if after hours contact your primary care physician or go to the hospital emergency department.    PLEASE NOTE: IF YOU ARE UNABLE TO OBTAIN WOUND SUPPLIES, CONTINUE TO USE THE SUPPLIES YOU HAVE AVAILABLE UNTIL YOU ARE ABLE TO REACH US. IT IS MOST IMPORTANT TO KEEP THE WOUND COVERED AT ALL TIMES.    Electronically signed Emily HERNANDEZ RN on 2/22/2024 at 4:44 PM

## 2024-02-22 NOTE — PROGRESS NOTES
Decompression Time   Treatment End Time: 1331 Door Opened    Symptoms Noted During Treatment: None    Length of Treatment: 90 Minutes         Adverse Event:      I was present on these premises and immediately available to furnish assistance & direction throughout the HBO procedure today.   MD spoke with patient regarding any problems or questions related to HBO therapy.  Reviewed HBO treatment profile with HBO  and verified no adverse reactions noted.      Plan      Patient placed in a full body Monoplace Chamber #: [id number]  Treatment Start Time: 1144     Pressure Reached Time: 1153  JAC : 2  Number of Air Breaks:        Decompression Time: 1324   Treatment End Time: 1331  Length of Treatment: 90 Minutes  Symptoms Noted During Treatment: None  Total Treatment Time (min): 107  Treatment Completion Status: Treatment completed without issue (D/C instructions reviewed with patient. No questions)      I was present on these premises and immediately available to furnish assistance & direction throughout the HBO Treatment.     Estevan Valenzuela is a 58 y.o. male  successfully completed today's hyperbaric oxygen treatment at [facility] and HBO therapy.  In my clinical judgement, ongoing HBO therapy is necessary at this time to assist with wound healing, preservation of limb, life, or function.  Supervision and attendance of Hyperbaric Oxygen Therapy provided. Continue HBO treatment as outlined in the treatment plan.  Hyperbaric Oxygen: Mr. Valenzuela tolerated: Treatment Number: 34 without issue.    Discharge Instructions were explained and given to Mr. Valenzuela   Electronically signed by Efe Gramajo MD on 2/22/2024 at 3:57 PM

## 2024-02-23 ENCOUNTER — HOSPITAL ENCOUNTER (OUTPATIENT)
Dept: WOUND CARE | Age: 59
Discharge: HOME | End: 2024-02-23
Payer: MEDICARE

## 2024-02-23 VITALS
OXYGEN SATURATION: 99 % | HEART RATE: 68 BPM | RESPIRATION RATE: 18 BRPM | SYSTOLIC BLOOD PRESSURE: 156 MMHG | TEMPERATURE: 97.5 F | DIASTOLIC BLOOD PRESSURE: 92 MMHG

## 2024-02-23 DIAGNOSIS — L97.524 DIABETIC ULCER OF TOE OF LEFT FOOT ASSOCIATED WITH TYPE 2 DIABETES MELLITUS, WITH NECROSIS OF BONE (HCC): Primary | ICD-10-CM

## 2024-02-23 DIAGNOSIS — E11.621 DIABETIC ULCER OF TOE OF LEFT FOOT ASSOCIATED WITH TYPE 2 DIABETES MELLITUS, WITH NECROSIS OF BONE (HCC): Primary | ICD-10-CM

## 2024-02-23 LAB
GLUCOSE BLD STRIP.AUTO-MCNC: 168 MG/DL (ref 65–100)
GLUCOSE BLD STRIP.AUTO-MCNC: 184 MG/DL (ref 65–100)
SERVICE CMNT-IMP: ABNORMAL
SERVICE CMNT-IMP: ABNORMAL

## 2024-02-23 PROCEDURE — 82962 GLUCOSE BLOOD TEST: CPT

## 2024-02-23 PROCEDURE — 99183 HYPERBARIC OXYGEN THERAPY: CPT | Performed by: FAMILY MEDICINE

## 2024-02-23 PROCEDURE — G0277 HBOT, FULL BODY CHAMBER, 30M: HCPCS

## 2024-02-23 ASSESSMENT — PAIN SCALES - GENERAL
PAINLEVEL_OUTOF10: 0
PAINLEVEL_OUTOF10: 0

## 2024-02-23 NOTE — PROGRESS NOTES
HBO PROGRESS NOTE      NAME: Estevan Valenzuela  MEDICAL RECORD NUMBER:  [id number]  AGE: 58 y.o.   GENDER: male  : 1965  EPISODE DATE:  2024     Subjective     HBO Treatment Number: 35 out of Total Treatments: 40    HBO Diagnosis:             Indications: Chronic Refractory Osteomyelitis to ___ (site) (Left Great Toe)    Safety checks performed prior to treatment.  See doc flowsheets for documentation.    Objective        Recent Labs     24  0852 24  1105   POCGLU 184* 168*     Pre treatment Vital Signs       Temp: 97.5 °F (36.4 °C)     Pulse: 74     Respirations: 18     BP: (!) 131/109       Post treatment Vital Signs  Temp: 97.5 °F (36.4 °C)  Pulse: 68  Respirations: 18  BP: (!) 156/92    Assessment        HBO Diagnosis:   Problem List Items Addressed This Visit          Endocrine    Diabetic ulcer of toe of left foot associated with type 2 diabetes mellitus, with necrosis of bone (HCC) - Primary (Chronic)    Relevant Orders    Hypoglycemial protocol    POCT glucose    Care order/instruction    Care order/instruction    Care order/instruction    Care order/instruction    Care order/instruction    Care order/instruction    Care order/instruction    Care order/instruction    Notify physician (specify)    Hyperbaric Oxygen Therapy       Physical Exam        General Appearance:  alert and oriented to person, place and time, well-developed and well-nourished, in no acute distress    Pre Tympanic Membrane Assessment:  Right: Normal  Left: Normal    Post Tympanic Membrane Assessment:  Left: Normal  Right: Normal    Pulmonary/Chest:  clear to auscultation bilaterally- no wheezes, rales or rhonchi, normal air movement, no respiratory distress    Cardiovascular:  normal    Plan        Patient placed in a full body Monoplace Chamber #: [id number]  Treatment Start Time: 915     Pressure Reached Time: 0924  JAC : 2  Number of Air Breaks:        Decompression Time: 1055   Treatment End Time:

## 2024-02-23 NOTE — PROGRESS NOTES
historian(s)    or   Category 2: Independent interpretation of tests   ?Independent interpretation of a test performed by another physician/other qualified health care professional (not separately reported);     or  Category 3: Discussion of management or test interpretation  ?Discussion of management or test interpretation with external physician/other qualified health care professional/appropriate source (not separately reported)   High risk of morbidity from additional diagnostic testing or treatment  Examples only:  ?Drug therapy requiring intensive monitoring for toxicity  ?Decision regarding elective major surgery with identified patient or procedure risk factors  ?Decision regarding emergency major surgery  ?Decision regarding hospitalization  ?Decision not to resuscitate or to de-escalate care because of poor prognosis      Parenteral controlled substances                 I have personally performed a face-to-face diagnostic evaluation and management  service on this patient.    I have independently seen the patient.   I have independently obtained the above history from the patient/family.    I have independently examined the patient with above findings.  I have independently reviewed data/labs for this patient and developed the above plan of care (MDM).      Electronically signed by Efe Gramajo MD on 2/22/2024 at 7:26 PM

## 2024-02-26 ENCOUNTER — HOSPITAL ENCOUNTER (OUTPATIENT)
Dept: WOUND CARE | Age: 59
Discharge: HOME | End: 2024-02-26
Payer: MEDICARE

## 2024-02-26 VITALS
RESPIRATION RATE: 18 BRPM | OXYGEN SATURATION: 97 % | HEART RATE: 62 BPM | DIASTOLIC BLOOD PRESSURE: 119 MMHG | SYSTOLIC BLOOD PRESSURE: 186 MMHG | TEMPERATURE: 98 F

## 2024-02-26 DIAGNOSIS — M86.172 ACUTE OSTEOMYELITIS OF TOE OF LEFT FOOT (HCC): ICD-10-CM

## 2024-02-26 DIAGNOSIS — E11.621 DIABETIC ULCER OF TOE OF LEFT FOOT ASSOCIATED WITH TYPE 2 DIABETES MELLITUS, WITH NECROSIS OF BONE (HCC): Primary | ICD-10-CM

## 2024-02-26 DIAGNOSIS — M86.672 CHRONIC OSTEOMYELITIS OF TOE OF LEFT FOOT (HCC): ICD-10-CM

## 2024-02-26 DIAGNOSIS — L97.524 DIABETIC ULCER OF TOE OF LEFT FOOT ASSOCIATED WITH TYPE 2 DIABETES MELLITUS, WITH NECROSIS OF BONE (HCC): Primary | ICD-10-CM

## 2024-02-26 LAB
GLUCOSE BLD STRIP.AUTO-MCNC: 184 MG/DL (ref 65–100)
GLUCOSE BLD STRIP.AUTO-MCNC: 194 MG/DL (ref 65–100)
SERVICE CMNT-IMP: ABNORMAL
SERVICE CMNT-IMP: ABNORMAL

## 2024-02-26 PROCEDURE — 82962 GLUCOSE BLOOD TEST: CPT

## 2024-02-26 PROCEDURE — G0277 HBOT, FULL BODY CHAMBER, 30M: HCPCS

## 2024-02-26 PROCEDURE — 99183 HYPERBARIC OXYGEN THERAPY: CPT | Performed by: SURGERY

## 2024-02-26 ASSESSMENT — PAIN SCALES - GENERAL
PAINLEVEL_OUTOF10: 0
PAINLEVEL_OUTOF10: 0

## 2024-02-26 NOTE — PROGRESS NOTES
Adverse Event:      I was present on these premises and immediately available to furnish assistance & direction throughout the HBO procedure today.   MD spoke with patient regarding any problems or questions related to HBO therapy.  Reviewed HBO treatment profile with HBO  and verified no adverse reactions noted.      Plan      Patient placed in a full body Monoplace Chamber #: [id number]  Treatment Start Time: 1033     Pressure Reached Time: 1042  JAC : 2  Number of Air Breaks:        Decompression Time: 1212   Treatment End Time: 1220  Length of Treatment: 90 Minutes  Symptoms Noted During Treatment: None  Total Treatment Time (min): 107  Treatment Completion Status: Treatment completed without issue (D/C instructions reviewed no questions)      I was present on these premises and immediately available to furnish assistance & direction throughout the HBO Treatment.     Estevan Valenzuela is a 58 y.o. male  successfully completed today's hyperbaric oxygen treatment at [facility] and HBO therapy.  In my clinical judgement, ongoing HBO therapy is necessary at this time to assist with wound healing, preservation of limb, life, or function.  Supervision and attendance of Hyperbaric Oxygen Therapy provided. Continue HBO treatment as outlined in the treatment plan.  Hyperbaric Oxygen: Mr. Valenzuela tolerated: Treatment Number: 36 without issue.    Discharge Instructions were explained and given to Mr. Valenzuela   Electronically signed by Efe Gramajo MD on 2/26/2024 at 12:59 PM

## 2024-02-27 ENCOUNTER — HOSPITAL ENCOUNTER (OUTPATIENT)
Dept: WOUND CARE | Age: 59
Discharge: HOME | End: 2024-02-27
Payer: MEDICARE

## 2024-02-27 VITALS
OXYGEN SATURATION: 100 % | DIASTOLIC BLOOD PRESSURE: 104 MMHG | TEMPERATURE: 98.2 F | SYSTOLIC BLOOD PRESSURE: 198 MMHG | RESPIRATION RATE: 18 BRPM | HEART RATE: 66 BPM

## 2024-02-27 DIAGNOSIS — E11.621 DIABETIC ULCER OF TOE OF LEFT FOOT ASSOCIATED WITH TYPE 2 DIABETES MELLITUS, WITH NECROSIS OF BONE (HCC): Primary | ICD-10-CM

## 2024-02-27 DIAGNOSIS — L97.524 DIABETIC ULCER OF TOE OF LEFT FOOT ASSOCIATED WITH TYPE 2 DIABETES MELLITUS, WITH NECROSIS OF BONE (HCC): Primary | ICD-10-CM

## 2024-02-27 LAB
GLUCOSE BLD STRIP.AUTO-MCNC: 128 MG/DL (ref 65–100)
GLUCOSE BLD STRIP.AUTO-MCNC: 141 MG/DL (ref 65–100)
SERVICE CMNT-IMP: ABNORMAL
SERVICE CMNT-IMP: ABNORMAL

## 2024-02-27 PROCEDURE — 82962 GLUCOSE BLOOD TEST: CPT

## 2024-02-27 PROCEDURE — 99183 HYPERBARIC OXYGEN THERAPY: CPT | Performed by: FAMILY MEDICINE

## 2024-02-27 PROCEDURE — G0277 HBOT, FULL BODY CHAMBER, 30M: HCPCS

## 2024-02-27 ASSESSMENT — PAIN SCALES - GENERAL: PAINLEVEL_OUTOF10: 0

## 2024-02-28 ENCOUNTER — CARE COORDINATION (OUTPATIENT)
Dept: CARE COORDINATION | Facility: CLINIC | Age: 59
End: 2024-02-28

## 2024-02-28 ENCOUNTER — HOSPITAL ENCOUNTER (OUTPATIENT)
Dept: WOUND CARE | Age: 59
Discharge: HOME | End: 2024-02-28
Payer: MEDICARE

## 2024-02-28 VITALS
SYSTOLIC BLOOD PRESSURE: 186 MMHG | OXYGEN SATURATION: 99 % | HEART RATE: 65 BPM | RESPIRATION RATE: 18 BRPM | DIASTOLIC BLOOD PRESSURE: 102 MMHG | TEMPERATURE: 97.5 F

## 2024-02-28 DIAGNOSIS — E11.621 DIABETIC ULCER OF TOE OF LEFT FOOT ASSOCIATED WITH TYPE 2 DIABETES MELLITUS, WITH NECROSIS OF BONE (HCC): Primary | ICD-10-CM

## 2024-02-28 DIAGNOSIS — L97.524 DIABETIC ULCER OF TOE OF LEFT FOOT ASSOCIATED WITH TYPE 2 DIABETES MELLITUS, WITH NECROSIS OF BONE (HCC): Primary | ICD-10-CM

## 2024-02-28 LAB
GLUCOSE BLD STRIP.AUTO-MCNC: 214 MG/DL (ref 65–100)
GLUCOSE BLD STRIP.AUTO-MCNC: 289 MG/DL (ref 65–100)
SERVICE CMNT-IMP: ABNORMAL
SERVICE CMNT-IMP: ABNORMAL

## 2024-02-28 PROCEDURE — G0277 HBOT, FULL BODY CHAMBER, 30M: HCPCS

## 2024-02-28 PROCEDURE — 82962 GLUCOSE BLOOD TEST: CPT

## 2024-02-28 ASSESSMENT — PAIN SCALES - GENERAL: PAINLEVEL_OUTOF10: 0

## 2024-02-28 NOTE — CARE COORDINATION
Ambulatory Care Coordination Note  2/28/2024    Patient Current Location:   Wound Center      Ongoing outreach with patient for HRCM   - patient has 2 more hyperbaric visits   - he finds deep discrepancies with BS he takes at home in the a.m (this morning 123) and at the wound center - 240 after his treatment.  He understands the treatment may interfere with BS.    Looking ahead, patient wants to make plans for diabetes education as recommended by endocrinology and also for his eye appointment.  Future appointment for colonoscopy.  History in intolerance to the prep and so has never had this procedure.  But patient rightfully concerned re: tendency to always be constipated or having diarrhea - never totally \"normal.\"    ACM to help coordinate these appointments.    Challenges to be reviewed by the provider   Additional needs identified to be addressed with provider: No  none               Method of communication with provider: chart routing.    ACM: Heather Byers RN    Offered patient enrollment in the Remote Patient Monitoring (RPM) program for in-home monitoring:  Not addressed at this time, patient is not a candidate. .    Lab Results       None            Care Coordination Interventions    Referral from Primary Care Provider: Yes  Suggested Interventions and Community Resources  [facility]  Diabetes Education: In Process  Fall Risk Prevention: Completed  Disease Specific Clinic: In Process          Goals Addressed    None         Future Appointments   Date Time Provider Department Center   2/29/2024  1:00 PM Efe Gramajo MD [facility]   3/1/2024  1:00 PM Derick Uribe DO [facility]   5/15/2024  3:00 PM Parul Perez PA-C [facility]   5/22/2024 11:15 AM Aric Shafer MD [facility]   8/12/2024  3:00 PM Parul Perez PA-C [facility]

## 2024-02-28 NOTE — PROGRESS NOTES
HBO PROGRESS NOTE      NAME: Estevan Valenzuela  MEDICAL RECORD NUMBER:  [id number]  AGE: 58 y.o.   GENDER: male  : 1965  EPISODE DATE:  2024     Subjective     HBO Treatment Number: 37 out of Total Treatments: 40    HBO Diagnosis:             Indications: Chronic Refractory Osteomyelitis to ___ (site) (Left Great Toe)    Safety checks performed prior to treatment.  See doc flowsheets for documentation.    Objective        Recent Labs     24  1008 24  1204   POCGLU 141* 128*     Pre treatment Vital Signs       Temp: 98.2 °F (36.8 °C)     Pulse: 80     Respirations: 18     BP: (!) 166/113       Post treatment Vital Signs  Temp: 98.2 °F (36.8 °C)  Pulse: 66  Respirations: 18  BP: (!) 198/104    Assessment        HBO Diagnosis:   Problem List Items Addressed This Visit          Endocrine    Diabetic ulcer of toe of left foot associated with type 2 diabetes mellitus, with necrosis of bone (HCC) - Primary (Chronic)    Relevant Orders    POCT glucose       Physical Exam        General Appearance:  alert and oriented to person, place and time, well-developed and well-nourished, in no acute distress    Pre Tympanic Membrane Assessment:  Right: Normal  Left: Normal    Post Tympanic Membrane Assessment:  Left: Normal  Right: Normal    Pulmonary/Chest:  clear to auscultation bilaterally- no wheezes, rales or rhonchi, normal air movement, no respiratory distress    Cardiovascular:  normal    Plan        Patient placed in a full body Monoplace Chamber #: [id number]  Treatment Start Time: 1013     Pressure Reached Time: 1022  JAC : 2  Number of Air Breaks:        Decompression Time: 1154   Treatment End Time: 1201  Length of Treatment: 90 Minutes  Symptoms Noted During Treatment: None  Total Treatment Time (min): 108    Treatment Completion Status: Treatment completed without issue (D/C instructions reviewed no questions)    I was present on these premises and immediately available to furnish assistance &

## 2024-02-29 ENCOUNTER — TELEPHONE (OUTPATIENT)
Dept: ENDOCRINOLOGY | Age: 59
End: 2024-02-29

## 2024-02-29 ENCOUNTER — HOSPITAL ENCOUNTER (OUTPATIENT)
Dept: WOUND CARE | Age: 59
Discharge: HOME | End: 2024-02-29
Payer: MEDICARE

## 2024-02-29 ENCOUNTER — HOSPITAL ENCOUNTER (OUTPATIENT)
Dept: GENERAL RADIOLOGY | Age: 59
Discharge: HOME | End: 2024-02-29
Payer: MEDICARE

## 2024-02-29 VITALS
RESPIRATION RATE: 18 BRPM | TEMPERATURE: 98.2 F | OXYGEN SATURATION: 100 % | HEART RATE: 61 BPM | SYSTOLIC BLOOD PRESSURE: 189 MMHG | DIASTOLIC BLOOD PRESSURE: 107 MMHG

## 2024-02-29 DIAGNOSIS — E11.621 DIABETIC ULCER OF TOE OF LEFT FOOT ASSOCIATED WITH TYPE 2 DIABETES MELLITUS, WITH NECROSIS OF BONE (HCC): ICD-10-CM

## 2024-02-29 DIAGNOSIS — L97.524 DIABETIC ULCER OF TOE OF LEFT FOOT ASSOCIATED WITH TYPE 2 DIABETES MELLITUS, WITH NECROSIS OF BONE (HCC): ICD-10-CM

## 2024-02-29 DIAGNOSIS — M86.172 ACUTE OSTEOMYELITIS OF TOE OF LEFT FOOT (HCC): ICD-10-CM

## 2024-02-29 DIAGNOSIS — L97.524 DIABETIC ULCER OF TOE OF LEFT FOOT ASSOCIATED WITH TYPE 2 DIABETES MELLITUS, WITH NECROSIS OF BONE (HCC): Primary | ICD-10-CM

## 2024-02-29 DIAGNOSIS — M86.672 CHRONIC OSTEOMYELITIS OF TOE OF LEFT FOOT (HCC): ICD-10-CM

## 2024-02-29 DIAGNOSIS — E11.621 DIABETIC ULCER OF TOE OF LEFT FOOT ASSOCIATED WITH TYPE 2 DIABETES MELLITUS, WITH NECROSIS OF BONE (HCC): Primary | ICD-10-CM

## 2024-02-29 LAB
GLUCOSE BLD STRIP.AUTO-MCNC: 157 MG/DL (ref 65–100)
GLUCOSE BLD STRIP.AUTO-MCNC: 185 MG/DL (ref 65–100)
SERVICE CMNT-IMP: ABNORMAL
SERVICE CMNT-IMP: ABNORMAL

## 2024-02-29 PROCEDURE — 82962 GLUCOSE BLOOD TEST: CPT

## 2024-02-29 PROCEDURE — 99183 HYPERBARIC OXYGEN THERAPY: CPT | Performed by: FAMILY MEDICINE

## 2024-02-29 PROCEDURE — 73630 X-RAY EXAM OF FOOT: CPT

## 2024-02-29 PROCEDURE — G0277 HBOT, FULL BODY CHAMBER, 30M: HCPCS

## 2024-02-29 ASSESSMENT — PAIN SCALES - GENERAL
PAINLEVEL_OUTOF10: 0
PAINLEVEL_OUTOF10: 0

## 2024-02-29 NOTE — PROGRESS NOTES
HBO PROGRESS NOTE      NAME: Estevan Valenzuela  MEDICAL RECORD NUMBER:  [id number]  AGE: 58 y.o.   GENDER: male  : 1965  EPISODE DATE:  2024     Subjective     HBO Treatment Number: 39 out of Total Treatments: 40    HBO Diagnosis:             Indications: Chronic Refractory Osteomyelitis to ___ (site) (Left great Toe)    Safety checks performed prior to treatment.  See doc flowsheets for documentation.    Objective        Recent Labs     24  1214 24  1413   POCGLU 157* 185*     Pre treatment Vital Signs       Temp: 98.2 °F (36.8 °C)     Pulse: 80     Respirations: 18     BP: (!) 176/118       Post treatment Vital Signs  Temp: 98.2 °F (36.8 °C)  Pulse: 61  Respirations: 18  BP: (!) 189/107    Assessment        HBO Diagnosis:   Problem List Items Addressed This Visit          Endocrine    Diabetic ulcer of toe of left foot associated with type 2 diabetes mellitus, with necrosis of bone (HCC) - Primary (Chronic)    Relevant Orders    Hypoglycemial protocol    POCT glucose    Care order/instruction    Care order/instruction    Care order/instruction    Care order/instruction    Care order/instruction    Care order/instruction    Care order/instruction    Care order/instruction    Notify physician (specify)    Hyperbaric Oxygen Therapy       Physical Exam        General Appearance:  alert and oriented to person, place and time, well-developed and well-nourished, in no acute distress    Pre Tympanic Membrane Assessment:  Right: Normal  Left: Normal    Post Tympanic Membrane Assessment:  Left: Normal  Right: Normal    Pulmonary/Chest:  clear to auscultation bilaterally- no wheezes, rales or rhonchi, normal air movement, no respiratory distress    Cardiovascular:  normal    Plan        Patient placed in a full body Monoplace Chamber #: [id number]  Treatment Start Time: 1224     Pressure Reached Time: 1233  JAC : 2  Number of Air Breaks:        Decompression Time: 1403   Treatment End Time:

## 2024-03-01 ENCOUNTER — HOSPITAL ENCOUNTER (OUTPATIENT)
Dept: WOUND CARE | Age: 59
Discharge: HOME | End: 2024-03-01
Payer: MEDICARE

## 2024-03-01 VITALS
SYSTOLIC BLOOD PRESSURE: 163 MMHG | RESPIRATION RATE: 18 BRPM | HEART RATE: 75 BPM | TEMPERATURE: 97.9 F | DIASTOLIC BLOOD PRESSURE: 105 MMHG | OXYGEN SATURATION: 98 %

## 2024-03-01 DIAGNOSIS — E11.621 DIABETIC ULCER OF TOE OF LEFT FOOT ASSOCIATED WITH TYPE 2 DIABETES MELLITUS, WITH NECROSIS OF BONE (HCC): Primary | ICD-10-CM

## 2024-03-01 DIAGNOSIS — L97.524 DIABETIC ULCER OF TOE OF LEFT FOOT ASSOCIATED WITH TYPE 2 DIABETES MELLITUS, WITH NECROSIS OF BONE (HCC): Primary | ICD-10-CM

## 2024-03-01 LAB
GLUCOSE BLD STRIP.AUTO-MCNC: 197 MG/DL (ref 65–100)
GLUCOSE BLD STRIP.AUTO-MCNC: 320 MG/DL (ref 65–100)
SERVICE CMNT-IMP: ABNORMAL
SERVICE CMNT-IMP: ABNORMAL

## 2024-03-01 PROCEDURE — 99183 HYPERBARIC OXYGEN THERAPY: CPT | Performed by: FAMILY MEDICINE

## 2024-03-01 PROCEDURE — 82962 GLUCOSE BLOOD TEST: CPT

## 2024-03-01 PROCEDURE — G0277 HBOT, FULL BODY CHAMBER, 30M: HCPCS

## 2024-03-01 ASSESSMENT — PAIN SCALES - GENERAL
PAINLEVEL_OUTOF10: 0
PAINLEVEL_OUTOF10: 0

## 2024-03-01 NOTE — PROGRESS NOTES
HBO PROGRESS NOTE      NAME: Estevan Valenzuela  MEDICAL RECORD NUMBER:  [id number]  AGE: 58 y.o.   GENDER: male  : 1965  EPISODE DATE:  3/1/2024     Subjective     HBO Treatment Number: 40 out of Total Treatments: 40    HBO Diagnosis:             Indications: Chronic Refractory Osteomyelitis to ___ (site) (Left Great Toe)    Safety checks performed prior to treatment.  See doc flowsheets for documentation.    Objective        Recent Labs     24  1216 24  1416   POCGLU 197* 320*     Pre treatment Vital Signs       Temp: 97.9 °F (36.6 °C)     Pulse: 82     Respirations: 18     BP: (!) 153/95       Post treatment Vital Signs  Temp: 97.9 °F (36.6 °C)  Pulse: 75  Respirations: 18  BP: (!) 163/105    Assessment        HBO Diagnosis:   Problem List Items Addressed This Visit          Endocrine    * (Principal) Diabetic ulcer of toe of left foot associated with type 2 diabetes mellitus, with necrosis of bone (HCC) - Primary (Chronic)    Relevant Orders    Hypoglycemial protocol    POCT glucose    Care order/instruction    Care order/instruction    Care order/instruction    Care order/instruction    Care order/instruction    Care order/instruction    Care order/instruction    Care order/instruction    Notify physician (specify)    Hyperbaric Oxygen Therapy       Physical Exam        General Appearance:  alert and oriented to person, place and time, well-developed and well-nourished, in no acute distress    Pre Tympanic Membrane Assessment:  Right: Normal  Left: Normal    Post Tympanic Membrane Assessment:  Left: Normal  Right: Normal    Pulmonary/Chest:  clear to auscultation bilaterally- no wheezes, rales or rhonchi, normal air movement, no respiratory distress    Cardiovascular:  normal    Plan        Patient placed in a full body Monoplace Chamber #: [id number]  Treatment Start Time: 1224     Pressure Reached Time: 1233  JAC : 2  Number of Air Breaks:        Decompression Time: 1404   Treatment End

## 2024-03-01 NOTE — WOUND CARE
Discharge Instructions for  Hyperbaric Oxygen Therapy  [address]   [address] [place] [address]  Telephone: [phone]   FAX: [phone]    NAME:  Estevan Valenzuela  YOB: 1965  MEDICAL RECORD NUMBER:  [id number]  DATE:  3/1/2024    You have been treated with 100% oxygen in the Hyperbaric Chamber at the [facility].  There are usually no adverse effects or problems which follow this treatment.  However, for comfort and safety, we strongly recommend these guidelines are followed:    It is perfectly normal to feel tired after a hyperbaric treatment.  This tiredness should go away 2 to 3 days after your last treatment.  Avoid heavy exertion, exercise or other unnecessary activity if you are feeling tired.   Some people develop a feeling of fullness or stuffiness in their ears.  This is a result of a small amount of fluid build-up in the middle ear.  You may take an over the counter decongestant if approved by your doctor.  Follow the instructions in the medicine package for the amount to take.  If this problem lasts for more than 48 hours, please call your personal doctor.  You also may call or return to the [facility] Hyperbaric Medicine Department and have a Hyperbaric physician examine you.  In rare cases, patients may have so called “late effects” after the treatments.  Please call the Hyperbaric Medicine Department if you have any of these symptoms:     [x]Headache that is not relieved by over the counter pain medicine.  [x]Changes in vision.  During the course of many hyperbaric treatments (20 or more) some patients may complain of a temporary problem with sharp focus on distant objects.  This is a result of changes in the shape of the lens.  Your vision should return to normal in 6 to 8 weeks.  [x]Severe pain in your ears.  [x]Nausea or vomiting.  [x]Difficulty concentrating.  [x]Clumsiness, difficulty walking

## 2024-03-01 NOTE — WOUND CARE
Discharge Instructions for  Hyperbaric Oxygen Therapy  [address]   [address] [place] [address]  Telephone: [phone]   FAX: [phone]  NAME:  Estevan Valenzuela  YOB: 1965  MEDICAL RECORD NUMBER:  [id number]  DATE:  3/1/2024    You have been treated with 100% oxygen in the Hyperbaric Chamber at the [facility].  There are usually no adverse effects or problems which follow this treatment.  However, for comfort and safety, we strongly recommend these guidelines are followed:    It is perfectly normal to feel tired after a hyperbaric treatment.  This tiredness should go away 2 to 3 days after your last treatment.  Avoid heavy exertion, exercise or other unnecessary activity if you are feeling tired.   Some people develop a feeling of fullness or stuffiness in their ears.  This is a result of a small amount of fluid build-up in the middle ear.  You may take an over the counter decongestant if approved by your doctor.  Follow the instructions in the medicine package for the amount to take.  If this problem lasts for more than 48 hours, please call your personal doctor.  You also may call or return to the [facility] Hyperbaric Medicine Department and have a Hyperbaric physician examine you.  In rare cases, patients may have so called “late effects” after the treatments.  Please call the Hyperbaric Medicine Department if you have any of these symptoms:     [x]Headache that is not relieved by over the counter pain medicine.  [x]Changes in vision.  During the course of many hyperbaric treatments (20 or more) some patients may complain of a temporary problem with sharp focus on distant objects.  This is a result of changes in the shape of the lens.  Your vision should return to normal in 6 to 8 weeks.  [x]Severe pain in your ears.  [x]Nausea or vomiting.  [x]Difficulty concentrating.  [x]Clumsiness, difficulty

## 2024-03-04 ENCOUNTER — TELEPHONE (OUTPATIENT)
Dept: ENDOCRINOLOGY | Age: 59
End: 2024-03-04

## 2024-03-07 ENCOUNTER — HOSPITAL ENCOUNTER (OUTPATIENT)
Dept: WOUND CARE | Age: 59
Discharge: HOME | End: 2024-03-07
Payer: MEDICARE

## 2024-03-07 VITALS
TEMPERATURE: 98.3 F | WEIGHT: 193 LBS | HEART RATE: 95 BPM | DIASTOLIC BLOOD PRESSURE: 60 MMHG | BODY MASS INDEX: 25.58 KG/M2 | RESPIRATION RATE: 18 BRPM | SYSTOLIC BLOOD PRESSURE: 92 MMHG | HEIGHT: 73 IN

## 2024-03-07 DIAGNOSIS — M86.172 ACUTE OSTEOMYELITIS OF TOE OF LEFT FOOT (HCC): ICD-10-CM

## 2024-03-07 DIAGNOSIS — F17.210 CIGARETTE SMOKER: ICD-10-CM

## 2024-03-07 DIAGNOSIS — L97.524 DIABETIC ULCER OF TOE OF LEFT FOOT ASSOCIATED WITH TYPE 2 DIABETES MELLITUS, WITH NECROSIS OF BONE (HCC): Primary | ICD-10-CM

## 2024-03-07 DIAGNOSIS — E11.65 TYPE 2 DIABETES MELLITUS WITH HYPERGLYCEMIA, WITH LONG-TERM CURRENT USE OF INSULIN (HCC): ICD-10-CM

## 2024-03-07 DIAGNOSIS — M86.672 CHRONIC OSTEOMYELITIS OF TOE OF LEFT FOOT (HCC): ICD-10-CM

## 2024-03-07 DIAGNOSIS — Z79.4 TYPE 2 DIABETES MELLITUS WITH HYPERGLYCEMIA, WITH LONG-TERM CURRENT USE OF INSULIN (HCC): ICD-10-CM

## 2024-03-07 DIAGNOSIS — E11.621 DIABETIC ULCER OF TOE OF LEFT FOOT ASSOCIATED WITH TYPE 2 DIABETES MELLITUS, WITH NECROSIS OF BONE (HCC): Primary | ICD-10-CM

## 2024-03-07 DIAGNOSIS — E11.42 PERIPHERAL SENSORY NEUROPATHY DUE TO TYPE 2 DIABETES MELLITUS (HCC): ICD-10-CM

## 2024-03-07 DIAGNOSIS — L03.032 CELLULITIS OF GREAT TOE OF LEFT FOOT: ICD-10-CM

## 2024-03-07 DIAGNOSIS — B20 HUMAN IMMUNODEFICIENCY VIRUS (HCC): ICD-10-CM

## 2024-03-07 PROCEDURE — 99212 OFFICE O/P EST SF 10 MIN: CPT

## 2024-03-07 RX ORDER — LIDOCAINE HYDROCHLORIDE 20 MG/ML
JELLY TOPICAL ONCE
Status: CANCELLED | OUTPATIENT
Start: 2024-03-07 | End: 2024-03-07

## 2024-03-07 RX ORDER — BACITRACIN ZINC AND POLYMYXIN B SULFATE 500; 1000 [USP'U]/G; [USP'U]/G
OINTMENT TOPICAL ONCE
Status: CANCELLED | OUTPATIENT
Start: 2024-03-07 | End: 2024-03-07

## 2024-03-07 RX ORDER — SODIUM CHLOR/HYPOCHLOROUS ACID 0.033 %
SOLUTION, IRRIGATION IRRIGATION ONCE
Status: CANCELLED | OUTPATIENT
Start: 2024-03-07 | End: 2024-03-07

## 2024-03-07 RX ORDER — CLOBETASOL PROPIONATE 0.5 MG/G
OINTMENT TOPICAL ONCE
Status: CANCELLED | OUTPATIENT
Start: 2024-03-07 | End: 2024-03-07

## 2024-03-07 RX ORDER — LIDOCAINE 50 MG/G
OINTMENT TOPICAL ONCE
Status: CANCELLED | OUTPATIENT
Start: 2024-03-07 | End: 2024-03-07

## 2024-03-07 RX ORDER — GINSENG 100 MG
CAPSULE ORAL ONCE
Status: CANCELLED | OUTPATIENT
Start: 2024-03-07 | End: 2024-03-07

## 2024-03-07 RX ORDER — IBUPROFEN 200 MG
TABLET ORAL ONCE
Status: CANCELLED | OUTPATIENT
Start: 2024-03-07 | End: 2024-03-07

## 2024-03-07 RX ORDER — GENTAMICIN SULFATE 1 MG/G
OINTMENT TOPICAL ONCE
Status: CANCELLED | OUTPATIENT
Start: 2024-03-07 | End: 2024-03-07

## 2024-03-07 RX ORDER — TRIAMCINOLONE ACETONIDE 1 MG/G
OINTMENT TOPICAL ONCE
Status: CANCELLED | OUTPATIENT
Start: 2024-03-07 | End: 2024-03-07

## 2024-03-07 RX ORDER — LIDOCAINE 40 MG/G
CREAM TOPICAL ONCE
Status: CANCELLED | OUTPATIENT
Start: 2024-03-07 | End: 2024-03-07

## 2024-03-07 RX ORDER — BETAMETHASONE DIPROPIONATE 0.05 %
OINTMENT (GRAM) TOPICAL ONCE
Status: CANCELLED | OUTPATIENT
Start: 2024-03-07 | End: 2024-03-07

## 2024-03-07 RX ORDER — LIDOCAINE HYDROCHLORIDE 40 MG/ML
SOLUTION TOPICAL ONCE
Status: CANCELLED | OUTPATIENT
Start: 2024-03-07 | End: 2024-03-07

## 2024-03-07 NOTE — FLOWSHEET NOTE
03/07/24 1328   Wound 10/02/23 Toe (Comment  which one) Left;Plantar great toe #4   Date First Assessed/Time First Assessed: 10/02/23 1133   Present on Original Admission: Yes  Primary Wound Type: Diabetic Ulcer  Location: Toe (Comment  which one)  Wound Location Orientation: Left;Plantar  Wound Description (Comments): great toe #4   Wound Image    Wound Etiology Diabetic Gavin 3   Dressing Status Intact   Wound Cleansed Vashe   Dressing/Treatment Other (comment)  (goldbond)   Wound Length (cm) 0 cm   Wound Width (cm) 0 cm   Wound Depth (cm) 0 cm   Wound Surface Area (cm^2) 0 cm^2   Change in Wound Size % (l*w) 100   Wound Volume (cm^3) 0 cm^3   Wound Healing % 100   Wound Assessment Fibrin   Drainage Amount None (dry)   Odor None   Stella-wound Assessment Intact   Pain Assessment   Pain Assessment None - Denies Pain

## 2024-03-07 NOTE — WOUND CARE
Discharge Instructions for  [facility]  [address] [place] [address]  [address]  [place] [address]  Phone [phone]   Fax [phone]      NAME:  Estevan Valenzuela  YOB: 1965  MEDICAL RECORD NUMBER:  [id number]  DATE:  3/7/2024    Return Appointment:   Discharge with Efe Gramajo MD      Instructions:   Left Great Toe.   Clean with saline or Vashe.  Apply Gold Bond Diabetic lotion to wound.   Cover with white sock  Apply daily.     Great toe toe nail:  Apply Lamisil spray daily    Patient to offload wound with wide toebox shoes or darco peg shoe  while standing or weight bearing.     Do not get wound wet. May purchase cast cover at local pharmacy to keep dry in shower.  Wound healing is greatly slowed when blood glucose levels are greater than 200. Monitor glucose levels daily to ensure tight glucose control.  Follow up with PCP if your glucose levels are frequently greater than 200.     Smoking greatly slows wound healing by restricting essential blow flow. Decrease smoking with a plan to quit.     Increase protein intake to promote wound healing. Protein supplements such as Arya and Ensure are great options.     Continue antibiotics as prescribed by Infectious Disease.      Should you experience increased redness, swelling, pain, foul odor, size of wound(s), or have a temperature over 101 degrees please contact the Wound Healing Center at [phone] or if after hours contact your primary care physician or go to the hospital emergency department.    PLEASE NOTE: IF YOU ARE UNABLE TO OBTAIN WOUND SUPPLIES, CONTINUE TO USE THE SUPPLIES YOU HAVE AVAILABLE UNTIL YOU ARE ABLE TO REACH US. IT IS MOST IMPORTANT TO KEEP THE WOUND COVERED AT ALL TIMES.    Electronically signed Kaylen Lopez RN on 3/7/2024 at 2:15 PM

## 2024-03-07 NOTE — PROGRESS NOTES
paying careful attention to his neuropathic foot was again emphasized.  He returns on 1/4/2024.  As of today, he has completed 4 out of 40 HBO treatments.  He continues to be followed by ID who are managing his antibiotics of Cipro and minocycline.  Toe wounds are improving.  Debridement was performed today and well-tolerated.  Will make a slight adjustment to his dressing using Iodosorb since the wounds are now small.  He is now offloading and protecting his foot.  Glucose control continues to be an issue.  He continues to smoke as well.  He returns on 1/11/2024.  He has completed 8 of 40 HBO treatments and is continuing to do well with his antibiotics.  Blood sugars remain high but is not causing any issue with HBO.  Debridement was performed today and well-tolerated.  We will continue to use Iodosorb.  He returns on 1/18/2024.  He has completed 11 of 40 HBO treatments.  He continues to do well with his antibiotics.  Blood sugars remain high and he is going to be seen by endocrine.  A debridement was performed today and well-tolerated.  We will continue to use Iodosorb.  We will continue with HBOT.   He returns on 1/25/2024.  He has completed 15 of 40 HBO treatments.  The wound is now nearly closed.  Selective debridement only was required today.  This was well-tolerated.  We are continuing with our current dressing of Iodosorb.  Will continue with HBO.  Continue antibiotics.  Smoking cessation was again discussed.   He returns on 2/1/2024, during HBO treatment #19 of 40.  He continues to do very well and his toe wound is now closed.  It does not require further dressings.  We are continue with HBO and we will plan to obtain an x-ray of the foot as a baseline at the end of treatment.   He returns on 2/8/2024.  He continues to do well with HBO.  His wound remains closed and no debridement was required.  He is at the point where he can moisturize and we recommended obtaining Goldbond diabetic footcare lotion.  He

## 2024-03-11 ENCOUNTER — CARE COORDINATION (OUTPATIENT)
Dept: CARE COORDINATION | Facility: CLINIC | Age: 59
End: 2024-03-11

## 2024-03-11 NOTE — CARE COORDINATION
Ambulatory Care Coordination Note  3/11/2024    Outreach with patient for High Risk Care Management.  Spoke with patient.  Blood sugar this morning - 150  BS last night 188  Last week, BS ranging from 120-145    Patient has completed treatments in hyperbaric chamber and is released from Wound Center.  Open now to diabetes education.  Still trying to engage his partner in the process (partner does the cooking).    PLAN:    Route chart to diabetes education.  Suggest private session - partner more apt to attend?    Challenges to be reviewed by the provider   Additional needs identified to be addressed with provider: No  none               Method of communication with provider: chart routing.    ACM: Heather Byers RN    Offered patient enrollment in the Remote Patient Monitoring (RPM) program for in-home monitoring: Patient is not eligible for RPM program.    Lab Results       None            Care Coordination Interventions    Referral from Primary Care Provider: Yes  Suggested Interventions and Community Resources  Behavorial Health: In Process  Diabetes Education: In Process  Fall Risk Prevention: Completed  Disease Specific Clinic: In Process          Goals Addressed                   This Visit's Progress     Patient verbalizes understanding of self -management goals of living with Diabetes.   On track     Initial Goal - week 1.  Patient will eat small meals/snack 4x daily (has not been eating at all) and will keep a food log.  Goal - Week 2.  Patient keeping food log, taking new insulin.  Attending appointments.         Supportive resources in place to maintain patient in the community (ie. Home Health, DME equipment, refer to, medication assistant plan, etc.)   On track     Patient to connect with Medicaid  for help with access to Protein [employer] 12/1/23              Future Appointments   Date Time Provider Department Center   5/15/2024  3:00 PM [facility]   5/22/2024 11:15

## 2024-03-12 ENCOUNTER — FOLLOWUP TELEPHONE ENCOUNTER (OUTPATIENT)
Dept: DIABETES SERVICES | Age: 59
End: 2024-03-12

## 2024-03-12 DIAGNOSIS — N39.41 URGE URINARY INCONTINENCE: ICD-10-CM

## 2024-03-12 NOTE — TELEPHONE ENCOUNTER
Endo office requested we try pt again for diabetes education. Called and talked with pt. Scheduled for basics class on 4/2/24. Offered 1:1 with his friend who cooks but pt stated he works at [employer] and goes to culinary school so he isn't sure he can come to education.

## 2024-03-13 RX ORDER — OXYBUTYNIN CHLORIDE 10 MG/1
TABLET, EXTENDED RELEASE ORAL
Qty: 30 TABLET | Refills: 3 | Status: SHIPPED | OUTPATIENT
Start: 2024-03-13

## 2024-03-18 ENCOUNTER — CARE COORDINATION (OUTPATIENT)
Dept: CARE COORDINATION | Facility: CLINIC | Age: 59
End: 2024-03-18

## 2024-03-18 NOTE — CARE COORDINATION
Ambulatory Care Coordination Note  3/18/2024    Patient Current Location:    Home: [address] [address]     Incoming calls/texts from patient  He is following up in making appointments:   - diabetes education 4/2 (basics class)   - PCP appointment with Dr. Leigh   - Eye exam was last week  Blood sugars \"up and down\"   - 220 evening 3/17   - 170 this morning  Continues to experience blurry vision (hyperbaric chamber side effect) and diarrhea    PLAN:  Check in with patient 1 week.    Challenges to be reviewed by the provider   Additional needs identified to be addressed with provider: No  none               Method of communication with provider: chart routing.    ACM: Heather Byers RN    Offered patient enrollment in the Remote Patient Monitoring (RPM) program for in-home monitoring: Patient is not eligible for RPM program.    Lab Results       None            Care Coordination Interventions    Referral from Primary Care Provider: Yes  Suggested Interventions and Community Resources  [facility]  Diabetes Education: In Process  Fall Risk Prevention: Completed  Disease Specific Clinic: In Process          Goals Addressed    None         Future Appointments   Date Time Provider Department Center   4/2/2024  1:00 PM You Devine RN [facility]   5/15/2024  3:00 PM Parul Perez PA-C END [facility]   5/22/2024 11:15 AM Aric Shafer MD [facility]   8/12/2024  3:00 PM Parul Perez PA-C END [facility]

## 2024-03-25 DIAGNOSIS — Z79.4 TYPE 2 DIABETES MELLITUS WITH HYPERGLYCEMIA, WITH LONG-TERM CURRENT USE OF INSULIN (HCC): ICD-10-CM

## 2024-03-25 DIAGNOSIS — E11.65 TYPE 2 DIABETES MELLITUS WITH HYPERGLYCEMIA, WITH LONG-TERM CURRENT USE OF INSULIN (HCC): ICD-10-CM

## 2024-03-26 RX ORDER — SITAGLIPTIN 50 MG/1
50 TABLET, FILM COATED ORAL DAILY
Qty: 90 TABLET | Refills: 1 | Status: SHIPPED | OUTPATIENT
Start: 2024-03-26

## 2024-03-26 RX ORDER — SITAGLIPTIN 50 MG/1
50 TABLET, FILM COATED ORAL DAILY
Qty: 90 TABLET | Refills: 1 | OUTPATIENT
Start: 2024-03-26

## 2024-04-02 ENCOUNTER — TELEPHONE (OUTPATIENT)
Dept: DIABETES SERVICES | Age: 59
End: 2024-04-02

## 2024-04-02 NOTE — TELEPHONE ENCOUNTER
No show for Diabetes Basic class today.  Called and left message with call back numbers to reschedule.

## 2024-04-09 ENCOUNTER — CARE COORDINATION (OUTPATIENT)
Dept: CARE COORDINATION | Facility: CLINIC | Age: 59
End: 2024-04-09

## 2024-04-09 NOTE — CARE COORDINATION
Ambulatory Care Coordination Note  4/9/2024    Patient Current Location:    Home: 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    Conversation had to be cut short - patient had a visitor show up.  Will call tomorrow.    Challenges to be reviewed by the provider   Additional needs identified to be addressed with provider: No  none               Method of communication with provider: chart routing.    ACM: Heather Byers RN    Offered patient enrollment in the Remote Patient Monitoring (RPM) program for in-home monitoring: Patient is not eligible for RPM program.    Lab Results       None            Care Coordination Interventions    Referral from Primary Care Provider: Yes  Suggested Interventions and Community Resources  [facility]  Diabetes Education: In Process  Fall Risk Prevention: Completed  Disease Specific Clinic: In Process          Goals Addressed    None         Future Appointments   Date Time Provider Department Center   5/15/2024  3:00 PM Parul Perez PA-C END [facility]   5/22/2024 11:15 AM Aric Shafer MD [facility]   8/12/2024  3:00 PM Parul Perez PA-C END [facility]

## 2024-04-10 ENCOUNTER — CARE COORDINATION (OUTPATIENT)
Dept: CARE COORDINATION | Facility: CLINIC | Age: 59
End: 2024-04-10

## 2024-04-10 DIAGNOSIS — Z79.4 TYPE 2 DIABETES MELLITUS WITH HYPERGLYCEMIA, WITH LONG-TERM CURRENT USE OF INSULIN (HCC): ICD-10-CM

## 2024-04-10 DIAGNOSIS — E11.65 TYPE 2 DIABETES MELLITUS WITH HYPERGLYCEMIA, WITH LONG-TERM CURRENT USE OF INSULIN (HCC): ICD-10-CM

## 2024-04-10 RX ORDER — INSULIN DEGLUDEC 200 U/ML
40 INJECTION, SOLUTION SUBCUTANEOUS DAILY
Qty: 21 ML | Refills: 3
Start: 2024-04-10

## 2024-04-10 NOTE — TELEPHONE ENCOUNTER
I sent the last message to you and the staff in hopes that either of you will be able to encourage him to reduce tresiba and so you know that we are giving him less tresiba

## 2024-04-10 NOTE — TELEPHONE ENCOUNTER
With fasting lows, I would like him to decrease tresiba from 44 units to 40 units daily. Focus on taking meal time insulin BEFORE meals. Should be using CGM now that he is done with treatment. Let me know if lows persists

## 2024-04-10 NOTE — PROGRESS NOTES
See note with CM, fasting lows as low as 54    With fasting lows, I would like him to decrease tresiba from 44 units to 40 units daily. Focus on taking meal time insulin BEFORE meals. Should be using CGM now that he is done with treatment. Let me know if lows persists

## 2024-04-10 NOTE — CARE COORDINATION
Ambulatory Care Coordination Note  4/10/2024    Patient Current Location:    Home: [address] [facility] [address]     Outreach for High Risk Care Management  Blood Sugars have been running low.  Endocrinology provider aware   - taking tresiba 44 u as prescribed   - see notes from yesterday re: feeling ill much of the time   - states today he is sleeping until noon.    The one big change is that patient is no longer seeing wound care - while doing the hyperbaric treatments, blood sugars were high and erratic.  Endo aware.    Challenges to be reviewed by the provider   Additional needs identified to be addressed with provider: No  none               Method of communication with provider: chart routing.    ACM: Heather Byers RN      Lab Results       None            Care Coordination Interventions    Referral from Primary Care Provider: Yes  Suggested Interventions and Community Resources  Behavorial Health: In Process  Diabetes Education: In Process  Fall Risk Prevention: Completed  Disease Specific Clinic: In Process          Goals Addressed    None         Future Appointments   Date Time Provider Department Center   5/15/2024  3:00 PM Parul Perez PA-C END [facility]   5/22/2024 11:15 AM Aric Shafer MD [facility]   8/12/2024  3:00 PM Parul Perez PA-C END [facility]

## 2024-04-17 ENCOUNTER — CARE COORDINATION (OUTPATIENT)
Dept: CARE COORDINATION | Facility: CLINIC | Age: 59
End: 2024-04-17

## 2024-04-26 ENCOUNTER — CARE COORDINATION (OUTPATIENT)
Dept: CARE COORDINATION | Facility: CLINIC | Age: 59
End: 2024-04-26

## 2024-04-26 NOTE — CARE COORDINATION
Ambulatory Care Coordination Note     4/26/2024 12:02 PM     Patient Current Location:   PCP appointment today  Care Summary Note: Incoming text messages from patient   - reports very low BP 85/63  Apparently using a wrist cuff incorrectly  BP taken at PCP office within normal parameters  Patient reports a blood draw and UA while at PCP clinic    PLAN:  continuing follow up with patient for glycemic control, BP and overall general health    ACM: Heather Byers, RN   none.    PCP/Specialist follow up:   Future Appointments         Provider Specialty Dept Phone    5/15/2024 3:00 PM Parul Perez PA-C Endocrinology [phone]    5/22/2024 11:15 AM Aric Shafer MD Neurology [phone]    8/12/2024 3:00 PM Parul Perez PA-C Endocrinology [phone]

## 2024-05-08 ENCOUNTER — CARE COORDINATION (OUTPATIENT)
Dept: CARE COORDINATION | Facility: CLINIC | Age: 59
End: 2024-05-08

## 2024-05-08 NOTE — CARE COORDINATION
Ambulatory Care Coordination Note     5/8/2024 4:16 PM     Patient has graduated from the Ambulatory Care Management program on 5/8/2024.  Patient/family has the ability to self manage at this time..  Care management goals have been completed. No further Ambulatory Care Manager follow up scheduled.      ACM: Heather Byers RN     PCP/Specialist follow up:   Future Appointments         Provider Specialty Dept Phone    5/15/2024 3:00 PM Parul Perez PA-C Endocrinology [phone]    5/22/2024 11:15 AM Aric Shafer MD Neurology [phone]    8/12/2024 3:00 PM Parul Perez PA-C Endocrinology [phone]

## 2024-05-15 ENCOUNTER — OFFICE VISIT (OUTPATIENT)
Dept: ENDOCRINOLOGY | Age: 59
End: 2024-05-15
Payer: MEDICARE

## 2024-05-15 VITALS — WEIGHT: 188 LBS | DIASTOLIC BLOOD PRESSURE: 65 MMHG | BODY MASS INDEX: 24.8 KG/M2 | SYSTOLIC BLOOD PRESSURE: 110 MMHG

## 2024-05-15 DIAGNOSIS — E11.65 TYPE 2 DIABETES MELLITUS WITH HYPERGLYCEMIA, WITH LONG-TERM CURRENT USE OF INSULIN (HCC): Primary | ICD-10-CM

## 2024-05-15 DIAGNOSIS — E78.00 HYPERCHOLESTEROLEMIA: ICD-10-CM

## 2024-05-15 DIAGNOSIS — E78.1 HYPERTRIGLYCERIDEMIA: ICD-10-CM

## 2024-05-15 DIAGNOSIS — E11.621 DIABETIC ULCER OF TOE OF LEFT FOOT ASSOCIATED WITH TYPE 2 DIABETES MELLITUS, WITH FAT LAYER EXPOSED (HCC): ICD-10-CM

## 2024-05-15 DIAGNOSIS — Z79.4 TYPE 2 DIABETES MELLITUS WITH HYPERGLYCEMIA, WITH LONG-TERM CURRENT USE OF INSULIN (HCC): Primary | ICD-10-CM

## 2024-05-15 DIAGNOSIS — L97.522 DIABETIC ULCER OF TOE OF LEFT FOOT ASSOCIATED WITH TYPE 2 DIABETES MELLITUS, WITH FAT LAYER EXPOSED (HCC): ICD-10-CM

## 2024-05-15 LAB — HBA1C MFR BLD: 10.5 %

## 2024-05-15 PROCEDURE — 3017F COLORECTAL CA SCREEN DOC REV: CPT | Performed by: PHYSICIAN ASSISTANT

## 2024-05-15 PROCEDURE — 95251 CONT GLUC MNTR ANALYSIS I&R: CPT | Performed by: PHYSICIAN ASSISTANT

## 2024-05-15 PROCEDURE — 2022F DILAT RTA XM EVC RTNOPTHY: CPT | Performed by: PHYSICIAN ASSISTANT

## 2024-05-15 PROCEDURE — 4004F PT TOBACCO SCREEN RCVD TLK: CPT | Performed by: PHYSICIAN ASSISTANT

## 2024-05-15 PROCEDURE — G8427 DOCREV CUR MEDS BY ELIG CLIN: HCPCS | Performed by: PHYSICIAN ASSISTANT

## 2024-05-15 PROCEDURE — G8420 CALC BMI NORM PARAMETERS: HCPCS | Performed by: PHYSICIAN ASSISTANT

## 2024-05-15 PROCEDURE — 83036 HEMOGLOBIN GLYCOSYLATED A1C: CPT | Performed by: PHYSICIAN ASSISTANT

## 2024-05-15 PROCEDURE — 3046F HEMOGLOBIN A1C LEVEL >9.0%: CPT | Performed by: PHYSICIAN ASSISTANT

## 2024-05-15 PROCEDURE — 99214 OFFICE O/P EST MOD 30 MIN: CPT | Performed by: PHYSICIAN ASSISTANT

## 2024-05-15 RX ORDER — INSULIN LISPRO 100 [IU]/ML
INJECTION, SOLUTION INTRAVENOUS; SUBCUTANEOUS
Qty: 45 ML | Refills: 3
Start: 2024-05-15

## 2024-05-15 ASSESSMENT — ENCOUNTER SYMPTOMS
NAUSEA: 1
VOICE CHANGE: 1
TROUBLE SWALLOWING: 1
VOMITING: 1
DIARRHEA: 1

## 2024-05-15 NOTE — PROGRESS NOTES
foot ulcer    Optho:    Last eye exam was March 2023 and demonstrated no diabetic retinopathy.  Eye  care specialist is [facility].     Obesity:         Body mass index is 24.8 kg/m².       increasing steadily with insulin compliance      Wt Readings from Last 3 Encounters:   05/15/24 85.3 kg (188 lb)   03/07/24 87.5 kg (193 lb)   02/12/24 85.7 kg (189 lb)         CardioVascular:    None     Renal:    Under care of nephro? no    On ARB/ACE-I  losartan - 25 MG     5/8/2019: Serum creatinine 1.03.                           Urine microalbumin unknown.                       05/06/2022      Cr 0.69, , microalbumin/Cr ratio 47    04/26/2024 microalbumin/Cr ratio          Lipids:     Current therapy atorvastatin - 80 MG with good compliance   9/7/2018: Total cholesterol 211, triglycerides 352, HDL 36, .        No results found for: \"CHOL\"  No components found for: \"LDLCHOLESTEROL\", \"LDLCALC\" No results found for: \"VLDL\" No results found for: \"HDL\" No results found for: \"HDL\" No results found for: \"TRIG\"  No results found for: \"CHOLHDLRATIO\"      Hemoglobin A1c:  3/10/2016: 12.8%.   /24/2017: 9.4%.   7/27/2017: 10.2%.   5/4/2018: 10.9%.   9/7/2018: 14.3%.   12/31/2018: 12.0%.   3/21/2019: 13.2%.   9/30/2019: Greater than 15.5%.   3/6/2020: Greater than 14%.   03/09/2021: 14.1%   05/06/2022: 9.5%  08/31/2022: 11.1%   Dec 2022 8.7% per insurance eval  05/11/2023  13.5%  10/11/2023  11.5%  02/12/2024  11.2%  5/15/2024  10.5%    Hemoglobin A1C, POC   Date Value Ref Range Status   05/15/2024 10.5 % Final   02/12/2024 11.2 % Final   10/11/2023 11.5 % Final        Thyroid:                     5/4/2018: TSH 2.125.                                  05/06/2022      1.850     04/26/2024 1.520, Free T4 1.08  Lab Results   Component Value Date/Time    TSH 1.850 05/06/2022 03:23 PM             Allergies & Medications:  Reviewed in chart.        Review of Systems   HENT:  Positive for trouble swallowing (pills, food,

## 2024-05-22 ENCOUNTER — OFFICE VISIT (OUTPATIENT)
Dept: NEUROLOGY | Age: 59
End: 2024-05-22
Payer: MEDICARE

## 2024-05-22 VITALS
SYSTOLIC BLOOD PRESSURE: 142 MMHG | DIASTOLIC BLOOD PRESSURE: 93 MMHG | WEIGHT: 194 LBS | BODY MASS INDEX: 25.71 KG/M2 | HEIGHT: 73 IN | HEART RATE: 87 BPM

## 2024-05-22 DIAGNOSIS — G62.9 PERIPHERAL POLYNEUROPATHY: Primary | ICD-10-CM

## 2024-05-22 DIAGNOSIS — M79.2 NEUROPATHIC PAIN: ICD-10-CM

## 2024-05-22 DIAGNOSIS — M54.81 BILATERAL OCCIPITAL NEURALGIA: ICD-10-CM

## 2024-05-22 DIAGNOSIS — B02.29 POSTHERPETIC NEURALGIA: ICD-10-CM

## 2024-05-22 PROCEDURE — 99204 OFFICE O/P NEW MOD 45 MIN: CPT | Performed by: PSYCHIATRY & NEUROLOGY

## 2024-05-22 PROCEDURE — 4004F PT TOBACCO SCREEN RCVD TLK: CPT | Performed by: PSYCHIATRY & NEUROLOGY

## 2024-05-22 PROCEDURE — 3017F COLORECTAL CA SCREEN DOC REV: CPT | Performed by: PSYCHIATRY & NEUROLOGY

## 2024-05-22 PROCEDURE — G8419 CALC BMI OUT NRM PARAM NOF/U: HCPCS | Performed by: PSYCHIATRY & NEUROLOGY

## 2024-05-22 PROCEDURE — G8427 DOCREV CUR MEDS BY ELIG CLIN: HCPCS | Performed by: PSYCHIATRY & NEUROLOGY

## 2024-05-22 RX ORDER — DULOXETIN HYDROCHLORIDE 30 MG/1
30 CAPSULE, DELAYED RELEASE ORAL EVERY MORNING
Qty: 30 CAPSULE | Refills: 5 | Status: SHIPPED | OUTPATIENT
Start: 2024-05-22

## 2024-05-22 NOTE — PROGRESS NOTES
[facility] NEUROLOG[facility]  [address], [address]  [place] [address]  Phone: [phone] Fax [phone]  Aric Shafer MD      Patient: Estevan Valenzuela  Provider: Aric Shafer MD    CC:   Chief Complaint   Patient presents with    New Patient     Balance disorder     Referring Provider:    History of Present Illness:     Estevan Valenzuela is a 59 y.o. RH male who presents for evaluation of peripheral neuropathy.     He is unaccompanied for today's visit.    This gentleman was previously followed by Dr. Busch, last seen in September 2023, these notes have been reviewed back to his original consultation in 2019.  He presents to establish care since [facility] no longer takes his insurance.    He has a history of a painful peripheral neuropathy affecting the lower extremities, presumed to be secondary to diabetes, which has been present for several years.  He has a poorly healing wound in the distal left lower extremity, followed by wound care.  He has a disturbance in his balance but continues to walk unassisted.     There is a history of recurrent neuropathic pain around the left shoulder and left upper extremity presumed secondary to a postherpetic neuralgia.    He had recurrent bouts of syncope and was previously evaluated by [facility]'s epilepsy group with workup being negative for seizures.  There have been some symptoms suggestive of dysautonomia with intermittent gastrointestinal symptoms along with orthostasis and erectile dysfunction.  He is followed by urology for lower urinary tract symptoms.    He has further history of anxiety/depression and he has a history of HIV on antiretroviral therapy.    Current medications include (but not limited to):  Gabapentin 800 mg 3 times a day    Previous medication trials include: Cymbalta 60 mg daily    He continues to take gabapentin.  He reports no longer taking Cymbalta citing adverse effects although it is not clear if these were medication related or not.  He

## 2024-05-22 NOTE — PATIENT INSTRUCTIONS
Restart Cymbalta 30 mg daily.  Take 1 tablet in the morning.    Continue your gabapentin as prescribed.    I will have the  call you to set you up for an \"occipital nerve block\".  This is an injection to the back of the scalp which can help with the headache.

## 2024-05-27 ASSESSMENT — ENCOUNTER SYMPTOMS
DIARRHEA: 1
CONSTIPATION: 1
VOICE CHANGE: 0
COUGH: 0

## 2024-06-11 ENCOUNTER — TELEPHONE (OUTPATIENT)
Dept: NEUROLOGY | Age: 59
End: 2024-06-11

## 2024-06-14 ENCOUNTER — PROCEDURE VISIT (OUTPATIENT)
Dept: NEUROLOGY | Age: 59
End: 2024-06-14

## 2024-06-14 VITALS — DIASTOLIC BLOOD PRESSURE: 112 MMHG | HEART RATE: 82 BPM | SYSTOLIC BLOOD PRESSURE: 177 MMHG | OXYGEN SATURATION: 95 %

## 2024-06-14 DIAGNOSIS — M54.81 BILATERAL OCCIPITAL NEURALGIA: Primary | ICD-10-CM

## 2024-06-14 NOTE — PROGRESS NOTES
BILATERAL OCCIPITAL NERVE BLOCK:              History: Patient referred by Dr. Shafer for a bilateral occipital nerve block. He reportspresentation of bilateral occipital headaches.  His pain is currently 10 out of 10 and he is having photophobia and photophobia presently.  Today will be the 1st treatment.         Procedure performed by: DAVION Saunders NP           TIME OUT: Current patient confirmed with 2 patient identifiers, right site, right procedure.              CONSENT:        Informed consent was obtained prior to the procedure after discussion of the risks, benefits were discussed with the patient; consent placed in chart. The possibilities of reaction to medication, bleeding, infection were discussed with the patient. The patient concurred with the proposed plan, giving informed consent.              LOCATION: [facility] Neurology        PROCEDURE DETAILS:      The patient was placed in the sitting position. The area of occipital nerve taking off and branch regions, the trigger points were identified and the skin prepped 2 times with alcohol and the alcohol allowed to dry. Medication used: 1 mL 0.5% bupivacaine; 1 mL 2% Lidocaine. Total 2 mL mixed solution. 27 gauge 1/2 inch for occipital nerve block. Once reproduction of the pain was elicited and negative aspiration confirmed, the block sites 0.5 mL per site. Following sites were injected, see the picture.       Vitals:    06/14/24 1240   BP: (!) 177/112   Site: Right Upper Arm   Position: Sitting   Pulse: 82   SpO2: 95%          Physical Exam  HENT:      Head:              Occipital nerve block: Occipital nerve trunk taking off region, below the ridge and the above the ridge bilaterally. Both medial and lateral branch bilaterally.    The patient tolerated the procedure well without complications.  Pain was reduced from 10 to a 4 following procedure.      1. Bilateral occipital neuralgia  Assessment & Plan:  Bilateral occipital nerve

## 2024-06-14 NOTE — ASSESSMENT & PLAN NOTE
Bilateral occipital nerve block performed with bupivacaine and lidocaine while in office.  Patient received immediate reduction in pain.  Will send patient for physical therapy.  Discussed he can use 4% topical lidocaine to apply to affected areas, bilateral subdural nerves, once daily as needed for pain.  Do not apply heat over this.  Do not use longer than 7-10 consecutive days.

## 2024-06-20 ENCOUNTER — TELEPHONE (OUTPATIENT)
Dept: ENDOCRINOLOGY | Age: 59
End: 2024-06-20

## 2024-06-20 NOTE — TELEPHONE ENCOUNTER
Pt called wanting to know when his follow up appt is, I gave him his follow up appt. He also wanted to know what to do about his  and don't connect sensors. I told him to contact abdi for the replacements. He expressed understanding and will do in the future.

## 2024-07-03 DIAGNOSIS — N39.41 URGE URINARY INCONTINENCE: ICD-10-CM

## 2024-07-03 RX ORDER — OXYBUTYNIN CHLORIDE 10 MG/1
TABLET, EXTENDED RELEASE ORAL
Qty: 30 TABLET | Refills: 3 | Status: SHIPPED | OUTPATIENT
Start: 2024-07-03

## 2024-07-29 DIAGNOSIS — Z87.891 PERSONAL HISTORY OF TOBACCO USE, PRESENTING HAZARDS TO HEALTH: ICD-10-CM

## 2024-07-29 RX ORDER — BUPROPION HYDROCHLORIDE 150 MG/1
150 TABLET, EXTENDED RELEASE ORAL 2 TIMES DAILY
Qty: 180 TABLET | Refills: 1 | OUTPATIENT
Start: 2024-07-29

## 2024-08-05 ENCOUNTER — TELEPHONE (OUTPATIENT)
Dept: ENDOCRINOLOGY | Age: 59
End: 2024-08-05

## 2024-08-14 ENCOUNTER — CARE COORDINATION (OUTPATIENT)
Dept: CARE COORDINATION | Facility: CLINIC | Age: 59
End: 2024-08-14

## 2024-08-14 NOTE — CARE COORDINATION
Ambulatory Care Coordination Note     8/14/2024 10:09 AM     Care Summary Note:   Incoming text from patient - he has a new wound on his foot.  Reports he has been to urgent care.  Expresses anxiety and much concern considering history of foot infection earlier this year.    Care Coordination call with Wound Center.  Patient has appointment tomorrow morning 08:30     ACM: Heather Byers RN    PCP/Specialist follow up:   Future Appointments         Provider Specialty Dept Phone    8/27/2024 2:30 PM Parul Perez PA-C Endocrinology [phone]    8/28/2024 2:00 PM Kaylen Lopez RN; Derick Uribe, DO Wound Ostomy [phone]    11/25/2024 1:00 PM Aric Shafer MD Neurology [phone]

## 2024-08-15 ENCOUNTER — HOSPITAL ENCOUNTER (OUTPATIENT)
Dept: WOUND CARE | Age: 59
Discharge: HOME | End: 2024-08-15
Payer: MEDICARE

## 2024-08-15 ENCOUNTER — CARE COORDINATION (OUTPATIENT)
Dept: CARE COORDINATION | Facility: CLINIC | Age: 59
End: 2024-08-15

## 2024-08-15 VITALS
TEMPERATURE: 97.9 F | HEART RATE: 88 BPM | BODY MASS INDEX: 25.15 KG/M2 | RESPIRATION RATE: 18 BRPM | HEIGHT: 73 IN | SYSTOLIC BLOOD PRESSURE: 164 MMHG | WEIGHT: 189.8 LBS | DIASTOLIC BLOOD PRESSURE: 104 MMHG

## 2024-08-15 DIAGNOSIS — L98.499 DM TYPE 2 WITH DIABETIC CHRONIC SKIN ULCER (HCC): Primary | ICD-10-CM

## 2024-08-15 DIAGNOSIS — Z72.0 TOBACCO ABUSE: ICD-10-CM

## 2024-08-15 DIAGNOSIS — E11.622 DM TYPE 2 WITH DIABETIC CHRONIC SKIN ULCER (HCC): Primary | ICD-10-CM

## 2024-08-15 PROCEDURE — 99213 OFFICE O/P EST LOW 20 MIN: CPT

## 2024-08-15 PROCEDURE — 99214 OFFICE O/P EST MOD 30 MIN: CPT | Performed by: SURGERY

## 2024-08-15 RX ORDER — GABAPENTIN 800 MG/1
800 TABLET ORAL 3 TIMES DAILY
COMMUNITY
Start: 2024-07-02

## 2024-08-15 RX ORDER — LOSARTAN POTASSIUM 100 MG/1
100 TABLET ORAL DAILY
COMMUNITY
Start: 2024-06-14

## 2024-08-15 RX ORDER — BUPROPION HYDROCHLORIDE 300 MG/1
TABLET ORAL
COMMUNITY
Start: 2024-06-09

## 2024-08-15 RX ORDER — CEPHALEXIN 500 MG/1
500 CAPSULE ORAL 2 TIMES DAILY
COMMUNITY
Start: 2024-08-13 | End: 2024-08-23

## 2024-08-15 RX ORDER — DULOXETIN HYDROCHLORIDE 60 MG/1
CAPSULE, DELAYED RELEASE ORAL
COMMUNITY
Start: 2024-06-14

## 2024-08-15 NOTE — FLOWSHEET NOTE
08/15/24 0916   Right Leg Edema Point of Measurement   Leg circumference 35.5 cm   Ankle circumference 22 cm   Foot circumference 23 cm   Left Leg Edema Point of Measurement   Leg circumference 36 cm   Ankle circumference 22 cm   Foot circumference 23 cm   RLE Neurovascular Assessment   Capillary Refill Less than/Equal to 3 seconds   Color Appropriate for Ethnicity   Temperature Warm   R Pedal Pulse +2   LLE Neurovascular Assessment   Capillary Refill Less than/Equal to 3 seconds   Color Appropriate for Ethnicity   Temperature Warm   L Pedal Pulse +2   Wound 08/15/24 Toe (Comment  which one) Right #1 right great toe   Date First Assessed/Time First Assessed: 08/15/24 0916   Present on Original Admission: Yes  Wound Approximate Age at First Assessment (Weeks): 2 weeks  Primary Wound Type: Diabetic Ulcer  Location: Toe (Comment  which one)  Wound Location Orientation...   Wound Image    Wound Etiology Diabetic Gavin 1   Wound Cleansed Vashe   Offloading for Diabetic Foot Ulcers Offloading ordered   Wound Length (cm) 2.5 cm   Wound Width (cm) 2.5 cm   Wound Depth (cm) 0.1 cm   Wound Surface Area (cm^2) 6.25 cm^2   Wound Volume (cm^3) 0.625 cm^3   Wound Assessment Pink/red   Drainage Amount Small (< 25%)   Drainage Description Serosanguinous   Odor None   Stella-wound Assessment Intact   Wound Thickness Description not for Pressure Injury Partial thickness   Wound 08/15/24 Toe (Comment  which one) Left #2 left great toe   Date First Assessed/Time First Assessed: 08/15/24 0918   Present on Original Admission: Yes  Wound Approximate Age at First Assessment (Weeks): 2 weeks  Primary Wound Type: Diabetic Ulcer  Location: Toe (Comment  which one)  Wound Location Orientation...   Wound Image    Wound Etiology Diabetic Gavin 1   Dressing Status Intact   Wound Cleansed Vashe   Offloading for Diabetic Foot Ulcers Offloading ordered   Wound Length (cm) 1 cm   Wound Width (cm) 1.3 cm   Wound Depth (cm) 0.1 cm   Wound Surface

## 2024-08-15 NOTE — DISCHARGE INSTRUCTIONS
NAME:  Estevan Valenzuela  YOB: 1965  MEDICAL RECORD NUMBER:  [id number]  DATE:  8/15/2024     Return Appointment:   1 week with Zachary Barnett MD        Instructions: Right and left great toes  Cleanse with normal saline and vashe.  Place a small amount of iodosorb to each open area. Cover with gauze and roll gauze. Secure with paper tape.  Change daily.     Limit walking for the next week.   Follow up with your endocrinologist about your diabetes control. Be sure to ask her about diabetes shoes and inserts.     Continue your cephalexin as previously prescribed.      Decrease your smoking.

## 2024-08-15 NOTE — CARE COORDINATION
Ambulatory Care Coordination Note     8/15/2024 10:00 AM     Care Summary Note: Follow up outreach for wound center appointment  New Episode for High Risk Case Management started today.   - uncontrolled blood sugar } both impeding healing   - continues to smoke     - concern for walking barefoot, creating a new foot wound, both R & L great toes   > all in the setting of HIV, HTN, neuropathy  Patient now on a course of cephalexin 500 mg twice daily x ? days  PCP visit today - plans to ask about drawing an A1c while he is there.    Wound Center has recommended diabetic shoes, inserts, to decrease smoking.  ACM will reiterate/follow up on all the above.    ACM: Heather Byers RN    Method of communication with provider: chart routing.      Medications Reviewed:   Completed during this call    Advance Care Planning:   Not on file; education provided     Care Planning:    Goals Addressed                   This Visit's Progress     Patient verbalizes understanding of self -management goals of living with Diabetes.   Not on track     Initial Goal - week 1.  Patient will eat small meals/snack 4x daily (has not been eating at all) and will keep a food log.  Goal - Week 2.  Patient keeping food log, taking new insulin.  Attending appointments.         Supportive resources in place to maintain patient in the community (ie. Home Health, DME equipment, refer to, medication assistant plan, etc.)   On track     Patient to connect with Medicaid  for help with access to [employer] 12/1/23               PCP/Specialist follow up:   Future Appointments         Provider Specialty Dept Phone    8/22/2024 10:20 AM Tanesha Zheng, PT; Zachary Barnett MD Wound Ostomy [phone]    8/27/2024 2:30 PM Parul Perez PA-C Endocrinology [phone]    11/25/2024 1:00 PM Aric Shafer MD Neurology [phone]

## 2024-08-15 NOTE — WOUND CARE
Discharge Instructions for  [facility]  [address] [place] [address]  [address] [place] [address]  Phone [phone]   Fax [phone]      NAME:  Estevan Valenzuela  YOB: 1965  MEDICAL RECORD NUMBER:  [id number]  DATE:  8/15/2024    Return Appointment:   1 week with Zachary Barnett MD      Instructions: Right and left great toes  Cleanse with normal saline and vashe.  Place a small amount of iodosorb to each open area. Cover with gauze and roll gauze. Secure with paper tape.  Change daily.    Limit walking for the next week.   Follow up with your endocrinologist about your diabetes control. Be sure to ask her about diabetes shoes and inserts.    Continue your cephalexin as previously prescribed.     Decrease your smoking.        Should you experience increased redness, swelling, pain, foul odor, size of wound(s), or have a temperature over 101 degrees please contact the Wound Healing Center at [phone] or if after hours contact your primary care physician or go to the hospital emergency department.    PLEASE NOTE: IF YOU ARE UNABLE TO OBTAIN WOUND SUPPLIES, CONTINUE TO USE THE SUPPLIES YOU HAVE AVAILABLE UNTIL YOU ARE ABLE TO REACH US. IT IS MOST IMPORTANT TO KEEP THE WOUND COVERED AT ALL TIMES.    Electronically signed Maricel Mckoy RN on 8/15/2024 at 9:39 AM

## 2024-08-15 NOTE — PROGRESS NOTES
therapy requiring intensive monitoring for toxicity  ?Decision regarding elective major surgery with identified patient or procedure risk factors  ?Decision regarding emergency major surgery  ?Decision regarding hospitalization  ?Decision not to resuscitate or to de-escalate care because of poor prognosis      Parenteral controlled substances                 I have personally performed a face-to-face diagnostic evaluation and management  service on this patient.    I have independently seen the patient.   I have independently obtained the above history from the patient/family.    I have independently examined the patient with above findings.  I have independently reviewed data/labs for this patient and developed the above plan of care (MDM).      Electronically signed by GHADA PASCUAL MD on 8/15/2024 at 10:31 AM

## 2024-08-16 ENCOUNTER — CARE COORDINATION (OUTPATIENT)
Dept: CARE COORDINATION | Facility: CLINIC | Age: 59
End: 2024-08-16

## 2024-08-16 NOTE — CARE COORDINATION
Ambulatory Care Coordination Note     8/16/2024 11:36 AM     Care Summary Note:   Incoming call from patient re: HgbA1c = 10.2  Sent test messages of BS log indicating readings have been 217 and below.  Lowest reading 131.  States that after he eats it does spike into the 200s but following insulin the BS decreases 150-below.  He does not report any lows.  Wonders if his meter is \"off.\"  Will request printout from Endocrinology.  (Provider is out of town this week).     ACM: Heather Byers RN     Challenges to be reviewed by the provider   Additional needs identified to be addressed with provider No  none                 PCP/Specialist follow up:   Future Appointments         Provider Specialty Dept Phone    8/22/2024 10:20 AM Tanesha Zheng, PT; Zachary Barnett MD Wound Ostomy [phone]    8/27/2024 2:30 PM Parul Perez PA-C Endocrinology [phone]    11/25/2024 1:00 PM Aric Shafer MD Neurology [phone]

## 2024-08-20 ENCOUNTER — CARE COORDINATION (OUTPATIENT)
Dept: CARE COORDINATION | Facility: CLINIC | Age: 59
End: 2024-08-20

## 2024-08-20 NOTE — CARE COORDINATION
Ambulatory Care Coordination Note     8/20/2024 10:27 AM     Care Summary Note:   Outreach for High Risk Case Management - spoke with patient  Patient has been caring for foot wound.  Confirms that he was wearing shoes when he suffered the blisters, but says his shoes have somehow become too big for him.  He wears socks.  Reports eliminating all refined sugar from his diet.  BS this morning 170.  Reiterated per Endocrinology provider the need for mealtime insulin to be taken prior to eating (at first bite) to avoid spikes in his BS.  Returns to wound center on Thursday, sees endocrinology in 1 week.  ACM to follow along.    Patient at 55.7% at risk of admission or ED visit r/t   - Diabetes poorly controlled; HgbA1c = 10.2   - HIV   - lives alone, partner is primary support, although he works.       ACM: Heather Byers RN     Method of communication with provider: chart routing.    PCP/Specialist follow up:   Future Appointments         Provider Specialty Dept Phone    8/22/2024 10:20 AM Tanesha Zheng, PT; Zachary Barnett MD Wound Ostomy [phone]    8/27/2024 2:30 PM Parul Perez PA-C Endocrinology [phone]    11/25/2024 1:00 PM Aric Shafer MD Neurology [phone]

## 2024-08-27 ENCOUNTER — OFFICE VISIT (OUTPATIENT)
Dept: ENDOCRINOLOGY | Age: 59
End: 2024-08-27
Payer: MEDICARE

## 2024-08-27 VITALS
OXYGEN SATURATION: 98 % | WEIGHT: 181.4 LBS | HEART RATE: 85 BPM | DIASTOLIC BLOOD PRESSURE: 84 MMHG | SYSTOLIC BLOOD PRESSURE: 130 MMHG | BODY MASS INDEX: 23.93 KG/M2

## 2024-08-27 DIAGNOSIS — E78.1 HYPERTRIGLYCERIDEMIA: ICD-10-CM

## 2024-08-27 DIAGNOSIS — E11.621 DIABETIC ULCER OF TOE OF LEFT FOOT ASSOCIATED WITH TYPE 2 DIABETES MELLITUS, WITH FAT LAYER EXPOSED (HCC): ICD-10-CM

## 2024-08-27 DIAGNOSIS — Z79.4 TYPE 2 DIABETES MELLITUS WITH HYPERGLYCEMIA, WITH LONG-TERM CURRENT USE OF INSULIN (HCC): Primary | ICD-10-CM

## 2024-08-27 DIAGNOSIS — L97.512 DIABETIC ULCER OF TOE OF RIGHT FOOT ASSOCIATED WITH TYPE 2 DIABETES MELLITUS, WITH FAT LAYER EXPOSED (HCC): ICD-10-CM

## 2024-08-27 DIAGNOSIS — E11.65 TYPE 2 DIABETES MELLITUS WITH HYPERGLYCEMIA, WITH LONG-TERM CURRENT USE OF INSULIN (HCC): Primary | ICD-10-CM

## 2024-08-27 DIAGNOSIS — L97.522 DIABETIC ULCER OF TOE OF LEFT FOOT ASSOCIATED WITH TYPE 2 DIABETES MELLITUS, WITH FAT LAYER EXPOSED (HCC): ICD-10-CM

## 2024-08-27 DIAGNOSIS — E11.621 DIABETIC ULCER OF TOE OF RIGHT FOOT ASSOCIATED WITH TYPE 2 DIABETES MELLITUS, WITH FAT LAYER EXPOSED (HCC): ICD-10-CM

## 2024-08-27 DIAGNOSIS — E78.00 HYPERCHOLESTEROLEMIA: ICD-10-CM

## 2024-08-27 PROCEDURE — 3046F HEMOGLOBIN A1C LEVEL >9.0%: CPT | Performed by: PHYSICIAN ASSISTANT

## 2024-08-27 PROCEDURE — 2022F DILAT RTA XM EVC RTNOPTHY: CPT | Performed by: PHYSICIAN ASSISTANT

## 2024-08-27 PROCEDURE — 3017F COLORECTAL CA SCREEN DOC REV: CPT | Performed by: PHYSICIAN ASSISTANT

## 2024-08-27 PROCEDURE — 99214 OFFICE O/P EST MOD 30 MIN: CPT | Performed by: PHYSICIAN ASSISTANT

## 2024-08-27 PROCEDURE — 4004F PT TOBACCO SCREEN RCVD TLK: CPT | Performed by: PHYSICIAN ASSISTANT

## 2024-08-27 PROCEDURE — 95251 CONT GLUC MNTR ANALYSIS I&R: CPT | Performed by: PHYSICIAN ASSISTANT

## 2024-08-27 PROCEDURE — G8420 CALC BMI NORM PARAMETERS: HCPCS | Performed by: PHYSICIAN ASSISTANT

## 2024-08-27 PROCEDURE — G8427 DOCREV CUR MEDS BY ELIG CLIN: HCPCS | Performed by: PHYSICIAN ASSISTANT

## 2024-08-27 RX ORDER — INSULIN DEGLUDEC 200 U/ML
36 INJECTION, SOLUTION SUBCUTANEOUS DAILY
Qty: 21 ML | Refills: 3 | Status: SHIPPED | OUTPATIENT
Start: 2024-08-27

## 2024-08-27 RX ORDER — INSULIN LISPRO 100 [IU]/ML
INJECTION, SOLUTION INTRAVENOUS; SUBCUTANEOUS
Qty: 45 ML | Refills: 3 | Status: SHIPPED | OUTPATIENT
Start: 2024-08-27

## 2024-08-27 ASSESSMENT — ENCOUNTER SYMPTOMS
NAUSEA: 1
DIARRHEA: 1
VOMITING: 1
VOICE CHANGE: 1
TROUBLE SWALLOWING: 1

## 2024-08-27 NOTE — PROGRESS NOTES
Patient discussed with Parul Perez PA-C.  I agree with her assessment and plan as described.  I have confirmed the key elements of her history and physical.  I independently performed a diabetic foot examination for the purpose of assessing the need for diabetic shoes.  He has preulcerative callus formation on both feet and a shallow ulcer at the base of the right toe.  He also has bilateral foot deformities.

## 2024-08-27 NOTE — PROGRESS NOTES
it AFTER eating with correction incorporated in dose    Lab Results   Component Value Date    LABA1C 10.7 (H) 11/02/2023     Lab Results   Component Value Date    MALBCR 803 (H) 11/10/2023    CREATININE 5.80 (H) 11/17/2023            Current Regimen: Tresiba 40 units, Humalog 12 units plus 1/25>150 (usually once a day), januvia 50mg      Glucose data:    Home blood glucose monitoring frequency:   By review of CGM download over past 28 days  Average blood glucose 200  Time in range 40%  High 36%, Very High 23%  Low 1%, Very Low 0%     Average    Fasting 196    AC lunch 186    AC supper 213    Bedtime 225      Blood glucose levels are uncontrolled, most significant elevations are post prandial    Failed past therapies:     Relevant co morbidities:    Denies HX pancreatitis, DKA, gastroparesis, foot ulcer    Optho:    Last eye exam was March 2023 and demonstrated no diabetic retinopathy.  Eye  care specialist is [facility].     Obesity:         Body mass index is 23.93 kg/m².       Fluctuates       Wt Readings from Last 3 Encounters:   08/27/24 82.3 kg (181 lb 6.4 oz)   08/15/24 86.1 kg (189 lb 12.8 oz)   05/22/24 88 kg (194 lb)         CardioVascular:    None     Renal:    Under care of nephro? no    On ARB/ACE-I  losartan - 100 MG, 25 MG     5/8/2019: Serum creatinine 1.03.                           Urine microalbumin unknown.                       05/06/2022      Cr 0.69, , microalbumin/Cr ratio 47    04/26/2024 microalbumin/Cr ratio          Lipids:     Current therapy atorvastatin - 80 MG with good compliance   9/7/2018: Total cholesterol 211, triglycerides 352, HDL 36, .        No results found for: \"CHOL\"  No components found for: \"LDLCHOLESTEROL\", \"LDLCALC\" No results found for: \"VLDL\" No results found for: \"HDL\" No results found for: \"HDL\" No results found for: \"TRIG\"  No results found for: \"CHOLHDLRATIO\"      Hemoglobin A1c:  3/10/2016: 12.8%.   /24/2017: 9.4%.   7/27/2017: 10.2%.    5/4/2018: 10.9%.   9/7/2018: 14.3%.   12/31/2018: 12.0%.   3/21/2019: 13.2%.   9/30/2019: Greater than 15.5%.   3/6/2020: Greater than 14%.   03/09/2021: 14.1%   05/06/2022: 9.5%  08/31/2022: 11.1%   Dec 2022 8.7% per insurance eval  05/11/2023  13.5%  10/11/2023  11.5%  02/12/2024  11.2%  5/15/2024  10.5%  8/15/2024  10.2%      Hemoglobin A1C, POC   Date Value Ref Range Status   05/15/2024 10.5 % Final   02/12/2024 11.2 % Final   10/11/2023 11.5 % Final        Thyroid:                     5/4/2018: TSH 2.125.                                  05/06/2022      1.850     04/26/2024 1.520, Free T4 1.08  Lab Results   Component Value Date/Time    TSH 1.850 05/06/2022 03:23 PM             Allergies & Medications:  Reviewed in chart.        Review of Systems   HENT:  Positive for trouble swallowing (pills, food, liquid with choking) and voice change.    Gastrointestinal:  Positive for diarrhea, nausea and vomiting (bile).        Abd bloating -in spells       Vital Signs:  /84 (Site: Right Upper Arm, Position: Sitting, Cuff Size: Medium Adult)   Pulse 85   Wt 82.3 kg (181 lb 6.4 oz)   SpO2 98%   BMI 23.93 kg/m²       Physical Exam  Constitutional:       Appearance: Normal appearance.   Neck:      Thyroid: No thyromegaly.   Cardiovascular:      Rate and Rhythm: Normal rate and regular rhythm.   Pulmonary:      Effort: Pulmonary effort is normal.      Breath sounds: Normal breath sounds.   Abdominal:      Palpations: Abdomen is soft.   Feet:      Right foot:      Protective Sensation: 3 sites tested.  0 sites sensed.      Left foot:      Protective Sensation: 3 sites tested.  0 sites sensed.      Comments:     3cm ulceration right plantar hallux with serous drainage. no heat, erythema, purulence, or sign of infection      Left hallux with healing, now 0.5cm  ulceration at MTP. 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

## 2024-08-28 ENCOUNTER — CARE COORDINATION (OUTPATIENT)
Dept: CARE COORDINATION | Facility: CLINIC | Age: 59
End: 2024-08-28

## 2024-08-28 NOTE — CARE COORDINATION
Ambulatory Care Coordination Note     8/28/2024 10:05 AM     Care Summary Note:   Incoming call from patient - appointment with endocrinology 8/27  Glycemic control is sub optimal    - Missing prandial injections at times, usually taking once a day but forgetting, often AFTER meals. Fearful of lows   - Patient has declined and/or has been a no show at diabetes education.  PLAN -    Reduce prandial dose from 12 units to 8 units plus 1/25>150 correction, focus on taking before meals     Focus on compliance and technique    ACM has sent message to provider to make sure of understanding of above.     ACM: Heather Byers RN    Method of communication with provider: chart routing.    PCP/Specialist follow up:   Future Appointments         Provider Specialty Dept Phone    8/29/2024 10:30 AM Estevan Domingo RN; Zachary Barnett MD Wound Ostomy [phone]    11/25/2024 1:00 PM Aric Shafer MD Neurology [phone]    2/28/2025 2:30 PM Parul Perez PA-C Endocrinology [phone]

## 2024-08-29 ENCOUNTER — TELEPHONE (OUTPATIENT)
Dept: ENDOCRINOLOGY | Age: 59
End: 2024-08-29

## 2024-08-29 ENCOUNTER — HOSPITAL ENCOUNTER (OUTPATIENT)
Dept: WOUND CARE | Age: 59
Discharge: HOME | End: 2024-08-29
Attending: SURGERY
Payer: MEDICARE

## 2024-08-29 ENCOUNTER — CARE COORDINATION (OUTPATIENT)
Dept: CARE COORDINATION | Facility: CLINIC | Age: 59
End: 2024-08-29

## 2024-08-29 VITALS
TEMPERATURE: 98.1 F | HEART RATE: 65 BPM | OXYGEN SATURATION: 96 % | SYSTOLIC BLOOD PRESSURE: 151 MMHG | RESPIRATION RATE: 18 BRPM | HEIGHT: 73 IN | DIASTOLIC BLOOD PRESSURE: 91 MMHG | WEIGHT: 197.6 LBS | BODY MASS INDEX: 26.19 KG/M2

## 2024-08-29 DIAGNOSIS — E11.621 DIABETIC ULCER OF TOE OF LEFT FOOT ASSOCIATED WITH TYPE 2 DIABETES MELLITUS, WITH FAT LAYER EXPOSED (HCC): Primary | ICD-10-CM

## 2024-08-29 DIAGNOSIS — L97.522 DIABETIC ULCER OF TOE OF LEFT FOOT ASSOCIATED WITH TYPE 2 DIABETES MELLITUS, WITH FAT LAYER EXPOSED (HCC): Primary | ICD-10-CM

## 2024-08-29 PROCEDURE — 99214 OFFICE O/P EST MOD 30 MIN: CPT | Performed by: SURGERY

## 2024-08-29 PROCEDURE — 99213 OFFICE O/P EST LOW 20 MIN: CPT

## 2024-08-29 NOTE — PROGRESS NOTES
culture that was obtained on 10/25.   The toe and foot remain edematous but are improved from several days ago up.  He has been taking his antibiotics.  There is less drainage.    The removed toenail bed is healing nicely.      11/3/2023 Procedure(s):  LEFT FOOT DEBRIDEMENT INCLUDING BONE WITH PLACEMENT ABSORBABLE ANTIBIOTIC BEADS   WOUND VAC PLACEMENT LEFT GREAT TOE  Findings: Viable toe with medial ulcer and exposed bone.  No joint space remaining.  Many bone fragments from distal phalanx removed and sent to path.  Other soft tissues debrided.  No remaining abscess pocket.  Copiously irrigated.  Calcium sulfate beads with vancomycin and tobramycin placed into deep wound.  Adaptic over beads and then VAC at -125 mmHg.  Specimen to be sent to pathology.  No additional cultures required.      Further notes from Dr Gramajo below  He returns on 11/9/2023, following left great toe debridement of bone for osteomyelitis with implantation of absorbable antibiotic beads with vancomycin and tobramycin, and placement of wound VAC.  He was seen by ID who had him on vancomycin and levofloxacin and discharged him on Augmentin and doxycycline.  Cultures have grown Klebsiella, Pseudomonas, MRSA, and group B beta-hemolytic Streptococcus.  Pathology is consistent with osteomyelitis.  He is doing well with VAC changes.  There is some beads coming out today but these will continue to absorb.  VAC was replaced.  We discussed moving forward with HBO.  He received a phone call while at the wound center from ID asking him to go to the emergency department because his creatinine went from 0.7 pretherapy to 4.57.  He will be admitted.    He returns on 12/4/2023.  His acute kidney injury has resolved.  He is making good urine.  He came to the wound center last Friday and his foot wound was doing so well past week discontinued his VAC.  He has been followed by ID who has made changes to his antibiotics.  He sees them back on 12/15.  Erythema  Tobacco abuse 08/15/2024    Bilateral occipital neuralgia 06/14/2024    Diabetic ulcer of toe of left foot associated with type 2 diabetes mellitus, with necrosis of muscle ([facility]) 12/18/2023    Chronic osteomyelitis of toe of left foot ([facility]) 12/18/2023    Metabolic acidosis 11/12/2023    FAHEEM (acute kidney injury) ([facility]) 11/09/2023    Acute osteomyelitis of toe of left foot ([facility]) 11/02/2023    Cellulitis of great toe of left foot 10/25/2023    Diabetic ulcer of toe of right foot associated with type 2 diabetes mellitus, with fat layer exposed ([facility]) 09/21/2023    Diabetic ulcer of toe of left foot associated with type 2 diabetes mellitus, with necrosis of bone ([facility]) 09/21/2023    Cigarette smoker 09/21/2023    Hypercholesterolemia 07/13/2020    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 ([facility]) 07/13/2020    Human immunodeficiency virus ([facility]) 07/13/2020    Type 2 diabetes mellitus with hyperglycemia, with long-term current use of insulin ([facility]) 07/13/2020    Hypertriglyceridemia            Plan:     Number and Complexity of Problems addressed and   Risks of complications and/or morbidity of management    Treatment Note please see attached Discharge Instructions  Any procedures done today in the wound center (if documented in a separate note) in [facility] are made part of this record by reference  Written patient dismissal instructions given to patient or POA.                 

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
## 2024-08-29 NOTE — WOUND CARE
Discharge Instructions for  [facility]  [address] [place] [address]  [address] [place] [address]  Phone [phone]   Fax [phone]      NAME:  Estevan Valenzuela  YOB: 1965  MEDICAL RECORD NUMBER:  [id number]  DATE:  8/29/2024    Return Appointment:   2 weeks with Zachary Barnett MD      Instructions: Right and left great toes:  Left great toe:  Cleanse with normal saline and vashe.  Place a small amount of iodosorb to each open area.   Cover with adhesive bandage   Change daily.    Right great toe:  Cleanse with normal saline and vashe.  Hydrofera Ready: Cut to wound size, place in wound bed, shiny side out.    Cover with ABD and wrap with rolled gauze.  Change dressing 3 times per week    Do not get wet in the shower  Limit walking until inserts received.  Follow up with your endocrinologist about your diabetes control. Be sure to ask her about diabetes shoes and inserts.  Decrease your smoking.        Should you experience increased redness, swelling, pain, foul odor, size of wound(s), or have a temperature over 101 degrees please contact the Wound Healing Center at [phone] or if after hours contact your primary care physician or go to the hospital emergency department.    PLEASE NOTE: IF YOU ARE UNABLE TO OBTAIN WOUND SUPPLIES, CONTINUE TO USE THE SUPPLIES YOU HAVE AVAILABLE UNTIL YOU ARE ABLE TO REACH US. IT IS MOST IMPORTANT TO KEEP THE WOUND COVERED AT ALL TIMES.    Electronically signed Tanesha Zheng PT, WCC on 8/29/2024 at 10:55 AM

## 2024-08-29 NOTE — DISCHARGE INSTRUCTIONS
Return Appointment:   1 week with Zachary Barnett MD        Instructions: Right and left great toes:  Left great toe:  Cleanse with normal saline and vashe.  Place a small amount of iodosorb to each open area.   Cover with adhesive bandage   Change daily.     Right great toe:  Cleanse with normal saline and vashe.  Hydrofera Ready: Cut to wound size, place in wound bed, shiny side out.    Cover with ABD and wrap with rolled gauze.  Change dressing 3 times per week     Do not get wet in the shower  Limit walking until inserts received.  Follow up with your endocrinologist about your diabetes control. Be sure to ask her about diabetes shoes and inserts.  Decrease your smoking.

## 2024-08-29 NOTE — FLOWSHEET NOTE
08/29/24 1042   Right Leg Edema Point of Measurement   Leg circumference 36.5 cm   Ankle circumference 24 cm   Foot circumference 24 cm   Compression Therapy Compression not ordered   Left Leg Edema Point of Measurement   Leg circumference 36 cm   Ankle circumference 22 cm   Foot circumference 23.5 cm   Compression Therapy Compression not ordered   Wound 08/15/24 Toe (Comment  which one) Right #1 right great toe   Date First Assessed/Time First Assessed: 08/15/24 0916   Present on Original Admission: Yes  Wound Approximate Age at First Assessment (Weeks): 2 weeks  Primary Wound Type: Diabetic Ulcer  Location: Toe (Comment  which one)  Wound Location Orientation...   Wound Image    Wound Etiology Diabetic Gavin 1   Dressing Status Old drainage noted   Wound Cleansed Cleansed with saline   Dressing/Treatment Adhesive bandage  (Iodosorb)   Offloading for Diabetic Foot Ulcers Offloading ordered   Wound Length (cm) 2.1 cm   Wound Width (cm) 1.9 cm   Wound Depth (cm) 0.1 cm   Wound Surface Area (cm^2) 3.99 cm^2   Change in Wound Size % (l*w) 36.16   Wound Volume (cm^3) 0.399 cm^3   Wound Healing % 36   Wound Assessment [place]/red;[place]   Drainage Amount Moderate (25-50%)   Drainage Description Serosanguinous   Odor None   Stella-wound Assessment Intact   Wound Thickness Description not for Pressure Injury Full thickness   Wound 08/15/24 Toe (Comment  which one) Left #2 left great toe   Date First Assessed/Time First Assessed: 08/15/24 0918   Present on Original Admission: Yes  Wound Approximate Age at First Assessment (Weeks): 2 weeks  Primary Wound Type: Diabetic Ulcer  Location: Toe (Comment  which one)  Wound Location Orientation...   Wound Image    Wound Etiology Diabetic Gavin 1   Dressing Status Old drainage noted   Wound Cleansed Cleansed with saline   Dressing/Treatment Other (comment)  (Iodosorb)   Offloading for Diabetic Foot Ulcers Offloading ordered   Wound Length (cm) 0.5 cm   Wound Width (cm) 0.8 cm    Wound Depth (cm) 0.1 cm   Wound Surface Area (cm^2) 0.4 cm^2   Change in Wound Size % (l*w) 69.23   Wound Volume (cm^3) 0.04 cm^3   Wound Healing % 69   Wound Assessment Pink/red   Drainage Amount Small (< 25%)   Drainage Description Serosanguinous   Odor None   Stella-wound Assessment Hyperkeratosis (callous)   Wound Thickness Description not for Pressure Injury Full thickness

## 2024-09-05 ENCOUNTER — TELEPHONE (OUTPATIENT)
Dept: ENDOCRINOLOGY | Age: 59
End: 2024-09-05

## 2024-09-05 ENCOUNTER — HOSPITAL ENCOUNTER (OUTPATIENT)
Dept: WOUND CARE | Age: 59
Discharge: HOME | End: 2024-09-05
Attending: SURGERY
Payer: MEDICARE

## 2024-09-05 ENCOUNTER — CARE COORDINATION (OUTPATIENT)
Dept: CARE COORDINATION | Facility: CLINIC | Age: 59
End: 2024-09-05

## 2024-09-05 VITALS
DIASTOLIC BLOOD PRESSURE: 100 MMHG | HEART RATE: 74 BPM | SYSTOLIC BLOOD PRESSURE: 160 MMHG | RESPIRATION RATE: 18 BRPM | OXYGEN SATURATION: 97 % | TEMPERATURE: 98.8 F

## 2024-09-05 PROCEDURE — 99213 OFFICE O/P EST LOW 20 MIN: CPT

## 2024-09-05 NOTE — FLOWSHEET NOTE
09/05/24 1038   Wound 08/15/24 Toe (Comment  which one) Right #1 right great toe   Date First Assessed/Time First Assessed: 08/15/24 0916   Present on Original Admission: Yes  Wound Approximate Age at First Assessment (Weeks): 2 weeks  Primary Wound Type: Diabetic Ulcer  Location: Toe (Comment  which one)  Wound Location Orientation...   Wound Image    Wound Etiology Diabetic Gavin 1   Dressing Status Old drainage noted   Wound Cleansed Cleansed with saline   Dressing/Treatment Hydrofera blue   Offloading for Diabetic Foot Ulcers Offloading ordered   Wound Length (cm) 2 cm   Wound Width (cm) 1.7 cm   Wound Depth (cm) 0.1 cm   Wound Surface Area (cm^2) 3.4 cm^2   Change in Wound Size % (l*w) 45.6   Wound Volume (cm^3) 0.34 cm^3   Wound Healing % 46   Wound Assessment Pink/red   Drainage Amount Moderate (25-50%)   Drainage Description Serosanguinous   Odor None   Stella-wound Assessment Intact   Wound Thickness Description not for Pressure Injury Full thickness   Wound 08/15/24 Toe (Comment  which one) Left #2 left great toe   Date First Assessed/Time First Assessed: 08/15/24 0918   Present on Original Admission: Yes  Wound Approximate Age at First Assessment (Weeks): 2 weeks  Primary Wound Type: Diabetic Ulcer  Location: Toe (Comment  which one)  Wound Location Orientation...   Wound Image    Wound Etiology Diabetic Gavin 1   Dressing Status Old drainage noted   Wound Cleansed Cleansed with saline   Dressing/Treatment Other (comment)  (Iodosorb)   Offloading for Diabetic Foot Ulcers Offloading ordered   Wound Length (cm) 0.2 cm   Wound Width (cm) 0.2 cm   Wound Depth (cm) 0.2 cm   Wound Surface Area (cm^2) 0.04 cm^2   Change in Wound Size % (l*w) 96.92   Wound Volume (cm^3) 0.008 cm^3   Wound Healing % 94   Wound Assessment Pink/red   Drainage Amount Small (< 25%)   Drainage Description Serosanguinous   Odor None   Stella-wound Assessment Hyperkeratosis (callous)   Wound Thickness Description not for Pressure Injury

## 2024-09-05 NOTE — CARE COORDINATION
Ambulatory Care Coordination Note     9/5/2024 10:38 AM     Care Summary Note:   Patient has been calling in with morning blood sugars:  8/30 Friday - 61 8/31 Saturday - 58 9/2 Monday - 97  Multiple reminders to take insulin with first bite of food.  [facility]     ACM: Heather Byers RN       Method of communication with provider: chart routing.    PCP/Specialist follow up:   Future Appointments         Provider Specialty Dept Phone    9/12/2024 10:30 AM Estevan Domingo RN; Zachary Barnett MD Wound Ostomy [phone]    11/25/2024 1:00 PM Aric Shafer MD Neurology [phone]    2/28/2025 2:30 PM Parul Perez PA-C Endocrinology [phone]

## 2024-09-05 NOTE — TELEPHONE ENCOUNTER
After reviewing note from  explaining recent fasting low blood sugars of 61 and 58, please ask pt to REDUCE his once daily Tresiba from 40 units down to 36 units daily. 40 is TOO MUCH and he was supposed to reduce from 40 to 36 at our August visit    He should be using the new sheet to determine his humalog dose, taking AT LEAST 8 units BEFORE (5-10 minutes would be great) a meal, taking the higher amount indicated on his shot sheet if his blood sugar is already high    Do not take scale between meals.    If eating a snack and not a meal, can take 4 units of humalog BEFORE the snack

## 2024-09-09 ENCOUNTER — CARE COORDINATION (OUTPATIENT)
Dept: CARE COORDINATION | Facility: CLINIC | Age: 59
End: 2024-09-09

## 2024-09-09 DIAGNOSIS — E11.65 TYPE 2 DIABETES MELLITUS WITH HYPERGLYCEMIA, WITH LONG-TERM CURRENT USE OF INSULIN (HCC): ICD-10-CM

## 2024-09-09 DIAGNOSIS — Z79.4 TYPE 2 DIABETES MELLITUS WITH HYPERGLYCEMIA, WITH LONG-TERM CURRENT USE OF INSULIN (HCC): ICD-10-CM

## 2024-09-09 LAB — GLUCOSE SERPL-MCNC: 181 MG/DL (ref 70–99)

## 2024-09-11 LAB — C PEPTIDE SERPL-MCNC: 1.5 NG/ML (ref 1.1–4.4)

## 2024-09-12 ENCOUNTER — HOSPITAL ENCOUNTER (OUTPATIENT)
Dept: WOUND CARE | Age: 59
Discharge: HOME | End: 2024-09-12
Attending: SURGERY
Payer: MEDICARE

## 2024-09-12 VITALS
DIASTOLIC BLOOD PRESSURE: 101 MMHG | BODY MASS INDEX: 25.58 KG/M2 | HEIGHT: 73 IN | HEART RATE: 70 BPM | WEIGHT: 193 LBS | SYSTOLIC BLOOD PRESSURE: 170 MMHG

## 2024-09-12 DIAGNOSIS — L97.522 DIABETIC ULCER OF TOE OF LEFT FOOT ASSOCIATED WITH TYPE 2 DIABETES MELLITUS, WITH FAT LAYER EXPOSED (HCC): Primary | ICD-10-CM

## 2024-09-12 DIAGNOSIS — E11.621 DIABETIC ULCER OF TOE OF LEFT FOOT ASSOCIATED WITH TYPE 2 DIABETES MELLITUS, WITH FAT LAYER EXPOSED (HCC): Primary | ICD-10-CM

## 2024-09-12 PROCEDURE — 99213 OFFICE O/P EST LOW 20 MIN: CPT

## 2024-09-12 PROCEDURE — 99214 OFFICE O/P EST MOD 30 MIN: CPT | Performed by: SURGERY

## 2024-09-13 DIAGNOSIS — Z79.4 TYPE 2 DIABETES MELLITUS WITH HYPERGLYCEMIA, WITH LONG-TERM CURRENT USE OF INSULIN (HCC): ICD-10-CM

## 2024-09-13 DIAGNOSIS — E11.65 TYPE 2 DIABETES MELLITUS WITH HYPERGLYCEMIA, WITH LONG-TERM CURRENT USE OF INSULIN (HCC): ICD-10-CM

## 2024-09-13 RX ORDER — SITAGLIPTIN 50 MG/1
50 TABLET, FILM COATED ORAL DAILY
Qty: 90 TABLET | Refills: 1 | Status: SHIPPED | OUTPATIENT
Start: 2024-09-13

## 2024-09-16 ENCOUNTER — CARE COORDINATION (OUTPATIENT)
Dept: CARE COORDINATION | Facility: CLINIC | Age: 59
End: 2024-09-16

## 2024-09-17 ENCOUNTER — TELEPHONE (OUTPATIENT)
Dept: ENDOCRINOLOGY | Age: 59
End: 2024-09-17

## 2024-09-26 ENCOUNTER — HOSPITAL ENCOUNTER (OUTPATIENT)
Dept: WOUND CARE | Age: 59
Discharge: HOME | End: 2024-09-26
Attending: SURGERY
Payer: MEDICARE

## 2024-09-26 VITALS — WEIGHT: 193.8 LBS | HEIGHT: 73 IN | BODY MASS INDEX: 25.69 KG/M2

## 2024-09-26 PROCEDURE — 99214 OFFICE O/P EST MOD 30 MIN: CPT | Performed by: SURGERY

## 2024-09-26 PROCEDURE — 99213 OFFICE O/P EST LOW 20 MIN: CPT

## 2024-10-01 ENCOUNTER — TELEPHONE (OUTPATIENT)
Dept: ENDOCRINOLOGY | Age: 59
End: 2024-10-01

## 2024-10-02 ENCOUNTER — CARE COORDINATION (OUTPATIENT)
Dept: CARE COORDINATION | Facility: CLINIC | Age: 59
End: 2024-10-02
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
## 2024-10-03 ENCOUNTER — TELEPHONE (OUTPATIENT)
Dept: ENDOCRINOLOGY | Age: 59
End: 2024-10-03

## 2024-10-03 NOTE — TELEPHONE ENCOUNTER
Do they have a form that they need us to complete that qualifies as \"standard written order for diabetic shoes\" and \"certificate of need\" like all the other pedorthic clinics    We completed a form on 8/27, have they received this and if so, what is missing?
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
## 2024-10-07 ENCOUNTER — CARE COORDINATION (OUTPATIENT)
Dept: CARE COORDINATION | Facility: CLINIC | Age: 59
End: 2024-10-07

## 2024-10-08 ENCOUNTER — CARE COORDINATION (OUTPATIENT)
Dept: CARE COORDINATION | Facility: CLINIC | Age: 59
End: 2024-10-08

## 2024-10-08 NOTE — CARE COORDINATION
Ambulatory Care Coordination Note     10/8/2024 10:09 AM     Incoming call from patient   - spoke with Marques; who has apparently sent in 4 requests to [employer].  States provider can schedule.  Will route note to provider.     ACM: Heather Byers RN     Method of communication with provider: chart routing.    PCP/Specialist follow up:   Future Appointments         Provider Specialty Dept Phone    10/16/2024 1:30 PM [facility] Radiology [phone]    10/17/2024 9:20 AM Tanesha Zheng, PT; Zachary Barnett MD Wound Ostomy [phone]    11/25/2024 1:00 PM Aric Shafer MD Neurology [phone]    2/28/2025 2:30 PM Parul Perez PA-C Endocrinology [phone]

## 2024-10-08 NOTE — TELEPHONE ENCOUNTER
Looks like his fasting c-peptide was too high to qualify for medicare coverage of pump. He can go on the omnipod 5 system but this does require carb counting. If he feels he can accurate count carbs (part of the FREE diabetes education) then I am open to ordering this through the pharmacy for him
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
## 2024-10-09 ENCOUNTER — CARE COORDINATION (OUTPATIENT)
Dept: CARE COORDINATION | Facility: CLINIC | Age: 59
End: 2024-10-09
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
## 2024-10-09 NOTE — CARE COORDINATION
Ambulatory Care Coordination Note     10/9/2024 12:06 PM     Care Summary Note:   Outreach for High Risk Case Management - spoke with patient  SW is also following patient  Discussed diabetes education to learn carb counting.  Patient is open to this.     ACM: Heather Byers, JOE     Method of communication with provider: chart routing.    Has the patient been seen in the ED since your last call? no    PCP/Specialist follow up:   Future Appointments         Provider Specialty Dept Phone    10/16/2024 1:30 PM [facility] 7 Radiology [phone]    10/17/2024 9:20 AM Tanesha Zheng, PT; Zachary Barnett MD Wound Ostomy [phone]    11/25/2024 1:00 PM Aric Shafer MD Neurology [phone]    2/28/2025 2:30 PM Parul Perez PAAnnaC Endocrinology [phone]

## 2024-10-10 RX ORDER — LANCETS 33 GAUGE
EACH MISCELLANEOUS
Qty: 600 EACH | Refills: 3 | OUTPATIENT
Start: 2024-10-10

## 2024-10-11 ENCOUNTER — CARE COORDINATION (OUTPATIENT)
Dept: CARE COORDINATION | Facility: CLINIC | Age: 59
End: 2024-10-11

## 2024-10-11 NOTE — CARE COORDINATION
Ambulatory Care Coordination Note     10/11/2024 12:33 PM     Outreach for High Risk Case Management - spoke with patient  Reports doing reasonably well today.  Continues to feel very stressed.  Has a mental health therapy appointment today   - encouraged patient to go  PLAN now to meet patient face to face on Monday at [facility]   - discuss diabetes education   - CeQur wearable insulin patch  (Suggested by Endocrinology provider.  We can also get company rep involved with patient).     ACM: Heather Byers RN    PCP/Specialist follow up:   Future Appointments         Provider Specialty Dept Phone    10/16/2024 1:30 PM [facility] 7 Radiology [phone]    10/17/2024 9:20 AM Tanesha Zheng, PT; Zachary Barnett MD Wound Ostomy [phone]    11/25/2024 1:00 PM Aric Shafer MD Neurology [phone]    2/28/2025 2:30 PM Parul Perez PA-C Endocrinology [phone]

## 2024-10-12 DIAGNOSIS — Z87.891 PERSONAL HISTORY OF TOBACCO USE, PRESENTING HAZARDS TO HEALTH: ICD-10-CM

## 2024-10-14 RX ORDER — BUPROPION HYDROCHLORIDE 150 MG/1
150 TABLET, EXTENDED RELEASE ORAL 2 TIMES DAILY
Qty: 180 TABLET | Refills: 1 | OUTPATIENT
Start: 2024-10-14

## 2024-10-16 ENCOUNTER — HOSPITAL ENCOUNTER (OUTPATIENT)
Dept: ULTRASOUND IMAGING | Age: 59
Discharge: HOME | End: 2024-10-19
Attending: SURGERY
Payer: MEDICARE

## 2024-10-16 DIAGNOSIS — L97.522 DIABETIC ULCER OF TOE OF LEFT FOOT ASSOCIATED WITH TYPE 2 DIABETES MELLITUS, WITH FAT LAYER EXPOSED (HCC): ICD-10-CM

## 2024-10-16 DIAGNOSIS — E11.621 DIABETIC ULCER OF TOE OF LEFT FOOT ASSOCIATED WITH TYPE 2 DIABETES MELLITUS, WITH FAT LAYER EXPOSED (HCC): ICD-10-CM

## 2024-10-16 PROCEDURE — 93925 LOWER EXTREMITY STUDY: CPT | Performed by: RADIOLOGY

## 2024-10-16 PROCEDURE — 93925 LOWER EXTREMITY STUDY: CPT

## 2024-10-17 ENCOUNTER — CARE COORDINATION (OUTPATIENT)
Dept: CARE COORDINATION | Facility: CLINIC | Age: 59
End: 2024-10-17

## 2024-10-17 ENCOUNTER — TELEPHONE (OUTPATIENT)
Dept: DIABETES SERVICES | Age: 59
End: 2024-10-17

## 2024-10-17 ENCOUNTER — TELEPHONE (OUTPATIENT)
Dept: ENDOCRINOLOGY | Age: 59
End: 2024-10-17

## 2024-10-17 ENCOUNTER — HOSPITAL ENCOUNTER (OUTPATIENT)
Dept: WOUND CARE | Age: 59
Discharge: HOME | End: 2024-10-17
Attending: SURGERY
Payer: MEDICARE

## 2024-10-17 VITALS
RESPIRATION RATE: 18 BRPM | DIASTOLIC BLOOD PRESSURE: 96 MMHG | SYSTOLIC BLOOD PRESSURE: 180 MMHG | TEMPERATURE: 97.1 F | HEIGHT: 73 IN | WEIGHT: 197.4 LBS | HEART RATE: 61 BPM | BODY MASS INDEX: 26.16 KG/M2

## 2024-10-17 DIAGNOSIS — E11.65 TYPE 2 DIABETES MELLITUS WITH HYPERGLYCEMIA, WITH LONG-TERM CURRENT USE OF INSULIN (HCC): Primary | ICD-10-CM

## 2024-10-17 DIAGNOSIS — Z79.4 TYPE 2 DIABETES MELLITUS WITH HYPERGLYCEMIA, WITH LONG-TERM CURRENT USE OF INSULIN (HCC): Primary | ICD-10-CM

## 2024-10-17 PROCEDURE — 11042 DBRDMT SUBQ TIS 1ST 20SQCM/<: CPT

## 2024-10-17 RX ORDER — BOLUS INSULIN PUMP, 200 UNIT 2 UNIT
EACH MISCELLANEOUS
Qty: 24 EACH | Refills: 3 | Status: SHIPPED | OUTPATIENT
Start: 2024-10-17

## 2024-10-17 NOTE — TELEPHONE ENCOUNTER
Type 2,  Called patient about free Diabetes Education.  He scheduled Basic session for 11- at 8 AM. [phone] or [phone]
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
## 2024-10-17 NOTE — WOUND CARE
Discharge Instructions for  [facility]  [address] [place] [address]  [address]  [place] [address]  Phone [phone]   Fax [phone]      NAME:  Estevan Valenzuela  YOB: 1965  MEDICAL RECORD NUMBER:  [id number]  DATE:  10/17/2024    Return Appointment:  1 week with Dr. Derick Uribe      Instructions: Right great toe  Cleanse with normal saline  Let Vashe Wound Solution moistened guaze sit on wound for at least 60 seconds.  Hydrofera Ready: Cut to wound size, place in wound bed, shiny side out to wound bed.  Secure with rolled gauze  Change every other day and as needed if it gets wet    Do not get wet in the shower  Limit walking until inserts received.  Follow up with your endocrinologist about your diabetes control. Be sure to ask her about diabetes shoes and inserts.  Decrease your smoking.    *Silver nitrate used this visit.     Sophia castillo ordered this visit and provided to patient.     Should you experience increased redness, swelling, pain, foul odor, size of wound(s), or have a temperature over 101 degrees please contact the Wound Healing Center at [phone] or if after hours contact your primary care physician or go to the hospital emergency department.    PLEASE NOTE: IF YOU ARE UNABLE TO OBTAIN WOUND SUPPLIES, CONTINUE TO USE THE SUPPLIES YOU HAVE AVAILABLE UNTIL YOU ARE ABLE TO REACH US. IT IS MOST IMPORTANT TO KEEP THE WOUND COVERED AT ALL TIMES.    Electronically signed TIFFANY BARTH RN, [facility] on 10/17/2024 at 10:07 AM

## 2024-10-18 ENCOUNTER — TELEPHONE (OUTPATIENT)
Dept: ENDOCRINOLOGY | Age: 59
End: 2024-10-18

## 2024-10-18 ENCOUNTER — CLINICAL DOCUMENTATION (OUTPATIENT)
Dept: NEUROLOGY | Age: 59
End: 2024-10-18

## 2024-10-18 NOTE — PROGRESS NOTES
Reviewed records from [facility].  Patient has diabetic peripheral neuropathy of the lower extremities in addition to a postherpetic neuralgia of the left shoulder.    It was also noted that he had been experiencing some occipital headaches with clinical suspicion for occipital neuralgia. He did undergo a bilateral occipital nerve block with some relief but it was short lasting. It was recommended that he continue to follow-up with neurology regarding treatment of this.      His follow-up is arranged.

## 2024-10-21 ENCOUNTER — CARE COORDINATION (OUTPATIENT)
Dept: CARE COORDINATION | Facility: CLINIC | Age: 59
End: 2024-10-21

## 2024-10-21 NOTE — CARE COORDINATION
Ambulatory Care Coordination Note     10/21/2024 1:34 PM     Care Summary Note:   Outreach in ongoing follow up with patient  Order/Pre-Auth now filed for CeQur - patient aware, states he has rec'd emails stating approval.  Pain management appointment today   - VSS   - BS = 124  Referral has been sent to Diabetes Education.    ACM: Heather Byers RN     Method of communication with provider: chart routing.    PCP/Specialist follow up:   Future Appointments         Provider Specialty Dept Phone    10/24/2024 9:40 AM Kaylen Amaro RN; Derick Uribe,  Wound Ostomy [phone]    11/22/2024 8:00 AM You Devine RN Diabetes Services [phone]    11/25/2024 1:00 PM Aric Shafer MD Neurology [phone]    2/28/2025 2:30 PM Parul Perez PA-C Endocrinology [phone]

## 2024-10-24 ENCOUNTER — HOSPITAL ENCOUNTER (OUTPATIENT)
Dept: WOUND CARE | Age: 59
Discharge: HOME | End: 2024-10-24
Attending: SURGERY
Payer: MEDICARE

## 2024-10-24 VITALS
BODY MASS INDEX: 25.21 KG/M2 | WEIGHT: 190.2 LBS | SYSTOLIC BLOOD PRESSURE: 168 MMHG | HEIGHT: 73 IN | HEART RATE: 88 BPM | DIASTOLIC BLOOD PRESSURE: 114 MMHG

## 2024-10-24 DIAGNOSIS — L97.522 DIABETIC ULCER OF TOE OF LEFT FOOT ASSOCIATED WITH TYPE 2 DIABETES MELLITUS, WITH FAT LAYER EXPOSED (HCC): Primary | ICD-10-CM

## 2024-10-24 DIAGNOSIS — E11.621 DIABETIC ULCER OF TOE OF LEFT FOOT ASSOCIATED WITH TYPE 2 DIABETES MELLITUS, WITH FAT LAYER EXPOSED (HCC): Primary | ICD-10-CM

## 2024-10-24 PROCEDURE — 11042 DBRDMT SUBQ TIS 1ST 20SQCM/<: CPT

## 2024-10-24 RX ORDER — GENTAMICIN SULFATE 1 MG/G
OINTMENT TOPICAL ONCE
OUTPATIENT
Start: 2024-10-24 | End: 2024-10-24

## 2024-10-24 RX ORDER — SODIUM CHLOR/HYPOCHLOROUS ACID 0.033 %
SOLUTION, IRRIGATION IRRIGATION ONCE
OUTPATIENT
Start: 2024-10-24 | End: 2024-10-24

## 2024-10-24 RX ORDER — NEOMYCIN/BACITRACIN/POLYMYXINB 3.5-400-5K
OINTMENT (GRAM) TOPICAL ONCE
OUTPATIENT
Start: 2024-10-24 | End: 2024-10-24

## 2024-10-24 RX ORDER — SILVER SULFADIAZINE 10 MG/G
CREAM TOPICAL ONCE
OUTPATIENT
Start: 2024-10-24 | End: 2024-10-24

## 2024-10-24 RX ORDER — GINSENG 100 MG
CAPSULE ORAL ONCE
OUTPATIENT
Start: 2024-10-24 | End: 2024-10-24

## 2024-10-24 RX ORDER — CLOBETASOL PROPIONATE 0.5 MG/G
OINTMENT TOPICAL ONCE
OUTPATIENT
Start: 2024-10-24 | End: 2024-10-24

## 2024-10-24 RX ORDER — BACITRACIN ZINC AND POLYMYXIN B SULFATE 500; 1000 [USP'U]/G; [USP'U]/G
OINTMENT TOPICAL ONCE
OUTPATIENT
Start: 2024-10-24 | End: 2024-10-24

## 2024-10-24 RX ORDER — LIDOCAINE HYDROCHLORIDE 40 MG/ML
SOLUTION TOPICAL ONCE
OUTPATIENT
Start: 2024-10-24 | End: 2024-10-24

## 2024-10-24 RX ORDER — LIDOCAINE 50 MG/G
OINTMENT TOPICAL ONCE
OUTPATIENT
Start: 2024-10-24 | End: 2024-10-24

## 2024-10-24 RX ORDER — MUPIROCIN 20 MG/G
OINTMENT TOPICAL ONCE
OUTPATIENT
Start: 2024-10-24 | End: 2024-10-24

## 2024-10-24 RX ORDER — TRIAMCINOLONE ACETONIDE 1 MG/G
OINTMENT TOPICAL ONCE
OUTPATIENT
Start: 2024-10-24 | End: 2024-10-24

## 2024-10-24 RX ORDER — LIDOCAINE 40 MG/G
CREAM TOPICAL ONCE
OUTPATIENT
Start: 2024-10-24 | End: 2024-10-24

## 2024-10-24 RX ORDER — LIDOCAINE HYDROCHLORIDE 20 MG/ML
JELLY TOPICAL ONCE
OUTPATIENT
Start: 2024-10-24 | End: 2024-10-24

## 2024-10-24 RX ORDER — LIDOCAINE HYDROCHLORIDE 20 MG/ML
JELLY TOPICAL ONCE
Status: COMPLETED | OUTPATIENT
Start: 2024-10-24 | End: 2024-10-24

## 2024-10-24 RX ORDER — BETAMETHASONE DIPROPIONATE 0.5 MG/G
CREAM TOPICAL ONCE
OUTPATIENT
Start: 2024-10-24 | End: 2024-10-24

## 2024-10-24 RX ADMIN — LIDOCAINE HYDROCHLORIDE: 20 JELLY TOPICAL at 09:45

## 2024-10-24 NOTE — DISCHARGE INSTRUCTIONS
Discharge Instructions for  [facility]  [address]  [address]  [place] [address]  Phone [phone]   Fax [phone]      NAME:  Estevan Valenzuela  YOB: 1965  MEDICAL RECORD NUMBER:  [id number]  DATE:  @ED@    Return Appointment:   1 week with Derick Uribe DO      Instructions: Right great toe  Cleanse with normal saline  Let Vashe Wound Solution moistened guaze sit on wound for at least 60 seconds.  Hydrofera Ready: Cut to wound size, place in wound bed, shiny side out to wound bed.  Secure with rolled gauze  Change every other day and as needed if it gets wet     Do not get wet in the shower  Limit walking until inserts received.  Follow up with your endocrinologist about your diabetes control. Be sure to ask her about diabetes shoes and inserts.  Decrease your smoking.     *Silver nitrate used this visit.      Defender boot continue using every time foot hits the ground  Think about possible TCC in the future if can get a ride and not drive         Should you experience increased redness, swelling, pain, foul odor, size of wound(s), or have a temperature over 101 degrees please contact the Wound Healing Center at [phone] or if after hours contact your primary care physician or go to the hospital emergency department.    PLEASE NOTE: IF YOU ARE UNABLE TO OBTAIN WOUND SUPPLIES, CONTINUE TO USE THE SUPPLIES YOU HAVE AVAILABLE UNTIL YOU ARE ABLE TO REACH US. IT IS MOST IMPORTANT TO KEEP THE WOUND COVERED AT ALL TIMES.    Electronically signed Brittney Pagan RN on 10/24/2024 at 10:01 AM

## 2024-10-24 NOTE — FLOWSHEET NOTE
10/24/24 0943   Wound 08/15/24 Toe (Comment  which one) Right #1 right great toe   Date First Assessed/Time First Assessed: 08/15/24 0916   Present on Original Admission: Yes  Wound Approximate Age at First Assessment (Weeks): 2 weeks  Primary Wound Type: Diabetic Ulcer  Location: Toe (Comment  which one)  Wound Location Orientation...   Wound Image     Wound Etiology Diabetic Gavin 1   Dressing Status Old drainage noted   Wound Cleansed Cleansed with saline   Dressing/Treatment Hydrofera blue   Offloading for Diabetic Foot Ulcers Offloading ordered;Other (comment)   Wound Length (cm) 1.4 cm   Wound Width (cm) 0.5 cm   Wound Depth (cm) 0.2 cm   Wound Surface Area (cm^2) 0.7 cm^2   Change in Wound Size % (l*w) 88.8   Wound Volume (cm^3) 0.14 cm^3   Wound Healing % 78   Post-Procedure Length (cm) 1.5 cm   Post-Procedure Width (cm) 0.5 cm   Post-Procedure Depth (cm) 0.1 cm   Post-Procedure Surface Area (cm^2) 0.75 cm^2   Post-Procedure Volume (cm^3) 0.075 cm^3   Undermining Starts ___ O'Clock 1   Undermining Ends___ O'Clock 4   Undermining Maxium Distance (cm) 0.3   Wound Assessment Pink/red   Drainage Amount Moderate (25-50%)   Drainage Description Serosanguinous   Odor None   Stella-wound Assessment Maceration;Hyperkeratosis (callous)   Wound Thickness Description not for Pressure Injury Full thickness     Post debridement

## 2024-10-24 NOTE — FLOWSHEET NOTE
10/24/24 0943   Wound 08/15/24 Toe (Comment  which one) Right #1 right great toe   Date First Assessed/Time First Assessed: 08/15/24 0916   Present on Original Admission: Yes  Wound Approximate Age at First Assessment (Weeks): 2 weeks  Primary Wound Type: Diabetic Ulcer  Location: Toe (Comment  which one)  Wound Location Orientation...   Wound Image    Wound Etiology Diabetic Gavin 1   Dressing Status Old drainage noted   Wound Cleansed Cleansed with saline   Dressing/Treatment Hydrofera blue   Offloading for Diabetic Foot Ulcers Offloading ordered;Other (comment)   Wound Length (cm) 1.4 cm   Wound Width (cm) 0.5 cm   Wound Depth (cm) 0.2 cm   Wound Surface Area (cm^2) 0.7 cm^2   Change in Wound Size % (l*w) 88.8   Wound Volume (cm^3) 0.14 cm^3   Wound Healing % 78   Undermining Starts ___ O'Clock 1   Undermining Ends___ O'Clock 4   Undermining Maxium Distance (cm) 0.3   Wound Assessment Pink/red   Drainage Amount Moderate (25-50%)   Drainage Description Serosanguinous   Odor None   Stella-wound Assessment Maceration;Hyperkeratosis (callous)   Wound Thickness Description not for Pressure Injury Full thickness

## 2024-10-24 NOTE — WOUND CARE
Discharge Instructions for  [facility]  [address]  [address]  [place] [address]  Phone [phone]   Fax [phone]      NAME:  Estevan Valenzuela  YOB: 1965  MEDICAL RECORD NUMBER:  [id number]  DATE:  10/24/2024    Return Appointment:  1 week with Dr. Derick Uribe      Instructions: Right great toe  Cleanse with normal saline  Let Vashe Wound Solution moistened guaze sit on wound for at least 60 seconds.  Hydrofera Ready: Cut to wound size, place in wound bed, shiny side out to wound bed.  Secure with rolled gauze  Change every other day and as needed if it gets wet    Do not get wet in the shower  Limit walking until inserts received.  Follow up with your endocrinologist about your diabetes control. Be sure to ask her about diabetes shoes and inserts.  Decrease your smoking.    *Silver nitrate used this visit.     Defender boot continue using every time foot hits the ground  Think about possible TCC in the future if can get a ride and not drive     Should you experience increased redness, swelling, pain, foul odor, size of wound(s), or have a temperature over 101 degrees please contact the Wound Healing Center at [phone] or if after hours contact your primary care physician or go to the hospital emergency department.    PLEASE NOTE: IF YOU ARE UNABLE TO OBTAIN WOUND SUPPLIES, CONTINUE TO USE THE SUPPLIES YOU HAVE AVAILABLE UNTIL YOU ARE ABLE TO REACH US. IT IS MOST IMPORTANT TO KEEP THE WOUND COVERED AT ALL TIMES.    Electronically signed Brittney Pagan RN, [facility] on 10/24/2024 at 9:46 AM

## 2024-10-27 PROBLEM — L97.512 DIABETIC ULCER OF TOE OF RIGHT FOOT ASSOCIATED WITH TYPE 2 DIABETES MELLITUS, WITH FAT LAYER EXPOSED (HCC): Chronic | Status: ACTIVE | Noted: 2023-09-21

## 2024-10-29 ENCOUNTER — CARE COORDINATION (OUTPATIENT)
Dept: CARE COORDINATION | Facility: CLINIC | Age: 59
End: 2024-10-29

## 2024-10-29 NOTE — CARE COORDINATION
.Ambulatory Care Coordination Note     10/25/2024 9:24 AM    Care Summary Note:    Outreach for high risk case management  Incoming call from patient 10/25/2024  Late entry - ACM on PTO    Reports doing well  Currently with his mother who is having hip surgery.  Repeat call this morning - Left      ACM: Heather Byers RN     Method of communication with provider: none.    Has the patient been seen in the ED since your last call? no    PCP/Specialist follow up:   Future Appointments         Provider Specialty Dept Phone    10/31/2024 9:30 AM Brittney Pagan RN; Derick Uribe,  Wound Ostomy [phone]    11/22/2024 8:00 AM You Devine RN Diabetes Services [phone]    11/25/2024 1:00 PM Aric Shafer MD Neurology [phone]    2/28/2025 2:30 PM Parul Perez PA-C Endocrinology [phone]

## 2024-11-01 DIAGNOSIS — N39.41 URGE URINARY INCONTINENCE: ICD-10-CM

## 2024-11-01 RX ORDER — OXYBUTYNIN CHLORIDE 10 MG/1
TABLET, EXTENDED RELEASE ORAL
Qty: 30 TABLET | Refills: 3 | OUTPATIENT
Start: 2024-11-01

## 2024-11-05 ENCOUNTER — CARE COORDINATION (OUTPATIENT)
Dept: CARE COORDINATION | Facility: CLINIC | Age: 59
End: 2024-11-05

## 2024-11-05 ENCOUNTER — PATIENT MESSAGE (OUTPATIENT)
Dept: ENDOCRINOLOGY | Age: 59
End: 2024-11-05

## 2024-11-05 DIAGNOSIS — N39.41 URGE URINARY INCONTINENCE: ICD-10-CM

## 2024-11-05 RX ORDER — OXYBUTYNIN CHLORIDE 10 MG/1
TABLET, EXTENDED RELEASE ORAL
Qty: 30 TABLET | Refills: 3 | OUTPATIENT
Start: 2024-11-05
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
## 2024-11-12 ENCOUNTER — CARE COORDINATION (OUTPATIENT)
Dept: CARE COORDINATION | Facility: CLINIC | Age: 59
End: 2024-11-12

## 2024-11-12 NOTE — CARE COORDINATION
Ambulatory Care Coordination Note     11/12/2024 12:12 PM    Care Summary Note:    Outreach for High Risk Case Management  Met with PCP yesterday.  Reports he made her aware he is taking Kratom.   - she has mixed feelings about this.   - continues to help him feel better  Patient is scheduled today with rep for Cequr device.  Wound Care appointment on Friday 11/15    ACM: Heather Byers RN     Method of communication with provider: none.    Utilization: Has the patient been seen in the ED since your last call? no    PCP/Specialist follow up:   Future Appointments         Provider Specialty Dept Phone    11/15/2024 10:30 AM Brittney Pagan RN; Derick Uribe, DO Wound Ostomy [phone]    11/22/2024 8:00 AM You Devine RN Diabetes Services [phone]    11/25/2024 1:00 PM Aric Shafer MD Neurology [phone]    2/28/2025 2:30 PM Parul Perez PA-C Endocrinology [phone]

## 2024-11-15 ENCOUNTER — HOSPITAL ENCOUNTER (OUTPATIENT)
Dept: WOUND CARE | Age: 59
Discharge: HOME | End: 2024-11-15
Attending: SURGERY
Payer: MEDICAID

## 2024-11-15 ENCOUNTER — HOSPITAL ENCOUNTER (OUTPATIENT)
Dept: GENERAL RADIOLOGY | Age: 59
Discharge: HOME | End: 2024-11-18
Payer: MEDICAID

## 2024-11-15 VITALS
TEMPERATURE: 97.4 F | RESPIRATION RATE: 18 BRPM | WEIGHT: 190 LBS | HEART RATE: 82 BPM | BODY MASS INDEX: 25.18 KG/M2 | DIASTOLIC BLOOD PRESSURE: 94 MMHG | SYSTOLIC BLOOD PRESSURE: 149 MMHG | HEIGHT: 73 IN

## 2024-11-15 DIAGNOSIS — L08.9 DIABETIC FOOT INFECTION (HCC): Chronic | ICD-10-CM

## 2024-11-15 DIAGNOSIS — L97.522 DIABETIC ULCER OF TOE OF LEFT FOOT ASSOCIATED WITH TYPE 2 DIABETES MELLITUS, WITH FAT LAYER EXPOSED (HCC): Primary | Chronic | ICD-10-CM

## 2024-11-15 DIAGNOSIS — E11.42 DIABETIC POLYNEUROPATHY ASSOCIATED WITH TYPE 2 DIABETES MELLITUS (HCC): Chronic | ICD-10-CM

## 2024-11-15 DIAGNOSIS — L97.522 DIABETIC ULCER OF TOE OF LEFT FOOT ASSOCIATED WITH TYPE 2 DIABETES MELLITUS, WITH FAT LAYER EXPOSED (HCC): ICD-10-CM

## 2024-11-15 DIAGNOSIS — E11.628 DIABETIC FOOT INFECTION (HCC): Chronic | ICD-10-CM

## 2024-11-15 DIAGNOSIS — E11.621 DIABETIC ULCER OF TOE OF RIGHT FOOT ASSOCIATED WITH TYPE 2 DIABETES MELLITUS, WITH FAT LAYER EXPOSED (HCC): Chronic | ICD-10-CM

## 2024-11-15 DIAGNOSIS — E11.621 DIABETIC ULCER OF TOE OF LEFT FOOT ASSOCIATED WITH TYPE 2 DIABETES MELLITUS, WITH FAT LAYER EXPOSED (HCC): ICD-10-CM

## 2024-11-15 DIAGNOSIS — E11.621 DIABETIC ULCER OF TOE OF LEFT FOOT ASSOCIATED WITH TYPE 2 DIABETES MELLITUS, WITH FAT LAYER EXPOSED (HCC): Primary | Chronic | ICD-10-CM

## 2024-11-15 DIAGNOSIS — L97.512 DIABETIC ULCER OF TOE OF RIGHT FOOT ASSOCIATED WITH TYPE 2 DIABETES MELLITUS, WITH FAT LAYER EXPOSED (HCC): Chronic | ICD-10-CM

## 2024-11-15 PROCEDURE — 87070 CULTURE OTHR SPECIMN AEROBIC: CPT

## 2024-11-15 PROCEDURE — 11042 DBRDMT SUBQ TIS 1ST 20SQCM/<: CPT

## 2024-11-15 PROCEDURE — 87205 SMEAR GRAM STAIN: CPT

## 2024-11-15 PROCEDURE — 87075 CULTR BACTERIA EXCEPT BLOOD: CPT

## 2024-11-15 PROCEDURE — 73660 X-RAY EXAM OF TOE(S): CPT

## 2024-11-15 PROCEDURE — 87176 TISSUE HOMOGENIZATION CULTR: CPT

## 2024-11-15 RX ORDER — LIDOCAINE 40 MG/G
CREAM TOPICAL ONCE
OUTPATIENT
Start: 2024-11-15 | End: 2024-11-15

## 2024-11-15 RX ORDER — BETAMETHASONE DIPROPIONATE 0.5 MG/G
CREAM TOPICAL ONCE
OUTPATIENT
Start: 2024-11-15 | End: 2024-11-15

## 2024-11-15 RX ORDER — MUPIROCIN 20 MG/G
OINTMENT TOPICAL ONCE
OUTPATIENT
Start: 2024-11-15 | End: 2024-11-15

## 2024-11-15 RX ORDER — DOXYCYCLINE HYCLATE 100 MG
100 TABLET ORAL 2 TIMES DAILY
Qty: 20 TABLET | Refills: 0 | Status: SHIPPED | OUTPATIENT
Start: 2024-11-15 | End: 2024-11-25

## 2024-11-15 RX ORDER — SODIUM CHLOR/HYPOCHLOROUS ACID 0.033 %
SOLUTION, IRRIGATION IRRIGATION ONCE
OUTPATIENT
Start: 2024-11-15 | End: 2024-11-15

## 2024-11-15 RX ORDER — GINSENG 100 MG
CAPSULE ORAL ONCE
OUTPATIENT
Start: 2024-11-15 | End: 2024-11-15

## 2024-11-15 RX ORDER — LIDOCAINE HYDROCHLORIDE 20 MG/ML
JELLY TOPICAL ONCE
Status: COMPLETED | OUTPATIENT
Start: 2024-11-15 | End: 2024-11-15

## 2024-11-15 RX ORDER — GENTAMICIN SULFATE 1 MG/G
OINTMENT TOPICAL ONCE
OUTPATIENT
Start: 2024-11-15 | End: 2024-11-15

## 2024-11-15 RX ORDER — TRIAMCINOLONE ACETONIDE 1 MG/G
OINTMENT TOPICAL ONCE
OUTPATIENT
Start: 2024-11-15 | End: 2024-11-15

## 2024-11-15 RX ORDER — LIDOCAINE 50 MG/G
OINTMENT TOPICAL ONCE
OUTPATIENT
Start: 2024-11-15 | End: 2024-11-15

## 2024-11-15 RX ORDER — LIDOCAINE HYDROCHLORIDE 40 MG/ML
SOLUTION TOPICAL ONCE
OUTPATIENT
Start: 2024-11-15 | End: 2024-11-15

## 2024-11-15 RX ORDER — LIDOCAINE HYDROCHLORIDE 20 MG/ML
JELLY TOPICAL ONCE
OUTPATIENT
Start: 2024-11-15 | End: 2024-11-15

## 2024-11-15 RX ORDER — NEOMYCIN/BACITRACIN/POLYMYXINB 3.5-400-5K
OINTMENT (GRAM) TOPICAL ONCE
OUTPATIENT
Start: 2024-11-15 | End: 2024-11-15

## 2024-11-15 RX ORDER — SILVER SULFADIAZINE 10 MG/G
CREAM TOPICAL ONCE
OUTPATIENT
Start: 2024-11-15 | End: 2024-11-15

## 2024-11-15 RX ORDER — BACITRACIN ZINC AND POLYMYXIN B SULFATE 500; 1000 [USP'U]/G; [USP'U]/G
OINTMENT TOPICAL ONCE
OUTPATIENT
Start: 2024-11-15 | End: 2024-11-15

## 2024-11-15 RX ORDER — CLOBETASOL PROPIONATE 0.5 MG/G
OINTMENT TOPICAL ONCE
OUTPATIENT
Start: 2024-11-15 | End: 2024-11-15

## 2024-11-15 RX ADMIN — LIDOCAINE HYDROCHLORIDE: 20 JELLY TOPICAL at 10:41

## 2024-11-15 ASSESSMENT — PAIN SCALES - GENERAL: PAINLEVEL_OUTOF10: 6

## 2024-11-15 NOTE — DISCHARGE INSTRUCTIONS
Instructions: Right great toe  Cleanse with normal saline  Let Vashe Wound Solution moistened guaze sit on wound for at least 60 seconds.  Collagen with ag to wound bed.  Hydrofera Ready: Cut to wound size, place over collagen, shiny side out.  Secure with rolled gauze  Change every other day and as needed if it gets wet     Do not get wet in the shower  Limit walking until inserts received.  Follow up with your endocrinologist about your diabetes control. Be sure to ask her about diabetes shoes and inserts.  Stop smoking.     Xray ordered at today's visit.  Antibiotic ordered at today's visit. Please  at your pharmacy and start taking.      Defender boot continue using every time foot hits the ground  Think about possible TCC in the future after

## 2024-11-15 NOTE — FLOWSHEET NOTE
11/15/24 1030   Wound 08/15/24 Toe (Comment  which one) Right #1 right great toe   Date First Assessed/Time First Assessed: 08/15/24 0916   Present on Original Admission: Yes  Wound Approximate Age at First Assessment (Weeks): 2 weeks  Primary Wound Type: Diabetic Ulcer  Location: Toe (Comment  which one)  Wound Location Orientation...   Wound Image     Wound Etiology Diabetic Gavin 2   Dressing Status Old drainage noted   Wound Cleansed Cleansed with saline   Dressing/Treatment Hydrofera blue   Offloading for Diabetic Foot Ulcers Offloading ordered;Other (comment)   Wound Length (cm) 1.5 cm   Wound Width (cm) 1 cm   Wound Depth (cm) 0.3 cm   Wound Surface Area (cm^2) 1.5 cm^2   Change in Wound Size % (l*w) 76   Wound Volume (cm^3) 0.45 cm^3   Wound Healing % 28   Post-Procedure Length (cm) 1.6 cm   Post-Procedure Width (cm) 1 cm   Post-Procedure Depth (cm) 0.4 cm   Post-Procedure Surface Area (cm^2) 1.6 cm^2   Post-Procedure Volume (cm^3) 0.64 cm^3   Undermining Starts ___ O'Clock 1   Undermining Ends___ O'Clock 4   Undermining Maxium Distance (cm) 0.3   Wound Assessment Pink/red   Drainage Amount Moderate (25-50%)   Drainage Description Serosanguinous   Odor None   Stella-wound Assessment Maceration;Hyperkeratosis (callous)   Margins Undefined edges   Wound Thickness Description not for Pressure Injury Full thickness   Pain Assessment   Pain Assessment 0-10   Pain Level 6

## 2024-11-15 NOTE — PROGRESS NOTES
to treatment: Patient tolerated procedure well with no complaints of pain.    This dictation may have been generated in part by voice recognition computer software. Although all attempts are made to edit the dictation for accuracy, there may be errors in the transcription that are not intended.

## 2024-11-15 NOTE — ASSESSMENT & PLAN NOTE
Assessment  Right great toe wound bed is pink granulation covered with thin layer slough, depth is near to bone but bone is not exposed  Periwound is macerated callus, toe is edematous, erythematous, and warm to touch  Patient is a current every day smoker; he is diabetic and his most recent A1c is 10.2  He is immunocompromised  He has a Defender boot but is not wearing it often  Of note, arterial duplex on 10/16/24 showed normal flow on the right  Plan  Excisional debridement to remove devitalized tissue and promote wound healing  Tissue culture taken; prescribed 10-day course of doxycycline pending culture results  Obtain XR right great toe  Wound care: Vashe cleanse, collagen Ag, Hydrofera Blue to right great toe; change three times weekly  Encourage patient to wear Defender boot anytime feet are on the floor   Infection precautions given  RTC in 1 week

## 2024-11-15 NOTE — WOUND CARE
Discharge Instructions for  [facility]  [address] [place] [address]  [address] [place] [address]  Phone [phone]   Fax [phone]      NAME:  Estevan Valenzuela  YOB: 1965  MEDICAL RECORD NUMBER:  [id number]  DATE:  11/15/2024    Return Appointment:  1 week with Dr. Derikc Uribe      Instructions: Right great toe  Cleanse with normal saline  Let Vashe Wound Solution moistened guaze sit on wound for at least 60 seconds.  Collagen with ag to wound bed.  Hydrofera Ready: Cut to wound size, place over collagen, shiny side out.  Secure with rolled gauze  Change every other day and as needed if it gets wet    Do not get wet in the shower  Limit walking until inserts received.  Follow up with your endocrinologist about your diabetes control. Be sure to ask her about diabetes shoes and inserts.  Stop smoking.    Xray ordered at today's visit.  Antibiotic ordered at today's visit. Please  at your pharmacy and start taking.   Silver nitrate used at today's visit.    Defender boot continue using every time foot hits the ground  Think about possible TCC in the future after     Should you experience increased redness, swelling, pain, foul odor, size of wound(s), or have a temperature over 101 degrees please contact the Wound Healing Center at [phone] or if after hours contact your primary care physician or go to the hospital emergency department.    PLEASE NOTE: IF YOU ARE UNABLE TO OBTAIN WOUND SUPPLIES, CONTINUE TO USE THE SUPPLIES YOU HAVE AVAILABLE UNTIL YOU ARE ABLE TO REACH US. IT IS MOST IMPORTANT TO KEEP THE WOUND COVERED AT ALL TIMES.    Electronically signed Estevan Domingo RN, [facility] on 11/15/2024 at 10:48 AM

## 2024-11-17 LAB
BACTERIA SPEC CULT: ABNORMAL
BACTERIA SPEC CULT: ABNORMAL
GRAM STN SPEC: ABNORMAL
GRAM STN SPEC: ABNORMAL
SERVICE CMNT-IMP: ABNORMAL

## 2024-11-18 RX ORDER — CEPHALEXIN 500 MG/1
500 CAPSULE ORAL 3 TIMES DAILY
Qty: 30 CAPSULE | Refills: 0 | Status: SHIPPED | OUTPATIENT
Start: 2024-11-18 | End: 2024-11-28

## 2024-11-19 ENCOUNTER — TELEPHONE (OUTPATIENT)
Dept: DIABETES SERVICES | Age: 59
End: 2024-11-19

## 2024-11-19 ENCOUNTER — CARE COORDINATION (OUTPATIENT)
Dept: CARE COORDINATION | Facility: CLINIC | Age: 59
End: 2024-11-19

## 2024-11-19 NOTE — TELEPHONE ENCOUNTER
Type 2.  Reminder call about free Diabetes Education Basic session this Friday.  He needs to reschedule due to transportation. Rescheduled for December 11, 2024 via [email or web address]
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
## 2024-11-22 LAB
BACTERIA SPEC CULT: NORMAL
SERVICE CMNT-IMP: NORMAL

## 2024-11-24 NOTE — PROGRESS NOTES
degenerative disc disease, uncovertebral hypertrophy and facet arthropathy as detailed above.   2.  No evidence of severe canal stenosis or cord compression to account for myelopathy symptoms.     MRI Lumbar Spine April 2019:  1.  Grade I anterolisthesis of both L4-5 and L5-S1 most likely degenerative spondylolisthesis secondary to productive facet hypertrophy.   2.  Intense marrow edema is seen in the L4 and L5 pedicles bilaterally, which may reflect stress reactions.   3.  At L4-5, there is moderate spinal canal stenosis.     Lab Results   Component Value Date    CREATININE 5.80 (H) 11/17/2023    BUN 54 (H) 11/17/2023     11/17/2023    K 3.9 11/17/2023     11/17/2023    CO2 26 11/17/2023     Lab Results   Component Value Date    WBC 7.9 11/17/2023    HGB 13.1 (L) 11/17/2023    HCT 38.5 (L) 11/17/2023    MCV 87.5 11/17/2023     11/17/2023     Lab Results   Component Value Date    TSH 1.850 05/06/2022       Past Medical History:     Past medical history, surgical history, social history, family history, medications, and allergies were reviewed and updated as appropriate.     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

## 2024-11-25 ENCOUNTER — OFFICE VISIT (OUTPATIENT)
Dept: NEUROLOGY | Age: 59
End: 2024-11-25
Payer: MEDICAID

## 2024-11-25 VITALS
HEART RATE: 87 BPM | WEIGHT: 191 LBS | SYSTOLIC BLOOD PRESSURE: 129 MMHG | OXYGEN SATURATION: 96 % | BODY MASS INDEX: 25.2 KG/M2 | DIASTOLIC BLOOD PRESSURE: 86 MMHG

## 2024-11-25 DIAGNOSIS — M79.2 NEUROPATHIC PAIN: Primary | ICD-10-CM

## 2024-11-25 DIAGNOSIS — B02.29 POSTHERPETIC NEURALGIA: ICD-10-CM

## 2024-11-25 DIAGNOSIS — E11.42 DIABETIC PERIPHERAL NEUROPATHY (HCC): ICD-10-CM

## 2024-11-25 DIAGNOSIS — M54.81 BILATERAL OCCIPITAL NEURALGIA: ICD-10-CM

## 2024-11-25 PROCEDURE — 99213 OFFICE O/P EST LOW 20 MIN: CPT | Performed by: PSYCHIATRY & NEUROLOGY

## 2024-11-25 RX ORDER — FAMOTIDINE 40 MG/1
40 TABLET, FILM COATED ORAL 2 TIMES DAILY
COMMUNITY
Start: 2024-10-10

## 2024-11-25 RX ORDER — SODIUM HYPOCHLORITE 5 MG/ML
SOLUTION TOPICAL
COMMUNITY

## 2024-11-25 RX ORDER — BUPRENORPHINE 2 MG/1
TABLET SUBLINGUAL
COMMUNITY
Start: 2024-11-01

## 2024-11-27 ENCOUNTER — HOSPITAL ENCOUNTER (OUTPATIENT)
Dept: WOUND CARE | Age: 59
Discharge: HOME | End: 2024-11-27
Attending: SURGERY
Payer: MEDICAID

## 2024-11-27 VITALS
SYSTOLIC BLOOD PRESSURE: 148 MMHG | HEART RATE: 79 BPM | WEIGHT: 191 LBS | HEIGHT: 73 IN | DIASTOLIC BLOOD PRESSURE: 97 MMHG | TEMPERATURE: 97.5 F | BODY MASS INDEX: 25.31 KG/M2 | RESPIRATION RATE: 18 BRPM

## 2024-11-27 DIAGNOSIS — E11.621 DIABETIC ULCER OF TOE OF LEFT FOOT ASSOCIATED WITH TYPE 2 DIABETES MELLITUS, WITH FAT LAYER EXPOSED (HCC): Primary | ICD-10-CM

## 2024-11-27 DIAGNOSIS — L97.522 DIABETIC ULCER OF TOE OF LEFT FOOT ASSOCIATED WITH TYPE 2 DIABETES MELLITUS, WITH FAT LAYER EXPOSED (HCC): Primary | ICD-10-CM

## 2024-11-27 PROCEDURE — 11042 DBRDMT SUBQ TIS 1ST 20SQCM/<: CPT

## 2024-11-27 RX ORDER — BETAMETHASONE DIPROPIONATE 0.5 MG/G
CREAM TOPICAL ONCE
OUTPATIENT
Start: 2024-11-27 | End: 2024-11-27

## 2024-11-27 RX ORDER — BACITRACIN ZINC AND POLYMYXIN B SULFATE 500; 1000 [USP'U]/G; [USP'U]/G
OINTMENT TOPICAL ONCE
OUTPATIENT
Start: 2024-11-27 | End: 2024-11-27

## 2024-11-27 RX ORDER — MUPIROCIN 20 MG/G
OINTMENT TOPICAL ONCE
OUTPATIENT
Start: 2024-11-27 | End: 2024-11-27

## 2024-11-27 RX ORDER — LIDOCAINE 50 MG/G
OINTMENT TOPICAL ONCE
OUTPATIENT
Start: 2024-11-27 | End: 2024-11-27

## 2024-11-27 RX ORDER — CLOBETASOL PROPIONATE 0.5 MG/G
OINTMENT TOPICAL ONCE
OUTPATIENT
Start: 2024-11-27 | End: 2024-11-27

## 2024-11-27 RX ORDER — LIDOCAINE HYDROCHLORIDE 20 MG/ML
JELLY TOPICAL ONCE
OUTPATIENT
Start: 2024-11-27 | End: 2024-11-27

## 2024-11-27 RX ORDER — SILVER SULFADIAZINE 10 MG/G
CREAM TOPICAL ONCE
OUTPATIENT
Start: 2024-11-27 | End: 2024-11-27

## 2024-11-27 RX ORDER — GINSENG 100 MG
CAPSULE ORAL ONCE
OUTPATIENT
Start: 2024-11-27 | End: 2024-11-27

## 2024-11-27 RX ORDER — LIDOCAINE HYDROCHLORIDE 20 MG/ML
JELLY TOPICAL ONCE
Status: COMPLETED | OUTPATIENT
Start: 2024-11-27 | End: 2024-11-27

## 2024-11-27 RX ORDER — LIDOCAINE 40 MG/G
CREAM TOPICAL ONCE
OUTPATIENT
Start: 2024-11-27 | End: 2024-11-27

## 2024-11-27 RX ORDER — TRIAMCINOLONE ACETONIDE 1 MG/G
OINTMENT TOPICAL ONCE
OUTPATIENT
Start: 2024-11-27 | End: 2024-11-27

## 2024-11-27 RX ORDER — NEOMYCIN/BACITRACIN/POLYMYXINB 3.5-400-5K
OINTMENT (GRAM) TOPICAL ONCE
OUTPATIENT
Start: 2024-11-27 | End: 2024-11-27

## 2024-11-27 RX ORDER — GENTAMICIN SULFATE 1 MG/G
OINTMENT TOPICAL ONCE
OUTPATIENT
Start: 2024-11-27 | End: 2024-11-27

## 2024-11-27 RX ORDER — LIDOCAINE HYDROCHLORIDE 40 MG/ML
SOLUTION TOPICAL ONCE
OUTPATIENT
Start: 2024-11-27 | End: 2024-11-27

## 2024-11-27 RX ORDER — SODIUM CHLOR/HYPOCHLOROUS ACID 0.033 %
SOLUTION, IRRIGATION IRRIGATION ONCE
OUTPATIENT
Start: 2024-11-27 | End: 2024-11-27

## 2024-11-27 RX ADMIN — LIDOCAINE HYDROCHLORIDE: 20 JELLY TOPICAL at 10:12

## 2024-11-27 NOTE — WOUND CARE
Discharge Instructions for  [facility]  [address] [place] [address]  [address] [place] [address]  Phone [phone]   Fax [phone]      NAME:  Estevan Valenzuela  YOB: 1965  MEDICAL RECORD NUMBER:  [id number]  DATE:  11/27/2024    Return Appointment:  1 week with Dr. Derick Uribe  Plan to place TCC on Wednesday 4th and change on Friday 6th.      Instructions: Right great toe  Cleanse with normal saline  Let Vashe Wound Solution moistened guaze sit on wound for at least 60 seconds.  Collagen with ag to wound bed.  Hydrofera Ready: Cut to wound size, place over collagen, shiny side out.  Secure with rolled gauze  Change every other day and as needed if it gets wet    Do not get wet in the shower  Limit walking until inserts received.  Follow up with your endocrinologist about your diabetes control. Be sure to ask her about diabetes shoes and inserts.  Stop smoking.    Silver nitrate used at today's visit.    Defender boot continue using every time foot hits the ground     Should you experience increased redness, swelling, pain, foul odor, size of wound(s), or have a temperature over 101 degrees please contact the Wound Healing Center at [phone] or if after hours contact your primary care physician or go to the hospital emergency department.    PLEASE NOTE: IF YOU ARE UNABLE TO OBTAIN WOUND SUPPLIES, CONTINUE TO USE THE SUPPLIES YOU HAVE AVAILABLE UNTIL YOU ARE ABLE TO REACH US. IT IS MOST IMPORTANT TO KEEP THE WOUND COVERED AT ALL TIMES.    Electronically signed Kaylen Lopez RN, [facility] on 11/27/2024 at 9:56 AM

## 2024-11-27 NOTE — FLOWSHEET NOTE
11/27/24 0943   Wound 08/15/24 Toe (Comment  which one) Right #1 right great toe   Date First Assessed/Time First Assessed: 08/15/24 0916   Present on Original Admission: Yes  Wound Approximate Age at First Assessment (Weeks): 2 weeks  Primary Wound Type: Diabetic Ulcer  Location: Toe (Comment  which one)  Wound Location Orientation...   Wound Image     Wound Etiology Diabetic Gavin 2   Dressing Status Old drainage noted   Wound Cleansed Cleansed with saline   Dressing/Treatment Hydrofera blue   Offloading for Diabetic Foot Ulcers Offloading ordered;Other (comment)   Wound Length (cm) 1 cm   Wound Width (cm) 0.7 cm   Wound Depth (cm) 0.3 cm   Wound Surface Area (cm^2) 0.7 cm^2   Change in Wound Size % (l*w) 88.8   Wound Volume (cm^3) 0.21 cm^3   Wound Healing % 66   Post-Procedure Length (cm) 1.6 cm   Post-Procedure Width (cm) 1 cm   Post-Procedure Depth (cm) 0.4 cm   Post-Procedure Surface Area (cm^2) 1.6 cm^2   Post-Procedure Volume (cm^3) 0.64 cm^3   Wound Assessment Pink/red   Drainage Amount Small (< 25%)   Drainage Description Serosanguinous   Odor None   Stella-wound Assessment Maceration;Hyperkeratosis (callous)   Margins Undefined edges   Wound Thickness Description not for Pressure Injury Full thickness   Pain Assessment   Pain Assessment None - Denies Pain

## 2024-12-02 ENCOUNTER — HOSPITAL ENCOUNTER (OUTPATIENT)
Dept: WOUND CARE | Age: 59
Discharge: HOME | End: 2024-12-02
Attending: SURGERY
Payer: MEDICARE

## 2024-12-02 VITALS
HEART RATE: 97 BPM | HEIGHT: 73 IN | SYSTOLIC BLOOD PRESSURE: 141 MMHG | DIASTOLIC BLOOD PRESSURE: 93 MMHG | TEMPERATURE: 97.7 F | BODY MASS INDEX: 25.76 KG/M2 | WEIGHT: 194.4 LBS | RESPIRATION RATE: 18 BRPM

## 2024-12-02 DIAGNOSIS — R11.0 CHRONIC NAUSEA: ICD-10-CM

## 2024-12-02 DIAGNOSIS — E11.42 DIABETIC POLYNEUROPATHY ASSOCIATED WITH TYPE 2 DIABETES MELLITUS (HCC): Chronic | ICD-10-CM

## 2024-12-02 DIAGNOSIS — L97.512 DIABETIC ULCER OF TOE OF RIGHT FOOT ASSOCIATED WITH TYPE 2 DIABETES MELLITUS, WITH FAT LAYER EXPOSED (HCC): ICD-10-CM

## 2024-12-02 DIAGNOSIS — E11.621 DIABETIC ULCER OF TOE OF RIGHT FOOT ASSOCIATED WITH TYPE 2 DIABETES MELLITUS, WITH FAT LAYER EXPOSED (HCC): ICD-10-CM

## 2024-12-02 PROCEDURE — 11042 DBRDMT SUBQ TIS 1ST 20SQCM/<: CPT

## 2024-12-02 PROCEDURE — 99213 OFFICE O/P EST LOW 20 MIN: CPT

## 2024-12-02 RX ORDER — SODIUM CHLOR/HYPOCHLOROUS ACID 0.033 %
SOLUTION, IRRIGATION IRRIGATION ONCE
Status: COMPLETED | OUTPATIENT
Start: 2024-12-02 | End: 2024-12-02

## 2024-12-02 RX ORDER — MUPIROCIN 20 MG/G
OINTMENT TOPICAL ONCE
Status: CANCELLED | OUTPATIENT
Start: 2024-12-02 | End: 2024-12-02

## 2024-12-02 RX ORDER — LIDOCAINE HYDROCHLORIDE 20 MG/ML
JELLY TOPICAL ONCE
Status: DISCONTINUED | OUTPATIENT
Start: 2024-12-02 | End: 2024-12-03 | Stop reason: HOSPADM

## 2024-12-02 RX ORDER — LIDOCAINE HYDROCHLORIDE 20 MG/ML
JELLY TOPICAL ONCE
Status: CANCELLED | OUTPATIENT
Start: 2024-12-02 | End: 2024-12-02

## 2024-12-02 RX ORDER — CLOBETASOL PROPIONATE 0.5 MG/G
OINTMENT TOPICAL ONCE
Status: CANCELLED | OUTPATIENT
Start: 2024-12-02 | End: 2024-12-02

## 2024-12-02 RX ORDER — LIDOCAINE HYDROCHLORIDE 40 MG/ML
SOLUTION TOPICAL ONCE
Status: CANCELLED | OUTPATIENT
Start: 2024-12-02 | End: 2024-12-02

## 2024-12-02 RX ORDER — SODIUM CHLOR/HYPOCHLOROUS ACID 0.033 %
SOLUTION, IRRIGATION IRRIGATION ONCE
Status: CANCELLED | OUTPATIENT
Start: 2024-12-02 | End: 2024-12-02

## 2024-12-02 RX ORDER — CEPHALEXIN 500 MG/1
500 CAPSULE ORAL 4 TIMES DAILY
Qty: 40 CAPSULE | Refills: 0 | Status: SHIPPED | OUTPATIENT
Start: 2024-12-02 | End: 2024-12-12

## 2024-12-02 RX ORDER — BACITRACIN ZINC AND POLYMYXIN B SULFATE 500; 1000 [USP'U]/G; [USP'U]/G
OINTMENT TOPICAL ONCE
Status: CANCELLED | OUTPATIENT
Start: 2024-12-02 | End: 2024-12-02

## 2024-12-02 RX ORDER — GENTAMICIN SULFATE 1 MG/G
OINTMENT TOPICAL ONCE
Status: CANCELLED | OUTPATIENT
Start: 2024-12-02 | End: 2024-12-02

## 2024-12-02 RX ORDER — BETAMETHASONE DIPROPIONATE 0.5 MG/G
CREAM TOPICAL ONCE
Status: CANCELLED | OUTPATIENT
Start: 2024-12-02 | End: 2024-12-02

## 2024-12-02 RX ORDER — GINSENG 100 MG
CAPSULE ORAL ONCE
Status: CANCELLED | OUTPATIENT
Start: 2024-12-02 | End: 2024-12-02

## 2024-12-02 RX ORDER — LIDOCAINE 40 MG/G
CREAM TOPICAL ONCE
Status: CANCELLED | OUTPATIENT
Start: 2024-12-02 | End: 2024-12-02

## 2024-12-02 RX ORDER — SILVER SULFADIAZINE 10 MG/G
CREAM TOPICAL ONCE
Status: CANCELLED | OUTPATIENT
Start: 2024-12-02 | End: 2024-12-02

## 2024-12-02 RX ORDER — LIDOCAINE 50 MG/G
OINTMENT TOPICAL ONCE
Status: CANCELLED | OUTPATIENT
Start: 2024-12-02 | End: 2024-12-02

## 2024-12-02 RX ORDER — TRIAMCINOLONE ACETONIDE 1 MG/G
OINTMENT TOPICAL ONCE
Status: CANCELLED | OUTPATIENT
Start: 2024-12-02 | End: 2024-12-02

## 2024-12-02 RX ORDER — NEOMYCIN/BACITRACIN/POLYMYXINB 3.5-400-5K
OINTMENT (GRAM) TOPICAL ONCE
Status: CANCELLED | OUTPATIENT
Start: 2024-12-02 | End: 2024-12-02

## 2024-12-02 RX ADMIN — Medication: at 14:48

## 2024-12-02 NOTE — WOUND CARE
Discharge Instructions for  [facility]  [address] [place] [address]  [address]  [place] [id number]  Phone [phone]   Fax [phone]      NAME:  Estevan Valenzuela  YOB: 1965  MEDICAL RECORD NUMBER:  [id number]  DATE:  12/2/2024    Return Appointment:  1 week with Dr. Derick Uribe    Instructions: Right great toe  Cleanse with normal saline  Let Vashe Wound Solution moistened guaze sit on wound for at least 60 seconds.  Collagen with ag to wound bed.  Hydrofera Ready: Cut to wound size, place over collagen, shiny side out.  Secure with rolled gauze  Change every other day and as needed if it gets wet    Do not get wet in the shower  Limit walking until inserts received.  Follow up with your endocrinologist about your diabetes control. Be sure to ask her about diabetes shoes and inserts.  Stop smoking.    Silver nitrate used at today's visit.    Defender boot at all times.  \     Keflex prescription sent to pharmacy today       MRI ordered today. Expect a call to schedule test. If you do not receive a call in 2 business days, please call [phone] to schedule.     Should you experience increased redness, swelling, pain, foul odor, size of wound(s), or have a temperature over 101 degrees please contact the Wound Healing Center at [phone] or if after hours contact your primary care physician or go to the hospital emergency department.    PLEASE NOTE: IF YOU ARE UNABLE TO OBTAIN WOUND SUPPLIES, CONTINUE TO USE THE SUPPLIES YOU HAVE AVAILABLE UNTIL YOU ARE ABLE TO REACH US. IT IS MOST IMPORTANT TO KEEP THE WOUND COVERED AT ALL TIMES.    Electronically signed LETY SIBLEY RN, [facility] on 12/2/2024 at 2:31 PM

## 2024-12-02 NOTE — DISCHARGE INSTRUCTIONS
Return Appointment:  1 week with Dr. Derick Uribe     Instructions: Right great toe  Cleanse with normal saline  Let Vashe Wound Solution moistened guaze sit on wound for at least 60 seconds.  Collagen with ag to wound bed.  Hydrofera Ready: Cut to wound size, place over collagen, shiny side out.  Secure with rolled gauze  Change every other day and as needed if it gets wet     Do not get wet in the shower  Limit walking until inserts received.  Follow up with your endocrinologist about your diabetes control. Be sure to ask her about diabetes shoes and inserts.  Stop smoking.     Silver nitrate used at today's visit.     Defender boot at all times.  \      Keflex prescription sent to pharmacy today         MRI ordered today. Expect a call to schedule test. If you do not receive a call in 2 business days, please call [phone] to schedule.      Should you experience increased redness, swelling, pain, foul odor, size of wound(s), or have a temperature over 101 degrees please contact the Wound Healing Center at [phone] or if after hours contact your primary care physician or go to the hospital emergency department.     PLEASE NOTE: IF YOU ARE UNABLE TO OBTAIN WOUND SUPPLIES, CONTINUE TO USE THE SUPPLIES YOU HAVE AVAILABLE UNTIL YOU ARE ABLE TO REACH US. IT IS MOST IMPORTANT TO KEEP THE WOUND COVERED AT ALL TIMES.

## 2024-12-02 NOTE — FLOWSHEET NOTE
12/02/24 1403   Wound 08/15/24 Toe (Comment  which one) Right #1 right great toe   Date First Assessed/Time First Assessed: 08/15/24 0916   Present on Original Admission: Yes  Wound Approximate Age at First Assessment (Weeks): 2 weeks  Primary Wound Type: Diabetic Ulcer  Location: Toe (Comment  which one)  Wound Location Orientation...   Wound Image     Wound Etiology Diabetic Gavin 2   Dressing Status Old drainage noted   Wound Cleansed Cleansed with saline   Dressing/Treatment Hydrofera blue   Offloading for Diabetic Foot Ulcers Offloading boot   Wound Length (cm) 1.3 cm   Wound Width (cm) 0.7 cm   Wound Depth (cm) 0.4 cm   Wound Surface Area (cm^2) 0.91 cm^2   Change in Wound Size % (l*w) 85.44   Wound Volume (cm^3) 0.364 cm^3   Wound Healing % 42   Post-Procedure Length (cm) 1.7 cm   Post-Procedure Width (cm) 1 cm   Post-Procedure Depth (cm) 0.3 cm   Post-Procedure Surface Area (cm^2) 1.7 cm^2   Post-Procedure Volume (cm^3) 0.51 cm^3   Wound Assessment Pink/red   Drainage Amount Moderate (25-50%)   Drainage Description Serosanguinous   Odor None   Stella-wound Assessment Maceration;Hyperkeratosis (callous)   Margins Undefined edges   Wound Thickness Description not for Pressure Injury Full thickness   Pain Assessment   Pain Assessment None - Denies Pain     Patient reported not wearing boot when up out of bed during the night going to bathroom and to get water.

## 2024-12-03 ENCOUNTER — TELEPHONE (OUTPATIENT)
Dept: ENDOCRINOLOGY | Age: 59
End: 2024-12-03

## 2024-12-03 ENCOUNTER — CARE COORDINATION (OUTPATIENT)
Dept: CARE COORDINATION | Facility: CLINIC | Age: 59
End: 2024-12-03

## 2024-12-03 ENCOUNTER — HOSPITAL ENCOUNTER (EMERGENCY)
Age: 59
Discharge: HOME | End: 2024-12-03
Attending: EMERGENCY MEDICINE
Payer: MEDICARE

## 2024-12-03 VITALS
HEART RATE: 91 BPM | HEIGHT: 73 IN | WEIGHT: 191 LBS | BODY MASS INDEX: 25.31 KG/M2 | RESPIRATION RATE: 18 BRPM | SYSTOLIC BLOOD PRESSURE: 146 MMHG | DIASTOLIC BLOOD PRESSURE: 100 MMHG | TEMPERATURE: 98 F | OXYGEN SATURATION: 97 %

## 2024-12-03 DIAGNOSIS — R73.9 HYPERGLYCEMIA: ICD-10-CM

## 2024-12-03 DIAGNOSIS — R19.7 NAUSEA VOMITING AND DIARRHEA: Primary | ICD-10-CM

## 2024-12-03 DIAGNOSIS — R11.2 NAUSEA VOMITING AND DIARRHEA: Primary | ICD-10-CM

## 2024-12-03 PROBLEM — L97.524 DIABETIC ULCER OF TOE OF LEFT FOOT ASSOCIATED WITH TYPE 2 DIABETES MELLITUS, WITH NECROSIS OF BONE (HCC): Status: RESOLVED | Noted: 2023-09-21 | Resolved: 2024-12-03

## 2024-12-03 PROBLEM — E11.621 DIABETIC ULCER OF TOE OF LEFT FOOT ASSOCIATED WITH TYPE 2 DIABETES MELLITUS, WITH NECROSIS OF BONE (HCC): Status: RESOLVED | Noted: 2023-09-21 | Resolved: 2024-12-03

## 2024-12-03 PROBLEM — R11.0 CHRONIC NAUSEA: Status: ACTIVE | Noted: 2024-12-03

## 2024-12-03 LAB
ALBUMIN SERPL-MCNC: 3.5 G/DL (ref 3.5–5)
ALBUMIN/GLOB SERPL: 1 (ref 1–1.9)
ALP SERPL-CCNC: 124 U/L (ref 40–129)
ALT SERPL-CCNC: 15 U/L (ref 8–55)
ANION GAP SERPL CALC-SCNC: 14 MMOL/L (ref 7–16)
AST SERPL-CCNC: 19 U/L (ref 15–37)
BASE EXCESS BLDV CALC-SCNC: 0.7 MMOL/L
BASOPHILS # BLD: 0.1 K/UL (ref 0–0.2)
BASOPHILS NFR BLD: 1 % (ref 0–2)
BILIRUB SERPL-MCNC: 0.5 MG/DL (ref 0–1.2)
BUN SERPL-MCNC: 25 MG/DL (ref 6–23)
CALCIUM SERPL-MCNC: 8.8 MG/DL (ref 8.8–10.2)
CHLORIDE SERPL-SCNC: 96 MMOL/L (ref 98–107)
CO2 SERPL-SCNC: 23 MMOL/L (ref 20–29)
CREAT SERPL-MCNC: 1.2 MG/DL (ref 0.8–1.3)
DIFFERENTIAL METHOD BLD: ABNORMAL
EOSINOPHIL # BLD: 0.4 K/UL (ref 0–0.8)
EOSINOPHIL NFR BLD: 4 % (ref 0.5–7.8)
ERYTHROCYTE [DISTWIDTH] IN BLOOD BY AUTOMATED COUNT: 13.8 % (ref 11.9–14.6)
GAS FLOW.O2 O2 DELIVERY SYS: ABNORMAL
GLOBULIN SER CALC-MCNC: 3.6 G/DL (ref 2.3–3.5)
GLUCOSE BLD STRIP.AUTO-MCNC: 279 MG/DL (ref 65–100)
GLUCOSE SERPL-MCNC: 329 MG/DL (ref 70–99)
HCO3 BLDV-SCNC: 26.5 MMOL/L (ref 23–28)
HCT VFR BLD AUTO: 49.8 % (ref 41.1–50.3)
HGB BLD-MCNC: 16.9 G/DL (ref 13.6–17.2)
IMM GRANULOCYTES # BLD AUTO: 0 K/UL (ref 0–0.5)
IMM GRANULOCYTES NFR BLD AUTO: 0 % (ref 0–5)
LYMPHOCYTES # BLD: 1.1 K/UL (ref 0.5–4.6)
LYMPHOCYTES NFR BLD: 11 % (ref 13–44)
MCH RBC QN AUTO: 30.8 PG (ref 26.1–32.9)
MCHC RBC AUTO-ENTMCNC: 33.9 G/DL (ref 31.4–35)
MCV RBC AUTO: 90.9 FL (ref 82–102)
MONOCYTES # BLD: 0.6 K/UL (ref 0.1–1.3)
MONOCYTES NFR BLD: 6 % (ref 4–12)
NEUTS SEG # BLD: 7.7 K/UL (ref 1.7–8.2)
NEUTS SEG NFR BLD: 78 % (ref 43–78)
NRBC # BLD: 0 K/UL (ref 0–0.2)
PCO2 BLDV: 45 MMHG (ref 41–51)
PH BLDV: 7.38 (ref 7.32–7.42)
PLATELET # BLD AUTO: 182 K/UL (ref 150–450)
PMV BLD AUTO: 11.3 FL (ref 9.4–12.3)
PO2 BLDV: 32 MMHG
POTASSIUM SERPL-SCNC: 3.5 MMOL/L (ref 3.5–5.1)
PROT SERPL-MCNC: 7.1 G/DL (ref 6.3–8.2)
RBC # BLD AUTO: 5.48 M/UL (ref 4.23–5.6)
SAO2 % BLDV: 59.4 % (ref 65–88)
SERVICE CMNT-IMP: ABNORMAL
SERVICE CMNT-IMP: ABNORMAL
SODIUM SERPL-SCNC: 132 MMOL/L (ref 136–145)
SPECIMEN TYPE: ABNORMAL
WBC # BLD AUTO: 9.8 K/UL (ref 4.3–11.1)

## 2024-12-03 PROCEDURE — 82803 BLOOD GASES ANY COMBINATION: CPT

## 2024-12-03 PROCEDURE — 82962 GLUCOSE BLOOD TEST: CPT

## 2024-12-03 PROCEDURE — 96361 HYDRATE IV INFUSION ADD-ON: CPT

## 2024-12-03 PROCEDURE — 6360000002 HC RX W HCPCS: Performed by: EMERGENCY MEDICINE

## 2024-12-03 PROCEDURE — 80053 COMPREHEN METABOLIC PANEL: CPT

## 2024-12-03 PROCEDURE — 96374 THER/PROPH/DIAG INJ IV PUSH: CPT

## 2024-12-03 PROCEDURE — 99284 EMERGENCY DEPT VISIT MOD MDM: CPT

## 2024-12-03 PROCEDURE — 85025 COMPLETE CBC W/AUTO DIFF WBC: CPT

## 2024-12-03 PROCEDURE — 2580000003 HC RX 258: Performed by: EMERGENCY MEDICINE

## 2024-12-03 RX ORDER — NEOMYCIN/BACITRACIN/POLYMYXINB 3.5-400-5K
OINTMENT (GRAM) TOPICAL ONCE
OUTPATIENT
Start: 2024-12-03 | End: 2024-12-03

## 2024-12-03 RX ORDER — LIDOCAINE HYDROCHLORIDE 40 MG/ML
SOLUTION TOPICAL ONCE
OUTPATIENT
Start: 2024-12-03 | End: 2024-12-03

## 2024-12-03 RX ORDER — LIDOCAINE HYDROCHLORIDE 20 MG/ML
JELLY TOPICAL ONCE
OUTPATIENT
Start: 2024-12-03 | End: 2024-12-03

## 2024-12-03 RX ORDER — SILVER SULFADIAZINE 10 MG/G
CREAM TOPICAL ONCE
OUTPATIENT
Start: 2024-12-03 | End: 2024-12-03

## 2024-12-03 RX ORDER — LIDOCAINE 40 MG/G
CREAM TOPICAL ONCE
OUTPATIENT
Start: 2024-12-03 | End: 2024-12-03

## 2024-12-03 RX ORDER — LIDOCAINE 50 MG/G
OINTMENT TOPICAL ONCE
OUTPATIENT
Start: 2024-12-03 | End: 2024-12-03

## 2024-12-03 RX ORDER — GINSENG 100 MG
CAPSULE ORAL ONCE
OUTPATIENT
Start: 2024-12-03 | End: 2024-12-03

## 2024-12-03 RX ORDER — SODIUM CHLOR/HYPOCHLOROUS ACID 0.033 %
SOLUTION, IRRIGATION IRRIGATION ONCE
OUTPATIENT
Start: 2024-12-03 | End: 2024-12-03

## 2024-12-03 RX ORDER — SODIUM CHLORIDE, SODIUM LACTATE, POTASSIUM CHLORIDE, AND CALCIUM CHLORIDE .6; .31; .03; .02 G/100ML; G/100ML; G/100ML; G/100ML
1000 INJECTION, SOLUTION INTRAVENOUS ONCE
Status: COMPLETED | OUTPATIENT
Start: 2024-12-03 | End: 2024-12-03

## 2024-12-03 RX ORDER — BACITRACIN ZINC AND POLYMYXIN B SULFATE 500; 1000 [USP'U]/G; [USP'U]/G
OINTMENT TOPICAL ONCE
OUTPATIENT
Start: 2024-12-03 | End: 2024-12-03

## 2024-12-03 RX ORDER — CLOBETASOL PROPIONATE 0.5 MG/G
OINTMENT TOPICAL ONCE
OUTPATIENT
Start: 2024-12-03 | End: 2024-12-03

## 2024-12-03 RX ORDER — BETAMETHASONE DIPROPIONATE 0.5 MG/G
CREAM TOPICAL ONCE
OUTPATIENT
Start: 2024-12-03 | End: 2024-12-03

## 2024-12-03 RX ORDER — ONDANSETRON 2 MG/ML
4 INJECTION INTRAMUSCULAR; INTRAVENOUS ONCE
Status: COMPLETED | OUTPATIENT
Start: 2024-12-03 | End: 2024-12-03

## 2024-12-03 RX ORDER — TRIAMCINOLONE ACETONIDE 1 MG/G
OINTMENT TOPICAL ONCE
OUTPATIENT
Start: 2024-12-03 | End: 2024-12-03

## 2024-12-03 RX ORDER — MUPIROCIN 20 MG/G
OINTMENT TOPICAL ONCE
OUTPATIENT
Start: 2024-12-03 | End: 2024-12-03

## 2024-12-03 RX ORDER — GENTAMICIN SULFATE 1 MG/G
OINTMENT TOPICAL ONCE
OUTPATIENT
Start: 2024-12-03 | End: 2024-12-03

## 2024-12-03 RX ADMIN — SODIUM CHLORIDE, POTASSIUM CHLORIDE, SODIUM LACTATE AND CALCIUM CHLORIDE 1000 ML: 600; 310; 30; 20 INJECTION, SOLUTION INTRAVENOUS at 17:00

## 2024-12-03 RX ADMIN — ONDANSETRON 4 MG: 2 INJECTION INTRAMUSCULAR; INTRAVENOUS at 16:59

## 2024-12-03 ASSESSMENT — PAIN - FUNCTIONAL ASSESSMENT: PAIN_FUNCTIONAL_ASSESSMENT: NONE - DENIES PAIN

## 2024-12-03 NOTE — PROGRESS NOTES
[facility]  Procedure Note    Estevan Valenzuela  MEDICAL RECORD NUMBER: [id number]  AGE: 59 y.o.     GENDER: male    : 1965  EPISODE DATE: 2024    Diabetic ulcer of toe of right foot associated with type 2 diabetes mellitus, with fat layer exposed (HCC)  Assessment  Right great toe wound is essentially unchanged - wound bed is pink granulation covered with thin layer slough, depth is near to bone but bone is not exposed  Periwound is macerated callus, toe remains edematous, erythematous, and warm to touch  Patient is a current every day smoker; he is diabetic and his most recent A1c is 10.2  He is immunocompromised  He is still not wearing his defender boot consistently  Of note, arterial duplex on 10/16/24 showed normal flow on the right  XR on 2024-11-15 suggest MRI  Culture on 2024-11-15 grew heavy group B strep sensitive to cephalosporins; patient instructed to stop doxycycline and start Keflex which she is tolerating  Plan  Excisional debridement to remove devitalized tissue and promote wound healing  Obtain MRI right great toe  Wound care: Vashe cleanse, collagen Ag, Hydrofera Blue to right great toe; change three times weekly  Encourage patient to wear Defender boot anytime feet are on the floor   Also discussed effect of elevated blood sugars on wound healing  Again encouraged smoking cessation  Infection precautions given  RTC in 1 week    Chronic nausea  Assessment  Patient reports chronic nausea; states he takes Nexium which helps somewhat  He is acutely nauseated during this visit; he denies vomiting, abdominal pain, constipation, diarrhea  Plan  Chronic nausea possibly related to gastroparesis?  Referral to gastroenterologist  Patient to contact PCP for worsening of nausea, vomiting, pain, constipation or diarrhea     Procedure Note: Excisional Debridement  Indications: Based on my examination of this patient's wound(s) today, debridement is

## 2024-12-03 NOTE — ED PROVIDER NOTES
DEVICE ROOM AIR      PH, VENOUS (POC) 7.38 7.32 - 7.42      PCO2, Sandra, POC 45.0 41 - 51 MMHG    PO2, VENOUS (POC) 32 mmHg    HCO3, Venous 26.5 23 - 28 MMOL/L    SO2, VENOUS (POC) 59.4 (L) 65 - 88 %    BASE EXCESS, VENOUS (POC) 0.7 mmol/L    Specimen type: VENOUS BLOOD      Performed by: Talya          No orders to display                No results for input(s): \"COVID19\" in the last 72 hours.     Voice dictation software was used during the making of this note.  This software is not perfect and grammatical and other typographical errors may be present.  This note has not been completely proofread for errors.     Kari Caruso,   12/03/24 7090

## 2024-12-03 NOTE — CARE COORDINATION
Ambulatory Care Coordination Note     12/3/2024 10:26 AM     Care Summary Note:   Outreach for High Risk Case Management - spoke with patient  Patient notified ACM yesterday that he was vomiting at [facility], then has been vomiting with diarrhea at home and through the night.    Transportation for that visit was via a friend.  Patient didn't get the Medicaid [facility] appointment in time because of Thanksgiving.  Patient aware that ACM has spoken with [facility] about his frequent falls and the need for a  to investigate into further resources for patient.  Expressed concern re: BS due to N/V/D.  Blood Sugars at time of the call 295.   - encouraged good hydration   - slowly advance diet:  patient now able to keep down liquids.  Plan for another call tomorrow.    ACM: Heather Byers RN    Method of communication with provider: chart routing.    Utilization: Has the patient been seen in the ED since your last call? no    PCP/Specialist follow up:   Future Appointments         Provider Specialty Dept Phone    12/10/2024 1:00 PM Brittney Pagan RN; Derick Uribe, DO Wound Ostomy [phone]    12/11/2024 1:00 PM You Devine RN Diabetes Services [phone]    2/28/2025 2:30 PM Parul Perez PA-C Endocrinology [phone]    5/27/2025 3:00 PM Aric Shafer MD Neurology [phone]

## 2024-12-03 NOTE — ASSESSMENT & PLAN NOTE
Assessment  Right great toe wound is essentially unchanged - wound bed is pink granulation covered with thin layer slough, depth is near to bone but bone is not exposed  Periwound is macerated callus, toe remains edematous, erythematous, and warm to touch  Patient is a current every day smoker; he is diabetic and his most recent A1c is 10.2  He is immunocompromised  He is still not wearing his defender boot consistently  Of note, arterial duplex on 10/16/24 showed normal flow on the right  XR on 2024-11-15 suggest MRI  Culture on 2024-11-15 grew heavy group B strep sensitive to cephalosporins; patient instructed to stop doxycycline and start Keflex which she is tolerating  Plan  Excisional debridement to remove devitalized tissue and promote wound healing  Obtain MRI right great toe  Wound care: Vashe cleanse, collagen Ag, Hydrofera Blue to right great toe; change three times weekly  Encourage patient to wear Defender boot anytime feet are on the floor   Also discussed effect of elevated blood sugars on wound healing  Again encouraged smoking cessation  Infection precautions given  RTC in 1 week

## 2024-12-03 NOTE — CARE COORDINATION
Ambulatory Care Coordination Note     12/3/2024 2:13 PM     Care Summary Note:   Further outreach for High Risk Case Management  Made provider aware:   - patient's blood sugar has gone up now to 306   - has taken his insulin   - finger stick confirms CGM   - does not have a means of checking for ketones   (test strip sent in to pharmacy now, but patient has no transportation)  Communication with provider   - send patient to ED   - provider will call ahead     ACM: Hetaher Byers RN     PCP/Specialist follow up:   Future Appointments         Provider Specialty Dept Phone    12/10/2024 1:00 PM Brittney Pagan, JOE; Derick Uribe, DO Wound Ostomy [phone]    12/11/2024 1:00 PM You Devine RN Diabetes Services [phone]    2/28/2025 2:30 PM Parul Perez PA-C Endocrinology [phone]    5/27/2025 3:00 PM Aric Shafer MD Neurology [phone]

## 2024-12-03 NOTE — TELEPHONE ENCOUNTER
Received message from  concerned about pt due to feeling unwell and having high sugars    Please make sure pt is taking his tresiba and if he is eating, his meal time insulin. If he is not eating, he needs to be doing correction up to 4 times a day at the 4 befores    Urine ketone test strips sent to [facility] however, if pt is unwell, he likely needs emergent care from ER

## 2024-12-03 NOTE — ASSESSMENT & PLAN NOTE
Assessment  Patient reports chronic nausea; states he takes Nexium which helps somewhat  He is acutely nauseated during this visit; he denies vomiting, abdominal pain, constipation, diarrhea  Plan  Chronic nausea possibly related to gastroparesis?  Referral to gastroenterologist  Patient to contact PCP for worsening of nausea, vomiting, pain, constipation or diarrhea

## 2024-12-03 NOTE — ED TRIAGE NOTES
Patient arrived via EMS from home complaining of nausea vomiting, diarrhea onset 2 days ago with uncontrolled high blood sugars (in the 300's). Patient is on Humalog and another injection for his sugars and has been compliant with medications.

## 2024-12-04 ENCOUNTER — CARE COORDINATION (OUTPATIENT)
Dept: CARE COORDINATION | Facility: CLINIC | Age: 59
End: 2024-12-04

## 2024-12-04 RX ORDER — LANCETS 33 GAUGE
EACH MISCELLANEOUS
Qty: 600 EACH | Refills: 3 | Status: SHIPPED | OUTPATIENT
Start: 2024-12-04

## 2024-12-04 NOTE — CARE COORDINATION
Ambulatory Care Coordination Note     12/4/2024 10:13 AM     Care Summary Note:   Outreach for High Risk Case Management  Patient discharged home from ED last evening  This morning he reports diarrhea during the night.  Discussed the BRAT diet  Also emailed patient instructions for illness while a diabetic patient.   - will have diabetes educator review this with patient on the 12/11 visit  Patient to text his BS at mid day and also later this afternoon.    Currently BS is 167.    ACM: Heather Byers RN     Method of communication with provider: chart routing.    Utilization: Has the patient been seen in the ED since your last call? Yes,   Discharge Date: 12/3/24   Discharge Facility: [facility]  Reason for ED Visit: possible DKA  Visit Diagnosis: Hyperglycemia, N/V/D    Number of ED visits in the last 6 months: 1      Do you have any ongoing symptoms? Yes, my symptoms have improved.   Current symptoms: diarrhea.    Did you call your PCP prior to going to the ED? Yes, advised to go to the ED.    Review of Discharge Instructions:   [x] AVS discharge instructions  [x] Right Care, Right Place, Right Time document    PCP/Specialist follow up:   Future Appointments         Provider Specialty Dept Phone    12/10/2024 1:00 PM Brittney Pagan RN; Derick Uribe, DO Wound Ostomy [phone]    12/11/2024 1:00 PM You Devine RN Diabetes Services [phone]    2/28/2025 2:30 PM Parul Perez PA-C Endocrinology [phone]    5/27/2025 3:00 PM Aric Shafer MD Neurology [phone]

## 2024-12-09 ENCOUNTER — CARE COORDINATION (OUTPATIENT)
Dept: CARE COORDINATION | Facility: CLINIC | Age: 59
End: 2024-12-09

## 2024-12-09 NOTE — CARE COORDINATION
Ambulatory Care Coordination Note     12/9/2024 1:38 PM     Care Summary Note:   Outreach for High Risk Case Management  Reports  currently, just woke up about 15 minutes ago  Nauseous all night.  Will follow up with another call tomorrow - not wanting to talk right now.     ACM: Heather Byers, JOE    PCP/Specialist follow up:   Future Appointments         Provider Specialty Dept Phone    12/10/2024 1:00 PM Brittney Pagan RN; Derick Uribe, DO Wound Ostomy [phone]    12/11/2024 1:00 PM You Devine RN Diabetes Services [phone]    1/15/2025 11:15 AM Grinnell, Melissa R, PAANTHONY Gastroenterology [phone]    2/28/2025 2:30 PM Parul Perez PA-C Endocrinology [phone]    5/27/2025 3:00 PM Aric Shafer MD Neurology [phone]

## 2024-12-09 NOTE — CARE COORDINATION
Ambulatory Care Coordination Note     12/9/2024 12:29 PM     Care Summary Note:   Outreach for High Risk Case Management - unable to reach  Left message requesting return call.     ACM: Heather Byers RN     PCP/Specialist follow up:   Future Appointments         Provider Specialty Dept Phone    12/10/2024 1:00 PM Brittney Pagan RN; Derick Uribe, DO Wound Ostomy [phone]    12/11/2024 1:00 PM You Devine RN Diabetes Services [phone]    1/15/2025 11:15 AM Grinnell, Melissa R, PA-C Gastroenterology [phone]    2/28/2025 2:30 PM Parul Perez PA-C Endocrinology [phone]    5/27/2025 3:00 PM Aric Shafer MD Neurology [phone]                    Tranexamic Acid Counseling:  Patient advised of the small risk of bleeding problems with tranexamic acid. They were also instructed to call if they developed any nausea, vomiting or diarrhea. All of the patient's questions and concerns were addressed.

## 2024-12-10 ENCOUNTER — CARE COORDINATION (OUTPATIENT)
Dept: CARE COORDINATION | Facility: CLINIC | Age: 59
End: 2024-12-10

## 2024-12-10 ENCOUNTER — HOSPITAL ENCOUNTER (OUTPATIENT)
Dept: WOUND CARE | Age: 59
Discharge: HOME | End: 2024-12-10
Attending: SURGERY
Payer: MEDICARE

## 2024-12-10 VITALS
SYSTOLIC BLOOD PRESSURE: 166 MMHG | BODY MASS INDEX: 25.31 KG/M2 | HEART RATE: 73 BPM | DIASTOLIC BLOOD PRESSURE: 103 MMHG | RESPIRATION RATE: 18 BRPM | HEIGHT: 73 IN | TEMPERATURE: 97.6 F | WEIGHT: 191 LBS

## 2024-12-10 DIAGNOSIS — L97.512 DIABETIC ULCER OF TOE OF RIGHT FOOT ASSOCIATED WITH TYPE 2 DIABETES MELLITUS, WITH FAT LAYER EXPOSED (HCC): ICD-10-CM

## 2024-12-10 DIAGNOSIS — E11.621 DIABETIC ULCER OF TOE OF RIGHT FOOT ASSOCIATED WITH TYPE 2 DIABETES MELLITUS, WITH FAT LAYER EXPOSED (HCC): ICD-10-CM

## 2024-12-10 DIAGNOSIS — E11.42 DIABETIC POLYNEUROPATHY ASSOCIATED WITH TYPE 2 DIABETES MELLITUS (HCC): Primary | ICD-10-CM

## 2024-12-10 PROCEDURE — 11042 DBRDMT SUBQ TIS 1ST 20SQCM/<: CPT

## 2024-12-10 RX ORDER — SILVER SULFADIAZINE 10 MG/G
CREAM TOPICAL ONCE
OUTPATIENT
Start: 2024-12-10 | End: 2024-12-10

## 2024-12-10 RX ORDER — BETAMETHASONE DIPROPIONATE 0.5 MG/G
CREAM TOPICAL ONCE
OUTPATIENT
Start: 2024-12-10 | End: 2024-12-10

## 2024-12-10 RX ORDER — SODIUM CHLOR/HYPOCHLOROUS ACID 0.033 %
SOLUTION, IRRIGATION IRRIGATION ONCE
OUTPATIENT
Start: 2024-12-10 | End: 2024-12-10

## 2024-12-10 RX ORDER — LIDOCAINE 40 MG/G
CREAM TOPICAL ONCE
OUTPATIENT
Start: 2024-12-10 | End: 2024-12-10

## 2024-12-10 RX ORDER — MUPIROCIN 20 MG/G
OINTMENT TOPICAL ONCE
OUTPATIENT
Start: 2024-12-10 | End: 2024-12-10

## 2024-12-10 RX ORDER — CLOBETASOL PROPIONATE 0.5 MG/G
OINTMENT TOPICAL ONCE
OUTPATIENT
Start: 2024-12-10 | End: 2024-12-10

## 2024-12-10 RX ORDER — LIDOCAINE HYDROCHLORIDE 40 MG/ML
SOLUTION TOPICAL ONCE
OUTPATIENT
Start: 2024-12-10 | End: 2024-12-10

## 2024-12-10 RX ORDER — GENTAMICIN SULFATE 1 MG/G
OINTMENT TOPICAL ONCE
OUTPATIENT
Start: 2024-12-10 | End: 2024-12-10

## 2024-12-10 RX ORDER — BACITRACIN ZINC AND POLYMYXIN B SULFATE 500; 1000 [USP'U]/G; [USP'U]/G
OINTMENT TOPICAL ONCE
OUTPATIENT
Start: 2024-12-10 | End: 2024-12-10

## 2024-12-10 RX ORDER — LIDOCAINE HYDROCHLORIDE 20 MG/ML
JELLY TOPICAL ONCE
OUTPATIENT
Start: 2024-12-10 | End: 2024-12-10

## 2024-12-10 RX ORDER — LIDOCAINE 50 MG/G
OINTMENT TOPICAL ONCE
OUTPATIENT
Start: 2024-12-10 | End: 2024-12-10

## 2024-12-10 RX ORDER — SODIUM CHLOR/HYPOCHLOROUS ACID 0.033 %
SOLUTION, IRRIGATION IRRIGATION ONCE
Status: COMPLETED | OUTPATIENT
Start: 2024-12-10 | End: 2024-12-10

## 2024-12-10 RX ORDER — NEOMYCIN/BACITRACIN/POLYMYXINB 3.5-400-5K
OINTMENT (GRAM) TOPICAL ONCE
OUTPATIENT
Start: 2024-12-10 | End: 2024-12-10

## 2024-12-10 RX ORDER — GINSENG 100 MG
CAPSULE ORAL ONCE
OUTPATIENT
Start: 2024-12-10 | End: 2024-12-10

## 2024-12-10 RX ORDER — TRIAMCINOLONE ACETONIDE 1 MG/G
OINTMENT TOPICAL ONCE
OUTPATIENT
Start: 2024-12-10 | End: 2024-12-10

## 2024-12-10 RX ADMIN — Medication: at 13:43

## 2024-12-10 NOTE — CARE COORDINATION
Ambulatory Care Coordination Note     12/10/2024 11:21 AM     Care Summary Note:   Outreach for High Risk Case Management - spoke with patient  Reports he was nauseous all day yesterday - took some kratom which helped.  Today he is feeling reasonably well.  Still waiting on MRI by [facility] - going there today, he will ask.   -  will provide transportation.   - still on antibiotics for foot wound  Blood Sugar today is 167.     ACM: Heather Byers, JOE     PCP/Specialist follow up:   Future Appointments         Provider Specialty Dept Phone    12/10/2024 1:00 PM Brittney Pagan RN; Derick Uribe, DO Wound Ostomy [phone]    12/11/2024 1:00 PM You Devine RN Diabetes Services [phone]    1/15/2025 11:15 AM Grinnell, Melissa R, PA-C Gastroenterology [phone]    2/28/2025 2:30 PM Parul Perez PA-C Endocrinology [phone]    5/27/2025 3:00 PM Aric Shafer MD Neurology [phone]

## 2024-12-10 NOTE — WOUND CARE
Discharge Instructions for  [facility]  [address] [place] [address]  [address]  [place] [address]  Phone [phone]   Fax [phone]      NAME:  Estevan Valenzuela  YOB: 1965  MEDICAL RECORD NUMBER:  [id number]  DATE:  12/10/2024    Return Appointment:  1 week with Janny Casey NP    Instructions: Right great toe  Cleanse with normal saline  Let Vashe Wound Solution moistened guaze sit on wound for at least 60 seconds.  Collagen with ag to wound bed.  Hydrofera Ready: Cut to wound size, place over collagen, shiny side out.  Secure with rolled gauze  Change every other day and as needed if it gets wet    Do not get wet in the shower  Limit walking until inserts received.  Follow up with your endocrinologist about your diabetes control. Be sure to ask her about diabetes shoes and inserts.  Stop smoking.    Silver nitrate used at today's visit.    Defender boot at all times.  \      MRI ordered today. Expect a call to schedule test. If you do not receive a call in 2 business days, please call [phone] to schedule.     Should you experience increased redness, swelling, pain, foul odor, size of wound(s), or have a temperature over 101 degrees please contact the Wound Healing Center at [phone] or if after hours contact your primary care physician or go to the hospital emergency department.    PLEASE NOTE: IF YOU ARE UNABLE TO OBTAIN WOUND SUPPLIES, CONTINUE TO USE THE SUPPLIES YOU HAVE AVAILABLE UNTIL YOU ARE ABLE TO REACH US. IT IS MOST IMPORTANT TO KEEP THE WOUND COVERED AT ALL TIMES.    Electronically signed Maricel Mckoy, JOE, C on 12/10/2024 at 1:41 PM

## 2024-12-10 NOTE — FLOWSHEET NOTE
12/10/24 1322   Wound 08/15/24 Toe (Comment  which one) Right #1 right great toe   Date First Assessed/Time First Assessed: 08/15/24 0916   Present on Original Admission: Yes  Wound Approximate Age at First Assessment (Weeks): 2 weeks  Primary Wound Type: Diabetic Ulcer  Location: Toe (Comment  which one)  Wound Location Orientation...   Wound Image     Wound Etiology Diabetic Gavin 2   Dressing Status Old drainage noted   Wound Cleansed Soap and water   Dressing/Treatment Hydrofera blue   Offloading for Diabetic Foot Ulcers Offloading boot  (DEFENDER BOOT)   Wound Length (cm) 1.2 cm   Wound Width (cm) 1 cm   Wound Depth (cm) 0.2 cm   Wound Surface Area (cm^2) 1.2 cm^2   Change in Wound Size % (l*w) 80.8   Wound Volume (cm^3) 0.24 cm^3   Wound Healing % 62   Post-Procedure Length (cm) 1.7 cm   Post-Procedure Width (cm) 1.1 cm   Post-Procedure Depth (cm) 0.3 cm   Post-Procedure Surface Area (cm^2) 1.87 cm^2   Post-Procedure Volume (cm^3) 0.561 cm^3   Undermining Starts ___ O'Clock 1   Undermining Ends___ O'Clock 4   Undermining Maxium Distance (cm) 0.2   Wound Assessment Granulation tissue   Drainage Amount Moderate (25-50%)   Drainage Description Serosanguinous   Odor None   Stella-wound Assessment Hyperkeratosis (callous);Maceration   Wound Thickness Description not for Pressure Injury Full thickness

## 2024-12-10 NOTE — ASSESSMENT & PLAN NOTE
Assessment  Wound is slightly smaller today - wound bed is pink granulation and superficial slough; bone is not exposed, toe is less edematous, erythematous  Reports wearing Defender boot more consistently  Still has not scheduled MRI  Tolerating Keflex  Plan  Excisional debridement to remove devitalized tissue and promote wound healing  Schedule MRI  Continue abx to EOT  Continue Vashe, collagen Ag, Hydrofera blue; change 3x weekly, Defender boot for offloading  RTC 1 week

## 2024-12-10 NOTE — DISCHARGE INSTRUCTIONS
Discharge Instructions for  [facility]  [address] [place] [address]  [address] [place] [address]  Phone [phone]   Fax [phone]        NAME:  Estevan Valenzuela  YOB: 1965  MEDICAL RECORD NUMBER:  [id number]  DATE:  12/10/2024     Return Appointment:  1 week with Janny Casey NP     Instructions: Right great toe  Cleanse with normal saline  Let Vashe Wound Solution moistened guaze sit on wound for at least 60 seconds.  Collagen with ag to wound bed.  Hydrofera Ready: Cut to wound size, place over collagen, shiny side out.  Secure with rolled gauze  Change every other day and as needed if it gets wet     Do not get wet in the shower  Limit walking until inserts received.  Follow up with your endocrinologist about your diabetes control. Be sure to ask her about diabetes shoes and inserts.  Stop smoking.     Silver nitrate used at today's visit.     Defender boot at all times.  \

## 2024-12-11 ENCOUNTER — HOSPITAL ENCOUNTER (OUTPATIENT)
Dept: DIABETES SERVICES | Age: 59
Setting detail: RECURRING SERIES
Discharge: HOME | End: 2024-12-14

## 2024-12-11 NOTE — PROGRESS NOTES
Outpatient Diabetes Education    Class Type: Diabetes Basics    Patient attended outpatient diabetes education via zoom.  He completed 30 minutes to slide 36 and then had to go as  had arrived. He agreed to reschedule to complete education for Diabetes Basic free education.     Provided and reviewed education material entitled, \"Diabetes Basics: Learning to how to manage your diabetes.\"    Explained basic physiology of diabetes, causes, signs/symptoms. Educated on target goals for blood glucose and A1C.    Described the effects of poor glycemic control and risk of long-term complications.  Described proper diabetic foot care and importance of checking feet daily.     Educated on glycemic effect of sugary drinks and healthier alternatives to improve glycemic control. Educated on the effects of carbohydrates on blood glucose, \"plate method\" for healthy meal planning, and basics of carb counting.    He currently is dealing with foot issue and is having an MRI next Wednesday. Reports he does not sleep at night. Sleepless nights is an ongoing issue.   Certified Diabetes Care and   [facility]

## 2024-12-13 ENCOUNTER — TELEPHONE (OUTPATIENT)
Dept: DIABETES SERVICES | Age: 59
End: 2024-12-13

## 2024-12-13 NOTE — TELEPHONE ENCOUNTER
Type 2.  Called patient about our free diabetes education program to reschedule Basic session to complete session from  Wednesday of this week. Unable to speak to patient. Left message for patient to call back at [phone] or [phone].

## 2024-12-18 ENCOUNTER — HOSPITAL ENCOUNTER (OUTPATIENT)
Dept: MRI IMAGING | Age: 59
Discharge: HOME | End: 2024-12-21
Payer: MEDICARE

## 2024-12-18 DIAGNOSIS — L97.512 DIABETIC ULCER OF TOE OF RIGHT FOOT ASSOCIATED WITH TYPE 2 DIABETES MELLITUS, WITH FAT LAYER EXPOSED (HCC): ICD-10-CM

## 2024-12-18 DIAGNOSIS — E11.621 DIABETIC ULCER OF TOE OF RIGHT FOOT ASSOCIATED WITH TYPE 2 DIABETES MELLITUS, WITH FAT LAYER EXPOSED (HCC): ICD-10-CM

## 2024-12-18 PROCEDURE — 73720 MRI LWR EXTREMITY W/O&W/DYE: CPT

## 2024-12-18 PROCEDURE — 6360000004 HC RX CONTRAST MEDICATION

## 2024-12-18 PROCEDURE — A9579 GAD-BASE MR CONTRAST NOS,1ML: HCPCS

## 2024-12-18 RX ADMIN — GADOTERIDOL 17 ML: 279.3 INJECTION, SOLUTION INTRAVENOUS at 21:35
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
## 2024-12-23 ENCOUNTER — CARE COORDINATION (OUTPATIENT)
Dept: CARE COORDINATION | Facility: CLINIC | Age: 59
End: 2024-12-23

## 2024-12-23 NOTE — CARE COORDINATION
Ambulatory Care Coordination Note     12/23/2024 11:51 AM     Care Summary Note:   Outreach for High Risk Case Management - unable to reach  Left a VM requesting return call.     ACM: Heather Byers, JOE     PCP/Specialist follow up:   Future Appointments         Provider Specialty Dept Phone    12/26/2024 10:50 AM Estevan Domingo RN Wound Ostomy [phone]    1/15/2025 11:15 AM Grinnell, Melissa R, PAAnnaC Gastroenterology [phone]    1/21/2025 2:00 PM You Devine RN Diabetes Services [phone]    2/28/2025 2:30 PM Parul Perez PA-C Endocrinology [phone]    5/27/2025 3:00 PM Aric Shafer MD Neurology [phone]

## 2024-12-26 ENCOUNTER — HOSPITAL ENCOUNTER (OUTPATIENT)
Dept: WOUND CARE | Age: 59
Discharge: HOME | End: 2024-12-26
Attending: SURGERY
Payer: MEDICARE

## 2024-12-26 VITALS
DIASTOLIC BLOOD PRESSURE: 98 MMHG | SYSTOLIC BLOOD PRESSURE: 171 MMHG | HEIGHT: 73 IN | BODY MASS INDEX: 25.71 KG/M2 | RESPIRATION RATE: 18 BRPM | HEART RATE: 83 BPM | TEMPERATURE: 97.5 F | WEIGHT: 194 LBS

## 2024-12-26 DIAGNOSIS — E11.42 DIABETIC POLYNEUROPATHY ASSOCIATED WITH TYPE 2 DIABETES MELLITUS (HCC): Chronic | ICD-10-CM

## 2024-12-26 DIAGNOSIS — L97.512 DIABETIC ULCER OF TOE OF RIGHT FOOT ASSOCIATED WITH TYPE 2 DIABETES MELLITUS, WITH FAT LAYER EXPOSED (HCC): Primary | Chronic | ICD-10-CM

## 2024-12-26 DIAGNOSIS — E11.621 DIABETIC ULCER OF TOE OF RIGHT FOOT ASSOCIATED WITH TYPE 2 DIABETES MELLITUS, WITH FAT LAYER EXPOSED (HCC): Primary | Chronic | ICD-10-CM

## 2024-12-26 PROCEDURE — 11042 DBRDMT SUBQ TIS 1ST 20SQCM/<: CPT

## 2024-12-26 RX ORDER — LIDOCAINE 50 MG/G
OINTMENT TOPICAL ONCE
OUTPATIENT
Start: 2024-12-26 | End: 2024-12-26

## 2024-12-26 RX ORDER — SILVER SULFADIAZINE 10 MG/G
CREAM TOPICAL ONCE
OUTPATIENT
Start: 2024-12-26 | End: 2024-12-26

## 2024-12-26 RX ORDER — MUPIROCIN 20 MG/G
OINTMENT TOPICAL ONCE
OUTPATIENT
Start: 2024-12-26 | End: 2024-12-26

## 2024-12-26 RX ORDER — SODIUM CHLOR/HYPOCHLOROUS ACID 0.033 %
SOLUTION, IRRIGATION IRRIGATION ONCE
OUTPATIENT
Start: 2024-12-26 | End: 2024-12-26

## 2024-12-26 RX ORDER — BACITRACIN ZINC AND POLYMYXIN B SULFATE 500; 1000 [USP'U]/G; [USP'U]/G
OINTMENT TOPICAL ONCE
OUTPATIENT
Start: 2024-12-26 | End: 2024-12-26

## 2024-12-26 RX ORDER — CLOBETASOL PROPIONATE 0.5 MG/G
OINTMENT TOPICAL ONCE
OUTPATIENT
Start: 2024-12-26 | End: 2024-12-26

## 2024-12-26 RX ORDER — NEOMYCIN/BACITRACIN/POLYMYXINB 3.5-400-5K
OINTMENT (GRAM) TOPICAL ONCE
OUTPATIENT
Start: 2024-12-26 | End: 2024-12-26

## 2024-12-26 RX ORDER — LIDOCAINE HYDROCHLORIDE 40 MG/ML
SOLUTION TOPICAL ONCE
OUTPATIENT
Start: 2024-12-26 | End: 2024-12-26

## 2024-12-26 RX ORDER — BETAMETHASONE DIPROPIONATE 0.5 MG/G
CREAM TOPICAL ONCE
OUTPATIENT
Start: 2024-12-26 | End: 2024-12-26

## 2024-12-26 RX ORDER — LIDOCAINE HYDROCHLORIDE 20 MG/ML
JELLY TOPICAL ONCE
Status: COMPLETED | OUTPATIENT
Start: 2024-12-26 | End: 2024-12-26

## 2024-12-26 RX ORDER — GENTAMICIN SULFATE 1 MG/G
OINTMENT TOPICAL ONCE
OUTPATIENT
Start: 2024-12-26 | End: 2024-12-26

## 2024-12-26 RX ORDER — TRIAMCINOLONE ACETONIDE 1 MG/G
OINTMENT TOPICAL ONCE
OUTPATIENT
Start: 2024-12-26 | End: 2024-12-26

## 2024-12-26 RX ORDER — GINSENG 100 MG
CAPSULE ORAL ONCE
OUTPATIENT
Start: 2024-12-26 | End: 2024-12-26

## 2024-12-26 RX ORDER — SODIUM CHLOR/HYPOCHLOROUS ACID 0.033 %
SOLUTION, IRRIGATION IRRIGATION ONCE
Status: COMPLETED | OUTPATIENT
Start: 2024-12-26 | End: 2024-12-26

## 2024-12-26 RX ORDER — LIDOCAINE HYDROCHLORIDE 20 MG/ML
JELLY TOPICAL ONCE
OUTPATIENT
Start: 2024-12-26 | End: 2024-12-26

## 2024-12-26 RX ORDER — LIDOCAINE 40 MG/G
CREAM TOPICAL ONCE
OUTPATIENT
Start: 2024-12-26 | End: 2024-12-26

## 2024-12-26 RX ADMIN — LIDOCAINE HYDROCHLORIDE: 20 JELLY TOPICAL at 11:20

## 2024-12-26 RX ADMIN — Medication: at 11:19

## 2024-12-26 NOTE — ASSESSMENT & PLAN NOTE
Assessment  Wound is slightly deeper today - wound bed is pink granulation and superficial slough; bone is not exposed, toe is no longer edematous or erythematous  Reports wearing Defender boot more consistently  MRI from 12/18/24: \"early osteomyelitis is not excluded\"  Has completed Keflex  Plan  Excisional debridement to remove devitalized tissue and promote wound healing  Continue Vashe, collagen Ag, Hydrofera blue; change 3x weekly, Defender boot for offloading today, agreeable to return on 12/31 for application of TCC  RTC on 12/31/24

## 2024-12-26 NOTE — WOUND CARE
Discharge Instructions for  [facility]  [address] [place] [address]  [address] [place], [place] [id number]  Phone [phone]   Fax [phone]      NAME:  Estevan Valenzuela  YOB: 1965  MEDICAL RECORD NUMBER:  [id number]  DATE:  12/26/2024    Return Appointment:  Monday with Janny Casey NP  Cast to be applied at next visit - return appointment on Thursday    Instructions: Right great toe  Cleanse with normal saline  Let Vashe Wound Solution moistened guaze sit on wound for at least 60 seconds.  Collagen with ag to wound bed.  Hydrofera Ready: Cut to wound size, place over collagen, shiny side out.  Secure with rolled gauze  Change every other day and as needed if it gets wet    Do not get wet in the shower  Limit walking until inserts received.  Follow up with your endocrinologist about your diabetes control. Be sure to ask her about diabetes shoes and inserts.  Stop smoking.    Silver Nitrate used this visit.  Defender boot at all times.      MRI results reviewed this visit.   Plan for cast at next visit.     Should you experience increased redness, swelling, pain, foul odor, size of wound(s), or have a temperature over 101 degrees please contact the Wound Healing Center at [phone] or if after hours contact your primary care physician or go to the hospital emergency department.    PLEASE NOTE: IF YOU ARE UNABLE TO OBTAIN WOUND SUPPLIES, CONTINUE TO USE THE SUPPLIES YOU HAVE AVAILABLE UNTIL YOU ARE ABLE TO REACH US. IT IS MOST IMPORTANT TO KEEP THE WOUND COVERED AT ALL TIMES.    Electronically signed TIFFANY BARTH RN, [facility] on 12/26/2024 at 11:21 AM

## 2024-12-26 NOTE — FLOWSHEET NOTE
12/26/24 1043   Wound 08/15/24 Toe (Comment  which one) Right #1 right great toe   Date First Assessed/Time First Assessed: 08/15/24 0916   Present on Original Admission: Yes  Wound Approximate Age at First Assessment (Weeks): 2 weeks  Primary Wound Type: Diabetic Ulcer  Location: Toe (Comment  which one)  Wound Location Orientation...   Wound Image     Wound Etiology Diabetic Gavin 2   Dressing Status Old drainage noted   Wound Cleansed Soap and water   Dressing/Treatment Hydrofera blue   Offloading for Diabetic Foot Ulcers Offloading boot   Wound Length (cm) 1.4 cm   Wound Width (cm) 0.9 cm   Wound Depth (cm) 0.4 cm   Wound Surface Area (cm^2) 1.26 cm^2   Change in Wound Size % (l*w) 79.84   Wound Volume (cm^3) 0.504 cm^3   Wound Healing % 19   Post-Procedure Length (cm) 1.5 cm   Post-Procedure Width (cm) 1.1 cm   Post-Procedure Depth (cm) 0.3 cm   Post-Procedure Surface Area (cm^2) 1.65 cm^2   Post-Procedure Volume (cm^3) 0.495 cm^3   Wound Assessment Granulation tissue   Drainage Amount Moderate (25-50%)   Drainage Description Serosanguinous   Odor Mild   Stella-wound Assessment Hyperkeratosis (callous);Maceration   Margins Undefined edges   Wound Thickness Description not for Pressure Injury Full thickness   Pain Assessment   Pain Assessment None - Denies Pain

## 2024-12-26 NOTE — PROGRESS NOTES
12/26/24 1043   Dressing Status Old drainage noted 12/26/24 1043   Wound Cleansed Soap and water 12/26/24 1043   Dressing/Treatment Hydrofera blue 12/26/24 1043   Offloading for Diabetic Foot Ulcers Offloading boot 12/26/24 1043   Wound Length (cm) 1.4 cm 12/26/24 1043   Wound Width (cm) 0.9 cm 12/26/24 1043   Wound Depth (cm) 0.4 cm 12/26/24 1043   Wound Surface Area (cm^2) 1.26 cm^2 12/26/24 1043   Change in Wound Size % (l*w) 79.84 12/26/24 1043   Wound Volume (cm^3) 0.504 cm^3 12/26/24 1043   Wound Healing % 19 12/26/24 1043   Post-Procedure Length (cm) 1.5 cm 12/26/24 1043   Post-Procedure Width (cm) 1.1 cm 12/26/24 1043   Post-Procedure Depth (cm) 0.3 cm 12/26/24 1043   Post-Procedure Surface Area (cm^2) 1.65 cm^2 12/26/24 1043   Post-Procedure Volume (cm^3) 0.495 cm^3 12/26/24 1043   Undermining Starts ___ O'Clock 1 12/10/24 1322   Undermining Ends___ O'Clock 4 12/10/24 1322   Undermining Maxium Distance (cm) 0.2 12/10/24 1322   Wound Assessment Granulation tissue 12/26/24 1043   Drainage Amount Moderate (25-50%) 12/26/24 1043   Drainage Description Serosanguinous 12/26/24 1043   Odor Mild 12/26/24 1043   Stella-wound Assessment Hyperkeratosis (callous);Maceration 12/26/24 1043   Margins Undefined edges 12/26/24 1043   Wound Thickness Description not for Pressure Injury Full thickness 12/26/24 1043   Number of days: 133     Incision 11/03/23 Toe (Comment  which one) Left (Active)   Number of days: 419      Total surface area debrided: 1.25 sq cm   Estimated blood loss: minimal amount blood loss   Hemostasis achieved: by pressure and by silver nitrate  Procedural pain: 0 / 10   Post procedural pain: 0 / 10   Response to treatment: Patient tolerated procedure well with no complaints of pain.    Procedure: Total Contact Cast  Performed by: DAVION Wiseman NP  Consent obtained: yes  Total contact cast was applied to right lower leg.  Wound dressing(s) and cast padding were applied by wound care nurse.  I,  0

## 2024-12-30 ENCOUNTER — CARE COORDINATION (OUTPATIENT)
Dept: CARE COORDINATION | Facility: CLINIC | Age: 59
End: 2024-12-30

## 2024-12-30 NOTE — CARE COORDINATION
Ambulatory Care Coordination Note     12/30/2024 11:58 AM     Care Summary Note:   Outreach for High Risk Case Management - spoke with patient  Reports that tomorrow he will get cast on foot (wound center)   - see wound center notes with pictures   - MRI does not rule out osteomyelitis   Patient trying to get CME - BSC and cane.  [facility] has been addressing this, although patient has had to \"lean on the staff\" there.  Blood Sugars - running high in the mornings - up in 200s.   - patient working with Diabetes Education   - ACM will coordinate with CDE   - patient is nauseous most of the day, doesn't usually eat until 17:00   - complains of \"sour stomach,\" some loose stools, not quite diarrhea - sees GI mid January  Of note - patient is very hypertensive while at wound care     ACM: Heather Byers RN    PCP/Specialist follow up:   Future Appointments         Provider Specialty Dept Phone    12/31/2024 3:00 PM Janny Casey APRN - NP; Gayathri Gomez RN Wound Ostomy [phone]    1/3/2025 1:50 PM Janny Casey APRN - NP; Gayathri Gomez RN Wound Ostomy [phone]    1/10/2025 1:10 PM Janny Casey APRN - NP; Maricel Mckoy RN Wound Ostomy [phone]    1/15/2025 11:15 AM Grinnell, Melissa R, PA-C Gastroenterology [phone]    1/17/2025 1:00 PM Janny Casey APRN - NP; Kaylen Lopez RN Wound Ostomy [phone]    1/21/2025 2:00 PM You Devine RN Diabetes Services [phone]    1/24/2025 1:10 PM Janny Casey APRN - NP; Maricel Mckoy RN Wound Ostomy [phone]    1/31/2025 1:10 PM Janny Casey APRN - NP; Kaylen Lopez RN Wound Ostomy [phone]    2/28/2025 2:30 PM Parul Perez PA-C Endocrinology [phone]    5/27/2025 3:00 PM Aric Shafer MD Neurology [phone]

## 2024-12-31 ENCOUNTER — HOSPITAL ENCOUNTER (OUTPATIENT)
Dept: WOUND CARE | Age: 59
Discharge: HOME | End: 2024-12-31
Attending: SURGERY
Payer: MEDICARE

## 2024-12-31 VITALS
SYSTOLIC BLOOD PRESSURE: 170 MMHG | DIASTOLIC BLOOD PRESSURE: 107 MMHG | RESPIRATION RATE: 18 BRPM | OXYGEN SATURATION: 99 % | HEART RATE: 82 BPM | BODY MASS INDEX: 25.18 KG/M2 | TEMPERATURE: 97.5 F | HEIGHT: 73 IN | WEIGHT: 190 LBS

## 2024-12-31 DIAGNOSIS — E11.42 DIABETIC POLYNEUROPATHY ASSOCIATED WITH TYPE 2 DIABETES MELLITUS (HCC): ICD-10-CM

## 2024-12-31 DIAGNOSIS — E11.621 DIABETIC ULCER OF TOE OF RIGHT FOOT ASSOCIATED WITH TYPE 2 DIABETES MELLITUS, WITH FAT LAYER EXPOSED (HCC): Primary | ICD-10-CM

## 2024-12-31 DIAGNOSIS — L97.512 DIABETIC ULCER OF TOE OF RIGHT FOOT ASSOCIATED WITH TYPE 2 DIABETES MELLITUS, WITH FAT LAYER EXPOSED (HCC): Primary | ICD-10-CM

## 2024-12-31 PROBLEM — E11.622 DM TYPE 2 WITH DIABETIC CHRONIC SKIN ULCER (HCC): Chronic | Status: ACTIVE | Noted: 2024-08-15

## 2024-12-31 PROBLEM — L98.499 DM TYPE 2 WITH DIABETIC CHRONIC SKIN ULCER (HCC): Chronic | Status: ACTIVE | Noted: 2024-08-15

## 2024-12-31 PROCEDURE — 11042 DBRDMT SUBQ TIS 1ST 20SQCM/<: CPT

## 2024-12-31 NOTE — WOUND CARE
Multilayer Compression Wrap   (Not Unna) Below the Knee    NAME:  Estevan Valenzuela  YOB: 1965  MEDICAL RECORD NUMBER:  [id number]  DATE:  12/31/2024    Multilayer compression wrap: Removed old Multilayer wrap if indicated and wash leg with mild soap/water.  Applied primary and secondary dressing as ordered.  Applied multilayered dressing below the knee to right lower leg.  Instructed patient/caregiver not to remove dressing and to keep it clean and dry.   Instructed patient/caregiver on complications to report to provider, such as pain, numbness in toes, heavy drainage, and slippage of dressing.  Instructed patient on purpose of compression dressing and on activity and exercise recommendations.      Electronically signed by Tanesha Zheng PT, WCC on 12/31/2024 at 4:08 PM

## 2024-12-31 NOTE — PROGRESS NOTES
[facility]  Procedure Note    Estevan Valenzuela  MEDICAL RECORD NUMBER: [id number]  AGE: 59 y.o.     GENDER: male    : 1965  EPISODE DATE: 2024    Diabetic ulcer of toe of right foot associated with type 2 diabetes mellitus, with fat layer exposed (HCC)  Assessment  Wound is smaller with less depth - wound bed is pink granulation and superficial slough; bone is not exposed, toe is no longer edematous or erythematous; there is macerated callus to periwound  Is agreeable to casting today  Plan  Excisional debridement to remove devitalized tissue and promote wound healing  Wound care: Vashe cleanse, Aquacel Ag, TCC; return Friday for cast change    Procedure Note: Excisional Debridement  Indications: Based on my examination of this patient's wound(s) today, debridement is required to remove devitalized tissue, evaluate the wound base, and promote wound healing.  Performed by: DAVION Wiseman NP  Consent obtained: Yes  Time out taken: Yes  Pain control:     Wound #: 1  Diabetic/pressure/chronic non-pressure ulcers only: Diabetic ulcer, fat layer exposed was debrided using curette. The wound(s) was/were debrided down through/to subcutaneous tissue. Devitalized tissue debrided: biofilm, slough, exudate, and callus.  Pre and post debridement measurements: are located in the flow sheet.  Total surface area debrided: 0.8 sq cm   Estimated blood loss: minimal amount blood loss   Hemostasis achieved: by pressure and by silver nitrate  Procedural pain: 0 / 10   Post procedural pain: 0 / 10   Response to treatment: Patient tolerated procedure well with no complaints of pain.    Procedure: Total Contact Cast  Performed by: DAVION Wiseman NP  Consent obtained: yes  Total contact cast was applied to right lower leg.  Wound dressing(s) and cast padding were applied by wound care nurse.  I, as the provider, applied the final layer of fiberglass casting

## 2024-12-31 NOTE — FLOWSHEET NOTE
12/31/24 1500   Wound 08/15/24 Toe (Comment  which one) Right #1 right great toe   Date First Assessed/Time First Assessed: 08/15/24 0916   Present on Original Admission: Yes  Wound Approximate Age at First Assessment (Weeks): 2 weeks  Primary Wound Type: Diabetic Ulcer  Location: Toe (Comment  which one)  Wound Location Orientation...   Wound Image     Wound Etiology Diabetic Gavin 2   Dressing Status Old drainage noted   Wound Cleansed Cleansed with saline   Dressing/Treatment Hydrofera blue   Offloading for Diabetic Foot Ulcers Offloading boot   Wound Length (cm) 1 cm   Wound Width (cm) 0.8 cm   Wound Depth (cm) 0.2 cm   Wound Surface Area (cm^2) 0.8 cm^2   Change in Wound Size % (l*w) 87.2   Wound Volume (cm^3) 0.16 cm^3   Wound Healing % 74   Post-Procedure Length (cm) 1 cm   Post-Procedure Width (cm) 0.8 cm   Post-Procedure Depth (cm) 0.2 cm   Post-Procedure Surface Area (cm^2) 0.8 cm^2   Post-Procedure Volume (cm^3) 0.16 cm^3   Wound Assessment Granulation tissue   Drainage Amount Moderate (25-50%)   Drainage Description Serosanguinous   Odor Mild   Stella-wound Assessment Maceration   Wound Thickness Description not for Pressure Injury Full thickness   Pain Assessment   Pain Assessment None - Denies Pain

## 2024-12-31 NOTE — WOUND CARE
Discharge Instructions for  [facility]  [address] [place] [address]  [address] [place], [place] [address]  Phone [phone]   Fax [phone]      NAME:  Estevan Valenzuela  YOB: 1965  MEDICAL RECORD NUMBER:  [id number]  DATE:  12/31/2024    Return Appointment:  Friday for TCC change    Instructions: Right great toe  Cleanse with normal saline  Let Vashe Wound Solution moistened guaze sit on wound for at least 60 seconds.  Apply alginate with silver. Cut product to wound size and apply to wound bed.   Cover with ABD pad  Secure with rolled gauze  Wear total contact cast on right lower leg. Wear walking boot over cast at all times when standing or walking  Change on Friday, then weekly.    Do not get wet in the shower  Limit walking until inserts received.  Follow up with your endocrinologist about your diabetes control. Be sure to ask her about diabetes shoes and inserts.  Stop smoking.    Silver Nitrate used this visit.      Should you experience increased redness, swelling, pain, foul odor, size of wound(s), or have a temperature over 101 degrees please contact the Wound Healing Center at [phone] or if after hours contact your primary care physician or go to the hospital emergency department.    PLEASE NOTE: IF YOU ARE UNABLE TO OBTAIN WOUND SUPPLIES, CONTINUE TO USE THE SUPPLIES YOU HAVE AVAILABLE UNTIL YOU ARE ABLE TO REACH US. IT IS MOST IMPORTANT TO KEEP THE WOUND COVERED AT ALL TIMES.    Electronically signed Tanesha Zheng PT, WCC on 12/31/2024 at 3:06 PM

## 2024-12-31 NOTE — ASSESSMENT & PLAN NOTE
Assessment  Wound is smaller with less depth - wound bed is pink granulation and superficial slough; bone is not exposed, toe is no longer edematous or erythematous; there is macerated callus to periwound  Is agreeable to casting today  Plan  Excisional debridement to remove devitalized tissue and promote wound healing  Wound care: Vashe cleanse, Aquacel Ag, TCC; return Friday for cast change

## 2024-12-31 NOTE — WOUND CARE
Total Contact Cast    NAME:  Estevan Valenzuela  YOB: 1965  MEDICAL RECORD NUMBER:  [id number]  DATE:  12/31/2024    Goal:  Patient will maintain integrity of cast, avoid mobility hazards, and report complications that may occur (foul odor, pain, numbness, cracked cast).    Removed old contact cast if indicated and wash extremity with soap and water  Applied ordered dressing over wound(s)   Applied cast padding  Applied per Janny Casey NP  Total Contact Cast was applied in the Wound Care Center to right lower leg  Applied cast material fiberglass  Allow cast to dry   Instructed patient to report to health care provider, including wound care center, any back pain, hip pain, or leg pain, numbness of toes, or any odor coming from the cast.   Instructed patient not to stick any foreign objects down into cast.  Instructed patient to utilize assistive devices(crutches, cane or walker) as ordered.  Instructed patient to continue offloading as directed.     Applied cast per  Guidelines    Electronically signed by Tanesha Zheng PT, WCC on 12/31/2024 at 4:11 PM

## 2024-12-31 NOTE — DISCHARGE INSTRUCTIONS
Return Appointment:  Friday for TCC change     Instructions: Right great toe  Cleanse with normal saline  Let Vashe Wound Solution moistened guaze sit on wound for at least 60 seconds.  Apply alginate with silver. Cut product to wound size and apply to wound bed.   Cover with ABD pad  Secure with rolled gauze  Wear total contact cast on right lower leg. Wear walking boot over cast at all times when standing or walking  Change on Friday, then weekly.     Do not get wet in the shower  Limit walking until inserts received.  Follow up with your endocrinologist about your diabetes control. Be sure to ask her about diabetes shoes and inserts.  Stop smoking.     Silver Nitrate used this visit.

## 2025-01-03 ENCOUNTER — HOSPITAL ENCOUNTER (OUTPATIENT)
Dept: WOUND CARE | Age: 60
Discharge: HOME | End: 2025-01-03
Attending: SURGERY
Payer: MEDICARE

## 2025-01-03 VITALS
TEMPERATURE: 97.6 F | HEART RATE: 68 BPM | RESPIRATION RATE: 18 BRPM | BODY MASS INDEX: 25.18 KG/M2 | HEIGHT: 73 IN | DIASTOLIC BLOOD PRESSURE: 95 MMHG | SYSTOLIC BLOOD PRESSURE: 153 MMHG | WEIGHT: 190 LBS

## 2025-01-03 DIAGNOSIS — E11.42 DIABETIC POLYNEUROPATHY ASSOCIATED WITH TYPE 2 DIABETES MELLITUS (HCC): Primary | ICD-10-CM

## 2025-01-03 DIAGNOSIS — L97.512 DIABETIC ULCER OF TOE OF RIGHT FOOT ASSOCIATED WITH TYPE 2 DIABETES MELLITUS, WITH FAT LAYER EXPOSED (HCC): Chronic | ICD-10-CM

## 2025-01-03 DIAGNOSIS — E11.621 DIABETIC ULCER OF TOE OF RIGHT FOOT ASSOCIATED WITH TYPE 2 DIABETES MELLITUS, WITH FAT LAYER EXPOSED (HCC): Chronic | ICD-10-CM

## 2025-01-03 DIAGNOSIS — L97.522 DIABETIC ULCER OF TOE OF LEFT FOOT ASSOCIATED WITH TYPE 2 DIABETES MELLITUS, WITH FAT LAYER EXPOSED (HCC): ICD-10-CM

## 2025-01-03 DIAGNOSIS — E11.621 DIABETIC ULCER OF TOE OF LEFT FOOT ASSOCIATED WITH TYPE 2 DIABETES MELLITUS, WITH FAT LAYER EXPOSED (HCC): ICD-10-CM

## 2025-01-03 PROCEDURE — 29445 APPL RIGID TOT CNTC LEG CAST: CPT

## 2025-01-03 RX ORDER — LIDOCAINE 50 MG/G
OINTMENT TOPICAL ONCE
OUTPATIENT
Start: 2025-01-03 | End: 2025-01-03

## 2025-01-03 RX ORDER — BETAMETHASONE DIPROPIONATE 0.5 MG/G
CREAM TOPICAL ONCE
OUTPATIENT
Start: 2025-01-03 | End: 2025-01-03

## 2025-01-03 RX ORDER — MUPIROCIN 20 MG/G
OINTMENT TOPICAL ONCE
OUTPATIENT
Start: 2025-01-03 | End: 2025-01-03

## 2025-01-03 RX ORDER — TRIAMCINOLONE ACETONIDE 1 MG/G
OINTMENT TOPICAL ONCE
OUTPATIENT
Start: 2025-01-03 | End: 2025-01-03

## 2025-01-03 RX ORDER — GINSENG 100 MG
CAPSULE ORAL ONCE
OUTPATIENT
Start: 2025-01-03 | End: 2025-01-03

## 2025-01-03 RX ORDER — LIDOCAINE HYDROCHLORIDE 20 MG/ML
JELLY TOPICAL ONCE
OUTPATIENT
Start: 2025-01-03 | End: 2025-01-03

## 2025-01-03 RX ORDER — NEOMYCIN/BACITRACIN/POLYMYXINB 3.5-400-5K
OINTMENT (GRAM) TOPICAL ONCE
OUTPATIENT
Start: 2025-01-03 | End: 2025-01-03

## 2025-01-03 RX ORDER — SILVER SULFADIAZINE 10 MG/G
CREAM TOPICAL ONCE
OUTPATIENT
Start: 2025-01-03 | End: 2025-01-03

## 2025-01-03 RX ORDER — SODIUM CHLOR/HYPOCHLOROUS ACID 0.033 %
SOLUTION, IRRIGATION IRRIGATION ONCE
OUTPATIENT
Start: 2025-01-03 | End: 2025-01-03

## 2025-01-03 RX ORDER — CLOBETASOL PROPIONATE 0.5 MG/G
OINTMENT TOPICAL ONCE
OUTPATIENT
Start: 2025-01-03 | End: 2025-01-03

## 2025-01-03 RX ORDER — BACITRACIN ZINC AND POLYMYXIN B SULFATE 500; 1000 [USP'U]/G; [USP'U]/G
OINTMENT TOPICAL ONCE
OUTPATIENT
Start: 2025-01-03 | End: 2025-01-03

## 2025-01-03 RX ORDER — LIDOCAINE 40 MG/G
CREAM TOPICAL ONCE
OUTPATIENT
Start: 2025-01-03 | End: 2025-01-03

## 2025-01-03 RX ORDER — GENTAMICIN SULFATE 1 MG/G
OINTMENT TOPICAL ONCE
OUTPATIENT
Start: 2025-01-03 | End: 2025-01-03

## 2025-01-03 RX ORDER — LIDOCAINE HYDROCHLORIDE 40 MG/ML
SOLUTION TOPICAL ONCE
OUTPATIENT
Start: 2025-01-03 | End: 2025-01-03

## 2025-01-03 NOTE — WOUND CARE
Total Contact Cast    NAME:  Estevan Valenzuela  YOB: 1965  MEDICAL RECORD NUMBER:  [id number]  DATE:  1/3/2025    Goal:  Patient will maintain integrity of cast, avoid mobility hazards, and report complications that may occur (foul odor, pain, numbness, cracked cast).    Removed old contact cast if indicated and wash extremity with soap and water  Applied ordered dressing over wound(s)   Applied cast padding  Applied per Janny Casey NP  Total Contact Cast was applied in the Wound Care Center to right lower leg  Applied cast material fiberglass  Allow cast to dry   Instructed patient to report to health care provider, including wound care center, any back pain, hip pain, or leg pain, numbness of toes, or any odor coming from the cast.   Instructed patient not to stick any foreign objects down into cast.  Instructed patient to utilize assistive devices(crutches, cane or walker) as ordered.  Instructed patient to continue offloading as directed.     Applied cast per  Guidelines    Electronically signed by TIFFANY BARTH RN, [facility] on 1/3/2025 at 3:09 PM

## 2025-01-03 NOTE — WOUND CARE
Discharge Instructions for  [facility]  [address] [place] [address]  [address] [place] [address]  Phone [phone]   Fax [phone]      NAME:  Estevan Valenzuela  YOB: 1965  MEDICAL RECORD NUMBER:  [id number]  DATE:  1/3/2025    Return Appointment:  1 week with Janny Casey NP   Cast change    Instructions: Right great toe  Cleanse with normal saline  Let Vashe Wound Solution moistened guaze sit on wound for at least 60 seconds.  Apply alginate with silver. Cut product to wound size and apply to wound bed.   Cover with ABD pad  Secure with rolled gauze  TCC EZ 3\" applied this visit.   Wear total contact cast on right lower leg. Wear walking boot over cast at all times when standing or walking  Change on Friday of next week    Do not get wet in the shower, may purchase cast cover from local pharmacy.   Follow up with your endocrinologist about your diabetes control. Be sure to ask her about diabetes shoes and inserts.  Stop smoking.    Increase protein to 100g daily for optimal wound healing. Aim for 30g protein per shake, two shakes a day.  Protein:   Egg ~ 6g  Yogurt ~ 15g  Half cup of cottage cheese ~ 15g  Chicken breast ~ 30g  [place] filet ~ 40g       Should you experience increased redness, swelling, pain, foul odor, size of wound(s), or have a temperature over 101 degrees please contact the Wound Healing Center at [phone] or if after hours contact your primary care physician or go to the hospital emergency department.    PLEASE NOTE: IF YOU ARE UNABLE TO OBTAIN WOUND SUPPLIES, CONTINUE TO USE THE SUPPLIES YOU HAVE AVAILABLE UNTIL YOU ARE ABLE TO REACH US. IT IS MOST IMPORTANT TO KEEP THE WOUND COVERED AT ALL TIMES.    Electronically signed TIFFANY BARTH RN, [facility] on 1/3/2025 at 3:09 PM

## 2025-01-03 NOTE — TELEPHONE ENCOUNTER
Need to be sure he is taking his long acting insulin every day around the same time    Also, if he is not eating but it is breakfast time, lunchtime, supper time, and bedtime he can use the correction scale alone. If eating he can use the mealtime insulin PLUS the correction scale

## 2025-01-03 NOTE — FLOWSHEET NOTE
01/03/25 1400   Right Leg Edema Point of Measurement   Leg circumference 36 cm   Ankle circumference 23 cm   Foot circumference 24 cm   Compression Therapy Other  (TCC)   Wound 08/15/24 Toe (Comment  which one) Right #1 right great toe   Date First Assessed/Time First Assessed: 08/15/24 0916   Present on Original Admission: Yes  Wound Approximate Age at First Assessment (Weeks): 2 weeks  Primary Wound Type: Diabetic Ulcer  Location: Toe (Comment  which one)  Wound Location Orientation...   Wound Image    Wound Etiology Diabetic Gavin 2   Dressing Status Old drainage noted   Wound Cleansed Cleansed with saline   Dressing/Treatment Alginate with Ag   Offloading for Diabetic Foot Ulcers Total contact cast   Wound Length (cm) 0.9 cm   Wound Width (cm) 0.8 cm   Wound Depth (cm) 0.2 cm   Wound Surface Area (cm^2) 0.72 cm^2   Change in Wound Size % (l*w) 88.48   Wound Volume (cm^3) 0.144 cm^3   Wound Healing % 77   Wound Assessment Granulation tissue   Drainage Amount Moderate (25-50%)   Drainage Description Serosanguinous   Odor None   Stella-wound Assessment Hyperkeratosis (callous)   Margins Undefined edges   Wound Thickness Description not for Pressure Injury Full thickness   Pain Assessment   Pain Assessment None - Denies Pain

## 2025-01-06 ENCOUNTER — CARE COORDINATION (OUTPATIENT)
Dept: CARE COORDINATION | Facility: CLINIC | Age: 60
End: 2025-01-06

## 2025-01-06 NOTE — CARE COORDINATION
Ambulatory Care Coordination Note     1/6/2025 2:02 PM     Care Summary Note:   Outreach for High Risk Case Management - spoke with patient   this morning, nausea, diarrhea in the night  Patient is scheduled for Diabetes Education on 1/21  Encouraged him to make this a priority.  He states he has nothing else on that day.  Pointed out to patient that issues with his foot wounds and his gut are telling us that the diabetes is controlling the situation and not the other way around.  Patient offers medication and environmental problems as the reason for his BS highs and lows.  ACM responded that diabetes is a complex disease and all the contributing factors must be addressed.  Patient acquiesced.     ACM: Heather Byers RN     Method of communication with provider: chart routing.    Utilization: Has the patient been seen in the ED since your last call? no    PCP/Specialist follow up:   Future Appointments         Provider Specialty Dept Phone    1/10/2025 1:10 PM Janny Casey APRN - NP; Gayathri Gomez RN Wound Ostomy [phone]    1/15/2025 11:15 AM Grinnell, Melissa R, PA-C Gastroenterology [phone]    1/17/2025 11:00 AM Janny Casey APRN - LANCE; Kaylen Lopez RN Wound Ostomy [phone]    1/21/2025 2:00 PM You Devine RN Diabetes Services [phone]    1/24/2025 1:10 PM Janny Casey APRN - LANCE; Maricel Mckoy RN Wound Ostomy [phone]    1/31/2025 1:10 PM Janny Casey APRN - NP; Kaylen Lopez RN Wound Ostomy [phone]    2/28/2025 2:30 PM Parul Perez PA-C Endocrinology [phone]    5/27/2025 3:00 PM Aric Shafer MD Neurology [phone]

## 2025-01-14 ENCOUNTER — TELEPHONE (OUTPATIENT)
Dept: WOUND CARE | Age: 60
End: 2025-01-14

## 2025-01-14 ENCOUNTER — CARE COORDINATION (OUTPATIENT)
Dept: CARE COORDINATION | Facility: CLINIC | Age: 60
End: 2025-01-14

## 2025-01-14 NOTE — TELEPHONE ENCOUNTER
Spoke with patient about removing his cast at home.  Patient stated that he couldn't make his last appointment and didn't want to leave the cast on for 2 weeks.  Patient is doing dressing changes and wearing Defender boot.  Next appointment is 1/17.  Will assess wound at next appointment.

## 2025-01-14 NOTE — PROGRESS NOTES
day), 45 mL 3    buPROPion (WELLBUTRIN XL) 300 MG extended release tablet       DULoxetine (CYMBALTA) 60 MG extended release capsule TAKE ONE CAPSULE BY MOUTH EVERY MORNING ALONG WITH 30 MG CAPSULES (TOTAL DOSE OF 90 MG)      gabapentin (NEURONTIN) 800 MG tablet Take 1 tablet by mouth 3 times daily.      losartan (COZAAR) 100 MG tablet Take 1 tablet by mouth daily      oxyBUTYnin (DITROPAN-XL) 10 MG extended release tablet TAKE ONE TABLET BY MOUTH EVERY DAY 30 tablet 3    blood glucose test strips (ASCENSIA AUTODISC VI;ONE TOUCH ULTRA TEST VI) strip Check blood glucose 6 times per day.  Dx E11.65 600 each 3    ondansetron (ZOFRAN) 8 MG tablet TAKE ONE TABLET BY MOUTH 15 MINUTES PRIOR TO MEALTIME INSULIN TO PREVENT NAUSEA      traZODone (DESYREL) 50 MG tablet Take 1 tablet by mouth nightly      GVOKE HYPOPEN 2-PACK 1 MG/0.2ML SOAJ Inject 1 mg into the skin as needed (severe hypoglycemia) 2 each 1    Insulin Pen Needle (BD PEN NEEDLE LUCILLE 2ND GEN) 32G X 4 MM MISC 5 insulin injections per day.  Dx E11.65 500 each 3    atorvastatin (LIPITOR) 80 MG tablet Take 1 tablet by mouth daily      tamsulosin (FLOMAX) 0.4 mg capsule Take 1 capsule by mouth daily      Abacavir-Dolutegravir-Lamivud (TRIUMEQ) 600- MG TABS TAKE ONE TABLET BY MOUTH EVERY DAY Orally Once a day for 30 day(s)      buPROPion (WELLBUTRIN SR) 150 MG extended release tablet Take 1 tablet by mouth 2 times daily (Patient not taking: Reported on 8/27/2024) 180 tablet 1    sodium bicarbonate 650 MG tablet Take 2 tablets by mouth 2 times daily (Patient not taking: Reported on 11/25/2024)      losartan (COZAAR) 25 MG tablet  (Patient not taking: Reported on 8/15/2024)      amLODIPine (NORVASC) 10 MG tablet Take 1 tablet by mouth daily 30 tablet 0    glucose 4 g chewable tablet Take 4 tablets by mouth as needed for Low blood sugar 60 tablet 0     No current facility-administered medications for this visit.       Review of Systems  All other systems reviewed

## 2025-01-14 NOTE — CARE COORDINATION
Ambulatory Care Coordination Note     1/14/2025 2:04 PM     Care Summary Note:   Outreach for High Risk Case Management - spoke with patient  Per patient - Wound Care appointment (at wound center) cancelled x2.  Patient needs at least 3 days notice in order to book transportation - could not take the Wednesday 1/15 appointment offered.  Patient has removed the cast on his foot by himself.  States the wound looks better. ACM expressed concern about this - the need to keep it clean, etc.    Will reach out to wound care - perhaps Home Health would be of benefit here.    Patient's BS last evening 187  BS this morning (FBS) 87     ACM: Heather Byers RN     Method of communication with provider: chart routing.    PCP/Specialist follow up:   Future Appointments         Provider Specialty Dept Phone    1/15/2025 11:15 AM Grinnell, Melissa R, PA-C Gastroenterology [phone]    1/17/2025 11:00 AM Janny Casey, APRN - NP; Kaylen Lopez RN Wound Ostomy [phone]    1/21/2025 2:00 PM You Devine RN Diabetes Services [phone]    1/24/2025 1:10 PM Janny Casey, APRN - NP; Maricel Mckoy RN Wound Ostomy [phone]    1/31/2025 1:10 PM Janny Casey, APRN - NP; Kaylen Lopez RN Wound Ostomy [phone]    2/28/2025 2:30 PM Parul Perez PA-C Endocrinology [phone]    5/27/2025 3:00 PM Aric Shafer MD Neurology [phone]

## 2025-01-15 ENCOUNTER — OFFICE VISIT (OUTPATIENT)
Age: 60
End: 2025-01-15
Payer: MEDICARE

## 2025-01-15 ENCOUNTER — PREP FOR PROCEDURE (OUTPATIENT)
Dept: GASTROENTEROLOGY | Age: 60
End: 2025-01-15

## 2025-01-15 ENCOUNTER — TELEPHONE (OUTPATIENT)
Dept: GASTROENTEROLOGY | Age: 60
End: 2025-01-15

## 2025-01-15 VITALS
BODY MASS INDEX: 25.18 KG/M2 | DIASTOLIC BLOOD PRESSURE: 95 MMHG | HEIGHT: 73 IN | HEART RATE: 76 BPM | OXYGEN SATURATION: 100 % | WEIGHT: 190 LBS | SYSTOLIC BLOOD PRESSURE: 154 MMHG | RESPIRATION RATE: 17 BRPM

## 2025-01-15 DIAGNOSIS — R11.0 CHRONIC NAUSEA: Primary | ICD-10-CM

## 2025-01-15 DIAGNOSIS — Z80.0 FAMILY HISTORY OF PANCREATIC CANCER: ICD-10-CM

## 2025-01-15 DIAGNOSIS — K52.9 CHRONIC DIARRHEA: ICD-10-CM

## 2025-01-15 DIAGNOSIS — R15.2 INCONTINENCE OF FECES WITH FECAL URGENCY: ICD-10-CM

## 2025-01-15 DIAGNOSIS — K21.9 GASTROESOPHAGEAL REFLUX DISEASE, UNSPECIFIED WHETHER ESOPHAGITIS PRESENT: ICD-10-CM

## 2025-01-15 DIAGNOSIS — Z80.0 FAMILY HISTORY OF COLON CANCER IN FATHER: ICD-10-CM

## 2025-01-15 DIAGNOSIS — R15.9 INCONTINENCE OF FECES WITH FECAL URGENCY: ICD-10-CM

## 2025-01-15 PROCEDURE — 99204 OFFICE O/P NEW MOD 45 MIN: CPT | Performed by: PHYSICIAN ASSISTANT

## 2025-01-15 PROCEDURE — 3017F COLORECTAL CA SCREEN DOC REV: CPT | Performed by: PHYSICIAN ASSISTANT

## 2025-01-15 PROCEDURE — G8427 DOCREV CUR MEDS BY ELIG CLIN: HCPCS | Performed by: PHYSICIAN ASSISTANT

## 2025-01-15 PROCEDURE — 4004F PT TOBACCO SCREEN RCVD TLK: CPT | Performed by: PHYSICIAN ASSISTANT

## 2025-01-15 PROCEDURE — G8419 CALC BMI OUT NRM PARAM NOF/U: HCPCS | Performed by: PHYSICIAN ASSISTANT

## 2025-01-15 RX ORDER — FAMOTIDINE 40 MG/1
40 TABLET, FILM COATED ORAL NIGHTLY
Qty: 90 TABLET | Refills: 0 | Status: SHIPPED | OUTPATIENT
Start: 2025-01-15

## 2025-01-15 RX ORDER — ONDANSETRON 8 MG/1
8 TABLET, ORALLY DISINTEGRATING ORAL EVERY 6 HOURS PRN
Qty: 30 TABLET | Refills: 3 | Status: SHIPPED | OUTPATIENT
Start: 2025-01-15 | End: 2025-05-15

## 2025-01-15 RX ORDER — PROMETHAZINE HYDROCHLORIDE 12.5 MG/1
12.5 TABLET ORAL EVERY 8 HOURS PRN
Qty: 20 TABLET | Refills: 0 | Status: SHIPPED | OUTPATIENT
Start: 2025-01-15 | End: 2025-01-22

## 2025-01-15 RX ORDER — SODIUM CHLORIDE 9 MG/ML
25 INJECTION, SOLUTION INTRAVENOUS PRN
Status: CANCELLED | OUTPATIENT
Start: 2025-01-15

## 2025-01-15 RX ORDER — OMEPRAZOLE 40 MG/1
40 CAPSULE, DELAYED RELEASE ORAL
Qty: 180 CAPSULE | Refills: 0 | Status: SHIPPED | OUTPATIENT
Start: 2025-01-15

## 2025-01-15 RX ORDER — SODIUM CHLORIDE 0.9 % (FLUSH) 0.9 %
5-40 SYRINGE (ML) INJECTION EVERY 12 HOURS SCHEDULED
Status: CANCELLED | OUTPATIENT
Start: 2025-01-15

## 2025-01-15 RX ORDER — SODIUM CHLORIDE 0.9 % (FLUSH) 0.9 %
5-40 SYRINGE (ML) INJECTION PRN
Status: CANCELLED | OUTPATIENT
Start: 2025-01-15

## 2025-01-15 NOTE — TELEPHONE ENCOUNTER
SUPREP Bowel Preparation - Split Dosing  If you are scheduled at our                                          If you are scheduled at our                                 [facility]:      [facility]:    [address], [place] [address]       [place] [address]  [phone]       Items to purchase 5 days before your procedure:    Suprep -  prescription at your pharmacy.  Purchase one 10oz bottle of Magnesium Citrate  Purchase Dulcolax Laxative tablets (Not stool softener tablets).      Two days before your procedure:      Drink at least eight glasses of water today.  Stop eating high- fiber foods such as vegetables and beans until after your colonoscopy. You can eat all other types of food today.     Drink the 10 oz bottle of magnesium citrate after dinner.      The day before your Colonoscopy:    Clear liquids only the entire day (water, Gatorade, coffee, tea, Sprite, 7-up, Jell-O, popsicles, chicken / beef broth, apple juice).    No milk / No dairy products, NO RED, BLUE or PURPLE color liquids /dyes    4:00 pm Take 2 Dulcolax tablets.     6:00 pm - Pour one 6 oz bottle of Suprep liquid into the mixing container. Add cold water to the 16-oz line and mix. Drink all the solution over 10-15 minutes.   Drink two more 16-ounce glasses of water over the next hour.            The day of your procedure:    6 hours prior to arrival time of your procedure, pour one 6 oz bottle of Suprep liquid into the mixing container. Add cold water to the 16-oz line and mix. Drink all the solution over 10-15 minutes. Drink two more 16-ounce glasses of water. Day of your Colonoscopy:   2 hours prior to arrival, we ask that you drink 32oz of clear noncaffeinated drink .  Water, apple juice etc.      NOTHING TO EAT UNTIL AFTER YOUR PROCEDURE.      IMPORTANT:      If you do not follow

## 2025-01-16 ENCOUNTER — CARE COORDINATION (OUTPATIENT)
Dept: CARE COORDINATION | Facility: CLINIC | Age: 60
End: 2025-01-16

## 2025-01-16 DIAGNOSIS — Z12.11 ENCOUNTER FOR SCREENING COLONOSCOPY: Primary | ICD-10-CM

## 2025-01-16 DIAGNOSIS — E11.65 TYPE 2 DIABETES MELLITUS WITH HYPERGLYCEMIA (HCC): ICD-10-CM

## 2025-01-16 RX ORDER — SODIUM, POTASSIUM,MAG SULFATES 17.5-3.13G
1 SOLUTION, RECONSTITUTED, ORAL ORAL ONCE
Qty: 1 EACH | Refills: 0 | Status: SHIPPED | OUTPATIENT
Start: 2025-01-16 | End: 2025-01-16

## 2025-01-16 NOTE — CARE COORDINATION
Ambulatory Care Coordination Note     1/16/2025 9:56 AM     Care Summary Note:   Outreach for High Risk Case Management - spoke with patient  Reviewed medications and instructions from GI visit yesterday.  Patient reports no BM x1 week.  (As opposed to diarrhea)   - advised stool softener, plenty of water to get things started  Colonoscopy is scheduled for 2/12   - patient can see prep instructions via MyChart   - reviewed prep instructions   - reminded patient he needs to have someone with him, take him home from procedure  (may need to change procedure date, will check with partner today)  Message to endocrinology re: any particulars related to diabetes and insulin     ACM: Heather Byers, JOE     PCP/Specialist follow up:   Future Appointments         Provider Specialty Dept Phone    1/17/2025 11:00 AM Janny Casey APRN - LANCE; Kaylen Lopez RN Wound Ostomy [phone]    1/21/2025 2:00 PM You Devine RN Diabetes Services [phone]    1/24/2025 1:10 PM Janny Casey APRN - NP; Maricel Mckoy RN Wound Ostomy [phone]    1/31/2025 1:10 PM Janny Casey APRN - LANCE; Kaylen Lopez RN Wound Ostomy [phone]    2/28/2025 2:30 PM Parul Perez PA-C Endocrinology [phone]    5/27/2025 3:00 PM Aric Shafer MD Neurology [phone]                
No

## 2025-01-17 ENCOUNTER — HOSPITAL ENCOUNTER (OUTPATIENT)
Dept: WOUND CARE | Age: 60
Discharge: HOME | End: 2025-01-17
Attending: SURGERY
Payer: MEDICARE

## 2025-01-17 VITALS
SYSTOLIC BLOOD PRESSURE: 149 MMHG | WEIGHT: 190 LBS | HEART RATE: 79 BPM | DIASTOLIC BLOOD PRESSURE: 96 MMHG | BODY MASS INDEX: 25.18 KG/M2 | HEIGHT: 73 IN

## 2025-01-17 DIAGNOSIS — E11.621 DIABETIC ULCER OF TOE OF LEFT FOOT ASSOCIATED WITH TYPE 2 DIABETES MELLITUS, WITH FAT LAYER EXPOSED (HCC): ICD-10-CM

## 2025-01-17 DIAGNOSIS — E11.42 DIABETIC POLYNEUROPATHY ASSOCIATED WITH TYPE 2 DIABETES MELLITUS (HCC): Primary | ICD-10-CM

## 2025-01-17 DIAGNOSIS — L97.522 DIABETIC ULCER OF TOE OF LEFT FOOT ASSOCIATED WITH TYPE 2 DIABETES MELLITUS, WITH FAT LAYER EXPOSED (HCC): ICD-10-CM

## 2025-01-17 DIAGNOSIS — L97.512 DIABETIC ULCER OF TOE OF RIGHT FOOT ASSOCIATED WITH TYPE 2 DIABETES MELLITUS, WITH FAT LAYER EXPOSED (HCC): Chronic | ICD-10-CM

## 2025-01-17 DIAGNOSIS — E11.621 DIABETIC ULCER OF TOE OF RIGHT FOOT ASSOCIATED WITH TYPE 2 DIABETES MELLITUS, WITH FAT LAYER EXPOSED (HCC): Chronic | ICD-10-CM

## 2025-01-17 PROCEDURE — 11042 DBRDMT SUBQ TIS 1ST 20SQCM/<: CPT

## 2025-01-17 RX ORDER — PEN NEEDLE, DIABETIC 32GX 5/32"
NEEDLE, DISPOSABLE MISCELLANEOUS
Qty: 500 EACH | Refills: 3 | Status: SHIPPED | OUTPATIENT
Start: 2025-01-17

## 2025-01-17 NOTE — FLOWSHEET NOTE
01/17/25 1120   Wound 08/15/24 Toe (Comment  which one) Right #1 right great toe   Date First Assessed/Time First Assessed: 08/15/24 0916   Present on Original Admission: Yes  Wound Approximate Age at First Assessment (Weeks): 2 weeks  Primary Wound Type: Diabetic Ulcer  Location: Toe (Comment  which one)  Wound Location Orientation...   Wound Image    Wound Etiology Diabetic Gavin 2   Dressing Status Old drainage noted   Wound Cleansed Cleansed with saline   Dressing/Treatment Alginate with Ag   Offloading for Diabetic Foot Ulcers Other (comment)   Wound Length (cm) 1 cm   Wound Width (cm) 0.5 cm   Wound Depth (cm) 0.2 cm   Wound Surface Area (cm^2) 0.5 cm^2   Change in Wound Size % (l*w) 92   Wound Volume (cm^3) 0.1 cm^3   Wound Healing % 84   Post-Procedure Length (cm) 1 cm   Post-Procedure Width (cm) 1 cm   Post-Procedure Depth (cm) 0.2 cm   Post-Procedure Surface Area (cm^2) 1 cm^2   Post-Procedure Volume (cm^3) 0.2 cm^3   Wound Assessment Granulation tissue   Drainage Amount Moderate (25-50%)   Drainage Description Serosanguinous   Odor None   Stella-wound Assessment Hyperkeratosis (callous)   Margins Undefined edges   Wound Thickness Description not for Pressure Injury Full thickness     Post debridement

## 2025-01-17 NOTE — WOUND CARE
Discharge Instructions for  [facility]  [address] [place] [address]  [address] [place] [address]  Phone [phone]   Fax [phone]      NAME:  Estevan Valenzuela  YOB: 1965  MEDICAL RECORD NUMBER:  [id number]  DATE:  1/17/2025    Return Appointment:  1 week with Janny Casey NP   Cast change    Instructions: Right great toe  Cleanse with normal saline  Let Vashe Wound Solution moistened guaze sit on wound for at least 60 seconds.  Apply alginate with silver. Cut product to wound size and apply to wound bed.   Cover with ABD pad  Secure with rolled gauze  TCC EZ 3\" applied this visit.   Wear total contact cast on right lower leg. Wear walking boot over cast at all times when standing or walking  Change on Friday of next week    Do not get wet in the shower, may purchase cast cover from local pharmacy.   Follow up with your endocrinologist about your diabetes control. Be sure to ask her about diabetes shoes and inserts.  Stop smoking.    Increase protein to 100g daily for optimal wound healing. Aim for 30g protein per shake, two shakes a day.  Protein:   Egg ~ 6g  Yogurt ~ 15g  Half cup of cottage cheese ~ 15g  Chicken breast ~ 30g  [place] filet ~ 40g       Should you experience increased redness, swelling, pain, foul odor, size of wound(s), or have a temperature over 101 degrees please contact the Wound Healing Center at [phone] or if after hours contact your primary care physician or go to the hospital emergency department.    PLEASE NOTE: IF YOU ARE UNABLE TO OBTAIN WOUND SUPPLIES, CONTINUE TO USE THE SUPPLIES YOU HAVE AVAILABLE UNTIL YOU ARE ABLE TO REACH US. IT IS MOST IMPORTANT TO KEEP THE WOUND COVERED AT ALL TIMES.    Electronically signed Brittney Pagan RN, [facility] on 1/17/2025 at 11:48 AM

## 2025-01-17 NOTE — FLOWSHEET NOTE
01/17/25 1120   Wound 08/15/24 Toe (Comment  which one) Right #1 right great toe   Date First Assessed/Time First Assessed: 08/15/24 0916   Present on Original Admission: Yes  Wound Approximate Age at First Assessment (Weeks): 2 weeks  Primary Wound Type: Diabetic Ulcer  Location: Toe (Comment  which one)  Wound Location Orientation...   Wound Image    Wound Etiology Diabetic Gavin 2   Dressing Status Old drainage noted   Wound Cleansed Cleansed with saline   Dressing/Treatment Alginate with Ag   Offloading for Diabetic Foot Ulcers Other (comment)   Wound Length (cm) 1 cm   Wound Width (cm) 0.5 cm   Wound Depth (cm) 0.2 cm   Wound Surface Area (cm^2) 0.5 cm^2   Change in Wound Size % (l*w) 92   Wound Volume (cm^3) 0.1 cm^3   Wound Healing % 84   Wound Assessment Granulation tissue   Drainage Amount Moderate (25-50%)   Drainage Description Serosanguinous   Odor None   Stella-wound Assessment Hyperkeratosis (callous)   Margins Undefined edges   Wound Thickness Description not for Pressure Injury Full thickness

## 2025-01-17 NOTE — WOUND CARE
Total Contact Cast    NAME:  Estevan Valenzuela  YOB: 1965  MEDICAL RECORD NUMBER:  [id number]  DATE:  1/17/2025    Goal:  Patient will maintain integrity of cast, avoid mobility hazards, and report complications that may occur (foul odor, pain, numbness, cracked cast).    Removed old contact cast if indicated and wash extremity with soap and water  Applied ordered dressing over wound(s)   Applied cast padding  Applied per Janny Casey NP  Total Contact Cast was applied in the Wound Care Center to right lower leg  Applied cast material fiberglass  Allow cast to dry   Instructed patient to report to health care provider, including wound care center, any back pain, hip pain, or leg pain, numbness of toes, or any odor coming from the cast.   Instructed patient not to stick any foreign objects down into cast.  Instructed patient to utilize assistive devices(crutches, cane or walker) as ordered.  Instructed patient to continue offloading as directed.     Applied cast per  Guidelines    Electronically signed by Brittney Pagan RN, [facility] on 1/17/2025 at 11:48 AM

## 2025-01-17 NOTE — DISCHARGE INSTRUCTIONS
Discharge Instructions for  [facility]  [address] [place] [address]  Phone [phone]   Fax [phone]      NAME:  Estevan Valenzuela  YOB: 1965  MEDICAL RECORD NUMBER:  [id number]  DATE:  @ED@    Return Appointment:   1 week with Janny Casey NP      Instructions: ***        Should you experience increased redness, swelling, pain, foul odor, size of wound(s), or have a temperature over 101 degrees please contact the Wound Healing Center at [phone] or if after hours contact your primary care physician or go to the hospital emergency department.    PLEASE NOTE: IF YOU ARE UNABLE TO OBTAIN WOUND SUPPLIES, CONTINUE TO USE THE SUPPLIES YOU HAVE AVAILABLE UNTIL YOU ARE ABLE TO REACH US. IT IS MOST IMPORTANT TO KEEP THE WOUND COVERED AT ALL TIMES.    Electronically signed Brittney Pagan RN on 1/17/2025 at 11:48 AM

## 2025-01-20 ENCOUNTER — CARE COORDINATION (OUTPATIENT)
Dept: CARE COORDINATION | Facility: CLINIC | Age: 60
End: 2025-01-20

## 2025-01-20 NOTE — CARE COORDINATION
Ambulatory Care Coordination Note     1/20/2025 2:32 PM     Care Summary Note:   Outreach for High Risk Case Management - spoke with patient  Visit with Wound Care on Friday 1/17   - debridment   - cast re-applied   - returns 1/24  Just now getting up and going today   - FBS:  167   - up in the night with diarrhea  Discussed Diabetes Education tomorrow   - encouraged patient to ask questions, educator very knowledgeable   - needs information re: bowel prep for colonoscopy in terms of insulin     ACM: Heather Byers RN    Method of communication with provider: chart routing.    PCP/Specialist follow up:   Future Appointments         Provider Specialty Dept Phone    1/21/2025 2:00 PM You Devine RN Diabetes Services [phone]    1/24/2025 1:10 PM Janny Casey, DAVION - NP; Tanesha Zheng, ROSE Wound Ostomy [phone]    1/31/2025 1:10 PM Janny Casey, APRN - NP; Kaylen Lopez RN Wound Ostomy [phone]    2/28/2025 2:30 PM Parul Perez PA-C Endocrinology [phone]    5/27/2025 3:00 PM Aric Shafer MD Neurology [phone]

## 2025-01-21 ENCOUNTER — HOSPITAL ENCOUNTER (OUTPATIENT)
Dept: DIABETES SERVICES | Age: 60
Setting detail: RECURRING SERIES
Discharge: HOME | End: 2025-01-24

## 2025-01-21 NOTE — PROGRESS NOTES
Outpatient Diabetes Education    Class Type: Diabetes Basics    Patient attended outpatient diabetes education via telephone.  We started at slide 36 and completed Basic session today.   Provided and reviewed education material entitled, \"Diabetes Basics: Learning to how to manage your diabetes.\"    Explained basic physiology of diabetes, causes, signs/symptoms, and treatments for hypoglycemia and hyperglycemia. Educated on target goals for blood glucose and A1C.    Described the effects of poor glycemic control and risk of long-term complications.  Described proper diabetic foot care and importance of checking feet daily.     Educated on glycemic effect of sugary drinks and healthier alternatives to improve glycemic control. Educated on the effects of carbohydrates on blood glucose, \"plate method\" for healthy meal planning, basics of carb counting and nutrition label reading.    Educated on the benefits of physical activity (as directed by provider).    Educated on checking blood sugars, continuous glucose monitors, insulin pens, how to draw up and inject insulin using a syringe and vial, sick day management and stress.  Educated on diabetic medications including mechanism of action, timing, and possible side effects.     Encouraged lifestyle modifications and follow up with primary care provider.    He is still having issues with diarrhea, nausea and vomiting and is having difficulty with food.  He sometimes only eats a cracker and ginger ale for breakfast, but says breakfast food is his favorite food. He is under care of a Gastroenterologist and has a colonoscopy scheduled. He does not know if he can keep the prep down. Currently [facility]  is not holding a charge and does not last a day.  Provided phone number to [facility] and suggested he also can contact supplier for replacement.   should last 5 years. Reports his first  was recalled and sent him a second one and this is the one that will not

## 2025-01-23 ENCOUNTER — PATIENT MESSAGE (OUTPATIENT)
Dept: ENDOCRINOLOGY | Age: 60
End: 2025-01-23

## 2025-01-24 ENCOUNTER — HOSPITAL ENCOUNTER (OUTPATIENT)
Dept: WOUND CARE | Age: 60
Discharge: HOME | End: 2025-01-24
Attending: SURGERY
Payer: MEDICARE

## 2025-01-24 VITALS
WEIGHT: 190 LBS | RESPIRATION RATE: 17 BRPM | HEIGHT: 73 IN | OXYGEN SATURATION: 98 % | DIASTOLIC BLOOD PRESSURE: 100 MMHG | TEMPERATURE: 98.1 F | SYSTOLIC BLOOD PRESSURE: 161 MMHG | HEART RATE: 79 BPM | BODY MASS INDEX: 25.18 KG/M2

## 2025-01-24 DIAGNOSIS — L97.512 DIABETIC ULCER OF TOE OF RIGHT FOOT ASSOCIATED WITH TYPE 2 DIABETES MELLITUS, WITH FAT LAYER EXPOSED (HCC): ICD-10-CM

## 2025-01-24 DIAGNOSIS — E11.621 DIABETIC ULCER OF TOE OF RIGHT FOOT ASSOCIATED WITH TYPE 2 DIABETES MELLITUS, WITH FAT LAYER EXPOSED (HCC): ICD-10-CM

## 2025-01-24 DIAGNOSIS — E11.42 DIABETIC POLYNEUROPATHY ASSOCIATED WITH TYPE 2 DIABETES MELLITUS (HCC): ICD-10-CM

## 2025-01-24 DIAGNOSIS — L97.522 DIABETIC ULCER OF TOE OF LEFT FOOT ASSOCIATED WITH TYPE 2 DIABETES MELLITUS, WITH FAT LAYER EXPOSED (HCC): Primary | ICD-10-CM

## 2025-01-24 DIAGNOSIS — E11.621 DIABETIC ULCER OF TOE OF LEFT FOOT ASSOCIATED WITH TYPE 2 DIABETES MELLITUS, WITH FAT LAYER EXPOSED (HCC): Primary | ICD-10-CM

## 2025-01-24 PROCEDURE — 11042 DBRDMT SUBQ TIS 1ST 20SQCM/<: CPT

## 2025-01-24 RX ORDER — MUPIROCIN 20 MG/G
OINTMENT TOPICAL ONCE
OUTPATIENT
Start: 2025-01-24 | End: 2025-01-24

## 2025-01-24 RX ORDER — TRIAMCINOLONE ACETONIDE 1 MG/G
OINTMENT TOPICAL ONCE
OUTPATIENT
Start: 2025-01-24 | End: 2025-01-24

## 2025-01-24 RX ORDER — BACITRACIN ZINC AND POLYMYXIN B SULFATE 500; 1000 [USP'U]/G; [USP'U]/G
OINTMENT TOPICAL ONCE
OUTPATIENT
Start: 2025-01-24 | End: 2025-01-24

## 2025-01-24 RX ORDER — BETAMETHASONE DIPROPIONATE 0.5 MG/G
CREAM TOPICAL ONCE
OUTPATIENT
Start: 2025-01-24 | End: 2025-01-24

## 2025-01-24 RX ORDER — SILVER SULFADIAZINE 10 MG/G
CREAM TOPICAL ONCE
OUTPATIENT
Start: 2025-01-24 | End: 2025-01-24

## 2025-01-24 RX ORDER — GENTAMICIN SULFATE 1 MG/G
OINTMENT TOPICAL ONCE
OUTPATIENT
Start: 2025-01-24 | End: 2025-01-24

## 2025-01-24 RX ORDER — NEOMYCIN/BACITRACIN/POLYMYXINB 3.5-400-5K
OINTMENT (GRAM) TOPICAL ONCE
OUTPATIENT
Start: 2025-01-24 | End: 2025-01-24

## 2025-01-24 RX ORDER — GINSENG 100 MG
CAPSULE ORAL ONCE
OUTPATIENT
Start: 2025-01-24 | End: 2025-01-24

## 2025-01-24 RX ORDER — LIDOCAINE 40 MG/G
CREAM TOPICAL ONCE
OUTPATIENT
Start: 2025-01-24 | End: 2025-01-24

## 2025-01-24 RX ORDER — CLOBETASOL PROPIONATE 0.5 MG/G
OINTMENT TOPICAL ONCE
OUTPATIENT
Start: 2025-01-24 | End: 2025-01-24

## 2025-01-24 RX ORDER — SODIUM CHLOR/HYPOCHLOROUS ACID 0.033 %
SOLUTION, IRRIGATION IRRIGATION ONCE
OUTPATIENT
Start: 2025-01-24 | End: 2025-01-24

## 2025-01-24 RX ORDER — LIDOCAINE HYDROCHLORIDE 20 MG/ML
JELLY TOPICAL ONCE
OUTPATIENT
Start: 2025-01-24 | End: 2025-01-24

## 2025-01-24 RX ORDER — LIDOCAINE 50 MG/G
OINTMENT TOPICAL ONCE
OUTPATIENT
Start: 2025-01-24 | End: 2025-01-24

## 2025-01-24 RX ORDER — LIDOCAINE HYDROCHLORIDE 40 MG/ML
SOLUTION TOPICAL ONCE
OUTPATIENT
Start: 2025-01-24 | End: 2025-01-24

## 2025-01-24 RX ORDER — LIDOCAINE HYDROCHLORIDE 20 MG/ML
JELLY TOPICAL ONCE
Status: DISCONTINUED | OUTPATIENT
Start: 2025-01-24 | End: 2025-01-25 | Stop reason: HOSPADM

## 2025-01-24 NOTE — WOUND CARE
Discharge Instructions for  [facility]  [address] [place] [address]  [address] [place] [address]  Phone [phone]   Fax [phone]      NAME:  Estevan Valenzuela  YOB: 1965  MEDICAL RECORD NUMBER:  [id number]  DATE:  1/24/2025    Return Appointment:  1 week with Janny Casey NP       Instructions: Right great toe  Cleanse with normal saline  Let Vashe Wound Solution moistened guaze sit on wound for at least 60 seconds.  Apply alginate with silver. Cut product to wound size and apply to wound bed.   Cover with ABD pad  Secure with rolled gauze  TCC EZ 3\" applied this visit.   Wear total contact cast on right lower leg. Wear walking boot over cast at all times when standing or walking  Change dressing in 1 week at wound center    Do not get wet in the shower, may purchase cast cover from local pharmacy.   Follow up with your endocrinologist about your diabetes control. Be sure to ask her about diabetes shoes and inserts.  Stop smoking.    Increase protein to 100g daily for optimal wound healing. Aim for 30g protein per shake, two shakes a day.  Protein:   Egg ~ 6g  Yogurt ~ 15g  Half cup of cottage cheese ~ 15g  Chicken breast ~ 30g  [place] filet ~ 40g       Should you experience increased redness, swelling, pain, foul odor, size of wound(s), or have a temperature over 101 degrees please contact the Wound Healing Center at [phone] or if after hours contact your primary care physician or go to the hospital emergency department.    PLEASE NOTE: IF YOU ARE UNABLE TO OBTAIN WOUND SUPPLIES, CONTINUE TO USE THE SUPPLIES YOU HAVE AVAILABLE UNTIL YOU ARE ABLE TO REACH US. IT IS MOST IMPORTANT TO KEEP THE WOUND COVERED AT ALL TIMES.    Electronically signed Tanesha Zheng PT, [facility] on 1/24/2025 at 2:10 PM

## 2025-01-24 NOTE — FLOWSHEET NOTE
01/24/25 1332   Right Leg Edema Point of Measurement   Leg circumference 34.5 cm   Ankle circumference 22 cm   Foot circumference 23 cm   Compression Therapy Other  (TCC)   Wound 08/15/24 Toe (Comment  which one) Right #1 right great toe   Date First Assessed/Time First Assessed: 08/15/24 0916   Present on Original Admission: Yes  Wound Approximate Age at First Assessment (Weeks): 2 weeks  Primary Wound Type: Diabetic Ulcer  Location: Toe (Comment  which one)  Wound Location Orientation...   Wound Image     Wound Etiology Diabetic Gavin 2   Dressing Status Old drainage noted   Wound Cleansed Soap and water;Cleansed with saline   Dressing/Treatment Alginate with Ag   Offloading for Diabetic Foot Ulcers Total contact cast   Wound Length (cm) 0.8 cm   Wound Width (cm) 0.7 cm   Wound Depth (cm) 0.3 cm   Wound Surface Area (cm^2) 0.56 cm^2   Change in Wound Size % (l*w) 91.04   Wound Volume (cm^3) 0.168 cm^3   Wound Healing % 73   Post-Procedure Length (cm) 0.8 cm   Post-Procedure Width (cm) 0.7 cm   Post-Procedure Depth (cm) 0.2 cm   Post-Procedure Surface Area (cm^2) 0.56 cm^2   Post-Procedure Volume (cm^3) 0.112 cm^3   Wound Assessment Granulation tissue;Slough   Drainage Amount Moderate (25-50%)   Drainage Description Serosanguinous   Odor None   Stella-wound Assessment Hyperkeratosis (callous)   Wound Thickness Description not for Pressure Injury Full thickness   Pain Assessment   Pain Assessment None - Denies Pain     Pre- and post-debridement

## 2025-01-24 NOTE — DISCHARGE INSTRUCTIONS
Return Appointment:  1 week with Janny Casey NP         Instructions: Right great toe  Cleanse with normal saline  Let Vashe Wound Solution moistened guaze sit on wound for at least 60 seconds.  Apply alginate with silver. Cut product to wound size and apply to wound bed.   Cover with ABD pad  Secure with rolled gauze  TCC EZ 3\" applied this visit.   Wear total contact cast on right lower leg. Wear walking boot over cast at all times when standing or walking  Change dressing in 1 week at wound center     Do not get wet in the shower, may purchase cast cover from local pharmacy.   Follow up with your endocrinologist about your diabetes control. Be sure to ask her about diabetes shoes and inserts.  Stop smoking.     Increase protein to 100g daily for optimal wound healing. Aim for 30g protein per shake, two shakes a day.  Protein:   Egg ~ 6g  Yogurt ~ 15g  Half cup of cottage cheese ~ 15g  Chicken breast ~ 30g  [place] filet ~ 40g

## 2025-01-27 ENCOUNTER — CARE COORDINATION (OUTPATIENT)
Dept: CARE COORDINATION | Facility: CLINIC | Age: 60
End: 2025-01-27

## 2025-01-27 NOTE — CARE COORDINATION
Ambulatory Care Coordination Note     1/27/2025 12:23 PM     Care Summary Note:   Outreach for High Risk Case Management   Did not speak with patient, but he did text stating that he is very nauseous today, had gone back to bed.     ACM: Heather Byers, JOE     Method of communication with provider: none.    PCP/Specialist follow up:   Future Appointments         Provider Specialty Dept Phone    1/31/2025 1:10 PM Janny Casey, APRN - NP; Kaylen Lopez RN Wound Ostomy [phone]    2/7/2025 1:10 PM Janny Casey APRN - NP; Maricel Mckoy RN Wound Ostomy [phone]    2/14/2025 1:10 PM Janny Casey, DAVION - NP; Tanesha hZeng, ROSE Wound Ostomy [phone]    2/28/2025 2:30 PM Parul Perez PA-C Endocrinology [phone]    3/3/2025 2:00 PM Jenny River RD Diabetes Services [phone]    5/27/2025 3:00 PM Aric Shafer MD Neurology [phone]

## 2025-01-31 ENCOUNTER — HOSPITAL ENCOUNTER (OUTPATIENT)
Dept: WOUND CARE | Age: 60
Discharge: HOME | End: 2025-01-31
Attending: SURGERY
Payer: MEDICARE

## 2025-01-31 VITALS
HEIGHT: 73 IN | HEART RATE: 102 BPM | BODY MASS INDEX: 25.98 KG/M2 | DIASTOLIC BLOOD PRESSURE: 110 MMHG | SYSTOLIC BLOOD PRESSURE: 148 MMHG | WEIGHT: 196 LBS

## 2025-01-31 DIAGNOSIS — E11.621 DIABETIC ULCER OF TOE OF LEFT FOOT ASSOCIATED WITH TYPE 2 DIABETES MELLITUS, WITH FAT LAYER EXPOSED (HCC): ICD-10-CM

## 2025-01-31 DIAGNOSIS — E11.42 DIABETIC POLYNEUROPATHY ASSOCIATED WITH TYPE 2 DIABETES MELLITUS (HCC): Primary | ICD-10-CM

## 2025-01-31 DIAGNOSIS — L97.522 DIABETIC ULCER OF TOE OF LEFT FOOT ASSOCIATED WITH TYPE 2 DIABETES MELLITUS, WITH FAT LAYER EXPOSED (HCC): ICD-10-CM

## 2025-01-31 DIAGNOSIS — L97.512 DIABETIC ULCER OF TOE OF RIGHT FOOT ASSOCIATED WITH TYPE 2 DIABETES MELLITUS, WITH FAT LAYER EXPOSED (HCC): Chronic | ICD-10-CM

## 2025-01-31 DIAGNOSIS — E11.621 DIABETIC ULCER OF TOE OF RIGHT FOOT ASSOCIATED WITH TYPE 2 DIABETES MELLITUS, WITH FAT LAYER EXPOSED (HCC): Chronic | ICD-10-CM

## 2025-01-31 PROCEDURE — 11042 DBRDMT SUBQ TIS 1ST 20SQCM/<: CPT

## 2025-01-31 NOTE — WOUND CARE
Total Contact Cast    NAME:  Estevan Valenzuela  YOB: 1965  MEDICAL RECORD NUMBER:  [id number]  DATE:  1/31/2025    Goal:  Patient will maintain integrity of cast, avoid mobility hazards, and report complications that may occur (foul odor, pain, numbness, cracked cast).    Removed old contact cast if indicated and wash extremity with soap and water  Applied ordered dressing over wound(s)   Applied cast padding  Applied foam padding  Applied per Janny Casey NP  Total Contact Cast was applied in the Wound Care Center to left lower leg  Total Contact Cast was applied in the Wound Care Center to right lower leg  Applied cast material fiberglass  Applied cast material plaster   Allow cast to dry   Instructed patient to report to health care provider, including wound care center, any back pain, hip pain, or leg pain, numbness of toes, or any odor coming from the cast.   Instructed patient not to stick any foreign objects down into cast.  Instructed patient to utilize assistive devices(crutches, cane or walker) as ordered.  Instructed patient to continue offloading as directed.     Applied cast per  Guidelines    Electronically signed by Brittney Pagan RN on 1/31/2025 at 1:56 PM

## 2025-01-31 NOTE — FLOWSHEET NOTE
01/31/25 1327   Wound 08/15/24 Toe (Comment  which one) Right #1 right great toe   Date First Assessed/Time First Assessed: 08/15/24 0916   Present on Original Admission: Yes  Wound Approximate Age at First Assessment (Weeks): 2 weeks  Primary Wound Type: Diabetic Ulcer  Location: Toe (Comment  which one)  Wound Location Orientation...   Wound Image    Wound Etiology Diabetic Gavin 2   Dressing Status Old drainage noted   Wound Cleansed Soap and water;Cleansed with saline   Dressing/Treatment Alginate with Ag   Offloading for Diabetic Foot Ulcers Total contact cast   Wound Length (cm) 0.4 cm   Wound Width (cm) 0.3 cm   Wound Depth (cm) 0.2 cm   Wound Surface Area (cm^2) 0.12 cm^2   Change in Wound Size % (l*w) 98.08   Wound Volume (cm^3) 0.024 cm^3   Wound Healing % 96   Wound Assessment Granulation tissue;Slough   Drainage Amount Moderate (25-50%)   Drainage Description Serosanguinous   Odor None   Stella-wound Assessment Hyperkeratosis (callous)   Wound Thickness Description not for Pressure Injury Full thickness

## 2025-01-31 NOTE — FLOWSHEET NOTE
01/31/25 1327   Wound 08/15/24 Toe (Comment  which one) Right #1 right great toe   Date First Assessed/Time First Assessed: 08/15/24 0916   Present on Original Admission: Yes  Wound Approximate Age at First Assessment (Weeks): 2 weeks  Primary Wound Type: Diabetic Ulcer  Location: Toe (Comment  which one)  Wound Location Orientation...   Wound Image     Wound Etiology Diabetic Gavin 2   Dressing Status Old drainage noted   Wound Cleansed Soap and water;Cleansed with saline   Dressing/Treatment Alginate with Ag   Offloading for Diabetic Foot Ulcers Total contact cast   Wound Length (cm) 0.4 cm   Wound Width (cm) 0.3 cm   Wound Depth (cm) 0.2 cm   Wound Surface Area (cm^2) 0.12 cm^2   Change in Wound Size % (l*w) 98.08   Wound Volume (cm^3) 0.024 cm^3   Wound Healing % 96   Post-Procedure Length (cm) 0.4 cm   Post-Procedure Width (cm) 0.3 cm   Post-Procedure Depth (cm) 0.2 cm   Post-Procedure Surface Area (cm^2) 0.12 cm^2   Post-Procedure Volume (cm^3) 0.024 cm^3   Wound Assessment Granulation tissue;Slough   Drainage Amount Moderate (25-50%)   Drainage Description Serosanguinous   Odor None   Stella-wound Assessment Hyperkeratosis (callous)   Wound Thickness Description not for Pressure Injury Full thickness     Post debridement

## 2025-01-31 NOTE — WOUND CARE
Discharge Instructions for  [facility]  [address] [place] [address]  [address] [place] [address]  Phone [phone]   Fax [phone]      NAME:  Estevan Valenzuela  YOB: 1965  MEDICAL RECORD NUMBER:  [id number]  DATE:  1/31/2025    Return Appointment:  1 week with Janny Casey NP       Instructions: Right great toe  Cleanse with normal saline  Let Vashe Wound Solution moistened guaze sit on wound for at least 60 seconds.  Apply alginate with silver. Cut product to wound size and apply to wound bed.   Cover with ABD pad  Secure with rolled gauze  TCC EZ 3\" applied this visit.   Wear total contact cast on right lower leg. Wear walking boot over cast at all times when standing or walking  Change dressing in 1 week at wound center    Do not get wet in the shower, may purchase cast cover from local pharmacy.   Follow up with your endocrinologist about your diabetes control. Be sure to ask her about diabetes shoes and inserts.  Stop smoking.    Increase protein to 100g daily for optimal wound healing. Aim for 30g protein per shake, two shakes a day.  Protein:   Egg ~ 6g  Yogurt ~ 15g  Half cup of cottage cheese ~ 15g  Chicken breast ~ 30g  [place] filet ~ 40g       Should you experience increased redness, swelling, pain, foul odor, size of wound(s), or have a temperature over 101 degrees please contact the Wound Healing Center at [phone] or if after hours contact your primary care physician or go to the hospital emergency department.    PLEASE NOTE: IF YOU ARE UNABLE TO OBTAIN WOUND SUPPLIES, CONTINUE TO USE THE SUPPLIES YOU HAVE AVAILABLE UNTIL YOU ARE ABLE TO REACH US. IT IS MOST IMPORTANT TO KEEP THE WOUND COVERED AT ALL TIMES.    Electronically signed Brittney Pagan RN, [facility] on 1/31/2025 at 1:55 PM

## 2025-01-31 NOTE — DISCHARGE INSTRUCTIONS
Discharge Instructions for  [facility]  [address] [place] [address]  Phone [phone]   Fax [phone]      NAME:  Estevan Valenzuela  YOB: 1965  MEDICAL RECORD NUMBER:  [id number]  DATE:  @ED@    Return Appointment:   1 week with Janny Casey NP      Instructions: ***        Should you experience increased redness, swelling, pain, foul odor, size of wound(s), or have a temperature over 101 degrees please contact the Wound Healing Center at [phone] or if after hours contact your primary care physician or go to the hospital emergency department.    PLEASE NOTE: IF YOU ARE UNABLE TO OBTAIN WOUND SUPPLIES, CONTINUE TO USE THE SUPPLIES YOU HAVE AVAILABLE UNTIL YOU ARE ABLE TO REACH US. IT IS MOST IMPORTANT TO KEEP THE WOUND COVERED AT ALL TIMES.    Electronically signed Brittney Pagan RN on 1/31/2025 at 1:55 PM

## 2025-02-04 ENCOUNTER — CARE COORDINATION (OUTPATIENT)
Dept: CARE COORDINATION | Facility: CLINIC | Age: 60
End: 2025-02-04

## 2025-02-04 NOTE — CARE COORDINATION
Ambulatory Care Coordination Note     2/4/2025 11:30 AM     Care Summary Note:   Outreach for High Risk Case Management   Blood sugar yesterday 143, Day before 120  (FBS)  Expecting a visit from [facility] RN - review medications, etc  No nausea last few days  Compliant with Wound Care.  Colonoscopy next week.     ACM: Heather Byers RN     Method of communication with provider: none.    PCP/Specialist follow up:   Future Appointments         Provider Specialty Dept Phone    2/7/2025 1:10 PM Janny Casey, DAVION - LANCE; EanMaricel RN Wound Ostomy [phone]    2/14/2025 1:10 PM Janny Casey, DAVION - NP; Tanesha Zheng, PT Wound Ostomy [phone]    2/21/2025 1:20 PM Janny Casey APRN - LANCE; Tanesha Zheng, PT Wound Ostomy [phone]    2/28/2025 2:30 PM Parul Perez PA-C Endocrinology [phone]    3/3/2025 2:00 PM Jenny River, CAR[facility] Diabetes Services [phone]    5/27/2025 3:00 PM Aric Shafer MD Neurology [phone]

## 2025-02-07 ENCOUNTER — HOSPITAL ENCOUNTER (OUTPATIENT)
Dept: WOUND CARE | Age: 60
Discharge: HOME | End: 2025-02-07
Attending: SURGERY
Payer: MEDICARE

## 2025-02-07 ENCOUNTER — TELEPHONE (OUTPATIENT)
Dept: GASTROENTEROLOGY | Age: 60
End: 2025-02-07

## 2025-02-07 VITALS
DIASTOLIC BLOOD PRESSURE: 87 MMHG | HEIGHT: 73 IN | HEART RATE: 85 BPM | SYSTOLIC BLOOD PRESSURE: 132 MMHG | BODY MASS INDEX: 25.98 KG/M2 | WEIGHT: 196 LBS

## 2025-02-07 DIAGNOSIS — E11.42 DIABETIC POLYNEUROPATHY ASSOCIATED WITH TYPE 2 DIABETES MELLITUS (HCC): Primary | ICD-10-CM

## 2025-02-07 DIAGNOSIS — L97.512 DIABETIC ULCER OF TOE OF RIGHT FOOT ASSOCIATED WITH TYPE 2 DIABETES MELLITUS, WITH FAT LAYER EXPOSED (HCC): Chronic | ICD-10-CM

## 2025-02-07 DIAGNOSIS — E11.621 DIABETIC ULCER OF TOE OF LEFT FOOT ASSOCIATED WITH TYPE 2 DIABETES MELLITUS, WITH FAT LAYER EXPOSED (HCC): ICD-10-CM

## 2025-02-07 DIAGNOSIS — E11.621 DIABETIC ULCER OF TOE OF RIGHT FOOT ASSOCIATED WITH TYPE 2 DIABETES MELLITUS, WITH FAT LAYER EXPOSED (HCC): Chronic | ICD-10-CM

## 2025-02-07 DIAGNOSIS — L97.522 DIABETIC ULCER OF TOE OF LEFT FOOT ASSOCIATED WITH TYPE 2 DIABETES MELLITUS, WITH FAT LAYER EXPOSED (HCC): ICD-10-CM

## 2025-02-07 PROCEDURE — 11042 DBRDMT SUBQ TIS 1ST 20SQCM/<: CPT

## 2025-02-07 RX ORDER — GINSENG 100 MG
CAPSULE ORAL ONCE
OUTPATIENT
Start: 2025-02-07 | End: 2025-02-07

## 2025-02-07 RX ORDER — NEOMYCIN/BACITRACIN/POLYMYXINB 3.5-400-5K
OINTMENT (GRAM) TOPICAL ONCE
OUTPATIENT
Start: 2025-02-07 | End: 2025-02-07

## 2025-02-07 RX ORDER — BACITRACIN ZINC AND POLYMYXIN B SULFATE 500; 1000 [USP'U]/G; [USP'U]/G
OINTMENT TOPICAL ONCE
OUTPATIENT
Start: 2025-02-07 | End: 2025-02-07

## 2025-02-07 RX ORDER — SODIUM CHLOR/HYPOCHLOROUS ACID 0.033 %
SOLUTION, IRRIGATION IRRIGATION ONCE
OUTPATIENT
Start: 2025-02-07 | End: 2025-02-07

## 2025-02-07 RX ORDER — LIDOCAINE HYDROCHLORIDE 20 MG/ML
JELLY TOPICAL ONCE
OUTPATIENT
Start: 2025-02-07 | End: 2025-02-07

## 2025-02-07 RX ORDER — CLOBETASOL PROPIONATE 0.5 MG/G
OINTMENT TOPICAL ONCE
OUTPATIENT
Start: 2025-02-07 | End: 2025-02-07

## 2025-02-07 RX ORDER — MUPIROCIN 20 MG/G
OINTMENT TOPICAL ONCE
OUTPATIENT
Start: 2025-02-07 | End: 2025-02-07

## 2025-02-07 RX ORDER — LIDOCAINE 50 MG/G
OINTMENT TOPICAL ONCE
OUTPATIENT
Start: 2025-02-07 | End: 2025-02-07

## 2025-02-07 RX ORDER — LIDOCAINE 40 MG/G
CREAM TOPICAL ONCE
OUTPATIENT
Start: 2025-02-07 | End: 2025-02-07

## 2025-02-07 RX ORDER — LIDOCAINE HYDROCHLORIDE 20 MG/ML
JELLY TOPICAL ONCE
Status: COMPLETED | OUTPATIENT
Start: 2025-02-07 | End: 2025-02-07

## 2025-02-07 RX ORDER — SILVER SULFADIAZINE 10 MG/G
CREAM TOPICAL ONCE
OUTPATIENT
Start: 2025-02-07 | End: 2025-02-07

## 2025-02-07 RX ORDER — LIDOCAINE HYDROCHLORIDE 40 MG/ML
SOLUTION TOPICAL ONCE
OUTPATIENT
Start: 2025-02-07 | End: 2025-02-07

## 2025-02-07 RX ORDER — BETAMETHASONE DIPROPIONATE 0.5 MG/G
CREAM TOPICAL ONCE
OUTPATIENT
Start: 2025-02-07 | End: 2025-02-07

## 2025-02-07 RX ORDER — TRIAMCINOLONE ACETONIDE 1 MG/G
OINTMENT TOPICAL ONCE
OUTPATIENT
Start: 2025-02-07 | End: 2025-02-07

## 2025-02-07 RX ORDER — GENTAMICIN SULFATE 1 MG/G
OINTMENT TOPICAL ONCE
OUTPATIENT
Start: 2025-02-07 | End: 2025-02-07

## 2025-02-07 RX ADMIN — LIDOCAINE HYDROCHLORIDE: 20 JELLY TOPICAL at 14:00

## 2025-02-07 NOTE — FLOWSHEET NOTE
02/07/25 1331   Wound 08/15/24 Toe (Comment  which one) Right #1 right great toe   Date First Assessed/Time First Assessed: 08/15/24 0916   Present on Original Admission: Yes  Wound Approximate Age at First Assessment (Weeks): 2 weeks  Primary Wound Type: Diabetic Ulcer  Location: Toe (Comment  which one)  Wound Location Orientation...   Wound Image    Wound Etiology Diabetic Gavin 2   Dressing Status Old drainage noted   Wound Cleansed Soap and water;Cleansed with saline   Dressing/Treatment Alginate with Ag   Offloading for Diabetic Foot Ulcers Total contact cast   Wound Length (cm) 0.4 cm   Wound Width (cm) 0.3 cm   Wound Depth (cm) 0.2 cm   Wound Surface Area (cm^2) 0.12 cm^2   Change in Wound Size % (l*w) 98.08   Wound Volume (cm^3) 0.024 cm^3   Wound Healing % 96   Post-Procedure Length (cm) 0.3 cm   Post-Procedure Width (cm) 0.1 cm   Post-Procedure Depth (cm) 0.1 cm   Post-Procedure Surface Area (cm^2) 0.03 cm^2   Post-Procedure Volume (cm^3) 0.003 cm^3   Wound Assessment Granulation tissue   Drainage Amount None (dry)   Odor None   Stella-wound Assessment Hyperkeratosis (callous)   Wound Thickness Description not for Pressure Injury Full thickness     Post debridement

## 2025-02-07 NOTE — WOUND CARE
Discharge Instructions for  [facility]  [address]  [address]  [place] [address]  Phone [phone]   Fax [phone]      NAME:  Estevan Valenzuela  YOB: 1965  MEDICAL RECORD NUMBER:  [id number]  DATE:  2/7/2025    Return Appointment:  1 week with Janny Casey NP       Instructions: Right great toe  Cleanse with normal saline  Let Vashe Wound Solution moistened guaze sit on wound for at least 60 seconds.  Xeroform- apply to wound bed.  Cover with ABD pad  Secure with rolled gauze  TCC EZ 3\" applied this visit.   Wear total contact cast on right lower leg. Wear walking boot over cast at all times when standing or walking  Change dressing in 1 week at wound center    Do not get wet in the shower, may purchase cast cover from local pharmacy.   Follow up with your endocrinologist about your diabetes control. Be sure to ask her about diabetes shoes and inserts.  Stop smoking.  Make appointment with foot solutions and make cast change appointment aligning with it    Increase protein to 100g daily for optimal wound healing. Aim for 30g protein per shake, two shakes a day.  Protein:   Egg ~ 6g  Yogurt ~ 15g  Half cup of cottage cheese ~ 15g  Chicken breast ~ 30g  [place] filet ~ 40g       Should you experience increased redness, swelling, pain, foul odor, size of wound(s), or have a temperature over 101 degrees please contact the Wound Healing Center at [phone] or if after hours contact your primary care physician or go to the hospital emergency department.    PLEASE NOTE: IF YOU ARE UNABLE TO OBTAIN WOUND SUPPLIES, CONTINUE TO USE THE SUPPLIES YOU HAVE AVAILABLE UNTIL YOU ARE ABLE TO REACH US. IT IS MOST IMPORTANT TO KEEP THE WOUND COVERED AT ALL TIMES.    Electronically signed Brittney Pagan RN, [facility] on 2/7/2025 at 2:02 PM

## 2025-02-07 NOTE — WOUND CARE
Total Contact Cast    NAME:  Estevan Valenzuela  YOB: 1965  MEDICAL RECORD NUMBER:  [id number]  DATE:  2/7/2025    Goal:  Patient will maintain integrity of cast, avoid mobility hazards, and report complications that may occur (foul odor, pain, numbness, cracked cast).    Removed old contact cast if indicated and wash extremity with soap and water  Applied ordered dressing over wound(s)   Applied cast padding  Applied foam padding  Applied per Janny Casey NP  Total Contact Cast was applied in the Wound Care Center to right lower leg  Applied cast material fiberglass  Applied cast material plaster   Allow cast to dry   Instructed patient to report to health care provider, including wound care center, any back pain, hip pain, or leg pain, numbness of toes, or any odor coming from the cast.   Instructed patient not to stick any foreign objects down into cast.  Instructed patient to utilize assistive devices(crutches, cane or walker) as ordered.  Instructed patient to continue offloading as directed.     Applied cast per  Guidelines    Electronically signed by Brittney Pagan RN on 2/7/2025 at 2:31 PM

## 2025-02-07 NOTE — TELEPHONE ENCOUNTER
Called pt lvm requesting a call back to reschedule due to case message sent stating \"pt needed to reschedule due to not having a ride\"

## 2025-02-07 NOTE — FLOWSHEET NOTE
02/07/25 1331   Wound 08/15/24 Toe (Comment  which one) Right #1 right great toe   Date First Assessed/Time First Assessed: 08/15/24 0916   Present on Original Admission: Yes  Wound Approximate Age at First Assessment (Weeks): 2 weeks  Primary Wound Type: Diabetic Ulcer  Location: Toe (Comment  which one)  Wound Location Orientation...   Wound Image    Wound Etiology Diabetic Gavin 2   Dressing Status Old drainage noted   Wound Cleansed Soap and water;Cleansed with saline   Dressing/Treatment Alginate with Ag   Offloading for Diabetic Foot Ulcers Total contact cast   Wound Length (cm) 0.4 cm   Wound Width (cm) 0.3 cm   Wound Depth (cm) 0.2 cm   Wound Surface Area (cm^2) 0.12 cm^2   Change in Wound Size % (l*w) 98.08   Wound Volume (cm^3) 0.024 cm^3   Wound Healing % 96   Wound Assessment Granulation tissue   Drainage Amount None (dry)   Odor None   Stella-wound Assessment Hyperkeratosis (callous)   Wound Thickness Description not for Pressure Injury Full thickness

## 2025-02-07 NOTE — DISCHARGE INSTRUCTIONS
Discharge Instructions for  [facility]  [address] [place] [address]  [address] [place] [address]  Phone [phone]   Fax [phone]      NAME:  Estevan Valenzuela  YOB: 1965  MEDICAL RECORD NUMBER:  [id number]  DATE:  @ED@    Return Appointment:   1 week with Janny Casey NP      Instructions: ***        Should you experience increased redness, swelling, pain, foul odor, size of wound(s), or have a temperature over 101 degrees please contact the Wound Healing Center at [phone] or if after hours contact your primary care physician or go to the hospital emergency department.    PLEASE NOTE: IF YOU ARE UNABLE TO OBTAIN WOUND SUPPLIES, CONTINUE TO USE THE SUPPLIES YOU HAVE AVAILABLE UNTIL YOU ARE ABLE TO REACH US. IT IS MOST IMPORTANT TO KEEP THE WOUND COVERED AT ALL TIMES.    Electronically signed Brittney Pagan RN on 2/7/2025 at 2:29 PM

## 2025-02-10 DIAGNOSIS — E11.65 TYPE 2 DIABETES MELLITUS WITH HYPERGLYCEMIA (HCC): ICD-10-CM

## 2025-02-11 ENCOUNTER — CARE COORDINATION (OUTPATIENT)
Dept: CARE COORDINATION | Facility: CLINIC | Age: 60
End: 2025-02-11

## 2025-02-11 NOTE — CARE COORDINATION
Ambulatory Care Coordination Note     2/11/2025 1:08 PM     Care Summary Note:   Outreach for High Risk Case Management  Colonoscopy will have to be rescheduled as patient did not have a designated .  He learned yesterday his PCP is retiring.  A new one will be assigned at [facility]  Experiencing some challenges with DME (bedside commode) but has been approved now.  [facility] is supposed to follow this up.    BS this morning 230, now it is 150.     ACM: Heather Byers RN     Method of communication with provider: chart routing.    Utilization: Has the patient been seen in the ED since your last call? no    PCP/Specialist follow up:   Future Appointments         Provider Specialty Dept Phone    2/14/2025 1:10 PM Janny Casey APRN - LANCE; Tanesha Zheng, PT Wound Ostomy [phone]    2/21/2025 1:20 PM Janny Casey APRN - LANCE; Tanesha Zheng, PT Wound Ostomy [phone]    2/28/2025 2:30 PM Parul Perez PA-C Endocrinology [phone]    3/3/2025 2:00 PM Jenny River RD Diabetes Services [phone]    5/27/2025 3:00 PM Aric Shafer MD Neurology [phone]

## 2025-02-12 PROBLEM — L97.522 DIABETIC ULCER OF TOE OF LEFT FOOT ASSOCIATED WITH TYPE 2 DIABETES MELLITUS, WITH FAT LAYER EXPOSED (HCC): Chronic | Status: ACTIVE | Noted: 2024-08-29

## 2025-02-12 PROBLEM — E11.621 DIABETIC ULCER OF TOE OF LEFT FOOT ASSOCIATED WITH TYPE 2 DIABETES MELLITUS, WITH NECROSIS OF MUSCLE (HCC): Chronic | Status: ACTIVE | Noted: 2023-12-18

## 2025-02-12 PROBLEM — E11.621 DIABETIC ULCER OF TOE OF LEFT FOOT ASSOCIATED WITH TYPE 2 DIABETES MELLITUS, WITH FAT LAYER EXPOSED (HCC): Chronic | Status: ACTIVE | Noted: 2024-08-29

## 2025-02-12 PROBLEM — L97.523 DIABETIC ULCER OF TOE OF LEFT FOOT ASSOCIATED WITH TYPE 2 DIABETES MELLITUS, WITH NECROSIS OF MUSCLE (HCC): Chronic | Status: ACTIVE | Noted: 2023-12-18

## 2025-02-14 ENCOUNTER — HOSPITAL ENCOUNTER (OUTPATIENT)
Dept: WOUND CARE | Age: 60
Discharge: HOME | End: 2025-02-14
Attending: SURGERY
Payer: MEDICARE

## 2025-02-14 VITALS
BODY MASS INDEX: 25.98 KG/M2 | DIASTOLIC BLOOD PRESSURE: 91 MMHG | WEIGHT: 196 LBS | RESPIRATION RATE: 17 BRPM | HEART RATE: 96 BPM | SYSTOLIC BLOOD PRESSURE: 158 MMHG | OXYGEN SATURATION: 96 % | HEIGHT: 73 IN

## 2025-02-14 DIAGNOSIS — L97.512 DIABETIC ULCER OF TOE OF RIGHT FOOT ASSOCIATED WITH TYPE 2 DIABETES MELLITUS, WITH FAT LAYER EXPOSED (HCC): Primary | ICD-10-CM

## 2025-02-14 DIAGNOSIS — E11.621 DIABETIC ULCER OF TOE OF LEFT FOOT ASSOCIATED WITH TYPE 2 DIABETES MELLITUS, WITH NECROSIS OF MUSCLE (HCC): Chronic | ICD-10-CM

## 2025-02-14 DIAGNOSIS — L97.522 DIABETIC ULCER OF TOE OF LEFT FOOT ASSOCIATED WITH TYPE 2 DIABETES MELLITUS, WITH FAT LAYER EXPOSED (HCC): ICD-10-CM

## 2025-02-14 DIAGNOSIS — E11.621 DIABETIC ULCER OF TOE OF RIGHT FOOT ASSOCIATED WITH TYPE 2 DIABETES MELLITUS, WITH FAT LAYER EXPOSED (HCC): Primary | ICD-10-CM

## 2025-02-14 DIAGNOSIS — E11.621 DIABETIC ULCER OF TOE OF LEFT FOOT ASSOCIATED WITH TYPE 2 DIABETES MELLITUS, WITH FAT LAYER EXPOSED (HCC): ICD-10-CM

## 2025-02-14 DIAGNOSIS — L97.523 DIABETIC ULCER OF TOE OF LEFT FOOT ASSOCIATED WITH TYPE 2 DIABETES MELLITUS, WITH NECROSIS OF MUSCLE (HCC): Chronic | ICD-10-CM

## 2025-02-14 DIAGNOSIS — E11.42 DIABETIC POLYNEUROPATHY ASSOCIATED WITH TYPE 2 DIABETES MELLITUS (HCC): ICD-10-CM

## 2025-02-14 PROCEDURE — 99212 OFFICE O/P EST SF 10 MIN: CPT

## 2025-02-14 RX ORDER — SODIUM CHLOR/HYPOCHLOROUS ACID 0.033 %
SOLUTION, IRRIGATION IRRIGATION ONCE
Status: CANCELLED | OUTPATIENT
Start: 2025-02-14 | End: 2025-02-14

## 2025-02-14 RX ORDER — GINSENG 100 MG
CAPSULE ORAL ONCE
Status: CANCELLED | OUTPATIENT
Start: 2025-02-14 | End: 2025-02-14

## 2025-02-14 RX ORDER — SILVER SULFADIAZINE 10 MG/G
CREAM TOPICAL ONCE
Status: CANCELLED | OUTPATIENT
Start: 2025-02-14 | End: 2025-02-14

## 2025-02-14 RX ORDER — BETAMETHASONE DIPROPIONATE 0.5 MG/G
CREAM TOPICAL ONCE
Status: CANCELLED | OUTPATIENT
Start: 2025-02-14 | End: 2025-02-14

## 2025-02-14 RX ORDER — GENTAMICIN SULFATE 1 MG/G
OINTMENT TOPICAL ONCE
Status: CANCELLED | OUTPATIENT
Start: 2025-02-14 | End: 2025-02-14

## 2025-02-14 RX ORDER — TRIAMCINOLONE ACETONIDE 1 MG/G
OINTMENT TOPICAL ONCE
Status: CANCELLED | OUTPATIENT
Start: 2025-02-14 | End: 2025-02-14

## 2025-02-14 RX ORDER — BACITRACIN ZINC AND POLYMYXIN B SULFATE 500; 1000 [USP'U]/G; [USP'U]/G
OINTMENT TOPICAL ONCE
Status: CANCELLED | OUTPATIENT
Start: 2025-02-14 | End: 2025-02-14

## 2025-02-14 RX ORDER — CLOBETASOL PROPIONATE 0.5 MG/G
OINTMENT TOPICAL ONCE
Status: CANCELLED | OUTPATIENT
Start: 2025-02-14 | End: 2025-02-14

## 2025-02-14 RX ORDER — LIDOCAINE HYDROCHLORIDE 40 MG/ML
SOLUTION TOPICAL ONCE
Status: CANCELLED | OUTPATIENT
Start: 2025-02-14 | End: 2025-02-14

## 2025-02-14 RX ORDER — LIDOCAINE 50 MG/G
OINTMENT TOPICAL ONCE
Status: CANCELLED | OUTPATIENT
Start: 2025-02-14 | End: 2025-02-14

## 2025-02-14 RX ORDER — MUPIROCIN 20 MG/G
OINTMENT TOPICAL ONCE
Status: CANCELLED | OUTPATIENT
Start: 2025-02-14 | End: 2025-02-14

## 2025-02-14 RX ORDER — LIDOCAINE 40 MG/G
CREAM TOPICAL ONCE
Status: CANCELLED | OUTPATIENT
Start: 2025-02-14 | End: 2025-02-14

## 2025-02-14 RX ORDER — LIDOCAINE HYDROCHLORIDE 20 MG/ML
JELLY TOPICAL ONCE
Status: CANCELLED | OUTPATIENT
Start: 2025-02-14 | End: 2025-02-14

## 2025-02-14 RX ORDER — NEOMYCIN/BACITRACIN/POLYMYXINB 3.5-400-5K
OINTMENT (GRAM) TOPICAL ONCE
Status: CANCELLED | OUTPATIENT
Start: 2025-02-14 | End: 2025-02-14

## 2025-02-14 ASSESSMENT — ENCOUNTER SYMPTOMS
NAUSEA: 0
VOMITING: 0

## 2025-02-14 NOTE — ASSESSMENT & PLAN NOTE
Assessment  Healed today; has appt this afternoon with [facility]  Has Defender boot  Plan  Vaseline to right great toe callus; Defender boot for offloading until custom orthotics are ready  Discharge from clinic - RTC PRN open wound

## 2025-02-14 NOTE — WOUND CARE
Discharge Instructions for  [facility]  [address] [place] [address]  Phone [phone]   Fax [phone]      NAME:  Estevan Valenzuela  YOB: 1965  MEDICAL RECORD NUMBER:  [id number]  DATE:  2/14/2025    Return Appointment: Discharge from wound center at this time       Instructions: Right great toe  Wound is healed!  Patient may shower as previously without dressing  Xeroform- apply to wound bed.   Wrap with bandaid  Remove in 2 days.  At that time, may leave open and apply Vaseline daily      Should you experience increased redness, swelling, pain, foul odor, size of wound(s), or have a temperature over 101 degrees please contact the Wound Healing Center at [phone] or if after hours contact your primary care physician or go to the hospital emergency department.    PLEASE NOTE: IF YOU ARE UNABLE TO OBTAIN WOUND SUPPLIES, CONTINUE TO USE THE SUPPLIES YOU HAVE AVAILABLE UNTIL YOU ARE ABLE TO REACH US. IT IS MOST IMPORTANT TO KEEP THE WOUND COVERED AT ALL TIMES.    Electronically signed Tanesha Zheng PT, WCC on 2/14/2025 at 1:03 PM

## 2025-02-14 NOTE — DISCHARGE INSTRUCTIONS
Return Appointment: Discharge from wound center at this time         Instructions: Right great toe  Wound is healed!  Patient may shower as previously without dressing  Xeroform- apply to wound bed.   Wrap with bandaid  Remove in 2 days.  At that time, may leave open and apply Vaseline daily

## 2025-02-14 NOTE — PROGRESS NOTES
02/07/25 1331   Undermining Starts ___ O'Clock 1 12/10/24 1322   Undermining Ends___ O'Clock 4 12/10/24 1322   Undermining Maxium Distance (cm) 0.2 12/10/24 1322   Wound Assessment Granulation tissue 02/07/25 1331   Drainage Amount None (dry) 02/07/25 1331   Drainage Description Serosanguinous 01/31/25 1327   Odor None 02/07/25 1331   Stella-wound Assessment Hyperkeratosis (callous) 02/07/25 1331   Margins Undefined edges 01/17/25 1120   Wound Thickness Description not for Pressure Injury Full thickness 02/07/25 1331   Number of days: 183     Incision 11/03/23 Toe (Comment  which one) Left (Active)   Number of days: 469      Subjective:     Chief Complaint   Patient presents with   • Wound Check     Right great toe wound     Patient presents for follow up evaluation of wound(s) per chief complaint.     HPI and ROS:  HPI per initial history and physical note. Changes in history since last evaluation: {EBYESNO:87227}. Symptoms, wound related questions or concerns, and/or other pertinent information since last visit: {EBYESNO:18271}. Tolerating current treatment: {EBYESNO:18667}. Systemic complaints above baseline: {EBYESNO:76033}. *** VSS.    Review of Systems   Constitutional:  Negative for appetite change, chills and fever.   Cardiovascular:  Negative for leg swelling.   Gastrointestinal:  Negative for nausea and vomiting.   Musculoskeletal:  Negative for gait problem.   Skin:  Negative for wound.   Allergic/Immunologic: Negative for immunocompromised state.   Neurological:  Positive for numbness.   Hematological:  Does not bruise/bleed easily.      Objective:    BP (!) 158/91   Pulse 96   Resp 17   SpO2 96%   Wt Readings from Last 3 Encounters:   02/07/25 88.9 kg (196 lb)   01/31/25 88.9 kg (196 lb)   01/24/25 86.2 kg (190 lb)     Physical Exam  Vitals and nursing note reviewed.   Constitutional:       General: He is not in acute distress.     Appearance: Normal appearance. He is not toxic-appearing.

## 2025-02-14 NOTE — FLOWSHEET NOTE
02/14/25 1255   Wound 08/15/24 Toe (Comment  which one) Right #1 right great toe   Date First Assessed/Time First Assessed: 08/15/24 0916   Present on Original Admission: Yes  Wound Approximate Age at First Assessment (Weeks): 2 weeks  Primary Wound Type: Diabetic Ulcer  Location: Toe (Comment  which one)  Wound Location Orientation...   Wound Image    Wound Etiology Diabetic Gavin 2   Dressing Status Old drainage noted   Wound Cleansed Soap and water   Dressing/Treatment Xeroform   Offloading for Diabetic Foot Ulcers Total contact cast   Wound Length (cm) 0 cm   Wound Width (cm) 0 cm   Wound Depth (cm) 0 cm   Wound Surface Area (cm^2) 0 cm^2   Change in Wound Size % (l*w) 100   Wound Volume (cm^3) 0 cm^3   Wound Healing % 100   Wound Assessment Epithelialization   Drainage Amount None (dry)   Odor None   Stella-wound Assessment Intact   Wound Thickness Description not for Pressure Injury Full thickness   Pain Assessment   Pain Assessment None - Denies Pain

## 2025-02-17 ASSESSMENT — ENCOUNTER SYMPTOMS
NAUSEA: 0
VOMITING: 0

## 2025-02-18 ENCOUNTER — CARE COORDINATION (OUTPATIENT)
Dept: CARE COORDINATION | Facility: CLINIC | Age: 60
End: 2025-02-18

## 2025-02-18 NOTE — CARE COORDINATION
Ambulatory Care Coordination Note     2/18/2025 2:41 PM     Care Summary Note:   Outreach for High Risk Case Management - spoke with patient  Reports that his foot cast is off - waiting now to see Endocrinology for [facility] paperwork to be done (2/28).  GI issues continue but intermittently  Patient feels like diabetes education is really helping him understand the disease better!!  Partner will drive patient to Endocrinology appointment next week.     ACM: Heather Byers, RN       PCP/Specialist follow up:   Future Appointments         Provider Specialty Dept Phone    2/28/2025 2:30 PM Parul Perez PA-C Endocrinology [phone]    3/3/2025 2:00 PM Jenny River RD Diabetes Services [phone]    5/27/2025 3:00 PM Aric Shafer MD Neurology [phone]

## 2025-02-27 ENCOUNTER — CARE COORDINATION (OUTPATIENT)
Dept: CARE COORDINATION | Facility: CLINIC | Age: 60
End: 2025-02-27

## 2025-02-28 ENCOUNTER — OFFICE VISIT (OUTPATIENT)
Dept: ENDOCRINOLOGY | Age: 60
End: 2025-02-28

## 2025-02-28 VITALS
OXYGEN SATURATION: 96 % | SYSTOLIC BLOOD PRESSURE: 142 MMHG | WEIGHT: 199.4 LBS | BODY MASS INDEX: 26.43 KG/M2 | HEIGHT: 73 IN | HEART RATE: 94 BPM | DIASTOLIC BLOOD PRESSURE: 96 MMHG

## 2025-02-28 DIAGNOSIS — E11.65 TYPE 2 DIABETES MELLITUS WITH HYPERGLYCEMIA, WITH LONG-TERM CURRENT USE OF INSULIN (HCC): Primary | ICD-10-CM

## 2025-02-28 DIAGNOSIS — Z79.4 TYPE 2 DIABETES MELLITUS WITH HYPERGLYCEMIA, WITH LONG-TERM CURRENT USE OF INSULIN (HCC): Primary | ICD-10-CM

## 2025-02-28 DIAGNOSIS — E78.5 HYPERLIPIDEMIA ASSOCIATED WITH TYPE 2 DIABETES MELLITUS (HCC): ICD-10-CM

## 2025-02-28 DIAGNOSIS — E11.69 HYPERLIPIDEMIA ASSOCIATED WITH TYPE 2 DIABETES MELLITUS (HCC): ICD-10-CM

## 2025-02-28 DIAGNOSIS — E11.621 DIABETIC ULCER OF TOE OF LEFT FOOT ASSOCIATED WITH TYPE 2 DIABETES MELLITUS, WITH FAT LAYER EXPOSED (HCC): ICD-10-CM

## 2025-02-28 DIAGNOSIS — E78.1 HYPERTRIGLYCERIDEMIA: ICD-10-CM

## 2025-02-28 DIAGNOSIS — L97.512 DIABETIC ULCER OF TOE OF RIGHT FOOT ASSOCIATED WITH TYPE 2 DIABETES MELLITUS, WITH FAT LAYER EXPOSED (HCC): ICD-10-CM

## 2025-02-28 DIAGNOSIS — E11.621 DIABETIC ULCER OF TOE OF RIGHT FOOT ASSOCIATED WITH TYPE 2 DIABETES MELLITUS, WITH FAT LAYER EXPOSED (HCC): ICD-10-CM

## 2025-02-28 DIAGNOSIS — L97.522 DIABETIC ULCER OF TOE OF LEFT FOOT ASSOCIATED WITH TYPE 2 DIABETES MELLITUS, WITH FAT LAYER EXPOSED (HCC): ICD-10-CM

## 2025-02-28 LAB — HBA1C MFR BLD: 10.2 %

## 2025-02-28 RX ORDER — SITAGLIPTIN 50 MG/1
50 TABLET, FILM COATED ORAL DAILY
Qty: 90 TABLET | Refills: 1 | Status: SHIPPED | OUTPATIENT
Start: 2025-02-28

## 2025-02-28 RX ORDER — INSULIN DEGLUDEC 200 U/ML
36 INJECTION, SOLUTION SUBCUTANEOUS DAILY
Qty: 21 ML | Refills: 1 | Status: SHIPPED | OUTPATIENT
Start: 2025-02-28

## 2025-02-28 RX ORDER — INSULIN LISPRO 100 [IU]/ML
INJECTION, SOLUTION INTRAVENOUS; SUBCUTANEOUS
Qty: 45 ML | Refills: 3 | Status: SHIPPED | OUTPATIENT
Start: 2025-02-28

## 2025-02-28 ASSESSMENT — ENCOUNTER SYMPTOMS
DIARRHEA: 1
NAUSEA: 1
TROUBLE SWALLOWING: 0
VOMITING: 1
VOICE CHANGE: 0

## 2025-02-28 NOTE — PROGRESS NOTES
[facility] ENDOCRINOLOGY   AND   THYROID NODULE CLINIC    Parul Perez PA-C  [facility] Endocrinology and Thyroid Nodule Clinic  [address], [address], [place] [address]  Phone [phone]  Facsimile [phone]          Estevan Valenzuela is a 59 y.o. male seen 2/28/2025 for follow up evaluation of type 2 diabetes        Assessment and Plan:    Interpretation of 72 hour glucose monitor:  At least 72 hours of data were reviewed.  The patient utilizes a CDEL 2 continuous glucose monitoring system.  The average glucose during the reviewed timeframe was 211.  There is a pattern of frequent post prandial hyperglycemia and fasting hypoglycemia     Assessment & Plan      1. Type 2 diabetes mellitus with hyperglycemia, with long-term current use of insulin ([facility])   A1c is 10.2, indicating poor glycemic control. Reports nocturnal and morning hypoglycemia, suggesting Tresiba dosage may be too high.  - Reduce Tresiba to 36 units daily.  - Take Humalog 5 units before the largest meal, adjust based on a sliding scale if blood sugar >150, and skip if <100.  - Continue Januvia daily.  - Continue diabetic education and discuss new insulin regimen with educator.  - Prescription for Tresiba 36 units sent to pharmacy.  - Contact clinic if hypoglycemia persists.    New foot injury 2 days ago due to a pebble in the boot, resulting in a 2 cm ulceration with bleeding on the right hallux. Released from wound care 2 weeks ago.  - Keep foot in a diabetic shoe.      - AMB POC HEMOGLOBIN A1C  - TRESIBA FLEXTOUCH 200 UNIT/ML SOPN; Inject 36 Units into the skin daily  Dispense: 21 mL; Refill: 1  - HUMALOG KWIKPEN 100 UNIT/ML SOPN; 5 units before snacks plus 1 units per 50 >150 AC/HS (up to 50 units per day),  Dispense: 45 mL; Refill: 3  - JANUVIA 50 MG tablet; Take 1 tablet by mouth daily E11.65  Dispense: 90 tablet; Refill: 1  -  DIABETES FOOT EXAM  - GLUCOSE MONITOR, 72 HOUR, PHYS INTERP    2. Hyperlipidemia

## 2025-02-28 NOTE — PROGRESS NOTES
Patient seen and examined with Parul Perez PA-C.  I agree with her treatment plan.  I independently performed a diabetic foot exam for the purpose of assessing the need for diabetic shoes.  The patient has bilateral callus formation (including an open callus on the right great toe), foot deformity, and preulcerative callus formation and would benefit from the prescription of diabetic shoes.

## 2025-03-03 ENCOUNTER — TELEPHONE (OUTPATIENT)
Dept: ENDOCRINOLOGY | Age: 60
End: 2025-03-03

## 2025-03-05 ENCOUNTER — HOSPITAL ENCOUNTER (OUTPATIENT)
Dept: WOUND CARE | Age: 60
Discharge: HOME | End: 2025-03-05
Payer: MEDICARE

## 2025-03-05 VITALS
OXYGEN SATURATION: 93 % | WEIGHT: 200 LBS | HEART RATE: 87 BPM | RESPIRATION RATE: 18 BRPM | TEMPERATURE: 98.2 F | DIASTOLIC BLOOD PRESSURE: 78 MMHG | SYSTOLIC BLOOD PRESSURE: 124 MMHG | HEIGHT: 73 IN | BODY MASS INDEX: 26.51 KG/M2

## 2025-03-05 PROCEDURE — 11042 DBRDMT SUBQ TIS 1ST 20SQCM/<: CPT

## 2025-03-05 PROCEDURE — 99213 OFFICE O/P EST LOW 20 MIN: CPT

## 2025-03-05 NOTE — DISCHARGE INSTRUCTIONS
nstructions: right great toe:  Cleanse with normal saline.  Vashe Wound Solution soak placed on wound bed for a minimum of 60 seconds prior to dressing application.   Apply hydrofera blue classic to wound bed. Moisten before application.  Cover with ABD.  Secure with roll gauze.  Change 3 times weekly.     Wear offloading boot anytime your foot touches the floor.  Continue to work on blood sugar readings.

## 2025-03-05 NOTE — FLOWSHEET NOTE
03/05/25 1010   Right Leg Edema Point of Measurement   Leg circumference 34 cm   Ankle circumference 23 cm   Foot circumference 24.5 cm   Compression Therapy Compression not ordered   RLE Neurovascular Assessment   Capillary Refill Less than/Equal to 3 seconds   Color Appropriate for Ethnicity   Temperature Warm   R Post Tibial Pulse +2 (Moderate)   R Pedal Pulse +2   Wound 03/05/25 Toe (Comment  which one) Right # 1   Date First Assessed/Time First Assessed: 03/05/25 1015   Present on Original Admission: Yes  Wound Approximate Age at First Assessment (Weeks): 1 weeks  Primary Wound Type: Diabetic Ulcer  Location: Toe (Comment  which one)  Wound Location Orientation...   Wound Image     Wound Etiology Diabetic Gavin 2   Dressing Status Intact   Wound Cleansed Cleansed with saline   Dressing/Treatment Hydrofera blue   Wound Length (cm) 1.5 cm   Wound Width (cm) 1 cm   Wound Depth (cm) 0.4 cm   Wound Surface Area (cm^2) 1.5 cm^2   Wound Volume (cm^3) 0.6 cm^3   Post-Procedure Length (cm) 2 cm   Post-Procedure Width (cm) 1.6 cm   Post-Procedure Depth (cm) 0.3 cm   Post-Procedure Surface Area (cm^2) 3.2 cm^2   Post-Procedure Volume (cm^3) 0.96 cm^3   Wound Assessment Pink/red   Drainage Amount Moderate (25-50%)   Drainage Description Serosanguinous   Odor None   Stella-wound Assessment Intact   Margins Attached edges   Wound Thickness Description not for Pressure Injury Full thickness   Pain Assessment   Pain Assessment None - Denies Pain

## 2025-03-05 NOTE — WOUND CARE
Discharge Instructions for  [facility]  [address] [place] [address]  [address] [place] [address]  Phone [phone]   Fax [phone]      NAME:  Estevan Valenzuela  YOB: 1965  MEDICAL RECORD NUMBER:  [id number]  DATE:  3/5/2025    Return Appointment:   1 week with Janny Casey NP      Instructions: right great toe:  Cleanse with normal saline.  Vashe Wound Solution soak placed on wound bed for a minimum of 60 seconds prior to dressing application.   Apply hydrofera blue classic to wound bed. Moisten before application.  Cover with ABD.  Secure with roll gauze.  Change 3 times weekly.    Wear offloading boot anytime your foot touches the floor.  Continue to work on blood sugar readings.    Should you experience increased redness, swelling, pain, foul odor, size of wound(s), or have a temperature over 101 degrees please contact the Wound Healing Center at [phone] or if after hours contact your primary care physician or go to the hospital emergency department.    PLEASE NOTE: IF YOU ARE UNABLE TO OBTAIN WOUND SUPPLIES, CONTINUE TO USE THE SUPPLIES YOU HAVE AVAILABLE UNTIL YOU ARE ABLE TO REACH US. IT IS MOST IMPORTANT TO KEEP THE WOUND COVERED AT ALL TIMES.    Electronically signed Estevan Domingo RN on 3/5/2025 at 10:47 AM

## 2025-03-06 ENCOUNTER — CARE COORDINATION (OUTPATIENT)
Dept: CARE COORDINATION | Facility: CLINIC | Age: 60
End: 2025-03-06

## 2025-03-06 NOTE — CARE COORDINATION
Ambulatory Care Coordination Note     3/6/2025 4:15 PM     Care Summary Note:   Outreach for High Risk Case Management - spoke with patient  Reports trying to follow insulin regimen - humalog has made him nausea starting yesterday  Has only eaten a few crackers today  Tresiba 36 units daily, humalog 5 units with largest meal  Taking Januvia as ordered    Resumed weekly wound center visits  Has been fit for shoes at last endocrinology appointment     ACM: Heather Byers, JOE     PCP/Specialist follow up:   Future Appointments         Provider Specialty Dept Phone    3/13/2025 10:20 AM Brandy Ibarra, JOE; Derick Uribe, DO Wound Ostomy [phone]    4/10/2025 11:00 AM Jenny River RD Diabetes Services [phone]    5/27/2025 3:00 PM Aric Shafer MD Neurology [phone]    6/27/2025 2:30 PM Parul Perez PA-C Endocrinology [phone]

## 2025-03-13 ENCOUNTER — HOSPITAL ENCOUNTER (OUTPATIENT)
Dept: WOUND CARE | Age: 60
Discharge: HOME | End: 2025-03-13
Payer: MEDICARE

## 2025-03-13 VITALS
HEART RATE: 72 BPM | BODY MASS INDEX: 26.11 KG/M2 | SYSTOLIC BLOOD PRESSURE: 177 MMHG | HEIGHT: 73 IN | DIASTOLIC BLOOD PRESSURE: 98 MMHG | TEMPERATURE: 98 F | RESPIRATION RATE: 20 BRPM | WEIGHT: 197 LBS

## 2025-03-13 DIAGNOSIS — L97.512 DIABETIC ULCER OF TOE OF RIGHT FOOT ASSOCIATED WITH TYPE 2 DIABETES MELLITUS, WITH FAT LAYER EXPOSED (HCC): Primary | ICD-10-CM

## 2025-03-13 DIAGNOSIS — E11.621 DIABETIC ULCER OF TOE OF RIGHT FOOT ASSOCIATED WITH TYPE 2 DIABETES MELLITUS, WITH FAT LAYER EXPOSED (HCC): Primary | ICD-10-CM

## 2025-03-13 DIAGNOSIS — E11.42 DIABETIC POLYNEUROPATHY ASSOCIATED WITH TYPE 2 DIABETES MELLITUS (HCC): ICD-10-CM

## 2025-03-13 PROCEDURE — 11042 DBRDMT SUBQ TIS 1ST 20SQCM/<: CPT

## 2025-03-13 ASSESSMENT — PAIN SCALES - GENERAL: PAINLEVEL_OUTOF10: 6

## 2025-03-13 ASSESSMENT — PAIN DESCRIPTION - LOCATION: LOCATION: FOOT

## 2025-03-13 ASSESSMENT — PAIN DESCRIPTION - DESCRIPTORS: DESCRIPTORS: ACHING

## 2025-03-13 ASSESSMENT — PAIN DESCRIPTION - ORIENTATION: ORIENTATION: RIGHT

## 2025-03-13 NOTE — FLOWSHEET NOTE
03/13/25 1000   Right Leg Edema Point of Measurement   Leg circumference 33 cm   Ankle circumference 24 cm   Foot circumference 23 cm   Compression Therapy Compression not ordered   RLE Neurovascular Assessment   Capillary Refill Less than/Equal to 3 seconds   Color Appropriate for Ethnicity   Temperature Warm   R Post Tibial Pulse +2 (Moderate)   R Pedal Pulse +2   Wound 03/05/25 Toe (Comment  which one) Right # 1   Date First Assessed/Time First Assessed: 03/05/25 1015   Present on Original Admission: Yes  Wound Approximate Age at First Assessment (Weeks): 1 weeks  Primary Wound Type: Diabetic Ulcer  Location: Toe (Comment  which one)  Wound Location Orientation...   Wound Image     Wound Etiology Diabetic Gavin 2   Dressing Status Intact   Wound Cleansed Cleansed with saline   Dressing/Treatment Hydrofera blue   Wound Length (cm) 1 cm   Wound Width (cm) 0.2 cm   Wound Depth (cm) 0.4 cm   Wound Surface Area (cm^2) 0.2 cm^2   Change in Wound Size % (l*w) 86.67   Wound Volume (cm^3) 0.08 cm^3   Wound Healing % 87   Post-Procedure Length (cm) 1.3 cm   Post-Procedure Width (cm) 0.6 cm   Post-Procedure Depth (cm) 0.2 cm   Post-Procedure Surface Area (cm^2) 0.78 cm^2   Post-Procedure Volume (cm^3) 0.156 cm^3   Wound Assessment Pink/red   Drainage Amount Moderate (25-50%)   Drainage Description Serosanguinous   Odor None   Stella-wound Assessment Intact   Margins Attached edges   Wound Thickness Description not for Pressure Injury Full thickness   Pain Assessment   Pain Assessment 0-10   Pain Level 6   Patient's Stated Pain Goal 0 - No pain   Pain Location Foot   Pain Orientation Right   Pain Descriptors Aching

## 2025-03-13 NOTE — WOUND CARE
Discharge Instructions for  [facility]  [address] [place] [address]  [address] [place] [address]  Phone [phone]   Fax [phone]      NAME:  Estevan Valenzuela  YOB: 1965  MEDICAL RECORD NUMBER:  [id number]  DATE:  3/13/2025    Return Appointment:   2 week with Janny Casey NP      Instructions: right great toe:  Cleanse with normal saline.  Vashe Wound Solution soak placed on wound bed for a minimum of 60 seconds prior to dressing application.   Apply hydrofera blue classic to wound bed. Moisten before application.  Cover with ABD.  Secure with roll gauze.  Change 3 times weekly.    Wear offloading boot anytime your foot touches the floor.  Continue to work on blood sugar readings.    Increase protein to 100g daily for optimal wound healing. Aim for 30g protein per shake, two shakes a day.    Protein:   Egg ~ 6g  Yogurt ~ 15g  Half cup of cottage cheese ~ 15g  Chicken breast ~ 30g  Walton filet ~ 40g       Should you experience increased redness, swelling, pain, foul odor, size of wound(s), or have a temperature over 101 degrees please contact the Wound Healing Center at [phone] or if after hours contact your primary care physician or go to the hospital emergency department.    PLEASE NOTE: IF YOU ARE UNABLE TO OBTAIN WOUND SUPPLIES, CONTINUE TO USE THE SUPPLIES YOU HAVE AVAILABLE UNTIL YOU ARE ABLE TO REACH US. IT IS MOST IMPORTANT TO KEEP THE WOUND COVERED AT ALL TIMES.    Electronically signed TIFFANY BARTH RN on 3/13/2025 at 10:42 AM

## 2025-03-14 PROBLEM — L97.522 DIABETIC ULCER OF TOE OF LEFT FOOT ASSOCIATED WITH TYPE 2 DIABETES MELLITUS, WITH FAT LAYER EXPOSED: Chronic | Status: RESOLVED | Noted: 2024-08-29 | Resolved: 2025-03-14

## 2025-03-14 PROBLEM — L97.523 DIABETIC ULCER OF TOE OF LEFT FOOT ASSOCIATED WITH TYPE 2 DIABETES MELLITUS, WITH NECROSIS OF MUSCLE: Chronic | Status: RESOLVED | Noted: 2023-12-18 | Resolved: 2025-03-14

## 2025-03-14 PROBLEM — L98.499 DM TYPE 2 WITH DIABETIC CHRONIC SKIN ULCER (HCC): Chronic | Status: RESOLVED | Noted: 2024-08-15 | Resolved: 2025-03-14

## 2025-03-14 PROBLEM — E11.622 DM TYPE 2 WITH DIABETIC CHRONIC SKIN ULCER (HCC): Chronic | Status: RESOLVED | Noted: 2024-08-15 | Resolved: 2025-03-14

## 2025-03-14 PROBLEM — E11.621 DIABETIC ULCER OF TOE OF LEFT FOOT ASSOCIATED WITH TYPE 2 DIABETES MELLITUS, WITH FAT LAYER EXPOSED: Chronic | Status: RESOLVED | Noted: 2024-08-29 | Resolved: 2025-03-14

## 2025-03-14 PROBLEM — E11.621 DIABETIC ULCER OF TOE OF LEFT FOOT ASSOCIATED WITH TYPE 2 DIABETES MELLITUS, WITH NECROSIS OF MUSCLE: Chronic | Status: RESOLVED | Noted: 2023-12-18 | Resolved: 2025-03-14

## 2025-03-14 RX ORDER — LIDOCAINE HYDROCHLORIDE 20 MG/ML
JELLY TOPICAL PRN
OUTPATIENT
Start: 2025-03-14

## 2025-03-14 RX ORDER — CLOBETASOL PROPIONATE 0.5 MG/G
OINTMENT TOPICAL PRN
OUTPATIENT
Start: 2025-03-14

## 2025-03-14 RX ORDER — MUPIROCIN 20 MG/G
OINTMENT TOPICAL PRN
OUTPATIENT
Start: 2025-03-14

## 2025-03-14 RX ORDER — GENTAMICIN SULFATE 1 MG/G
OINTMENT TOPICAL PRN
OUTPATIENT
Start: 2025-03-14

## 2025-03-14 RX ORDER — LIDOCAINE 40 MG/G
CREAM TOPICAL PRN
OUTPATIENT
Start: 2025-03-14

## 2025-03-14 RX ORDER — TRIAMCINOLONE ACETONIDE 1 MG/G
OINTMENT TOPICAL PRN
OUTPATIENT
Start: 2025-03-14

## 2025-03-14 RX ORDER — GINSENG 100 MG
CAPSULE ORAL PRN
OUTPATIENT
Start: 2025-03-14

## 2025-03-14 RX ORDER — LIDOCAINE HYDROCHLORIDE 40 MG/ML
SOLUTION TOPICAL PRN
OUTPATIENT
Start: 2025-03-14

## 2025-03-14 RX ORDER — LIDOCAINE 50 MG/G
OINTMENT TOPICAL PRN
OUTPATIENT
Start: 2025-03-14

## 2025-03-14 RX ORDER — SODIUM CHLOR/HYPOCHLOROUS ACID 0.033 %
SOLUTION, IRRIGATION IRRIGATION PRN
OUTPATIENT
Start: 2025-03-14

## 2025-03-14 RX ORDER — SILVER SULFADIAZINE 10 MG/G
CREAM TOPICAL PRN
OUTPATIENT
Start: 2025-03-14

## 2025-03-14 RX ORDER — NEOMYCIN/BACITRACIN/POLYMYXINB 3.5-400-5K
OINTMENT (GRAM) TOPICAL PRN
OUTPATIENT
Start: 2025-03-14

## 2025-03-14 RX ORDER — BACITRACIN ZINC AND POLYMYXIN B SULFATE 500; 1000 [USP'U]/G; [USP'U]/G
OINTMENT TOPICAL PRN
OUTPATIENT
Start: 2025-03-14

## 2025-03-14 RX ORDER — BETAMETHASONE DIPROPIONATE 0.5 MG/G
CREAM TOPICAL PRN
OUTPATIENT
Start: 2025-03-14

## 2025-03-14 NOTE — PROGRESS NOTES
[facility]  Procedure Note    Estevan Valenzuela  MEDICAL RECORD NUMBER: [id number]  AGE: 59 y.o.     GENDER: male    : 1965  EPISODE DATE: 3/13/2025    Problem List Items Addressed This Visit          Endocrine    Diabetic ulcer of toe of right foot associated with type 2 diabetes mellitus, with fat layer exposed (HCC) - Primary (Chronic)    Assessment  Wound is smaller than last week with slightly less macerated hemorrhagic callus  Negative probe to bone, does not appear acutely infected  Plan  Excisional debridement to remove devitalized tissue and promote wound healing  Wound care: Vashe cleanse, hydrofera blue, changed 3x weeklly  Defender boot for offloading  RTC 2 weeks          Diabetic polyneuropathy associated with type 2 diabetes mellitus (HCC)     Procedure Note: Excisional Debridement  Indications: Based on my examination of the patient's wound(s) today, debridement is required to remove devitalized tissue, evaluate the wound base, and promote wound healing.  Performed by: DAVION Wiseman - LANCE  Consent obtained: Yes  Time out taken: Yes  Pain control:     Wound #: 1  Diabetic/pressure/chronic non-pressure ulcers only: diabetic ulcer, fat layer exposed was/were debrided using curette. The wound(s) was/were debrided down through/to subcutaneous tissue. Devitalized tissue debrided: biofilm, slough, exudate, and callus. Pre and post debridement measurements: see flow sheet.    Negative Pressure Wound Therapy Toe (Comment  which one) Left (Active)   Number of days: 496       Wound 23 Toe (Comment  which one) Left;Dorsal #2 (Active)   Number of days: 539       Wound Toe (Comment  which one) Right;Anterior #3 Tip of toe (Active)   Number of days:        Wound 25 Toe (Comment  which one) Right # 1 (Active)   Wound Image    25 1000   Wound Etiology Diabetic Gavin 2 25 1000   Dressing Status Intact 25 1000   Wound Cleansed

## 2025-03-14 NOTE — ASSESSMENT & PLAN NOTE
Assessment  Wound is smaller than last week with slightly less macerated hemorrhagic callus  Negative probe to bone, does not appear acutely infected  Plan  Excisional debridement to remove devitalized tissue and promote wound healing  Wound care: Vashe cleanse, hydrofera blue, changed 3x weeklly  Defender boot for offloading  RTC 2 weeks

## 2025-03-15 PROBLEM — E11.42 DIABETIC POLYNEUROPATHY ASSOCIATED WITH TYPE 2 DIABETES MELLITUS: Chronic | Status: ACTIVE | Noted: 2020-07-13

## 2025-03-15 ASSESSMENT — ENCOUNTER SYMPTOMS
VOMITING: 0
NAUSEA: 1

## 2025-03-17 ENCOUNTER — CARE COORDINATION (OUTPATIENT)
Dept: CARE COORDINATION | Facility: CLINIC | Age: 60
End: 2025-03-17

## 2025-03-17 NOTE — CARE COORDINATION
Ambulatory Care Coordination Note     3/17/2025 11:09 AM       Care Summary Note:   Incoming call from patient/outreach for High Risk Case Management  Reports he has had a bad weekend   - BS slightly elevated: vomiting, diarrhea, nausea; has slowed down now   - still waiting to hear back re: [facility]; message sent to endocrinology   - toe is healing     ACM: Heather Byers, JOE    PCP/Specialist follow up:   Future Appointments         Provider Specialty Dept Phone    3/27/2025 1:00 PM Janny Casey, APRN - NP; Ean, JOE [facility] Wound Ostomy [phone]    4/10/2025 11:00 AM Jenny River RD Diabetes Services [phone]    5/27/2025 3:00 PM Aric Shafer MD Neurology [phone]    6/27/2025 2:30 PM Parul Perez PAAnnaC Endocrinology [phone]

## 2025-03-25 ENCOUNTER — CARE COORDINATION (OUTPATIENT)
Dept: CARE COORDINATION | Facility: CLINIC | Age: 60
End: 2025-03-25

## 2025-03-25 NOTE — CARE COORDINATION
Ambulatory Care Coordination Note     3/25/2025 11:33 AM     Care Summary Note:   Outreach for High Risk case Management - Incoming call from patient  Reports that [facility] called him (yesterday) stating they are still waiting on paperwork.  Patient directed them to call Endocrinology office  Patient continues to struggle with many small issues:  delay about getting bedside commode, diarrhea, nausea, legal issues and more.  Patient has minimal support system - partner in/out and works all day     ACM: Heather Byers, JOE     PCP/Specialist follow up:   Future Appointments         Provider Specialty Dept Phone    3/27/2025 1:00 PM Janny Casey, APRN - NP; Ean, JOE [facility] Wound Ostomy [phone]    4/10/2025 11:00 AM Jenny River RD Diabetes Services [phone]    5/27/2025 3:00 PM Aric Shafer MD Neurology [phone]    6/27/2025 2:30 PM Parul Perez PA-C Endocrinology [phone]

## 2025-03-27 ENCOUNTER — HOSPITAL ENCOUNTER (OUTPATIENT)
Dept: WOUND CARE | Age: 60
Discharge: HOME | End: 2025-03-27
Payer: MEDICARE

## 2025-03-27 VITALS
SYSTOLIC BLOOD PRESSURE: 146 MMHG | TEMPERATURE: 97.9 F | HEART RATE: 83 BPM | DIASTOLIC BLOOD PRESSURE: 96 MMHG | RESPIRATION RATE: 16 BRPM | OXYGEN SATURATION: 98 %

## 2025-03-27 DIAGNOSIS — L97.512 DIABETIC ULCER OF TOE OF RIGHT FOOT ASSOCIATED WITH TYPE 2 DIABETES MELLITUS, WITH FAT LAYER EXPOSED: Chronic | ICD-10-CM

## 2025-03-27 DIAGNOSIS — E11.42 DIABETIC POLYNEUROPATHY ASSOCIATED WITH TYPE 2 DIABETES MELLITUS: Primary | Chronic | ICD-10-CM

## 2025-03-27 DIAGNOSIS — E11.621 DIABETIC ULCER OF TOE OF RIGHT FOOT ASSOCIATED WITH TYPE 2 DIABETES MELLITUS, WITH FAT LAYER EXPOSED: Chronic | ICD-10-CM

## 2025-03-27 PROCEDURE — 11042 DBRDMT SUBQ TIS 1ST 20SQCM/<: CPT

## 2025-03-27 RX ORDER — SODIUM CHLOR/HYPOCHLOROUS ACID 0.033 %
SOLUTION, IRRIGATION IRRIGATION PRN
OUTPATIENT
Start: 2025-03-27

## 2025-03-27 RX ORDER — LIDOCAINE 50 MG/G
OINTMENT TOPICAL PRN
OUTPATIENT
Start: 2025-03-27

## 2025-03-27 RX ORDER — LIDOCAINE HYDROCHLORIDE 40 MG/ML
SOLUTION TOPICAL PRN
OUTPATIENT
Start: 2025-03-27

## 2025-03-27 RX ORDER — CLOBETASOL PROPIONATE 0.5 MG/G
OINTMENT TOPICAL PRN
OUTPATIENT
Start: 2025-03-27

## 2025-03-27 RX ORDER — GINSENG 100 MG
CAPSULE ORAL PRN
OUTPATIENT
Start: 2025-03-27

## 2025-03-27 RX ORDER — NEOMYCIN/BACITRACIN/POLYMYXINB 3.5-400-5K
OINTMENT (GRAM) TOPICAL PRN
OUTPATIENT
Start: 2025-03-27

## 2025-03-27 RX ORDER — LIDOCAINE HYDROCHLORIDE 20 MG/ML
JELLY TOPICAL PRN
OUTPATIENT
Start: 2025-03-27

## 2025-03-27 RX ORDER — BACITRACIN ZINC AND POLYMYXIN B SULFATE 500; 1000 [USP'U]/G; [USP'U]/G
OINTMENT TOPICAL PRN
OUTPATIENT
Start: 2025-03-27

## 2025-03-27 RX ORDER — GENTAMICIN SULFATE 1 MG/G
OINTMENT TOPICAL PRN
OUTPATIENT
Start: 2025-03-27

## 2025-03-27 RX ORDER — LIDOCAINE 40 MG/G
CREAM TOPICAL PRN
OUTPATIENT
Start: 2025-03-27

## 2025-03-27 RX ORDER — TRIAMCINOLONE ACETONIDE 1 MG/G
OINTMENT TOPICAL PRN
OUTPATIENT
Start: 2025-03-27

## 2025-03-27 RX ORDER — MUPIROCIN 20 MG/G
OINTMENT TOPICAL PRN
OUTPATIENT
Start: 2025-03-27

## 2025-03-27 RX ORDER — BETAMETHASONE DIPROPIONATE 0.5 MG/G
CREAM TOPICAL PRN
OUTPATIENT
Start: 2025-03-27

## 2025-03-27 RX ORDER — SILVER SULFADIAZINE 10 MG/G
CREAM TOPICAL PRN
OUTPATIENT
Start: 2025-03-27

## 2025-03-27 NOTE — FLOWSHEET NOTE
03/27/25 1331   Right Leg Edema Point of Measurement   Leg circumference 35.5 cm   Ankle circumference 23 cm   Foot circumference 23 cm   Compression Therapy Compression not ordered   Wound 03/05/25 Toe (Comment  which one) Right # 1   Date First Assessed/Time First Assessed: 03/05/25 1015   Present on Original Admission: Yes  Wound Approximate Age at First Assessment (Weeks): 1 weeks  Primary Wound Type: Diabetic Ulcer  Location: Toe (Comment  which one)  Wound Location Orientation...   Wound Image    Wound Etiology Diabetic Gavin 2   Dressing Status Old drainage noted   Wound Cleansed Cleansed with saline   Dressing/Treatment Hydrofera blue   Offloading for Diabetic Foot Ulcers Offloading boot   Wound Length (cm) 1 cm   Wound Width (cm) 0.3 cm   Wound Depth (cm) 0.2 cm   Wound Surface Area (cm^2) 0.3 cm^2   Change in Wound Size % (l*w) 80   Wound Volume (cm^3) 0.06 cm^3   Wound Healing % 90   Post-Procedure Length (cm) 1 cm   Post-Procedure Width (cm) 0.3 cm   Post-Procedure Depth (cm) 0.2 cm   Post-Procedure Surface Area (cm^2) 0.3 cm^2   Post-Procedure Volume (cm^3) 0.06 cm^3   Wound Assessment Pink/red   Drainage Amount Moderate (25-50%)   Drainage Description Serosanguinous   Odor None   Stella-wound Assessment Hyperkeratosis (callous)   Wound Thickness Description not for Pressure Injury Full thickness   Pain Assessment   Pain Assessment None - Denies Pain     Pre- and post-debridement

## 2025-03-27 NOTE — ASSESSMENT & PLAN NOTE
Assessment  Right great toe wound is essentially unchanged; wound bed is pink, non granular and periwound is macerated callus  Negative probe to bone, does not appear acutely infected  States he is wearing his defender boot faithfully  Plan  Excisional debridement to remove devitalized tissue and promote wound healing  Wound care: Vashe cleanse, hydrofera blue, change 3x weekly  Discussed advancing to TCC; he is not eager but states he will consider it  RTC 2 weeks

## 2025-03-27 NOTE — PROGRESS NOTES
[facility]  Procedure Note    Estevan Valenzuela  MEDICAL RECORD NUMBER: [id number]  AGE: 59 y.o.     GENDER: male    : 1965  EPISODE DATE: 3/27/2025    Problem List Items Addressed This Visit          Endocrine    Diabetic polyneuropathy associated with type 2 diabetes mellitus (HCC) - Primary (Chronic)    Relevant Orders    MD COMMUNICATION 1    MD COMMUNICATION 2    Initiate Outpatient Wound Care Protocol    Initiate Oxygen Therapy Protocol    Diabetic ulcer of toe of right foot associated with type 2 diabetes mellitus, with fat layer exposed (HCC) (Chronic)    Assessment  Right great toe wound is essentially unchanged; wound bed is pink, non granular and periwound is macerated callus  Negative probe to bone, does not appear acutely infected  States he is wearing his defender boot faithfully  Plan  Excisional debridement to remove devitalized tissue and promote wound healing  Wound care: Vashe cleanse, hydrofera blue, change 3x weekly  Discussed advancing to TCC; he is not eager but states he will consider it  RTC 2 weeks         Relevant Orders    MD COMMUNICATION 1    MD COMMUNICATION 2    Initiate Outpatient Wound Care Protocol    Initiate Oxygen Therapy Protocol     Procedure Note: Excisional Debridement  Indications: Based on my examination of the patient's wound(s) today, debridement is required to remove devitalized tissue, evaluate the wound base, and promote wound healing.  Performed by: DAVION Wiseman NP  Consent obtained: Yes  Time out taken: Yes  Pain control:     Wound #: 1  Diabetic/pressure/chronic non-pressure ulcers only: diabetic ulcer, fat layer exposed was/were debrided using curette. The wound(s) was/were debrided down through/to subcutaneous tissue. Devitalized tissue debrided: biofilm, exudate, and callus. Pre and post debridement measurements: see flow sheet.    Negative Pressure Wound Therapy Toe (Comment  which one) Left

## 2025-03-27 NOTE — DISCHARGE INSTRUCTIONS
Return Appointment:   2 weeks with Janny Casey NP        Instructions: right great toe:  Cleanse with normal saline.  Vashe Wound Solution soak placed on wound bed for a minimum of 60 seconds prior to dressing application.   Hydrofera Ready: Cut to wound size, place in wound bed, shiny side out.    Cover with ABD.  Secure with roll gauze.  Change 3 times weekly.     Wear offloading Defender boot anytime your foot touches the floor.  Continue to work on blood sugar readings.     Increase protein to 100g daily for optimal wound healing. Aim for 30g protein per shake, two shakes a day.     Protein:   Egg ~ 6g  Yogurt ~ 15g  Half cup of cottage cheese ~ 15g  Chicken breast ~ 30g  [place] filet ~ 40g

## 2025-03-27 NOTE — WOUND CARE
Discharge Instructions for  [facility]  [address] [place] [address]  [address] [place] [address]  Phone [phone]   Fax [phone]      NAME:  Estevan Valenzuela  YOB: 1965  MEDICAL RECORD NUMBER:  [id number]  DATE:  3/27/2025    Return Appointment:   2 weeks with Janny Casey NP      Instructions: right great toe:  Cleanse with normal saline.  Vashe Wound Solution soak placed on wound bed for a minimum of 60 seconds prior to dressing application.   Hydrofera Ready: Cut to wound size, place in wound bed, shiny side out.    Cover with ABD.  Secure with roll gauze.  Change 3 times weekly.    Wear offloading Defender boot anytime your foot touches the floor.  Continue to work on blood sugar readings.    Increase protein to 100g daily for optimal wound healing. Aim for 30g protein per shake, two shakes a day.    Protein:   Egg ~ 6g  Yogurt ~ 15g  Half cup of cottage cheese ~ 15g  Chicken breast ~ 30g  [place] filet ~ 40g       Should you experience increased redness, swelling, pain, foul odor, size of wound(s), or have a temperature over 101 degrees please contact the Wound Healing Center at [phone] or if after hours contact your primary care physician or go to the hospital emergency department.    PLEASE NOTE: IF YOU ARE UNABLE TO OBTAIN WOUND SUPPLIES, CONTINUE TO USE THE SUPPLIES YOU HAVE AVAILABLE UNTIL YOU ARE ABLE TO REACH US. IT IS MOST IMPORTANT TO KEEP THE WOUND COVERED AT ALL TIMES.    Electronically signed Tanesha Zheng PT, WCC on 3/27/2025 at 1:55 PM

## 2025-04-01 ENCOUNTER — CARE COORDINATION (OUTPATIENT)
Dept: CARE COORDINATION | Facility: CLINIC | Age: 60
End: 2025-04-01

## 2025-04-01 NOTE — CARE COORDINATION
Ambulatory Care Coordination Note     4/1/2025 11:21 AM     Care Summary Note:   Care Coordination call with [facility]  Goal was to confirm receipt of fax re-sent yesterday by Endocrinology  Person ACM spoke with could not tell if the fax was received  Requested that someone call back with confirmation information.  Patient and Provider aware of [lack of]progress     ACM: Heather Byers, RN     PCP/Specialist follow up:   Future Appointments         Provider Specialty Dept Phone    4/10/2025 11:00 AM Jenny River RD Diabetes Services [phone]    4/10/2025 1:20 PM Janny Casey, DAVION - NP; EanMaricel RN Wound Ostomy [phone]    5/27/2025 3:00 PM Aric Shafer MD Neurology [phone]    6/27/2025 2:30 PM Parul Perez PA-C Endocrinology [phone]

## 2025-04-10 ENCOUNTER — HOSPITAL ENCOUNTER (OUTPATIENT)
Dept: WOUND CARE | Age: 60
Discharge: HOME | End: 2025-04-10
Payer: MEDICARE

## 2025-04-10 VITALS
HEIGHT: 73 IN | HEART RATE: 87 BPM | BODY MASS INDEX: 26.24 KG/M2 | WEIGHT: 198 LBS | SYSTOLIC BLOOD PRESSURE: 147 MMHG | DIASTOLIC BLOOD PRESSURE: 105 MMHG

## 2025-04-10 DIAGNOSIS — L97.512 DIABETIC ULCER OF TOE OF RIGHT FOOT ASSOCIATED WITH TYPE 2 DIABETES MELLITUS, WITH FAT LAYER EXPOSED: Chronic | ICD-10-CM

## 2025-04-10 DIAGNOSIS — E11.621 DIABETIC ULCER OF TOE OF RIGHT FOOT ASSOCIATED WITH TYPE 2 DIABETES MELLITUS, WITH FAT LAYER EXPOSED: Chronic | ICD-10-CM

## 2025-04-10 DIAGNOSIS — E11.42 DIABETIC POLYNEUROPATHY ASSOCIATED WITH TYPE 2 DIABETES MELLITUS: Primary | Chronic | ICD-10-CM

## 2025-04-10 PROCEDURE — 11042 DBRDMT SUBQ TIS 1ST 20SQCM/<: CPT

## 2025-04-10 NOTE — FLOWSHEET NOTE
04/10/25 1329   Wound 03/05/25 Toe (Comment  which one) Right # 1   Date First Assessed/Time First Assessed: 03/05/25 1015   Present on Original Admission: Yes  Wound Approximate Age at First Assessment (Weeks): 1 weeks  Primary Wound Type: Diabetic Ulcer  Location: Toe (Comment  which one)  Wound Location Orientation...   Wound Image    Wound Etiology Diabetic Gavin 2   Dressing Status Old drainage noted   Wound Cleansed Cleansed with saline   Dressing/Treatment Hydrofera blue   Offloading for Diabetic Foot Ulcers Offloading boot   Wound Length (cm) 1 cm   Wound Width (cm) 0.3 cm   Wound Depth (cm) 0.4 cm   Wound Surface Area (cm^2) 0.3 cm^2   Change in Wound Size % (l*w) 80   Wound Volume (cm^3) 0.12 cm^3   Wound Healing % 80   Wound Assessment Pink/red   Drainage Amount Moderate (25-50%)   Drainage Description Serosanguinous   Odor None   Stella-wound Assessment Hyperkeratosis (callous)   Wound Thickness Description not for Pressure Injury Full thickness

## 2025-04-10 NOTE — WOUND CARE
Discharge Instructions for  [facility]  [address] [place] [address]  [address] [place] [address]  Phone [phone]   Fax [phone]      NAME:  Estevan Valenzuela  YOB: 1965  MEDICAL RECORD NUMBER:  [id number]  DATE:  4/10/2025    Return Appointment:   1 week  with Janny Casey NP      Instructions: right great toe:  Cleanse with normal saline.  Vashe Wound Solution soak placed on wound bed for a minimum of 60 seconds prior to dressing application.   Hydrofera Ready: Cut to wound size, place in wound bed, shiny side out.    Cover with ABD.  Secure with roll gauze.  Change 3 times weekly.    Wear offloading Defender boot anytime your foot touches the floor.  Continue to work on blood sugar readings.    Increase protein to 100g daily for optimal wound healing. Aim for 30g protein per shake, two shakes a day.  Possible cast next visit if patient agrees and can try and work with [facility] once shoes and inserts are ready to transition once healed again    Protein:   Egg ~ 6g  Yogurt ~ 15g  Half cup of cottage cheese ~ 15g  Chicken breast ~ 30g  [place] filet ~ 40g       Should you experience increased redness, swelling, pain, foul odor, size of wound(s), or have a temperature over 101 degrees please contact the Wound Healing Center at [phone] or if after hours contact your primary care physician or go to the hospital emergency department.    PLEASE NOTE: IF YOU ARE UNABLE TO OBTAIN WOUND SUPPLIES, CONTINUE TO USE THE SUPPLIES YOU HAVE AVAILABLE UNTIL YOU ARE ABLE TO REACH US. IT IS MOST IMPORTANT TO KEEP THE WOUND COVERED AT ALL TIMES.    Electronically signed Brittney Pagan RN, [facility] on 4/10/2025 at 1:45 PM

## 2025-04-10 NOTE — DISCHARGE INSTRUCTIONS
Discharge Instructions for  [facility]  [address] [place] [address]  [address] [place] [address]  Phone [phone]   Fax [phone]      NAME:  Estevan Valenzuela  YOB: 1965  MEDICAL RECORD NUMBER:  [id number]  DATE:  @ED@    Return Appointment:   1 week with Janny Casey NP      Instructions: right great toe:  Cleanse with normal saline.  Vashe Wound Solution soak placed on wound bed for a minimum of 60 seconds prior to dressing application.   Hydrofera Ready: Cut to wound size, place in wound bed, shiny side out.    Cover with ABD.  Secure with roll gauze.  Change 3 times weekly.     Wear offloading Defender boot anytime your foot touches the floor.  Continue to work on blood sugar readings.     Increase protein to 100g daily for optimal wound healing. Aim for 30g protein per shake, two shakes a day.  Possible cast next visit if patient agrees and can try and work with [facility] once shoes and inserts are ready to transition once healed again     Protein:   Egg ~ 6g  Yogurt ~ 15g  Half cup of cottage cheese ~ 15g  Chicken breast ~ 30g  [place] filet ~ 40g            Should you experience increased redness, swelling, pain, foul odor, size of wound(s), or have a temperature over 101 degrees please contact the Wound Healing Center at [phone] or if after hours contact your primary care physician or go to the hospital emergency department.    PLEASE NOTE: IF YOU ARE UNABLE TO OBTAIN WOUND SUPPLIES, CONTINUE TO USE THE SUPPLIES YOU HAVE AVAILABLE UNTIL YOU ARE ABLE TO REACH US. IT IS MOST IMPORTANT TO KEEP THE WOUND COVERED AT ALL TIMES.    Electronically signed Brittney Pagan RN on 4/10/2025 at 1:45 PM

## 2025-04-10 NOTE — FLOWSHEET NOTE
04/10/25 1329   Wound 03/05/25 Toe (Comment  which one) Right # 1   Date First Assessed/Time First Assessed: 03/05/25 1015   Present on Original Admission: Yes  Wound Approximate Age at First Assessment (Weeks): 1 weeks  Primary Wound Type: Diabetic Ulcer  Location: Toe (Comment  which one)  Wound Location Orientation...   Wound Image     Wound Etiology Diabetic Gavin 2   Dressing Status Old drainage noted   Wound Cleansed Cleansed with saline   Dressing/Treatment Hydrofera blue   Offloading for Diabetic Foot Ulcers Offloading boot   Wound Length (cm) 1 cm   Wound Width (cm) 0.3 cm   Wound Depth (cm) 0.4 cm   Wound Surface Area (cm^2) 0.3 cm^2   Change in Wound Size % (l*w) 80   Wound Volume (cm^3) 0.12 cm^3   Wound Healing % 80   Post-Procedure Length (cm) 1 cm   Post-Procedure Width (cm) 0.5 cm   Post-Procedure Depth (cm) 0.2 cm   Post-Procedure Surface Area (cm^2) 0.5 cm^2   Post-Procedure Volume (cm^3) 0.1 cm^3   Wound Assessment Pink/red   Drainage Amount Moderate (25-50%)   Drainage Description Serosanguinous   Odor None   Stella-wound Assessment Hyperkeratosis (callous)   Wound Thickness Description not for Pressure Injury Full thickness     Post debridement

## 2025-04-16 ENCOUNTER — CARE COORDINATION (OUTPATIENT)
Dept: CARE COORDINATION | Facility: CLINIC | Age: 60
End: 2025-04-16

## 2025-04-16 ENCOUNTER — HOSPITAL ENCOUNTER (OUTPATIENT)
Dept: DIABETES SERVICES | Age: 60
Setting detail: RECURRING SERIES
Discharge: HOME | End: 2025-04-19

## 2025-04-16 NOTE — PROGRESS NOTES
Participant attended Diabetes #1 session today titled : \"Understanding Diabetes\". Via zoom.   Topics included: Pathophysiology of Diabetes, Complications, Sleep Apnea, Diagnostic for diagnosis of Diabetes, A1C, Blood sugar target ranges, Glucometer use, Importance of blood sugar logs, Continuous Glucose Monitors, Sick day management, Hyperglycemia, Diabetic Ketoacidosis, Hypoglycemia, Emergency treatment of low blood sugars, and reviewed Diabetes Care team.    Written booklet/material provided via email     Encouraged lifestyle modifications and follow up with primary care provider.    He has completed Nutrition and scheduled today Living [facility] Coping [facility] for May 7, 2025 at 1 PM.  He reports his doctor told him yesterday he has gastroparesis and they are working on helping him with that situation.  He reports the medicine is in the mail.            Certified Diabetes Care and   [facility]

## 2025-04-16 NOTE — CARE COORDINATION
Ambulatory Care Coordination Note     4/16/2025 9:44 AM     Care Summary Note:   Outreach with patient for High Risk Case Management  Continues to have issues with N/V on any given day  BS while at recent PCP visit was 300+  Still waiting on [employer] inserts/shoes   - care coordination call with agency discovered that they need a fax, did not receive the one sent about 10 days ago.  Message with JAMI Rogel MA - she will resend.  Patient has two new medications prescribed by PCP   - Metoprolol Suc tab 25 mg ER   - Duloxetine Cap 30 mg  (Patient did not know the \"sig\" as he had not picked up the meds as yet)  Continues to be seen at Wound Center  Patient is following through with diabetes education     ACM: Heather Byers RN     Method of communication with provider: chart routing.    PCP/Specialist follow up:   Future Appointments         Provider Specialty Dept Phone    4/16/2025 11:30 AM You Devine RN Diabetes Services [phone]    4/17/2025  2:30 PM Janny Casey, APRN - NP; Brandy Ibarra, RN Wound Ostomy [phone]    5/27/2025 3:00 PM Aric Shafer MD Neurology [phone]    6/27/2025 2:30 PM Parul Perez PA-C Endocrinology [phone]

## 2025-04-24 ENCOUNTER — HOSPITAL ENCOUNTER (OUTPATIENT)
Dept: WOUND CARE | Age: 60
Discharge: HOME | End: 2025-04-24
Payer: MEDICARE

## 2025-04-24 VITALS
RESPIRATION RATE: 18 BRPM | DIASTOLIC BLOOD PRESSURE: 95 MMHG | HEIGHT: 73 IN | HEART RATE: 67 BPM | TEMPERATURE: 97.3 F | WEIGHT: 190 LBS | BODY MASS INDEX: 25.18 KG/M2 | SYSTOLIC BLOOD PRESSURE: 139 MMHG

## 2025-04-24 DIAGNOSIS — L97.512 DIABETIC ULCER OF TOE OF RIGHT FOOT ASSOCIATED WITH TYPE 2 DIABETES MELLITUS, WITH FAT LAYER EXPOSED (HCC): Chronic | ICD-10-CM

## 2025-04-24 DIAGNOSIS — E11.621 DIABETIC ULCER OF TOE OF RIGHT FOOT ASSOCIATED WITH TYPE 2 DIABETES MELLITUS, WITH FAT LAYER EXPOSED (HCC): Chronic | ICD-10-CM

## 2025-04-24 DIAGNOSIS — E11.42 DIABETIC POLYNEUROPATHY ASSOCIATED WITH TYPE 2 DIABETES MELLITUS (HCC): Primary | Chronic | ICD-10-CM

## 2025-04-24 PROCEDURE — 11042 DBRDMT SUBQ TIS 1ST 20SQCM/<: CPT

## 2025-04-24 RX ORDER — LIDOCAINE HYDROCHLORIDE 20 MG/ML
JELLY TOPICAL PRN
OUTPATIENT
Start: 2025-04-24

## 2025-04-24 RX ORDER — SODIUM CHLOR/HYPOCHLOROUS ACID 0.033 %
SOLUTION, IRRIGATION IRRIGATION PRN
Status: DISCONTINUED | OUTPATIENT
Start: 2025-04-24 | End: 2025-04-25 | Stop reason: HOSPADM

## 2025-04-24 RX ORDER — LIDOCAINE HYDROCHLORIDE 40 MG/ML
SOLUTION TOPICAL PRN
OUTPATIENT
Start: 2025-04-24

## 2025-04-24 RX ORDER — SODIUM CHLOR/HYPOCHLOROUS ACID 0.033 %
SOLUTION, IRRIGATION IRRIGATION PRN
OUTPATIENT
Start: 2025-04-24

## 2025-04-24 RX ORDER — TRIAMCINOLONE ACETONIDE 1 MG/G
OINTMENT TOPICAL PRN
OUTPATIENT
Start: 2025-04-24

## 2025-04-24 RX ORDER — SILVER SULFADIAZINE 10 MG/G
CREAM TOPICAL PRN
OUTPATIENT
Start: 2025-04-24

## 2025-04-24 RX ORDER — LIDOCAINE HYDROCHLORIDE 20 MG/ML
JELLY TOPICAL PRN
Status: DISCONTINUED | OUTPATIENT
Start: 2025-04-24 | End: 2025-04-25 | Stop reason: HOSPADM

## 2025-04-24 RX ORDER — GINSENG 100 MG
CAPSULE ORAL PRN
OUTPATIENT
Start: 2025-04-24

## 2025-04-24 RX ORDER — BACITRACIN ZINC AND POLYMYXIN B SULFATE 500; 1000 [USP'U]/G; [USP'U]/G
OINTMENT TOPICAL PRN
OUTPATIENT
Start: 2025-04-24

## 2025-04-24 RX ORDER — LIDOCAINE 50 MG/G
OINTMENT TOPICAL PRN
OUTPATIENT
Start: 2025-04-24

## 2025-04-24 RX ORDER — LIDOCAINE 40 MG/G
CREAM TOPICAL PRN
OUTPATIENT
Start: 2025-04-24

## 2025-04-24 RX ORDER — MUPIROCIN 20 MG/G
OINTMENT TOPICAL PRN
OUTPATIENT
Start: 2025-04-24

## 2025-04-24 RX ORDER — CLOBETASOL PROPIONATE 0.5 MG/G
OINTMENT TOPICAL PRN
OUTPATIENT
Start: 2025-04-24

## 2025-04-24 RX ORDER — BETAMETHASONE DIPROPIONATE 0.5 MG/G
CREAM TOPICAL PRN
OUTPATIENT
Start: 2025-04-24

## 2025-04-24 RX ORDER — GENTAMICIN SULFATE 1 MG/G
OINTMENT TOPICAL PRN
OUTPATIENT
Start: 2025-04-24

## 2025-04-24 RX ORDER — NEOMYCIN/BACITRACIN/POLYMYXINB 3.5-400-5K
OINTMENT (GRAM) TOPICAL PRN
OUTPATIENT
Start: 2025-04-24

## 2025-04-24 RX ADMIN — Medication: at 13:35

## 2025-04-24 RX ADMIN — LIDOCAINE HYDROCHLORIDE: 20 JELLY TOPICAL at 13:35

## 2025-04-24 NOTE — FLOWSHEET NOTE
04/24/25 1315   Right Leg Edema Point of Measurement   Leg circumference 34 cm   Ankle circumference 24 cm   Foot circumference 23 cm   Compression Therapy Compression not ordered   Wound 03/05/25 Toe (Comment  which one) Right # 1   Date First Assessed/Time First Assessed: 03/05/25 1015   Present on Original Admission: Yes  Wound Approximate Age at First Assessment (Weeks): 1 weeks  Primary Wound Type: Diabetic Ulcer  Location: Toe (Comment  which one)  Wound Location Orientation...   Wound Image     Wound Etiology Diabetic Gavin 2   Dressing Status Old drainage noted   Wound Cleansed Cleansed with saline   Dressing/Treatment Hydrofera blue   Offloading for Diabetic Foot Ulcers Offloading boot   Wound Length (cm) 0.8 cm   Wound Width (cm) 0.6 cm   Wound Depth (cm) 0.3 cm   Wound Surface Area (cm^2) 0.48 cm^2   Change in Wound Size % (l*w) 68   Wound Volume (cm^3) 0.144 cm^3   Wound Healing % 76   Post-Procedure Length (cm) 0.8 cm   Post-Procedure Width (cm) 0.5 cm   Post-Procedure Depth (cm) 0.2 cm   Post-Procedure Surface Area (cm^2) 0.4 cm^2   Post-Procedure Volume (cm^3) 0.08 cm^3   Wound Assessment Pink/red   Drainage Amount Small (< 25%)   Drainage Description Serosanguinous   Odor None   Stella-wound Assessment Hyperkeratosis (callous)   Margins Attached edges   Wound Thickness Description not for Pressure Injury Full thickness   Pain Assessment   Pain Assessment None - Denies Pain

## 2025-04-24 NOTE — WOUND CARE
Discharge Instructions for  [facility]  [address] [place] [address]  [address] [place] [address]  Phone [phone]   Fax [phone]      NAME:  Estevan Valenzuela  YOB: 1965  MEDICAL RECORD NUMBER:  [id number]  DATE:  4/24/2025    Return Appointment:  2 weeks with Janny Casey NP      Instructions: right great toe:  Cleanse with normal saline.  Vashe Wound Solution soak placed on wound bed for a minimum of 60 seconds prior to dressing application.   Hydrofera Ready: Cut to wound size, place in wound bed, shiny side out.    Cover with ABD.  Secure with roll gauze.  Change 3 times weekly.    Wear offloading Darco shoe with peg insert anytime your foot touches the floor.  Continue to work on blood sugar readings.    Increase protein to 100g daily for optimal wound healing. Aim for 30g protein per shake, two shakes a day.  Possible cast next visit if patient agrees and can try and work with [facility] once shoes and inserts are ready to transition once healed again    Protein:   Egg ~ 6g  Yogurt ~ 15g  Half cup of cottage cheese ~ 15g  Chicken breast ~ 30g  [place] filet ~ 40g       Should you experience increased redness, swelling, pain, foul odor, size of wound(s), or have a temperature over 101 degrees please contact the Wound Healing Center at [phone] or if after hours contact your primary care physician or go to the hospital emergency department.    PLEASE NOTE: IF YOU ARE UNABLE TO OBTAIN WOUND SUPPLIES, CONTINUE TO USE THE SUPPLIES YOU HAVE AVAILABLE UNTIL YOU ARE ABLE TO REACH US. IT IS MOST IMPORTANT TO KEEP THE WOUND COVERED AT ALL TIMES.    Electronically signed TIFFANY BARTH RN, [facility] on 4/24/2025 at 1:35 PM

## 2025-04-25 NOTE — ASSESSMENT & PLAN NOTE
Assessment  Right great toe wound is smaller today; wound bed is pink, non granular and periwound is callus  Negative probe to bone, does not appear acutely infected  Continues to wear defender boot; has finally been fitted for custom orthotics, they should arrive next week  Plan  Excisional debridement to remove devitalized tissue and promote wound healing  Wound care: Vashe cleanse, hydrofera blue, change 3x weekly; continue to offload with defender boot  RTC 2 weeks

## 2025-04-30 ENCOUNTER — CARE COORDINATION (OUTPATIENT)
Dept: CARE COORDINATION | Facility: CLINIC | Age: 60
End: 2025-04-30

## 2025-04-30 NOTE — CARE COORDINATION
Ambulatory Care Coordination Note     4/30/2025 10:41 AM     Care Summary Note:   Incoming call from patient  Reports he went to [facility] 4/24.  Shoes should be ready in 2 weeks.  States that BP was improved - noted at Wound Center during appointment.  Has new BP meds per Dr. Leigh.     ACM: Heather Byers RN     Method of communication with provider: chart routing.    PCP/Specialist follow up:   Future Appointments         Provider Specialty Dept Phone    5/7/2025 1:00 PM You Devine RN Diabetes Services [phone]    5/8/2025 1:00 PM Janny Casey, APRN - NP; Gayathri Gomez RN Wound Ostomy [phone]    5/22/2025 2:00 PM Casa Hewitt MD Cardiology [phone]    5/27/2025 3:00 PM (Arrive by 2:45 PM) Aric Shafer MD Neurology [phone]    6/27/2025 2:30 PM Parul Perez PA-C Endocrinology [phone]

## 2025-05-08 ENCOUNTER — HOSPITAL ENCOUNTER (OUTPATIENT)
Dept: WOUND CARE | Age: 60
Discharge: HOME | End: 2025-05-08
Payer: MEDICARE

## 2025-05-08 VITALS
WEIGHT: 191 LBS | HEIGHT: 73 IN | TEMPERATURE: 97.9 F | BODY MASS INDEX: 25.31 KG/M2 | RESPIRATION RATE: 18 BRPM | DIASTOLIC BLOOD PRESSURE: 91 MMHG | SYSTOLIC BLOOD PRESSURE: 145 MMHG | HEART RATE: 66 BPM

## 2025-05-08 DIAGNOSIS — L97.512 DIABETIC ULCER OF TOE OF RIGHT FOOT ASSOCIATED WITH TYPE 2 DIABETES MELLITUS, WITH FAT LAYER EXPOSED (HCC): Primary | Chronic | ICD-10-CM

## 2025-05-08 DIAGNOSIS — E11.42 DIABETIC POLYNEUROPATHY ASSOCIATED WITH TYPE 2 DIABETES MELLITUS (HCC): Chronic | ICD-10-CM

## 2025-05-08 DIAGNOSIS — E11.621 DIABETIC ULCER OF TOE OF RIGHT FOOT ASSOCIATED WITH TYPE 2 DIABETES MELLITUS, WITH FAT LAYER EXPOSED (HCC): Primary | Chronic | ICD-10-CM

## 2025-05-08 PROCEDURE — 11044 DBRDMT BONE 1ST 20 SQ CM/<: CPT

## 2025-05-08 PROCEDURE — 11042 DBRDMT SUBQ TIS 1ST 20SQCM/<: CPT

## 2025-05-08 RX ORDER — BETAMETHASONE DIPROPIONATE 0.5 MG/G
CREAM TOPICAL PRN
OUTPATIENT
Start: 2025-05-08

## 2025-05-08 RX ORDER — METOPROLOL SUCCINATE 25 MG/1
25 TABLET, EXTENDED RELEASE ORAL DAILY
COMMUNITY
Start: 2025-04-14

## 2025-05-08 RX ORDER — GENTAMICIN SULFATE 1 MG/G
OINTMENT TOPICAL PRN
OUTPATIENT
Start: 2025-05-08

## 2025-05-08 RX ORDER — TRIAMCINOLONE ACETONIDE 1 MG/G
OINTMENT TOPICAL PRN
OUTPATIENT
Start: 2025-05-08

## 2025-05-08 RX ORDER — BACITRACIN ZINC AND POLYMYXIN B SULFATE 500; 1000 [USP'U]/G; [USP'U]/G
OINTMENT TOPICAL PRN
OUTPATIENT
Start: 2025-05-08

## 2025-05-08 RX ORDER — SILVER SULFADIAZINE 10 MG/G
CREAM TOPICAL PRN
OUTPATIENT
Start: 2025-05-08

## 2025-05-08 RX ORDER — LIDOCAINE HYDROCHLORIDE 20 MG/ML
JELLY TOPICAL PRN
OUTPATIENT
Start: 2025-05-08

## 2025-05-08 RX ORDER — MUPIROCIN 20 MG/G
OINTMENT TOPICAL PRN
OUTPATIENT
Start: 2025-05-08

## 2025-05-08 RX ORDER — GINSENG 100 MG
CAPSULE ORAL PRN
OUTPATIENT
Start: 2025-05-08

## 2025-05-08 RX ORDER — METOCLOPRAMIDE 5 MG/1
5 TABLET ORAL 3 TIMES DAILY PRN
COMMUNITY
Start: 2025-04-15

## 2025-05-08 RX ORDER — LIDOCAINE 40 MG/G
CREAM TOPICAL PRN
OUTPATIENT
Start: 2025-05-08

## 2025-05-08 RX ORDER — NEOMYCIN/BACITRACIN/POLYMYXINB 3.5-400-5K
OINTMENT (GRAM) TOPICAL PRN
OUTPATIENT
Start: 2025-05-08

## 2025-05-08 RX ORDER — CLOBETASOL PROPIONATE 0.5 MG/G
OINTMENT TOPICAL PRN
OUTPATIENT
Start: 2025-05-08

## 2025-05-08 RX ORDER — SODIUM CHLOR/HYPOCHLOROUS ACID 0.033 %
SOLUTION, IRRIGATION IRRIGATION PRN
OUTPATIENT
Start: 2025-05-08

## 2025-05-08 RX ORDER — LIDOCAINE 50 MG/G
OINTMENT TOPICAL PRN
OUTPATIENT
Start: 2025-05-08

## 2025-05-08 RX ORDER — LIDOCAINE HYDROCHLORIDE 40 MG/ML
SOLUTION TOPICAL PRN
OUTPATIENT
Start: 2025-05-08

## 2025-05-08 NOTE — PROGRESS NOTES
[facility]  Procedure Note    Estevan Valenzuela  MEDICAL RECORD NUMBER: [id number]  AGE: 60 y.o.     GENDER: male    : 1965  EPISODE DATE: 2025    Problem List Items Addressed This Visit          Endocrine    Diabetic polyneuropathy associated with type 2 diabetes mellitus (HCC) (Chronic)    Relevant Orders    MD COMMUNICATION 1    MD COMMUNICATION 2    Initiate Outpatient Wound Care Protocol    Initiate Oxygen Therapy Protocol    * (Principal) Diabetic ulcer of toe of right foot associated with type 2 diabetes mellitus, with fat layer exposed (HCC) - Primary (Chronic)    Assessment  Right great toe wound is larger, depth is near to bone; toe is not edematous or erythematous  Patient still has not received his custom shoes; he has not been wearing his defender boot because it got wet  He states he has been up on his foot more chasing an opossum that is loose in his house  Plan  Excisional debridement to remove devitalized tissue and promote wound healing  After discussion, patient is willing to have a TCC applied  Wound care: Vashe cleanse, hydrofera blue, change 3x weekly; TCC for offloading  RTC 1 week         Relevant Orders    MD COMMUNICATION 1    MD COMMUNICATION 2    Initiate Outpatient Wound Care Protocol    Initiate Oxygen Therapy Protocol   Procedure Note: Excisional Debridement  Indications: Based on my examination of the patient's wound(s) today, debridement is required to remove devitalized tissue, evaluate the wound base, and promote wound healing.  Performed by: DAVION Wiseman NP  Consent obtained: Yes  Time out taken: Yes  Pain control:     Wound #: 1  Diabetic/pressure/chronic non-pressure ulcers only: diabetic ulcer, depth to bone was/were debrided using curette. The wound(s) was/were debrided down through/to bone. Devitalized tissue debrided: biofilm, slough, exudate, and callus. Pre and post debridement measurements: see flow

## 2025-05-08 NOTE — ASSESSMENT & PLAN NOTE
Assessment  Right great toe wound is larger, depth is near to bone; toe is not edematous or erythematous  Patient still has not received his custom shoes; he has not been wearing his defender boot because it got wet  He states he has been up on his foot more chasing an opossum that is loose in his house  Plan  Excisional debridement to remove devitalized tissue and promote wound healing  After discussion, patient is willing to have a TCC applied  Wound care: Vashe cleanse, hydrofera blue, change 3x weekly; TCC for offloading  RTC 1 week

## 2025-05-08 NOTE — DISCHARGE INSTRUCTIONS
Discharge Instructions for  [facility]  [address] [place] [address]  Phone [phone]   Fax [phone]        NAME:  Estevan Valenzuela  YOB: 1965  MEDICAL RECORD NUMBER:  [id number]  DATE:  5/8/2025     Return Appointment:  1 week with Janny Casey NP        Instructions: right great toe:  Cleanse with normal saline.  Vashe Wound Solution soak placed on wound bed for a minimum of 60 seconds prior to dressing application.   Hydrofera Ready: Cut to wound size, place in wound bed, shiny side out.    Secure with tape.  Apply total contact cast to RIGHT LEG. In the event the cast becomes wet, loose, cracked or causing any pain, call the wound center for an appointment for a cast change. If this occurs on the weekend, report to the ER for cast removal.      Change in wound center in one week.     Continue to work on blood sugar readings.     Increase protein to 100g daily for optimal wound healing. Aim for 30g protein per shake, two shakes a day.     Protein:   Egg ~ 6g  Yogurt ~ 15g  Half cup of cottage cheese ~ 15g  Chicken breast ~ 30g  [place] filet ~ 40g

## 2025-05-08 NOTE — WOUND CARE
Total Contact Cast    NAME:  Estevan Valenzuela  YOB: 1965  MEDICAL RECORD NUMBER:  [id number]  DATE:  5/8/2025    Goal:  Patient will maintain integrity of cast, avoid mobility hazards, and report complications that may occur (foul odor, pain, numbness, cracked cast).    Removed old contact cast if indicated and wash extremity with soap and water.  Applied ordered dressing.  Applied foam padding  Applied cast padding included in the kit   Applied per nursing   Applied to: [] Left Lower Leg [x] Right Lower Leg  Allow cast to dry.   Instructed patient to report to health care provider, including wound care center, any back pain, hip pain, or leg pain, numbness of toes, or any odor coming from the cast.   Instructed patient not to stick any foreign objects down into cast.   Instructed patient to utilize assistive devices(crutches, cane or walker) as ordered   Instructed patient to continue offloading as directed.     Applied cast per  Guidelines    Electronically signed by Maricel Mckoy RN on 5/8/2025 at 2:58 PM

## 2025-05-08 NOTE — WOUND CARE
Discharge Instructions for  [facility]  [address] [place] [address]  [address] [place] [address]  Phone [phone]   Fax [phone]      NAME:  Estevan Valenzuela  YOB: 1965  MEDICAL RECORD NUMBER:  [id number]  DATE:  5/8/2025    Return Appointment:  1 week with Janny Casey NP      Instructions: right great toe:  Cleanse with normal saline.  Vashe Wound Solution soak placed on wound bed for a minimum of 60 seconds prior to dressing application.   Hydrofera Ready: Cut to wound size, place in wound bed, shiny side out.    Secure with tape.  Apply total contact cast to RIGHT LEG. In the event the cast becomes wet, loose, cracked or causing any pain, call the wound center for an appointment for a cast change. If this occurs on the weekend, report to the ER for cast removal.     Change in wound center in one week.    Continue to work on blood sugar readings.    Increase protein to 100g daily for optimal wound healing. Aim for 30g protein per shake, two shakes a day.    Protein:   Egg ~ 6g  Yogurt ~ 15g  Half cup of cottage cheese ~ 15g  Chicken breast ~ 30g  [place] filet ~ 40g       Should you experience increased redness, swelling, pain, foul odor, size of wound(s), or have a temperature over 101 degrees please contact the Wound Healing Center at [phone] or if after hours contact your primary care physician or go to the hospital emergency department.    PLEASE NOTE: IF YOU ARE UNABLE TO OBTAIN WOUND SUPPLIES, CONTINUE TO USE THE SUPPLIES YOU HAVE AVAILABLE UNTIL YOU ARE ABLE TO REACH US. IT IS MOST IMPORTANT TO KEEP THE WOUND COVERED AT ALL TIMES.    Electronically signed Maricel Mckoy RN, C on 5/8/2025 at 1:46 PM

## 2025-05-08 NOTE — FLOWSHEET NOTE
05/08/25 1321   Wound 03/05/25 Toe (Comment  which one) Right # 1   Date First Assessed/Time First Assessed: 03/05/25 1015   Present on Original Admission: Yes  Wound Approximate Age at First Assessment (Weeks): 1 weeks  Primary Wound Type: Diabetic Ulcer  Location: Toe (Comment  which one)  Wound Location Orientation...   Wound Image     Wound Etiology Diabetic Gavin 2   Dressing Status Old drainage noted   Wound Cleansed Vashe   Dressing/Treatment Hydrofera blue   Offloading for Diabetic Foot Ulcers Offloading boot   Wound Length (cm) 1.3 cm   Wound Width (cm) 1.2 cm   Wound Depth (cm) 0.4 cm   Wound Surface Area (cm^2) 1.56 cm^2   Change in Wound Size % (l*w) -4   Wound Volume (cm^3) 0.624 cm^3   Wound Healing % -4   Post-Procedure Length (cm) 1.5 cm   Post-Procedure Width (cm) 1.3 cm   Post-Procedure Depth (cm) 0.5 cm   Post-Procedure Surface Area (cm^2) 1.95 cm^2   Post-Procedure Volume (cm^3) 0.975 cm^3   Wound Assessment Pink/red;Slough;Exposed structure bone   Drainage Amount Moderate (25-50%)   Drainage Description Serosanguinous   Odor Mild   Stella-wound Assessment Hyperkeratosis (callous)   Wound Thickness Description not for Pressure Injury Full thickness

## 2025-05-22 ENCOUNTER — OFFICE VISIT (OUTPATIENT)
Age: 60
End: 2025-05-22
Payer: MEDICARE

## 2025-05-22 ENCOUNTER — CARE COORDINATION (OUTPATIENT)
Dept: CARE COORDINATION | Facility: CLINIC | Age: 60
End: 2025-05-22

## 2025-05-22 VITALS
DIASTOLIC BLOOD PRESSURE: 88 MMHG | HEART RATE: 61 BPM | HEIGHT: 73 IN | SYSTOLIC BLOOD PRESSURE: 138 MMHG | BODY MASS INDEX: 25.2 KG/M2

## 2025-05-22 DIAGNOSIS — R07.2 PRECORDIAL PAIN: ICD-10-CM

## 2025-05-22 DIAGNOSIS — R06.09 DYSPNEA ON EXERTION: ICD-10-CM

## 2025-05-22 DIAGNOSIS — Z76.89 ENCOUNTER TO ESTABLISH CARE: Primary | ICD-10-CM

## 2025-05-22 DIAGNOSIS — E78.2 HYPERLIPIDEMIA, MIXED: ICD-10-CM

## 2025-05-22 PROCEDURE — 93000 ELECTROCARDIOGRAM COMPLETE: CPT | Performed by: INTERNAL MEDICINE

## 2025-05-22 PROCEDURE — 99204 OFFICE O/P NEW MOD 45 MIN: CPT | Performed by: INTERNAL MEDICINE

## 2025-05-22 PROCEDURE — 4004F PT TOBACCO SCREEN RCVD TLK: CPT | Performed by: INTERNAL MEDICINE

## 2025-05-22 PROCEDURE — G8428 CUR MEDS NOT DOCUMENT: HCPCS | Performed by: INTERNAL MEDICINE

## 2025-05-22 PROCEDURE — G8419 CALC BMI OUT NRM PARAM NOF/U: HCPCS | Performed by: INTERNAL MEDICINE

## 2025-05-22 PROCEDURE — 3017F COLORECTAL CA SCREEN DOC REV: CPT | Performed by: INTERNAL MEDICINE

## 2025-05-22 RX ORDER — EZETIMIBE 10 MG/1
10 TABLET ORAL DAILY
Qty: 90 TABLET | Refills: 3 | Status: SHIPPED | OUTPATIENT
Start: 2025-05-22

## 2025-05-22 RX ORDER — ROSUVASTATIN CALCIUM 40 MG/1
40 TABLET, COATED ORAL
Qty: 90 TABLET | Refills: 3 | Status: SHIPPED | OUTPATIENT
Start: 2025-05-22

## 2025-05-22 ASSESSMENT — ENCOUNTER SYMPTOMS
HEMOPTYSIS: 0
VOMITING: 0
COUGH: 0
DOUBLE VISION: 0
BACK PAIN: 0
SHORTNESS OF BREATH: 0
BLOATING: 0
ABDOMINAL PAIN: 0
NAUSEA: 0
BLURRED VISION: 0
ORTHOPNEA: 0

## 2025-05-22 NOTE — PROGRESS NOTES
[facility] CARDIOLOGY  [address] [place] [address], [address]  [place] [address]  PHONE: [phone]    25    NAME:  Estevan Valenzuela  : 1965  MRN: [id number]         SUBJECTIVE:   Estevan Valenzuela is a 60 y.o. male seen for a visit regarding the following:     Chief Complaint   Patient presents with    New Patient     Started having chest pain, SOB, nausea. Happens unexpectedly. Has diabetes.        HPI:      No prior history of coronary disease.  History of uncontrolled diabetes mellitus with a right toe diabetic ulcer and sees wound care.  Other history of HIV (since 2017), hyperlipidemia.    States impeded by rt foot ulcer/neuropathy; states at times with chest tightness with cutting grass/also some PARSON-since 2-3 years.   Not very active.  Denies any chest discomfort at rest.  Well-controlled home BPs.  Recent noted LDL levels at 203 from 2025 and referred for further evaluation    Past Medical History, Past Surgical History, Family history, Social History, and Medications were all reviewed with the patient today and updated as necessary.     No Known Allergies  Patient Active Problem List   Diagnosis    Hypercholesterolemia    Diabetic polyneuropathy associated with type 2 diabetes mellitus (HCC)    Human immunodeficiency virus (HCC)    Type 2 diabetes mellitus with hyperglycemia, with long-term current use of insulin (HCC)    Hypertriglyceridemia    Diabetic ulcer of toe of right foot associated with type 2 diabetes mellitus, with fat layer exposed (HCC)    Cigarette smoker    Cellulitis of great toe of left foot    Acute osteomyelitis of toe of left foot (HCC)    FAHEEM (acute kidney injury)    Metabolic acidosis    Chronic osteomyelitis of toe of left foot (HCC)    Bilateral occipital neuralgia    Tobacco abuse    Diabetic foot infection (HCC)    Chronic nausea    Chronic diarrhea    Incontinence of feces with fecal urgency    Gastroesophageal reflux disease    Family history of colon cancer in

## 2025-05-22 NOTE — CARE COORDINATION
Ambulatory Care Coordination Note     5/22/2025 12:05 PM     Care Summary Note:   Outreach for High Risk Case Management - spoke with patient  Reports feeling some better today - less nausea, has had some diarrhea today.  Blood sugar is 275  Has only been able to eat twice daily (though patient does get up late in the am)  Initial cardiology appointment today - patient has transportation arranged.     ACM: Heather Byers RN     Method of communication with provider: none.    PCP/Specialist follow up:   Future Appointments         Provider Specialty Dept Phone    5/22/2025 2:00 PM Casa Hewitt MD Cardiology [phone]    5/27/2025 3:00 PM (Arrive by 2:45 PM) Aric Shafer MD Neurology [phone]    5/29/2025 1:10 PM Janny Casey, APRN - NP; Tanesha Zheng, PT Wound Ostomy [phone]    6/27/2025 2:30 PM Parul Perez PA-C Endocrinology [phone]

## 2025-05-26 ASSESSMENT — ENCOUNTER SYMPTOMS
VOICE CHANGE: 0
DIARRHEA: 1
COUGH: 0
CONSTIPATION: 1

## 2025-05-26 NOTE — PROGRESS NOTES
[facility] NEUROLOGY  [address], [address]  [place] [address]  Phone: [phone] Fax [phone]  Aric Shafer MD      Patient: Estevan Valenzuela  Provider: Aric Shafer MD    CC:   Chief Complaint   Patient presents with    Follow-up    Neurologic Problem     Referring Provider:    History of Present Illness:     Estevan Valenzuela is a 60 y.o. RH male who presents for follow up of peripheral neuropathy.     He is unaccompanied for today's visit.  He was last seen November 2024.     Patient presents for follow-up and continued management of the painful peripheral neuropathy of the lower extremities, presumed secondary to diabetes, present for several years.  He was previously followed by Dr. Busch at [facility] neurology.  He also has a history of HIV on antiretroviral therapy.    Current medications include (but not limited to):  Gabapentin 800 mg 3 times a day  Cymbalta 90 mg daily    Previous medication trials include: Cymbalta    Patient presents today for follow-up.    No significant changes since the last visit.    Has been seen by [facility] pain management for diabetic peripheral neuropathy of the lower extremities in addition to a postherpetic neuralgia of the left shoulder.  Patient reports discussions of cervical injections and/or spinal cord stimulator but he has declined for now.    He continues to experience painful paresthesias in the lower extremities, currently taking gabapentin and is now taking duloxetine as well. He has a poorly healing diabetic ulcer in the distal right lower extremity, followed by wound care.      It was also noted that he had been experiencing some occipital headaches with clinical suspicion for occipital neuralgia; he did undergo a bilateral occipital nerve block but only transient benefit.  Currently seems to be doing okay for now.    He has a persistent gait disturbance complicated by the neuropathic pain in his feet as well as his poorly healing ulcer.

## 2025-05-27 ENCOUNTER — OFFICE VISIT (OUTPATIENT)
Dept: NEUROLOGY | Age: 60
End: 2025-05-27
Payer: MEDICARE

## 2025-05-27 VITALS
DIASTOLIC BLOOD PRESSURE: 84 MMHG | HEIGHT: 73 IN | BODY MASS INDEX: 25.2 KG/M2 | SYSTOLIC BLOOD PRESSURE: 150 MMHG | HEART RATE: 78 BPM

## 2025-05-27 DIAGNOSIS — M79.2 NEUROPATHIC PAIN: ICD-10-CM

## 2025-05-27 DIAGNOSIS — E11.42 DIABETIC PERIPHERAL NEUROPATHY (HCC): Primary | ICD-10-CM

## 2025-05-27 PROCEDURE — G8419 CALC BMI OUT NRM PARAM NOF/U: HCPCS | Performed by: PSYCHIATRY & NEUROLOGY

## 2025-05-27 PROCEDURE — 3046F HEMOGLOBIN A1C LEVEL >9.0%: CPT | Performed by: PSYCHIATRY & NEUROLOGY

## 2025-05-27 PROCEDURE — 99214 OFFICE O/P EST MOD 30 MIN: CPT | Performed by: PSYCHIATRY & NEUROLOGY

## 2025-05-27 PROCEDURE — 4004F PT TOBACCO SCREEN RCVD TLK: CPT | Performed by: PSYCHIATRY & NEUROLOGY

## 2025-05-27 PROCEDURE — 3017F COLORECTAL CA SCREEN DOC REV: CPT | Performed by: PSYCHIATRY & NEUROLOGY

## 2025-05-27 PROCEDURE — 2022F DILAT RTA XM EVC RTNOPTHY: CPT | Performed by: PSYCHIATRY & NEUROLOGY

## 2025-05-27 PROCEDURE — G8427 DOCREV CUR MEDS BY ELIG CLIN: HCPCS | Performed by: PSYCHIATRY & NEUROLOGY

## 2025-05-28 ENCOUNTER — TELEPHONE (OUTPATIENT)
Age: 60
End: 2025-05-28

## 2025-05-28 DIAGNOSIS — E78.1 HYPERTRIGLYCERIDEMIA: ICD-10-CM

## 2025-05-28 DIAGNOSIS — E78.00 HYPERCHOLESTEROLEMIA: Primary | ICD-10-CM

## 2025-05-28 NOTE — TELEPHONE ENCOUNTER
Calling regarding the CTA that was ordered. The code for that test has  and is no longer valid. Updated order with code [id number]

## 2025-05-29 ENCOUNTER — HOSPITAL ENCOUNTER (OUTPATIENT)
Dept: WOUND CARE | Age: 60
Discharge: HOME | End: 2025-05-29
Payer: MEDICARE

## 2025-05-29 VITALS
HEIGHT: 73 IN | TEMPERATURE: 97.7 F | SYSTOLIC BLOOD PRESSURE: 157 MMHG | OXYGEN SATURATION: 100 % | HEART RATE: 65 BPM | WEIGHT: 187 LBS | BODY MASS INDEX: 24.78 KG/M2 | DIASTOLIC BLOOD PRESSURE: 93 MMHG | RESPIRATION RATE: 12 BRPM

## 2025-05-29 DIAGNOSIS — E11.621 DIABETIC ULCER OF TOE OF RIGHT FOOT ASSOCIATED WITH TYPE 2 DIABETES MELLITUS, WITH FAT LAYER EXPOSED (HCC): ICD-10-CM

## 2025-05-29 DIAGNOSIS — E11.42 DIABETIC POLYNEUROPATHY ASSOCIATED WITH TYPE 2 DIABETES MELLITUS (HCC): Primary | ICD-10-CM

## 2025-05-29 DIAGNOSIS — L97.512 DIABETIC ULCER OF TOE OF RIGHT FOOT ASSOCIATED WITH TYPE 2 DIABETES MELLITUS, WITH FAT LAYER EXPOSED (HCC): ICD-10-CM

## 2025-05-29 PROCEDURE — 99213 OFFICE O/P EST LOW 20 MIN: CPT

## 2025-05-29 RX ORDER — LIDOCAINE HYDROCHLORIDE 40 MG/ML
SOLUTION TOPICAL PRN
Status: CANCELLED | OUTPATIENT
Start: 2025-05-29

## 2025-05-29 RX ORDER — SILVER SULFADIAZINE 10 MG/G
CREAM TOPICAL PRN
Status: CANCELLED | OUTPATIENT
Start: 2025-05-29

## 2025-05-29 RX ORDER — GENTAMICIN SULFATE 1 MG/G
OINTMENT TOPICAL PRN
Status: CANCELLED | OUTPATIENT
Start: 2025-05-29

## 2025-05-29 RX ORDER — LIDOCAINE HYDROCHLORIDE 20 MG/ML
JELLY TOPICAL PRN
Status: DISCONTINUED | OUTPATIENT
Start: 2025-05-29 | End: 2025-05-30 | Stop reason: HOSPADM

## 2025-05-29 RX ORDER — SODIUM CHLOR/HYPOCHLOROUS ACID 0.033 %
SOLUTION, IRRIGATION IRRIGATION PRN
Status: DISCONTINUED | OUTPATIENT
Start: 2025-05-29 | End: 2025-05-30 | Stop reason: HOSPADM

## 2025-05-29 RX ORDER — GINSENG 100 MG
CAPSULE ORAL PRN
Status: CANCELLED | OUTPATIENT
Start: 2025-05-29

## 2025-05-29 RX ORDER — BETAMETHASONE DIPROPIONATE 0.5 MG/G
CREAM TOPICAL PRN
Status: CANCELLED | OUTPATIENT
Start: 2025-05-29

## 2025-05-29 RX ORDER — SODIUM CHLOR/HYPOCHLOROUS ACID 0.033 %
SOLUTION, IRRIGATION IRRIGATION PRN
Status: CANCELLED | OUTPATIENT
Start: 2025-05-29

## 2025-05-29 RX ORDER — BACITRACIN ZINC AND POLYMYXIN B SULFATE 500; 1000 [USP'U]/G; [USP'U]/G
OINTMENT TOPICAL PRN
Status: CANCELLED | OUTPATIENT
Start: 2025-05-29

## 2025-05-29 RX ORDER — MUPIROCIN 20 MG/G
OINTMENT TOPICAL PRN
Status: CANCELLED | OUTPATIENT
Start: 2025-05-29

## 2025-05-29 RX ORDER — LIDOCAINE 50 MG/G
OINTMENT TOPICAL PRN
Status: CANCELLED | OUTPATIENT
Start: 2025-05-29

## 2025-05-29 RX ORDER — LIDOCAINE 40 MG/G
CREAM TOPICAL PRN
Status: CANCELLED | OUTPATIENT
Start: 2025-05-29

## 2025-05-29 RX ORDER — TRIAMCINOLONE ACETONIDE 1 MG/G
OINTMENT TOPICAL PRN
Status: CANCELLED | OUTPATIENT
Start: 2025-05-29

## 2025-05-29 RX ORDER — CLOBETASOL PROPIONATE 0.5 MG/G
OINTMENT TOPICAL PRN
Status: CANCELLED | OUTPATIENT
Start: 2025-05-29

## 2025-05-29 RX ORDER — LIDOCAINE HYDROCHLORIDE 20 MG/ML
JELLY TOPICAL PRN
Status: CANCELLED | OUTPATIENT
Start: 2025-05-29

## 2025-05-29 RX ORDER — NEOMYCIN/BACITRACIN/POLYMYXINB 3.5-400-5K
OINTMENT (GRAM) TOPICAL PRN
Status: CANCELLED | OUTPATIENT
Start: 2025-05-29

## 2025-05-29 RX ADMIN — Medication: at 14:11

## 2025-05-29 RX ADMIN — LIDOCAINE HYDROCHLORIDE: 20 JELLY TOPICAL at 14:10

## 2025-05-29 ASSESSMENT — ENCOUNTER SYMPTOMS
NAUSEA: 1
VOMITING: 0

## 2025-05-29 NOTE — ASSESSMENT & PLAN NOTE
Assessment  Patient was last seen on 05/08/2025; TCC was placed at that visit, but apparently patient cut TCC off himself and did not return as scheduled  Right great toe wound is callus covered; there is no edema, erythema, or purulence to suggest infection  Patient states his diabetic shoes and the inserts are ready but his endocrinologist has to fill out a form before he can pick them up  Plan  Callus to right great toe pared, surprisingly there is no open wound underneath  Moisturize daily with emollient of choice  Strongly encouraged to get paperwork completed by endocrinology so he can  his new shoes; strongly encouraged patient to continue wearing defender boot until he has his new shoes  Discharge from clinic - RTC for open wound

## 2025-05-29 NOTE — WOUND CARE
Discharge Instructions for  [facility]  [address] [place] [address]  Phone [phone]   Fax [phone]      NAME:  Estevan Valenzuela  YOB: 1965  MEDICAL RECORD NUMBER:  [id number]  DATE:  5/29/2025    Return Appointment:   Discharge with Janny Casey NP      Instructions:   Right Great Toe wound resolved!    Vaseline 2x daily x 1 month  Vaseline daily thereafter.    Daily Foot Exams.    Continue wearing Defender Boot until you obtain Diabetic Shoes & Insoles.    [employer] Shoes and Insoles ready.  Please take Endocrinologist's signed Diabetic Foot Care paperwork to [facility] in order to be able to obtain shoes and insoles.  For effective offloading, custom insoles may need several slight modifications.    Refresh insoles every 6 months.  Obtain new Diabetic Shoes annually.    Continue to aim for tight blood sugars: goal: between .  Continue to aim for A1C below 7%    Continue adequate protein and fluid intake.      Should you experience increased redness, swelling, pain, foul odor, size of wound(s), or have a temperature over 101 degrees please contact the Wound Healing Center at [phone] or if after hours contact your primary care physician or go to the hospital emergency department.    PLEASE NOTE: IF YOU ARE UNABLE TO OBTAIN WOUND SUPPLIES, CONTINUE TO USE THE SUPPLIES YOU HAVE AVAILABLE UNTIL YOU ARE ABLE TO REACH US. IT IS MOST IMPORTANT TO KEEP THE WOUND COVERED AT ALL TIMES.    Electronically signed Gayathri Gomez RN on 5/29/2025 at 2:00 PM

## 2025-05-29 NOTE — DISCHARGE INSTRUCTIONS
Right Great Toe wound resolved!    Vaseline 2x daily x 1 month  Vaseline daily thereafter.    Daily Foot Exams.    Continue wearing Defender Boot until you obtain Diabetic Shoes & Insoles.    [employer] Shoes and Insoles ready.  Please take Endocrinologist's signed Diabetic Foot Care paperwork to [employer] [facility] in order to be able to obtain shoes and insoles.  For effective offloading, custom insoles may need several slight modifications.    Refresh insoles every 6 months.  Obtain new Diabetic Shoes annually.    Continue to aim for tight blood sugars: goal: between .  Continue to aim for A1C below 7%    Continue adequate protein and fluid intake.

## 2025-05-29 NOTE — PROGRESS NOTES
[facility]  Medical Staff Progress Note     Estevan Valenzuela  MEDICAL RECORD NUMBER: [id number]  AGE: 60 y.o.     GENDER: male    : 1965  EPISODE DATE: 2025    Assessment and Plan:     Problem List Items Addressed This Visit          Endocrine    Diabetic polyneuropathy associated with type 2 diabetes mellitus (HCC) - Primary (Chronic)    Relevant Medications    lidocaine (XYLOCAINE) 2 % jelly    vashe wound therapy external solution    Other Relevant Orders    MD COMMUNICATION 1    MD COMMUNICATION 2    Initiate Outpatient Wound Care Protocol    Initiate Oxygen Therapy Protocol    * (Principal) Diabetic ulcer of toe of right foot associated with type 2 diabetes mellitus, with fat layer exposed (HCC) (Chronic)    Assessment  Patient was last seen on 2025; TCC was placed at that visit, but apparently patient cut TCC off himself and did not return as scheduled  Right great toe wound is callus covered; there is no edema, erythema, or purulence to suggest infection  Patient states his diabetic shoes and the inserts are ready but his endocrinologist has to fill out a form before he can pick them up  Plan  Callus to right great toe pared, surprisingly there is no open wound underneath  Moisturize daily with emollient of choice  Strongly encouraged to get paperwork completed by endocrinology so he can  his new shoes; strongly encouraged patient to continue wearing defender boot until he has his new shoes  Discharge from clinic - RTC for open wound         Relevant Medications    lidocaine (XYLOCAINE) 2 % jelly    vashe wound therapy external solution    Other Relevant Orders    MD COMMUNICATION 1    MD COMMUNICATION 2    Initiate Outpatient Wound Care Protocol    Initiate Oxygen Therapy Protocol     Medical decision making:  Assessment required other independent historian(s): N/A  Comorbid conditions affecting wound healing as noted in PMH and

## 2025-05-29 NOTE — FLOWSHEET NOTE
05/29/25 1334   Wound 03/05/25 Toe (Comment  which one) Right # 1   Date First Assessed/Time First Assessed: 03/05/25 1015   Present on Original Admission: Yes  Wound Approximate Age at First Assessment (Weeks): 1 weeks  Primary Wound Type: Diabetic Ulcer  Location: Toe (Comment  which one)  Wound Location Orientation...   Wound Image     Wound Etiology Diabetic Gavin 2   Dressing Status Dry   Wound Cleansed Vashe;Cleansed with saline   Dressing/Treatment Hydrofera blue   Offloading for Diabetic Foot Ulcers Other (comment)  (TCC applied 05/08/2025; wearing Defender Boot since last visit. Patient states that he removed cast related to soiling.)   Wound Length (cm) 1.5 cm   Wound Width (cm) 1.5 cm   Wound Depth (cm) 0.1 cm   Wound Surface Area (cm^2) 2.25 cm^2   Change in Wound Size % (l*w) -50   Wound Volume (cm^3) 0.225 cm^3   Wound Healing % 63   Wound Assessment Other (Comment)  (hemorrhagic callous)   Drainage Amount None (dry)   Stella-wound Assessment Hyperkeratosis (callous)   Margins Attached edges   Wound Thickness Description not for Pressure Injury Full thickness   Pain Assessment   Pain Assessment None - Denies Pain

## 2025-06-04 ENCOUNTER — CARE COORDINATION (OUTPATIENT)
Dept: CARE COORDINATION | Facility: CLINIC | Age: 60
End: 2025-06-04

## 2025-06-04 NOTE — CARE COORDINATION
Ambulatory Care Coordination Note     2025 3:30 PM    Care Summary Note:    Outreach for High Risk Case Management  Face to face encounter with endocrinology MA re: patient's order with [facility].  Paperwork faxed to [facility] in February has apparently  and MA will fax it today.  Made patient aware of situation, including that [employer] [facility] has been difficult to reach by endocrinology and also this ACM     ACM: Heather Byers, RN     Method of communication with provider: none.    PCP/Specialist follow up:   Future Appointments         Provider Specialty Dept Phone    2025 1:30 PM [facility] E[facility] [address] Cardiology [phone]    2025 9:00 AM [facility] [address] [facility] Radiology [phone]    2025 2:30 PM Parul Perez PA-C Endocrinology [phone]    7/10/2025 4:00 PM Casa Hewitt MD Cardiology [phone]    2025 1:00 PM (Arrive by 12:45 PM) Aric Shafre MD Neurology [phone]

## 2025-06-10 ENCOUNTER — CARE COORDINATION (OUTPATIENT)
Dept: CARE COORDINATION | Facility: CLINIC | Age: 60
End: 2025-06-10

## 2025-06-10 NOTE — CARE COORDINATION
Ambulatory Care Coordination Note     6/10/2025 12:58 PM     Care Summary Note:  Outreach for High Risk Case Management   (Spoke with patient multiple times over past week)  Advocated on patient's behalf with [employer]   - physician order had , had to be refaxed   - as of yesterday no news on shoes  Contact at [facility] is available MWF   - reminded patient to call tomorrow in follow up     ACM: Heather Byers, RN      PCP/Specialist follow up:   Future Appointments         Provider Specialty Dept Phone    2025 9:00 AM [facility] CT[facility] [address] Radiology [phone]    2025 2:30 PM Parul Perez PA-C Endocrinology [phone]    7/10/2025 4:00 PM Casa Hewitt MD Cardiology [phone]    2025 1:00 PM (Arrive by 12:45 PM) Aric Shafer MD Neurology [phone]

## 2025-06-16 ENCOUNTER — HOSPITAL ENCOUNTER (OUTPATIENT)
Dept: CT IMAGING | Age: 60
Discharge: HOME | End: 2025-06-18
Attending: INTERNAL MEDICINE
Payer: MEDICARE

## 2025-06-16 VITALS — DIASTOLIC BLOOD PRESSURE: 80 MMHG | HEART RATE: 70 BPM | SYSTOLIC BLOOD PRESSURE: 134 MMHG

## 2025-06-16 DIAGNOSIS — R07.2 PRECORDIAL PAIN: ICD-10-CM

## 2025-06-16 LAB — CREAT BLD-MCNC: 0.88 MG/DL (ref 0.8–1.5)

## 2025-06-16 PROCEDURE — 6360000004 HC RX CONTRAST MEDICATION: Performed by: INTERNAL MEDICINE

## 2025-06-16 PROCEDURE — 75574 CT ANGIO HRT W/3D IMAGE: CPT

## 2025-06-16 PROCEDURE — 82565 ASSAY OF CREATININE: CPT

## 2025-06-16 PROCEDURE — 75574 CT ANGIO HRT W/3D IMAGE: CPT | Performed by: INTERNAL MEDICINE

## 2025-06-16 PROCEDURE — 6370000000 HC RX 637 (ALT 250 FOR IP): Performed by: INTERNAL MEDICINE

## 2025-06-16 RX ORDER — IOPAMIDOL 755 MG/ML
75 INJECTION, SOLUTION INTRAVASCULAR
Status: COMPLETED | OUTPATIENT
Start: 2025-06-16 | End: 2025-06-16

## 2025-06-16 RX ORDER — NITROGLYCERIN 0.4 MG/1
0.4 TABLET SUBLINGUAL ONCE
Status: COMPLETED | OUTPATIENT
Start: 2025-06-16 | End: 2025-06-16

## 2025-06-16 RX ADMIN — NITROGLYCERIN 0.4 MG: 0.4 TABLET SUBLINGUAL at 08:59

## 2025-06-16 RX ADMIN — IOPAMIDOL 75 ML: 755 INJECTION, SOLUTION INTRAVENOUS at 08:56

## 2025-06-17 ENCOUNTER — TELEPHONE (OUTPATIENT)
Dept: ENDOCRINOLOGY | Age: 60
End: 2025-06-17

## 2025-06-17 DIAGNOSIS — E11.65 TYPE 2 DIABETES MELLITUS WITH HYPERGLYCEMIA, WITH LONG-TERM CURRENT USE OF INSULIN (HCC): ICD-10-CM

## 2025-06-17 DIAGNOSIS — Z79.4 TYPE 2 DIABETES MELLITUS WITH HYPERGLYCEMIA, WITH LONG-TERM CURRENT USE OF INSULIN (HCC): ICD-10-CM

## 2025-06-17 RX ORDER — INSULIN DEGLUDEC 200 U/ML
32 INJECTION, SOLUTION SUBCUTANEOUS DAILY
Qty: 21 ML | Refills: 1 | Status: SHIPPED | OUTPATIENT
Start: 2025-06-17

## 2025-06-17 NOTE — TELEPHONE ENCOUNTER
Spoke to     Was having am hyperglycemia, started taking tresiba at bedtime with resulting am hypoglycemia, as low as 56    Reduce tresiba from 36 to 32 units    Will discuss omnipod 5 at upcoming office visit

## 2025-06-23 ENCOUNTER — RESULTS FOLLOW-UP (OUTPATIENT)
Age: 60
End: 2025-06-23

## 2025-06-23 NOTE — TELEPHONE ENCOUNTER
----- Message from Dr. Casa Hewitt MD sent at 6/23/2025  8:56 AM EDT -----  Looks like he needs a coronary angiogram based on his CTA results.  Can we get him in for an evaluation in the next couple weeks. If he is having worsening symptoms, may need to go to the ER to have it done sooner.  Thanks  ----- Message -----  From: Melissa Prieto Incoming Orders Results To [facility]iant  Sent: 6/20/2025   4:26 PM EDT  To: Casa Hewitt MD  
Called pt, informed him of results. Pt has appoint on 7/10/25  
oral

## 2025-06-24 ENCOUNTER — CARE COORDINATION (OUTPATIENT)
Dept: CARE COORDINATION | Facility: CLINIC | Age: 60
End: 2025-06-24

## 2025-06-24 NOTE — CARE COORDINATION
Ambulatory Care Coordination Note     6/24/2025 10:02 AM     Care Summary Note:   Outreach for High Risk Case Management - spoke with patient  Continued/ongoing issues with nausea/diarrhea/vomiting   - blood sugars erratic with highs and recently some lows in 60s, 50s.  Discussed switching over to a pump - this didn't work in the past because did not meet criteria set by insurance.  Endocrinology appointment 6/27, provider aware of issues     ACM: Heather Byers RN     Method of communication with provider: chart routing.      PCP/Specialist follow up:   Future Appointments         Provider Specialty Dept Phone    6/27/2025 2:30 PM Parul Perez PA-C Endocrinology [phone]    7/10/2025 4:00 PM Casa Hewitt MD Cardiology [phone]    11/18/2025 1:00 PM (Arrive by 12:45 PM) Aric Shafer MD Neurology [phone]

## 2025-06-27 ENCOUNTER — OFFICE VISIT (OUTPATIENT)
Dept: ENDOCRINOLOGY | Age: 60
End: 2025-06-27

## 2025-06-27 VITALS
WEIGHT: 193 LBS | OXYGEN SATURATION: 97 % | BODY MASS INDEX: 25.58 KG/M2 | HEART RATE: 81 BPM | DIASTOLIC BLOOD PRESSURE: 80 MMHG | HEIGHT: 73 IN | SYSTOLIC BLOOD PRESSURE: 126 MMHG

## 2025-06-27 DIAGNOSIS — E11.69 HYPERLIPIDEMIA ASSOCIATED WITH TYPE 2 DIABETES MELLITUS (HCC): ICD-10-CM

## 2025-06-27 DIAGNOSIS — L97.512 DIABETIC ULCER OF TOE OF RIGHT FOOT ASSOCIATED WITH TYPE 2 DIABETES MELLITUS, WITH FAT LAYER EXPOSED (HCC): ICD-10-CM

## 2025-06-27 DIAGNOSIS — Z79.4 TYPE 2 DIABETES MELLITUS WITH HYPERGLYCEMIA, WITH LONG-TERM CURRENT USE OF INSULIN (HCC): Primary | ICD-10-CM

## 2025-06-27 DIAGNOSIS — E11.621 DIABETIC ULCER OF TOE OF RIGHT FOOT ASSOCIATED WITH TYPE 2 DIABETES MELLITUS, WITH FAT LAYER EXPOSED (HCC): ICD-10-CM

## 2025-06-27 DIAGNOSIS — E78.1 HYPERTRIGLYCERIDEMIA: ICD-10-CM

## 2025-06-27 DIAGNOSIS — E78.5 HYPERLIPIDEMIA ASSOCIATED WITH TYPE 2 DIABETES MELLITUS (HCC): ICD-10-CM

## 2025-06-27 DIAGNOSIS — E11.65 TYPE 2 DIABETES MELLITUS WITH HYPERGLYCEMIA, WITH LONG-TERM CURRENT USE OF INSULIN (HCC): Primary | ICD-10-CM

## 2025-06-27 LAB — HBA1C MFR BLD: 11.3 %

## 2025-06-27 RX ORDER — BLOOD-GLUCOSE SENSOR
EACH MISCELLANEOUS
Qty: 6 EACH | Refills: 1 | Status: SHIPPED | OUTPATIENT
Start: 2025-06-27

## 2025-06-27 ASSESSMENT — ENCOUNTER SYMPTOMS
DIARRHEA: 0
TROUBLE SWALLOWING: 0
NAUSEA: 1
VOMITING: 1
VOICE CHANGE: 0

## 2025-06-27 NOTE — PROGRESS NOTES
[facility] ENDOCRINOLOGY   AND   THYROID NODULE CLINIC    Parul Perez PA-C  [facility] Endocrinology and Thyroid Nodule Clinic  [address], [address]  [place] [address]  Phone [phone]  Facsimile [phone]          Estevan Valenzuela is a 60 y.o. male seen 6/27/2025 for follow up evaluation of type 2 diabetes        Assessment and Plan:    Interpretation of 72 hour glucose monitor:  At least 72 hours of data were reviewed.  The patient utilizes a abdi 2 continuous glucose monitoring system.  The average glucose during the reviewed timeframe was 244.  There is a pattern of frequent post prandial hyperglycemia and occasional overnight hypoglycemia     Assessment & Plan          1. Type 2 diabetes mellitus with hyperglycemia, with long-term current use of insulin (HCC)  A1c 11.3%, GMI 9.1%. Nocturnal hypoglycemia likely due to bedtime Humalog. Stress, pain, and infection can elevate blood glucose.  Treatment plan: Emphasized smoking cessation. Advised Humalog before meals to prevent postprandial hyperglycemia. Discussed switching insulin if adverse effects persist. Ordered OmniPod 5 insulin pump, refill every three days. Encouraged to attend classes for its use ad carb count. Continue Tresiba daily and Humalog BEFORE MEALS. Advised on carbohydrate counting.    At today's visit we discussed sequela associated with uncontrolled diabetes including increased risk of stroke, heart attack, kidney failure, amputation, retinopathy, neuropathy, and nephropathy.  Patient was strongly encouraged to comply with treatment regimen as well as dietary and exercise recommendations to aid in control of this chronic disease to help prevent complications associated with uncontrolled diabetes.    - AMB POC HEMOGLOBIN A1C  - Misc. Devices MISC; Omnipod 5 [id number] G6 PODs (Gen 5) [id number] Use to deliver insulin, change every 72 hours  Dispense: 30 each; Refill: 3  - Misc. Devices MISC; Omnipod 5

## 2025-06-30 ENCOUNTER — CARE COORDINATION (OUTPATIENT)
Dept: CARE COORDINATION | Facility: CLINIC | Age: 60
End: 2025-06-30

## 2025-06-30 NOTE — CARE COORDINATION
Ambulatory Care Coordination Note     6/30/2025 10:28 AM     Care Summary Note:   Incoming call from patient on Saturday evening.  (Blood sugar 326)  Reports that he is taking the Humalog before meals.  Did not take his Tresiba last night as he had been, but took it this morning.  Reviewed directions for taking this long acting insulin and that since he has taken it this morning, to take it every morning.  Reviewed that the Humalog is to offset the sugar in his system when he eats.  Reminded him to have protein at each meal.    Patient has his shoes  Will continue with wound care for new foot wound.  Reports neuropathic pain at night, that sometimes is unbearable.  Has been following with neurology     ACM: Heather Byers RN     Method of communication with provider: chart routing.    PCP/Specialist follow up:   Future Appointments         Provider Specialty Dept Phone    7/7/2025 1:50 PM Janny Casey, DAVION - NP; Kaylen Lopez RN Wound Ostomy [phone]    7/10/2025 4:00 PM Casa Hewitt MD Cardiology [phone]    10/2/2025 2:00 PM Parul Perez PA-C Endocrinology [phone]    11/18/2025 1:00 PM (Arrive by 12:45 PM) Aric Shafer MD Neurology [phone]

## 2025-07-07 ENCOUNTER — PATIENT MESSAGE (OUTPATIENT)
Dept: ENDOCRINOLOGY | Age: 60
End: 2025-07-07

## 2025-07-07 ENCOUNTER — CARE COORDINATION (OUTPATIENT)
Dept: CARE COORDINATION | Facility: CLINIC | Age: 60
End: 2025-07-07

## 2025-07-07 ENCOUNTER — HOSPITAL ENCOUNTER (OUTPATIENT)
Dept: WOUND CARE | Age: 60
Discharge: HOME | End: 2025-07-07
Payer: MEDICARE

## 2025-07-07 VITALS
HEIGHT: 73 IN | HEART RATE: 76 BPM | DIASTOLIC BLOOD PRESSURE: 94 MMHG | WEIGHT: 195 LBS | SYSTOLIC BLOOD PRESSURE: 134 MMHG | BODY MASS INDEX: 25.84 KG/M2

## 2025-07-07 DIAGNOSIS — E11.621 DIABETIC ULCER OF TOE OF RIGHT FOOT ASSOCIATED WITH TYPE 2 DIABETES MELLITUS, WITH FAT LAYER EXPOSED (HCC): Primary | ICD-10-CM

## 2025-07-07 DIAGNOSIS — E11.65 TYPE 2 DIABETES MELLITUS WITH HYPERGLYCEMIA, WITH LONG-TERM CURRENT USE OF INSULIN (HCC): Primary | ICD-10-CM

## 2025-07-07 DIAGNOSIS — L97.512 DIABETIC ULCER OF TOE OF RIGHT FOOT ASSOCIATED WITH TYPE 2 DIABETES MELLITUS, WITH FAT LAYER EXPOSED (HCC): Primary | ICD-10-CM

## 2025-07-07 DIAGNOSIS — Z79.4 TYPE 2 DIABETES MELLITUS WITH HYPERGLYCEMIA, WITH LONG-TERM CURRENT USE OF INSULIN (HCC): Primary | ICD-10-CM

## 2025-07-07 DIAGNOSIS — E11.42 DIABETIC POLYNEUROPATHY ASSOCIATED WITH TYPE 2 DIABETES MELLITUS (HCC): ICD-10-CM

## 2025-07-07 PROCEDURE — 11042 DBRDMT SUBQ TIS 1ST 20SQCM/<: CPT

## 2025-07-07 PROCEDURE — 99212 OFFICE O/P EST SF 10 MIN: CPT

## 2025-07-07 RX ORDER — BLOOD SUGAR DIAGNOSTIC
STRIP MISCELLANEOUS
Qty: 200 EACH | Refills: 3 | Status: SHIPPED | OUTPATIENT
Start: 2025-07-07

## 2025-07-07 RX ORDER — LANCETS
EACH MISCELLANEOUS
Qty: 102 EACH | Refills: 3 | Status: SHIPPED | OUTPATIENT
Start: 2025-07-07

## 2025-07-07 RX ORDER — LANCING DEVICE/LANCETS
KIT MISCELLANEOUS
Qty: 1 KIT | Refills: 1 | Status: SHIPPED | OUTPATIENT
Start: 2025-07-07

## 2025-07-07 RX ORDER — BLOOD-GLUCOSE SENSOR
EACH MISCELLANEOUS
Qty: 6 EACH | Refills: 1 | Status: SHIPPED | OUTPATIENT
Start: 2025-07-07

## 2025-07-07 RX ORDER — BLOOD-GLUCOSE METER
EACH MISCELLANEOUS
Qty: 1 KIT | Refills: 0 | Status: SHIPPED | OUTPATIENT
Start: 2025-07-07

## 2025-07-07 NOTE — FLOWSHEET NOTE
07/07/25 1331   Wound 07/07/25 Toe (Comment  which one) Right great toe #1   Date First Assessed/Time First Assessed: 07/07/25 1331   Present on Original Admission: Yes  Wound Approximate Age at First Assessment (Weeks): 2 weeks  Primary Wound Type: Diabetic Ulcer  Location: Toe (Comment  which one)  Wound Location Orientation...   Wound Image     Wound Etiology Diabetic Gavin 1   Dressing Status Clean;Dry;Intact   Wound Cleansed Cleansed with saline   Dressing/Treatment Hydrofera blue   Offloading for Diabetic Foot Ulcers Offloading ordered   Wound Length (cm) 2 cm   Wound Width (cm) 1.5 cm   Wound Depth (cm) 0.2 cm   Wound Surface Area (cm^2) 3 cm^2   Wound Volume (cm^3) 0.6 cm^3   Post-Procedure Length (cm) 1 cm   Post-Procedure Width (cm) 0.3 cm   Post-Procedure Depth (cm) 0.1 cm   Post-Procedure Surface Area (cm^2) 0.3 cm^2   Post-Procedure Volume (cm^3) 0.03 cm^3   Wound Assessment Pink/red   Drainage Amount Moderate (25-50%)   Drainage Description Serosanguinous   Odor None   Stella-wound Assessment Hyperkeratosis (callous)   Wound Thickness Description not for Pressure Injury Full thickness     Post debridement

## 2025-07-07 NOTE — TELEPHONE ENCOUNTER
It sounds like pt will receive omnipod start kit and pods with abdi 2 plus compatibility. I reordered the abdi 2 plus sensors to his [facility] pharmacy

## 2025-07-07 NOTE — WOUND CARE
Discharge Instructions for  [facility]  [address] [place] [address]  [address] [place] [address]  Phone [phone]   Fax [phone]      NAME:  Estevan Valenzuela  YOB: 1965  MEDICAL RECORD NUMBER:  [id number]  DATE:  7/7/2025    Return Appointment:   1 week with Janny Casey NP      Instructions: Right great toe:  Cleanse with vashe  Hydrofera Ready: Cut to wound size, place in wound bed, shiny side out.    Secure with rolled gauze  Change every other day    Patient to offload wound with CUSTOM SHOES & INSERTS while standing or weight bearing.    Do not get wound wet. May purchase cast cover at local pharmacy to keep dry in shower.  Wound healing is greatly slowed when blood glucose levels are greater than 200. Monitor glucose levels daily to ensure tight glucose control.  Follow up with PCP if your glucose levels are frequently greater than 200.  Smoking greatly slows wound healing by restricting essential blow flow. Decrease smoking with a plan to quit.  Increase protein intake to promote wound healing. Protein supplements such as Arya and Ensure are great options.         Should you experience increased redness, swelling, pain, foul odor, size of wound(s), or have a temperature over 101 degrees please contact the Wound Healing Center at [phone] or if after hours contact your primary care physician or go to the hospital emergency department.    PLEASE NOTE: IF YOU ARE UNABLE TO OBTAIN WOUND SUPPLIES, CONTINUE TO USE THE SUPPLIES YOU HAVE AVAILABLE UNTIL YOU ARE ABLE TO REACH US. IT IS MOST IMPORTANT TO KEEP THE WOUND COVERED AT ALL TIMES.    Electronically signed Brittney Pagan RN on 7/7/2025 at 1:38 PM

## 2025-07-07 NOTE — TELEPHONE ENCOUNTER
Raghu response:    Hi,    The accuchek marcelle is no longer available so I sent you the accuchek guide system to [facility] pharmacy    Let me know if any issues     ~Parul

## 2025-07-07 NOTE — CARE COORDINATION
Ambulatory Care Coordination Note     7/7/2025 10:18 AM     Care Summary Note:   Outreach for High Risk Case Management  Patient had called earlier re: his glucometer but has gotten this resolved via MA at endocrinology    Omnipod pump is set to be delivered tomorrow.  Coordination with  - patient made aware to complete onboarding steps which will trigger an outreach from [place] to schedule the training    Patient is wearing shoes [from [employer]].  Visit with wound clinic today.     ACM: Heather Byers RN    Method of communication with provider: chart routing.    PCP/Specialist follow up:   Future Appointments         Provider Specialty Dept Phone    7/7/2025 1:50 PM Janny Casey, DAVION - NP; Kaylen Lopez RN Wound Ostomy [phone]    7/10/2025 4:00 PM Casa Hewitt MD Cardiology [phone]    10/2/2025 2:00 PM Parul Perez PA-C Endocrinology [phone]    11/18/2025 1:00 PM (Arrive by 12:45 PM) Aric Shafer MD Neurology [phone]

## 2025-07-09 ASSESSMENT — ENCOUNTER SYMPTOMS
VOMITING: 0
NAUSEA: 0

## 2025-07-09 NOTE — ASSESSMENT & PLAN NOTE
Assessment  Last seen 5/29/25; wound was healed at that time  Callus has reformed; patient reports he has new custom shoes/orthotics but this occurred before he had the shoes  Denies drainage; toe is not edematous or erythematous  Continues to smoke; A1c 11.3 on 6/27/25  Plan  Callus pared to reveal open wound; excisional debridement to remove devitalized tissue and promote wound healing   Wound care: Vashe cleanse, Hydrofera blue, change every other day; at patient's request, will trial offloading with custom shoes, but if wound is slow to heal will need more aggressive offloading  Discussed effect of elevated blood sugars on wound healing; encouraged to work to keep A1c between 7 and 8 for optimal wound healing   Discussed effect of nicotine on wound healing; encouraged smoking cessation   Discussed role of protein in wound healing; goal is 100 gm per day - encouraged 30 gm protein shakes 2-3 x daily   Discussed the importance of offloading to reduce pressure and promote healing  RTC 1 week

## 2025-07-09 NOTE — PROGRESS NOTES
[facility]  History and Physical Note   Referring Provider: Self Referral     Estevan Valenzuela  MEDICAL RECORD NUMBER: [id number]  AGE: 60 y.o.     GENDER: male    : 1965  EPISODE DATE: 2025    Assessment & Plan  Diabetic ulcer of toe of right foot associated with type 2 diabetes mellitus, with fat layer exposed (HCC)  Assessment  Last seen 25; wound was healed at that time  Callus has reformed; patient reports he has new custom shoes/orthotics but this occurred before he had the shoes  Denies drainage; toe is not edematous or erythematous  Continues to smoke; A1c 11.3 on 25  Plan  Callus pared to reveal open wound; excisional debridement to remove devitalized tissue and promote wound healing   Wound care: Vashe cleanse, Hydrofera blue, change every other day; at patient's request, will trial offloading with custom shoes, but if wound is slow to heal will need more aggressive offloading  Discussed effect of elevated blood sugars on wound healing; encouraged to work to keep A1c between 7 and 8 for optimal wound healing   Discussed effect of nicotine on wound healing; encouraged smoking cessation   Discussed role of protein in wound healing; goal is 100 gm per day - encouraged 30 gm protein shakes 2-3 x daily   Discussed the importance of offloading to reduce pressure and promote healing  RTC 1 week    Medical decision making:  Assessment required other independent historian(s): N/A.  Comorbid conditions affecting wound healing as noted in PMH and PSH reviewed for this visit.  Review of medical records and external note from other providers reviewed for this visit.  Pertinent diagnostics were reviewed for this visit. Discussed management or test interpretation with other qualified health care professional.  Prescription drug management: N/A.    Risk of complications and/or mortality of treatment plan:  The patient has a moderate risk of morbidity and

## 2025-07-10 ENCOUNTER — CARE COORDINATION (OUTPATIENT)
Dept: CARE COORDINATION | Facility: CLINIC | Age: 60
End: 2025-07-10

## 2025-07-10 ENCOUNTER — OFFICE VISIT (OUTPATIENT)
Age: 60
End: 2025-07-10
Payer: MEDICARE

## 2025-07-10 VITALS
HEART RATE: 64 BPM | DIASTOLIC BLOOD PRESSURE: 74 MMHG | HEIGHT: 73 IN | WEIGHT: 195 LBS | SYSTOLIC BLOOD PRESSURE: 110 MMHG | BODY MASS INDEX: 25.84 KG/M2

## 2025-07-10 DIAGNOSIS — E78.2 HYPERLIPIDEMIA, MIXED: ICD-10-CM

## 2025-07-10 DIAGNOSIS — R06.09 DYSPNEA ON EXERTION: ICD-10-CM

## 2025-07-10 DIAGNOSIS — Z79.4 TYPE 2 DIABETES MELLITUS WITH HYPERGLYCEMIA, WITH LONG-TERM CURRENT USE OF INSULIN (HCC): ICD-10-CM

## 2025-07-10 DIAGNOSIS — I25.119 CORONARY ARTERY DISEASE INVOLVING NATIVE CORONARY ARTERY OF NATIVE HEART WITH ANGINA PECTORIS: ICD-10-CM

## 2025-07-10 DIAGNOSIS — E11.65 TYPE 2 DIABETES MELLITUS WITH HYPERGLYCEMIA, WITH LONG-TERM CURRENT USE OF INSULIN (HCC): ICD-10-CM

## 2025-07-10 DIAGNOSIS — I25.119 CORONARY ARTERY DISEASE INVOLVING NATIVE CORONARY ARTERY OF NATIVE HEART WITH ANGINA PECTORIS: Primary | ICD-10-CM

## 2025-07-10 PROBLEM — I20.0 ACCELERATING ANGINA (HCC): Status: ACTIVE | Noted: 2025-07-10

## 2025-07-10 LAB
ANION GAP SERPL CALC-SCNC: 12 MMOL/L (ref 7–16)
BUN SERPL-MCNC: 21 MG/DL (ref 6–23)
CALCIUM SERPL-MCNC: 9.6 MG/DL (ref 8.8–10.2)
CHLORIDE SERPL-SCNC: 99 MMOL/L (ref 98–107)
CO2 SERPL-SCNC: 22 MMOL/L (ref 20–29)
CREAT SERPL-MCNC: 1 MG/DL (ref 0.8–1.3)
ERYTHROCYTE [DISTWIDTH] IN BLOOD BY AUTOMATED COUNT: 13.9 % (ref 11.9–14.6)
GLUCOSE SERPL-MCNC: 293 MG/DL (ref 70–99)
HCT VFR BLD AUTO: 44 % (ref 41.1–50.3)
HGB BLD-MCNC: 15.2 G/DL (ref 13.6–17.2)
MCH RBC QN AUTO: 31.9 PG (ref 26.1–32.9)
MCHC RBC AUTO-ENTMCNC: 34.5 G/DL (ref 31.4–35)
MCV RBC AUTO: 92.2 FL (ref 82–102)
NRBC # BLD: 0 K/UL (ref 0–0.2)
PLATELET # BLD AUTO: 201 K/UL (ref 150–450)
PMV BLD AUTO: 11.5 FL (ref 9.4–12.3)
POTASSIUM SERPL-SCNC: 4.2 MMOL/L (ref 3.5–5.1)
RBC # BLD AUTO: 4.77 M/UL (ref 4.23–5.6)
SODIUM SERPL-SCNC: 133 MMOL/L (ref 136–145)
WBC # BLD AUTO: 9.4 K/UL (ref 4.3–11.1)

## 2025-07-10 PROCEDURE — G8428 CUR MEDS NOT DOCUMENT: HCPCS | Performed by: INTERNAL MEDICINE

## 2025-07-10 PROCEDURE — G8419 CALC BMI OUT NRM PARAM NOF/U: HCPCS | Performed by: INTERNAL MEDICINE

## 2025-07-10 PROCEDURE — 4004F PT TOBACCO SCREEN RCVD TLK: CPT | Performed by: INTERNAL MEDICINE

## 2025-07-10 PROCEDURE — 99215 OFFICE O/P EST HI 40 MIN: CPT | Performed by: INTERNAL MEDICINE

## 2025-07-10 PROCEDURE — 3017F COLORECTAL CA SCREEN DOC REV: CPT | Performed by: INTERNAL MEDICINE

## 2025-07-10 RX ORDER — SODIUM CHLORIDE 0.9 % (FLUSH) 0.9 %
5-40 SYRINGE (ML) INJECTION EVERY 12 HOURS SCHEDULED
OUTPATIENT
Start: 2025-07-10

## 2025-07-10 RX ORDER — SODIUM CHLORIDE 9 MG/ML
INJECTION, SOLUTION INTRAVENOUS CONTINUOUS
OUTPATIENT
Start: 2025-07-10

## 2025-07-10 RX ORDER — SODIUM CHLORIDE 0.9 % (FLUSH) 0.9 %
5-40 SYRINGE (ML) INJECTION PRN
OUTPATIENT
Start: 2025-07-10

## 2025-07-10 RX ORDER — SODIUM CHLORIDE 9 MG/ML
INJECTION, SOLUTION INTRAVENOUS PRN
OUTPATIENT
Start: 2025-07-10

## 2025-07-10 ASSESSMENT — ENCOUNTER SYMPTOMS
BLOATING: 0
HEMOPTYSIS: 0
NAUSEA: 0
SHORTNESS OF BREATH: 0
DOUBLE VISION: 0
ORTHOPNEA: 0
BLURRED VISION: 0
BACK PAIN: 0
VOMITING: 0
COUGH: 0
ABDOMINAL PAIN: 0

## 2025-07-10 NOTE — CARE COORDINATION
Ambulatory Care Coordination Note     7/10/2025 11:10 AM     Care Summary Note:   Outreach for High Risk Case Management - spoke with patient  Omnipod arrived in the mail yesterday  Missing from the package is abdi 2 reader as clarified with patient by [employer] [place] - who will apparently call provider re: this.  Patient will take part in training soon     ACM: Heather Byers RN     Method of communication with provider: chart routing.    PCP/Specialist follow up:   Future Appointments         Provider Specialty Dept Phone    7/10/2025 4:00 PM Casa Hewitt MD Cardiology [phone]    7/15/2025 10:50 AM Janny Casey, APRN - NP; Brandy Ibarra, RN Wound Ostomy [phone]    10/2/2025 2:00 PM Parul Perez PA-C Endocrinology [phone]    11/18/2025 1:00 PM (Arrive by 12:45 PM) Aric Shafer MD Neurology [phone]

## 2025-07-10 NOTE — PROGRESS NOTES
[facility] CARDIOLOGY  [address] [place] [address], [address]  [place] [address]  PHONE: [phone]    07/10/25    NAME:  Estevan Valenzuela  : 1965  MRN: [id number]         SUBJECTIVE:   Estevan Valenzuela is a 60 y.o. male seen for a visit regarding the following:     Chief Complaint   Patient presents with    Follow-up     Echo results       HPI:      No prior history of coronary disease.  History of uncontrolled diabetes mellitus with a right toe diabetic ulcer and sees wound care.  Other history of HIV (since 2017), hyperlipidemia.  Coronary CTA with severe mid LAD disease [calcium score of 393; 2025]. Echo from  with preserved EF and no WMA.    No recurrent chest discomfort but has also not done much in terms of activity since last evaluation.  Compliant with medications.  States impeded by rt foot ulcer/neuropathy; states at times with chest tightness with cutting grass/also some PARSON-since 2-3 years.   Not very active.  Denies any chest discomfort at rest.  Well-controlled home BPs.  Recent noted LDL levels at 203 from 2025 and referred for further evaluation; was switched over to rosuvastatin 40 mg daily and Zetia added on on initial evaluation from 2025.    Coronary disease-not controlled, hyperlipidemia-not controlled, chest discomfort/dyspnea on exertion-not controlled    Past Medical History, Past Surgical History, Family history, Social History, and Medications were all reviewed with the patient today and updated as necessary.     No Known Allergies  Patient Active Problem List   Diagnosis    Hypercholesterolemia    Diabetic polyneuropathy associated with type 2 diabetes mellitus (HCC)    Human immunodeficiency virus (HCC)    Type 2 diabetes mellitus with hyperglycemia, with long-term current use of insulin ([facility])    Hypertriglyceridemia    Diabetic ulcer of toe of right foot associated with type 2 diabetes mellitus, with fat layer exposed ([facility])    Cigarette smoker    Cellulitis

## 2025-07-14 ENCOUNTER — TELEPHONE (OUTPATIENT)
Age: 60
End: 2025-07-14

## 2025-07-14 NOTE — TELEPHONE ENCOUNTER
I received a message from [employer] stating that the insurance company has requested that we send additional info and/or do a P2P. All clinical info has already been sent would you like to do the P2P?         I am reaching out to inform you that is case is pending per [facility] portal pending#[id number].  Additional Information Needed. Uploaded letter. A Rvcc-eg-Ajfb discussion, this discussion must take place Before 7/21/2025. If you wish to schedule a Wvlb-kg-Qfyc discussion, please call [phone], select Option # 3, and enter in Case Number [id number].   Please advise if a Peer to Peer/Appeal will be scheduled or if the Doctor plans to reschedule or change the procedure.

## 2025-07-15 ENCOUNTER — HOSPITAL ENCOUNTER (OUTPATIENT)
Dept: WOUND CARE | Age: 60
Discharge: HOME | End: 2025-07-15
Payer: MEDICARE

## 2025-07-15 VITALS
HEIGHT: 73 IN | BODY MASS INDEX: 25.58 KG/M2 | TEMPERATURE: 97.7 F | SYSTOLIC BLOOD PRESSURE: 151 MMHG | WEIGHT: 193 LBS | HEART RATE: 57 BPM | DIASTOLIC BLOOD PRESSURE: 95 MMHG | RESPIRATION RATE: 18 BRPM

## 2025-07-15 PROCEDURE — 11042 DBRDMT SUBQ TIS 1ST 20SQCM/<: CPT

## 2025-07-15 RX ORDER — BACITRACIN ZINC AND POLYMYXIN B SULFATE 500; 1000 [USP'U]/G; [USP'U]/G
OINTMENT TOPICAL PRN
OUTPATIENT
Start: 2025-07-15

## 2025-07-15 RX ORDER — BETAMETHASONE DIPROPIONATE 0.5 MG/G
CREAM TOPICAL PRN
OUTPATIENT
Start: 2025-07-15

## 2025-07-15 RX ORDER — NEOMYCIN/BACITRACIN/POLYMYXINB 3.5-400-5K
OINTMENT (GRAM) TOPICAL PRN
OUTPATIENT
Start: 2025-07-15

## 2025-07-15 RX ORDER — SODIUM CHLOR/HYPOCHLOROUS ACID 0.033 %
SOLUTION, IRRIGATION IRRIGATION PRN
OUTPATIENT
Start: 2025-07-15

## 2025-07-15 RX ORDER — LIDOCAINE HYDROCHLORIDE 20 MG/ML
JELLY TOPICAL PRN
OUTPATIENT
Start: 2025-07-15

## 2025-07-15 RX ORDER — MUPIROCIN 2 %
OINTMENT (GRAM) TOPICAL PRN
OUTPATIENT
Start: 2025-07-15

## 2025-07-15 RX ORDER — GENTAMICIN SULFATE 1 MG/G
OINTMENT TOPICAL PRN
OUTPATIENT
Start: 2025-07-15

## 2025-07-15 RX ORDER — LIDOCAINE HYDROCHLORIDE 40 MG/ML
SOLUTION TOPICAL PRN
OUTPATIENT
Start: 2025-07-15

## 2025-07-15 RX ORDER — CLOBETASOL PROPIONATE 0.5 MG/G
OINTMENT TOPICAL PRN
OUTPATIENT
Start: 2025-07-15

## 2025-07-15 RX ORDER — GINSENG 100 MG
CAPSULE ORAL PRN
OUTPATIENT
Start: 2025-07-15

## 2025-07-15 RX ORDER — TRIAMCINOLONE ACETONIDE 1 MG/G
OINTMENT TOPICAL PRN
OUTPATIENT
Start: 2025-07-15

## 2025-07-15 RX ORDER — LIDOCAINE 50 MG/G
OINTMENT TOPICAL PRN
OUTPATIENT
Start: 2025-07-15

## 2025-07-15 RX ORDER — SILVER SULFADIAZINE 10 MG/G
CREAM TOPICAL PRN
OUTPATIENT
Start: 2025-07-15

## 2025-07-15 RX ORDER — LIDOCAINE 40 MG/G
CREAM TOPICAL PRN
OUTPATIENT
Start: 2025-07-15

## 2025-07-15 NOTE — PROGRESS NOTES
[facility]  Procedure Note    Estevan Valenzuela  MEDICAL RECORD NUMBER: [id number]  AGE: 60 y.o.     GENDER: male    : 1965  EPISODE DATE: 7/15/2025    Assessment & Plan  Diabetic ulcer of toe of right foot associated with type 2 diabetes mellitus, with fat layer exposed (HCC)  Assessment  Wound is slightly smaller today - wound bed is dark red fibrotic tissue, does not probe to bone; periwound is heavily macerated, but patient reports he has been using Band-Aids only  Patient is offloading with his new custom shoes/inserts  Patient is having heart cath on 2025  Plan  Excisional debridement remove devitalized tissue and promote wound healing  Change wound care to Vashe soak and cleanse, collagen Ag, calcium alginate to periwound, gauze, gauze roll; change three times weekly  Discussed TCC to offload and allow wound to heal; will consider next week after heart cath  RTC 1 week     Procedure Note: Excisional Debridement  Indications: Based on my examination of the patient's wound(s) today, debridement is required to remove devitalized tissue, evaluate the wound base, and promote wound healing.  Performed by: DAVION Wiseman NP  Consent obtained: Yes  Time out taken: Yes  Pain control:     Wound #: 1  Diabetic/pressure/chronic non-pressure ulcers only: diabetic ulcer, fat layer exposed was/were debrided using curette. The wound(s) was/were debrided down through/to subcutaneous tissue. Devitalized tissue debrided: fibrin, biofilm, necrotic/eschar, exudate, and callus.  Pre and post debridement measurements: see flow sheet  Total surface area debrided: 0.3 sq cm   Estimated blood loss: minimal amount blood loss  Hemostasis achieved: by pressure and silver nitrate  Procedural pain: 0 / 10   Post procedural pain: 0 / 10   Response to treatment: Patient tolerated procedure well with no complaints of pain.    Negative Pressure Wound Therapy Toe (Comment

## 2025-07-15 NOTE — WOUND CARE
Discharge Instructions for  [facility]  [address] [place] [address]  [address]  [place] [address]  Phone [phone]   Fax [phone]      NAME:  Estevan Valenzuela  YOB: 1965  MEDICAL RECORD NUMBER:  [id number]  DATE:  7/15/2025    Return Appointment:   1 week with Janny Casey NP      Instructions: Right great toe:  Cleanse with vashe  Apply Collagen to wound bed  Apply alginate to wound bed    Secure with rolled gauze  Change every other day    Patient to offload wound with CUSTOM SHOES & INSERTS while standing or weight bearing.    Do not get wound wet. May purchase cast cover at local pharmacy to keep dry in shower.  Wound healing is greatly slowed when blood glucose levels are greater than 200. Monitor glucose levels daily to ensure tight glucose control.  Follow up with PCP if your glucose levels are frequently greater than 200.  Smoking greatly slows wound healing by restricting essential blow flow. Decrease smoking with a plan to quit.  Increase protein intake to promote wound healing. Protein supplements such as Arya and Ensure are great options.         Should you experience increased redness, swelling, pain, foul odor, size of wound(s), or have a temperature over 101 degrees please contact the Wound Healing Center at [phone] or if after hours contact your primary care physician or go to the hospital emergency department.    PLEASE NOTE: IF YOU ARE UNABLE TO OBTAIN WOUND SUPPLIES, CONTINUE TO USE THE SUPPLIES YOU HAVE AVAILABLE UNTIL YOU ARE ABLE TO REACH US. IT IS MOST IMPORTANT TO KEEP THE WOUND COVERED AT ALL TIMES.    Electronically signed TIFFANY BARTH RN on 7/15/2025 at 10:46 AM

## 2025-07-15 NOTE — FLOWSHEET NOTE
07/15/25 1015   Wound 07/07/25 Toe (Comment  which one) Right great toe #1   Date First Assessed/Time First Assessed: 07/07/25 1331   Present on Original Admission: Yes  Wound Approximate Age at First Assessment (Weeks): 2 weeks  Primary Wound Type: Diabetic Ulcer  Location: Toe (Comment  which one)  Wound Location Orientation...   Wound Image     Wound Etiology Diabetic Gavin 1   Dressing Status Old drainage noted   Wound Cleansed Cleansed with saline   Dressing/Treatment Hydrofera blue   Offloading for Diabetic Foot Ulcers Offloading ordered   Wound Length (cm) 0.3 cm   Wound Width (cm) 0.2 cm   Wound Depth (cm) 0.2 cm   Wound Surface Area (cm^2) 0.06 cm^2   Change in Wound Size % (l*w) 98   Wound Volume (cm^3) 0.012 cm^3   Wound Healing % 98   Post-Procedure Length (cm) 1.4 cm   Post-Procedure Width (cm) 0.5 cm   Post-Procedure Depth (cm) 0.2 cm   Post-Procedure Surface Area (cm^2) 0.7 cm^2   Post-Procedure Volume (cm^3) 0.14 cm^3   Wound Assessment Slough   Drainage Amount Moderate (25-50%)   Drainage Description Serosanguinous   Odor None   Stella-wound Assessment Maceration   Margins Attached edges   Wound Thickness Description not for Pressure Injury Full thickness   Pain Assessment   Pain Assessment None - Denies Pain

## 2025-07-15 NOTE — ASSESSMENT & PLAN NOTE
Assessment  Wound is slightly smaller today - wound bed is dark red fibrotic tissue, does not probe to bone; periwound is heavily macerated, but patient reports he has been using Band-Aids only  Patient is offloading with his new custom shoes/inserts  Patient is having heart cath on 07/18/2025  Plan  Excisional debridement remove devitalized tissue and promote wound healing  Change wound care to Vashe soak and cleanse, collagen Ag, calcium alginate to periwound, gauze, gauze roll; change three times weekly  Discussed TCC to offload and allow wound to heal; will consider next week after heart cath  RTC 1 week

## 2025-07-17 NOTE — PROGRESS NOTES
Patient pre-assessment complete for [facility] scheduled for , arrival time 1030. Patient verified using . Patient instructed to bring a list of all home medications on the day of procedure. NPO status reinforced. Patient informed to take a full dose aspirin 325mg  or 81 mg x 4 on the day of procedure. Patient instructed to HOLD januvia and all other diabetic meds.. Instructed they can take all other medications excluding vitamins & supplements. Patient verbalizes understanding of all instructions & denies any questions at this time.      Pt states that he got an insulin pump two days ago and wants to remove it for the procedure. He had a recent glucose of 21. He said when the pump is off his blood sugar is not controlled and will be in the high 200s.

## 2025-07-18 ENCOUNTER — HOSPITAL ENCOUNTER (OUTPATIENT)
Age: 60
Setting detail: OBSERVATION
Discharge: HOME | End: 2025-07-19
Attending: INTERNAL MEDICINE | Admitting: INTERNAL MEDICINE
Payer: MEDICARE

## 2025-07-18 DIAGNOSIS — I20.0 ACCELERATING ANGINA (HCC): ICD-10-CM

## 2025-07-18 PROBLEM — Z98.61 POST PTCA: Status: ACTIVE | Noted: 2025-07-18

## 2025-07-18 LAB
ECHO BSA: 2.12 M2
EKG ATRIAL RATE: 48 BPM
EKG ATRIAL RATE: 52 BPM
EKG ATRIAL RATE: 57 BPM
EKG DIAGNOSIS: NORMAL
EKG P AXIS: -24 DEGREES
EKG P AXIS: 15 DEGREES
EKG P AXIS: 16 DEGREES
EKG P-R INTERVAL: 164 MS
EKG P-R INTERVAL: 174 MS
EKG P-R INTERVAL: 188 MS
EKG Q-T INTERVAL: 412 MS
EKG Q-T INTERVAL: 424 MS
EKG Q-T INTERVAL: 440 MS
EKG QRS DURATION: 88 MS
EKG QRS DURATION: 88 MS
EKG QRS DURATION: 92 MS
EKG QTC CALCULATION (BAZETT): 378 MS
EKG QTC CALCULATION (BAZETT): 401 MS
EKG QTC CALCULATION (BAZETT): 409 MS
EKG R AXIS: -41 DEGREES
EKG R AXIS: -41 DEGREES
EKG R AXIS: -51 DEGREES
EKG T AXIS: -20 DEGREES
EKG T AXIS: -26 DEGREES
EKG T AXIS: -8 DEGREES
EKG VENTRICULAR RATE: 48 BPM
EKG VENTRICULAR RATE: 52 BPM
EKG VENTRICULAR RATE: 57 BPM
GLUCOSE BLD STRIP.AUTO-MCNC: 240 MG/DL (ref 65–100)
GLUCOSE BLD STRIP.AUTO-MCNC: 244 MG/DL (ref 65–100)
SERVICE CMNT-IMP: ABNORMAL
SERVICE CMNT-IMP: ABNORMAL

## 2025-07-18 PROCEDURE — 82962 GLUCOSE BLOOD TEST: CPT

## 2025-07-18 PROCEDURE — 92978 ENDOLUMINL IVUS OCT C 1ST: CPT | Performed by: INTERNAL MEDICINE

## 2025-07-18 PROCEDURE — 99153 MOD SED SAME PHYS/QHP EA: CPT | Performed by: INTERNAL MEDICINE

## 2025-07-18 PROCEDURE — C1769 GUIDE WIRE: HCPCS | Performed by: INTERNAL MEDICINE

## 2025-07-18 PROCEDURE — G0378 HOSPITAL OBSERVATION PER HR: HCPCS

## 2025-07-18 PROCEDURE — 2709999900 HC NON-CHARGEABLE SUPPLY: Performed by: INTERNAL MEDICINE

## 2025-07-18 PROCEDURE — 2580000003 HC RX 258: Performed by: INTERNAL MEDICINE

## 2025-07-18 PROCEDURE — C1753 CATH, INTRAVAS ULTRASOUND: HCPCS | Performed by: INTERNAL MEDICINE

## 2025-07-18 PROCEDURE — C1894 INTRO/SHEATH, NON-LASER: HCPCS | Performed by: INTERNAL MEDICINE

## 2025-07-18 PROCEDURE — C9601 PERC DRUG-EL COR STENT BRAN: HCPCS | Performed by: INTERNAL MEDICINE

## 2025-07-18 PROCEDURE — 6370000000 HC RX 637 (ALT 250 FOR IP): Performed by: INTERNAL MEDICINE

## 2025-07-18 PROCEDURE — 99152 MOD SED SAME PHYS/QHP 5/>YRS: CPT | Performed by: INTERNAL MEDICINE

## 2025-07-18 PROCEDURE — C1874 STENT, COATED/COV W/DEL SYS: HCPCS | Performed by: INTERNAL MEDICINE

## 2025-07-18 PROCEDURE — 93571 IV DOP VEL&/PRESS C FLO 1ST: CPT | Performed by: INTERNAL MEDICINE

## 2025-07-18 PROCEDURE — C9600 PERC DRUG-EL COR STENT SING: HCPCS | Performed by: INTERNAL MEDICINE

## 2025-07-18 PROCEDURE — 93458 L HRT ARTERY/VENTRICLE ANGIO: CPT | Performed by: INTERNAL MEDICINE

## 2025-07-18 PROCEDURE — C1725 CATH, TRANSLUMIN NON-LASER: HCPCS | Performed by: INTERNAL MEDICINE

## 2025-07-18 PROCEDURE — 6360000002 HC RX W HCPCS: Performed by: INTERNAL MEDICINE

## 2025-07-18 PROCEDURE — 2500000003 HC RX 250 WO HCPCS: Performed by: INTERNAL MEDICINE

## 2025-07-18 PROCEDURE — 93010 ELECTROCARDIOGRAM REPORT: CPT | Performed by: INTERNAL MEDICINE

## 2025-07-18 PROCEDURE — 92928 PRQ TCAT PLMT NTRAC ST 1 LES: CPT | Performed by: INTERNAL MEDICINE

## 2025-07-18 PROCEDURE — 6360000004 HC RX CONTRAST MEDICATION: Performed by: INTERNAL MEDICINE

## 2025-07-18 PROCEDURE — C1887 CATHETER, GUIDING: HCPCS | Performed by: INTERNAL MEDICINE

## 2025-07-18 PROCEDURE — 92979 ENDOLUMINL IVUS OCT C EA: CPT | Performed by: INTERNAL MEDICINE

## 2025-07-18 PROCEDURE — 93005 ELECTROCARDIOGRAM TRACING: CPT | Performed by: INTERNAL MEDICINE

## 2025-07-18 DEVICE — STENT ONYXNG30022UX ONYX 3.00X22RX
Type: IMPLANTABLE DEVICE | Status: FUNCTIONAL
Brand: ONYX FRONTIER™

## 2025-07-18 DEVICE — STENT ONYXNG35030UX ONYX 3.50X30RX
Type: IMPLANTABLE DEVICE | Status: FUNCTIONAL
Brand: ONYX FRONTIER™

## 2025-07-18 RX ORDER — ROSUVASTATIN CALCIUM 20 MG/1
40 TABLET, COATED ORAL
Status: DISCONTINUED | OUTPATIENT
Start: 2025-07-18 | End: 2025-07-19 | Stop reason: HOSPADM

## 2025-07-18 RX ORDER — ASPIRIN 81 MG/1
324 TABLET, CHEWABLE ORAL ONCE
Status: COMPLETED | OUTPATIENT
Start: 2025-07-18 | End: 2025-07-18

## 2025-07-18 RX ORDER — FENTANYL CITRATE 50 UG/ML
INJECTION, SOLUTION INTRAMUSCULAR; INTRAVENOUS PRN
Status: DISCONTINUED | OUTPATIENT
Start: 2025-07-18 | End: 2025-07-18 | Stop reason: HOSPADM

## 2025-07-18 RX ORDER — TRAZODONE HYDROCHLORIDE 50 MG/1
50 TABLET ORAL NIGHTLY
Status: DISCONTINUED | OUTPATIENT
Start: 2025-07-18 | End: 2025-07-19 | Stop reason: HOSPADM

## 2025-07-18 RX ORDER — SODIUM CHLORIDE 9 MG/ML
INJECTION, SOLUTION INTRAVENOUS CONTINUOUS
Status: ACTIVE | OUTPATIENT
Start: 2025-07-18 | End: 2025-07-18

## 2025-07-18 RX ORDER — ACETAMINOPHEN 325 MG/1
650 TABLET ORAL EVERY 4 HOURS PRN
Status: DISCONTINUED | OUTPATIENT
Start: 2025-07-18 | End: 2025-07-19 | Stop reason: HOSPADM

## 2025-07-18 RX ORDER — METOCLOPRAMIDE 10 MG/1
5 TABLET ORAL 3 TIMES DAILY PRN
Status: DISCONTINUED | OUTPATIENT
Start: 2025-07-18 | End: 2025-07-19 | Stop reason: HOSPADM

## 2025-07-18 RX ORDER — HEPARIN SODIUM 200 [USP'U]/100ML
INJECTION, SOLUTION INTRAVENOUS CONTINUOUS PRN
Status: DISCONTINUED | OUTPATIENT
Start: 2025-07-18 | End: 2025-07-18 | Stop reason: HOSPADM

## 2025-07-18 RX ORDER — INSULIN LISPRO 100 [IU]/ML
0-8 INJECTION, SOLUTION INTRAVENOUS; SUBCUTANEOUS
Status: DISCONTINUED | OUTPATIENT
Start: 2025-07-18 | End: 2025-07-18

## 2025-07-18 RX ORDER — TICAGRELOR 90 MG/1
90 TABLET, FILM COATED ORAL 2 TIMES DAILY
Status: DISCONTINUED | OUTPATIENT
Start: 2025-07-18 | End: 2025-07-19 | Stop reason: HOSPADM

## 2025-07-18 RX ORDER — SODIUM CHLORIDE 0.9 % (FLUSH) 0.9 %
5-40 SYRINGE (ML) INJECTION EVERY 12 HOURS SCHEDULED
Status: DISCONTINUED | OUTPATIENT
Start: 2025-07-18 | End: 2025-07-19 | Stop reason: HOSPADM

## 2025-07-18 RX ORDER — LOSARTAN POTASSIUM 50 MG/1
100 TABLET ORAL DAILY
Status: DISCONTINUED | OUTPATIENT
Start: 2025-07-18 | End: 2025-07-19 | Stop reason: HOSPADM

## 2025-07-18 RX ORDER — HYDROCODONE BITARTRATE AND ACETAMINOPHEN 5; 325 MG/1; MG/1
1 TABLET ORAL EVERY 4 HOURS PRN
Status: DISCONTINUED | OUTPATIENT
Start: 2025-07-18 | End: 2025-07-19 | Stop reason: HOSPADM

## 2025-07-18 RX ORDER — SODIUM CHLORIDE 9 MG/ML
INJECTION, SOLUTION INTRAVENOUS CONTINUOUS
Status: DISCONTINUED | OUTPATIENT
Start: 2025-07-18 | End: 2025-07-18

## 2025-07-18 RX ORDER — SODIUM CHLORIDE 9 MG/ML
INJECTION, SOLUTION INTRAVENOUS PRN
Status: DISCONTINUED | OUTPATIENT
Start: 2025-07-18 | End: 2025-07-19 | Stop reason: HOSPADM

## 2025-07-18 RX ORDER — DULOXETIN HYDROCHLORIDE 60 MG/1
60 CAPSULE, DELAYED RELEASE ORAL DAILY
Status: DISCONTINUED | OUTPATIENT
Start: 2025-07-19 | End: 2025-07-19 | Stop reason: HOSPADM

## 2025-07-18 RX ORDER — NITROGLYCERIN 20 MG/100ML
INJECTION INTRAVENOUS PRN
Status: DISCONTINUED | OUTPATIENT
Start: 2025-07-18 | End: 2025-07-18 | Stop reason: HOSPADM

## 2025-07-18 RX ORDER — LOSARTAN POTASSIUM 50 MG/1
100 TABLET ORAL DAILY
Status: DISCONTINUED | OUTPATIENT
Start: 2025-07-19 | End: 2025-07-18

## 2025-07-18 RX ORDER — METOPROLOL SUCCINATE 25 MG/1
25 TABLET, EXTENDED RELEASE ORAL DAILY
Status: DISCONTINUED | OUTPATIENT
Start: 2025-07-19 | End: 2025-07-19 | Stop reason: HOSPADM

## 2025-07-18 RX ORDER — GABAPENTIN 400 MG/1
800 CAPSULE ORAL 3 TIMES DAILY
Status: DISCONTINUED | OUTPATIENT
Start: 2025-07-18 | End: 2025-07-19 | Stop reason: HOSPADM

## 2025-07-18 RX ORDER — ASPIRIN 81 MG/1
81 TABLET, CHEWABLE ORAL DAILY
Status: DISCONTINUED | OUTPATIENT
Start: 2025-07-19 | End: 2025-07-19 | Stop reason: HOSPADM

## 2025-07-18 RX ORDER — HYDROCODONE BITARTRATE AND ACETAMINOPHEN 5; 325 MG/1; MG/1
2 TABLET ORAL EVERY 4 HOURS PRN
Status: DISCONTINUED | OUTPATIENT
Start: 2025-07-18 | End: 2025-07-19 | Stop reason: HOSPADM

## 2025-07-18 RX ORDER — MIDAZOLAM HYDROCHLORIDE 1 MG/ML
INJECTION, SOLUTION INTRAMUSCULAR; INTRAVENOUS PRN
Status: DISCONTINUED | OUTPATIENT
Start: 2025-07-18 | End: 2025-07-18 | Stop reason: HOSPADM

## 2025-07-18 RX ORDER — INSULIN LISPRO 100 [IU]/ML
0-8 INJECTION, SOLUTION INTRAVENOUS; SUBCUTANEOUS
Status: DISCONTINUED | OUTPATIENT
Start: 2025-07-18 | End: 2025-07-19 | Stop reason: HOSPADM

## 2025-07-18 RX ORDER — AMLODIPINE BESYLATE 10 MG/1
10 TABLET ORAL DAILY
Status: DISCONTINUED | OUTPATIENT
Start: 2025-07-18 | End: 2025-07-18

## 2025-07-18 RX ORDER — IOPAMIDOL 755 MG/ML
INJECTION, SOLUTION INTRAVASCULAR PRN
Status: DISCONTINUED | OUTPATIENT
Start: 2025-07-18 | End: 2025-07-18 | Stop reason: HOSPADM

## 2025-07-18 RX ORDER — AMLODIPINE BESYLATE 10 MG/1
10 TABLET ORAL DAILY
Status: DISCONTINUED | OUTPATIENT
Start: 2025-07-19 | End: 2025-07-18

## 2025-07-18 RX ORDER — AMLODIPINE BESYLATE 10 MG/1
10 TABLET ORAL DAILY
Status: DISCONTINUED | OUTPATIENT
Start: 2025-07-18 | End: 2025-07-19 | Stop reason: HOSPADM

## 2025-07-18 RX ORDER — EZETIMIBE 10 MG/1
10 TABLET ORAL DAILY
Status: DISCONTINUED | OUTPATIENT
Start: 2025-07-19 | End: 2025-07-19 | Stop reason: HOSPADM

## 2025-07-18 RX ORDER — TICAGRELOR 90 MG/1
TABLET, FILM COATED ORAL PRN
Status: DISCONTINUED | OUTPATIENT
Start: 2025-07-18 | End: 2025-07-18 | Stop reason: HOSPADM

## 2025-07-18 RX ORDER — SODIUM CHLORIDE 0.9 % (FLUSH) 0.9 %
5-40 SYRINGE (ML) INJECTION PRN
Status: DISCONTINUED | OUTPATIENT
Start: 2025-07-18 | End: 2025-07-19 | Stop reason: HOSPADM

## 2025-07-18 RX ORDER — BUPROPION HYDROCHLORIDE 150 MG/1
150 TABLET ORAL 2 TIMES DAILY
Status: DISCONTINUED | OUTPATIENT
Start: 2025-07-18 | End: 2025-07-19 | Stop reason: HOSPADM

## 2025-07-18 RX ORDER — TAMSULOSIN HYDROCHLORIDE 0.4 MG/1
0.4 CAPSULE ORAL DAILY
Status: DISCONTINUED | OUTPATIENT
Start: 2025-07-19 | End: 2025-07-19 | Stop reason: HOSPADM

## 2025-07-18 RX ORDER — HEPARIN SODIUM 10000 [USP'U]/ML
INJECTION, SOLUTION INTRAVENOUS; SUBCUTANEOUS PRN
Status: DISCONTINUED | OUTPATIENT
Start: 2025-07-18 | End: 2025-07-18 | Stop reason: HOSPADM

## 2025-07-18 RX ADMIN — AMLODIPINE BESYLATE 10 MG: 10 TABLET ORAL at 18:10

## 2025-07-18 RX ADMIN — LOSARTAN POTASSIUM 100 MG: 50 TABLET, FILM COATED ORAL at 18:10

## 2025-07-18 RX ADMIN — ROSUVASTATIN CALCIUM 40 MG: 20 TABLET, FILM COATED ORAL at 20:30

## 2025-07-18 RX ADMIN — INSULIN LISPRO 2 UNITS: 100 INJECTION, SOLUTION INTRAVENOUS; SUBCUTANEOUS at 18:21

## 2025-07-18 RX ADMIN — TRAZODONE HYDROCHLORIDE 50 MG: 50 TABLET ORAL at 20:30

## 2025-07-18 RX ADMIN — BUPROPION HYDROCHLORIDE 150 MG: 150 TABLET, EXTENDED RELEASE ORAL at 20:30

## 2025-07-18 RX ADMIN — ASPIRIN 324 MG: 81 TABLET, CHEWABLE ORAL at 09:36

## 2025-07-18 RX ADMIN — GABAPENTIN 800 MG: 400 CAPSULE ORAL at 20:30

## 2025-07-18 RX ADMIN — SODIUM CHLORIDE: 0.9 INJECTION, SOLUTION INTRAVENOUS at 17:49

## 2025-07-18 RX ADMIN — TICAGRELOR 90 MG: 90 TABLET ORAL at 20:30

## 2025-07-18 NOTE — PROGRESS NOTES
TRANSFER - OUT REPORT:    Verbal report given to Yulia DIAZ on Estevan Valenzuela  being transferred to [facility] for routine progression of patient care       Report consisted of patient's Situation, Background, Assessment and   Recommendations(SBAR).     Information from the following report(s) Nurse Handoff Report was reviewed with the receiving nurse.           Lines:   Peripheral IV 07/18/25 Left Antecubital (Active)       Peripheral IV 07/18/25 Right Antecubital (Active)        Opportunity for questions and clarification was provided.      Patient transported with:  Registered Nurse

## 2025-07-18 NOTE — PROGRESS NOTES
4 Eyes Skin Assessment     NAME:  Estevan Valenzuela  YOB: 1965  MEDICAL RECORD NUMBER:  [id number]    The patient is being assessed for  Transfer to New Unit    I agree that at least one RN has performed a thorough Head to Toe Skin Assessment on the patient. ALL assessment sites listed below have been assessed.      Areas assessed by both nurses:    Head, Face, Ears, Shoulders, Back, Chest, Arms, Elbows, Hands, Sacrum. Buttock, Coccyx, Ischium, Legs. Feet and Heels, and Under Medical Devices         Does the Patient have a Wound? Yes wound(s) were present on assessment. LDA wound assessment was Initiated and completed by RN  Pt has wound on toe that he has been treating at home.   Fadi Prevention initiated by RN: No  Wound Care Orders initiated by RN: No    For hospital-acquired stage 1 & 2 and ALL Stage 3,4, Unstageable, DTI, NWPT, and Complex wounds: place order “IP Wound Care/Ostomy Nurse Eval and Treat” by RN under : No    New Ostomies, if present place, Ostomy referral order under : No     Nurse 1 eSignature: Electronically signed by Yulia Villasenor RN on 7/18/25 at 6:42 PM EDT    **SHARE this note so that the co-signing nurse can place an eSignature**    Nurse 2 eSignature: Electronically signed by Olvin Baez RN on 7/18/25 at 6:47 PM EDT

## 2025-07-18 NOTE — PROGRESS NOTES
Report received from [facility] Cath Lab RN. Procedural finding communicated. Intra procedural medication administration reviewed. Progression of care discussed.    Patient received into CPRU room 2, Post PCI    Access site without bleeding or swelling. Right wrist    Patient instructed to limit movement of right upper extremity.    Routine post procedural vital signs & site assessment initiated.

## 2025-07-18 NOTE — PROGRESS NOTES
TRANSFER - OUT REPORT:    Verbal report given to JOE rogel on Estevan Valenzuela  being transferred to [facility]U for ordered procedure       Report consisted of patient’s Situation, Background, Assessment and Recommendations(SBAR).     Information from the following report(s) Procedure Summary, MAR, and Med Rec Status was reviewed with the receiving nurse.    Opportunity for questions and clarification was provided.      C by Dr. banks  Right radial access  IVUS to LAD and Diag  X1 stent to LAD  X1 stent to diag  Right wrist TR band with 12 mL  3 m Versed  75 mcg Fentanyl  15,000 units of heparin  180 mg brilinta  Access site soft. Clean, dry, and intact; with no signs of bleeding or hematoma  Pt. Tolerated procedure

## 2025-07-18 NOTE — PROGRESS NOTES
Perfect serve message sent to Cardiology:   Pt had Heart cath today 2 stents placed. current /98 HR 55 SB. BP are not ordered until the AM> which meds would you like to give?

## 2025-07-18 NOTE — PROGRESS NOTES
TRANSFER - IN REPORT:    Verbal report received from Yany DIAZ  on Estevan Valenzuela  being received from [facility] for routine progression of patient care      Report consisted of patient's Situation, Background, Assessment and   Recommendations(SBAR).     Information from the following report(s) Nurse Handoff Report was reviewed with the receiving nurse.    Opportunity for questions and clarification was provided.      Assessment completed upon patient's arrival to unit and care assumed.

## 2025-07-19 VITALS
TEMPERATURE: 97.5 F | BODY MASS INDEX: 25.58 KG/M2 | OXYGEN SATURATION: 96 % | HEART RATE: 66 BPM | WEIGHT: 193 LBS | DIASTOLIC BLOOD PRESSURE: 75 MMHG | SYSTOLIC BLOOD PRESSURE: 139 MMHG | RESPIRATION RATE: 18 BRPM | HEIGHT: 73 IN

## 2025-07-19 LAB
ANION GAP SERPL CALC-SCNC: 12 MMOL/L (ref 7–16)
BUN SERPL-MCNC: 10 MG/DL (ref 6–23)
CALCIUM SERPL-MCNC: 9.1 MG/DL (ref 8.8–10.2)
CHLORIDE SERPL-SCNC: 107 MMOL/L (ref 98–107)
CO2 SERPL-SCNC: 21 MMOL/L (ref 20–29)
CREAT SERPL-MCNC: 0.79 MG/DL (ref 0.8–1.3)
ERYTHROCYTE [DISTWIDTH] IN BLOOD BY AUTOMATED COUNT: 14 % (ref 11.9–14.6)
GLUCOSE BLD STRIP.AUTO-MCNC: 132 MG/DL (ref 65–100)
GLUCOSE BLD STRIP.AUTO-MCNC: 183 MG/DL (ref 65–100)
GLUCOSE SERPL-MCNC: 136 MG/DL (ref 70–99)
HCT VFR BLD AUTO: 46.9 % (ref 41.1–50.3)
HGB BLD-MCNC: 16.7 G/DL (ref 13.6–17.2)
MCH RBC QN AUTO: 32.3 PG (ref 26.1–32.9)
MCHC RBC AUTO-ENTMCNC: 35.6 G/DL (ref 31.4–35)
MCV RBC AUTO: 90.7 FL (ref 82–102)
NRBC # BLD: 0 K/UL (ref 0–0.2)
PLATELET # BLD AUTO: 206 K/UL (ref 150–450)
PMV BLD AUTO: 11.7 FL (ref 9.4–12.3)
POTASSIUM SERPL-SCNC: 4.1 MMOL/L (ref 3.5–5.1)
RBC # BLD AUTO: 5.17 M/UL (ref 4.23–5.6)
SERVICE CMNT-IMP: ABNORMAL
SERVICE CMNT-IMP: ABNORMAL
SODIUM SERPL-SCNC: 140 MMOL/L (ref 136–145)
WBC # BLD AUTO: 11.6 K/UL (ref 4.3–11.1)

## 2025-07-19 PROCEDURE — 82962 GLUCOSE BLOOD TEST: CPT

## 2025-07-19 PROCEDURE — 2500000003 HC RX 250 WO HCPCS: Performed by: INTERNAL MEDICINE

## 2025-07-19 PROCEDURE — 36415 COLL VENOUS BLD VENIPUNCTURE: CPT

## 2025-07-19 PROCEDURE — 85027 COMPLETE CBC AUTOMATED: CPT

## 2025-07-19 PROCEDURE — 99238 HOSP IP/OBS DSCHRG MGMT 30/<: CPT | Performed by: INTERNAL MEDICINE

## 2025-07-19 PROCEDURE — G0378 HOSPITAL OBSERVATION PER HR: HCPCS

## 2025-07-19 PROCEDURE — 6370000000 HC RX 637 (ALT 250 FOR IP): Performed by: INTERNAL MEDICINE

## 2025-07-19 PROCEDURE — 80048 BASIC METABOLIC PNL TOTAL CA: CPT

## 2025-07-19 RX ORDER — TICAGRELOR 90 MG/1
90 TABLET, FILM COATED ORAL 2 TIMES DAILY
Qty: 60 TABLET | Refills: 11 | Status: SHIPPED
Start: 2025-07-19 | End: 2025-07-19

## 2025-07-19 RX ORDER — ASPIRIN 81 MG/1
81 TABLET, CHEWABLE ORAL DAILY
Qty: 90 TABLET | Refills: 3 | Status: SHIPPED | OUTPATIENT
Start: 2025-07-19

## 2025-07-19 RX ORDER — TICAGRELOR 90 MG/1
90 TABLET, FILM COATED ORAL 2 TIMES DAILY
Qty: 60 TABLET | Refills: 11 | Status: SHIPPED | OUTPATIENT
Start: 2025-07-19

## 2025-07-19 RX ORDER — ASPIRIN 81 MG/1
81 TABLET, CHEWABLE ORAL DAILY
Qty: 90 TABLET | Refills: 3 | Status: SHIPPED
Start: 2025-07-19 | End: 2025-07-19

## 2025-07-19 RX ADMIN — LOSARTAN POTASSIUM 100 MG: 50 TABLET, FILM COATED ORAL at 10:01

## 2025-07-19 RX ADMIN — TICAGRELOR 90 MG: 90 TABLET ORAL at 10:02

## 2025-07-19 RX ADMIN — DULOXETINE HYDROCHLORIDE 60 MG: 60 CAPSULE, DELAYED RELEASE ORAL at 10:01

## 2025-07-19 RX ADMIN — ASPIRIN 81 MG: 81 TABLET, CHEWABLE ORAL at 10:01

## 2025-07-19 RX ADMIN — EZETIMIBE 10 MG: 10 TABLET ORAL at 10:01

## 2025-07-19 RX ADMIN — METOPROLOL SUCCINATE 25 MG: 25 TABLET, EXTENDED RELEASE ORAL at 10:01

## 2025-07-19 RX ADMIN — SODIUM CHLORIDE, PRESERVATIVE FREE 10 ML: 5 INJECTION INTRAVENOUS at 10:05

## 2025-07-19 RX ADMIN — BUPROPION HYDROCHLORIDE 150 MG: 150 TABLET, EXTENDED RELEASE ORAL at 10:01

## 2025-07-19 RX ADMIN — TAMSULOSIN HYDROCHLORIDE 0.4 MG: 0.4 CAPSULE ORAL at 10:01

## 2025-07-19 RX ADMIN — AMLODIPINE BESYLATE 10 MG: 10 TABLET ORAL at 10:02

## 2025-07-19 RX ADMIN — GABAPENTIN 800 MG: 400 CAPSULE ORAL at 10:01

## 2025-07-19 NOTE — DISCHARGE SUMMARY
[facility] Cardiology Discharge Summary     Patient ID:  Estevan Valenzuela  [id number]  60 y.o.  1965    Admit date: 7/18/2025    Discharge date:  7/19/25    Admitting Physician: Wolfgang Gomez MD     Discharge Physician: GALINDO Bear/Dr. Ceron     Admission Diagnoses: Accelerating angina (HCC) [I20.0]  Post PTCA [Z98.61]    Discharge Diagnoses:   Patient Active Problem List    Diagnosis    Post PTCA    Accelerating angina (HCC)    Chronic diarrhea    Incontinence of feces with fecal urgency    Gastroesophageal reflux disease    Family history of colon cancer in father    Family history of pancreatic cancer    Chronic nausea    Diabetic foot infection (HCC)    Tobacco abuse    Bilateral occipital neuralgia    Chronic osteomyelitis of toe of left foot (HCC)    Metabolic acidosis    FAHEEM (acute kidney injury)    Acute osteomyelitis of toe of left foot (HCC)    Cellulitis of great toe of left foot    Diabetic ulcer of toe of right foot associated with type 2 diabetes mellitus, with fat layer exposed (HCC)    Cigarette smoker    Hypercholesterolemia    Diabetic polyneuropathy associated with type 2 diabetes mellitus (HCC)    Human immunodeficiency virus (HCC)    Type 2 diabetes mellitus with hyperglycemia, with long-term current use of insulin (HCC)    Hypertriglyceridemia       Cardiology Procedures this admission: LHC w PCI  Consults: None    Hospital Course: Patient was seen at the office of [facility] by Dr. Hewitt for complaints of CP and SOB, decreased activity tolerance w CT calcium score showing LAD disease and was subsequently scheduled for a LHC at [facility] on 7/18/25. Patient underwent cardiac catheterization by Dr. Gomez. Patient was found to have a 70% stenosis of the mLAD that was stented with a 3.5 x 30 mm drug-eluting stent in the LAD and a 3.0 x 22 mm resolute Bakari drug-eluting stent extending from the LAD into the diagonal  with 0% residual stenosis. Per  instructed to advance activities as tolerated to the limit of fatigue or shortness of breath. The patient is instructed to avoid all heavy lifting for 5 days. The patient is instructed to watch the cath site for bleeding/oozing; if seen, the patient is instructed to apply firm pressure with a clean cloth and call [facility] at [phone]. The patient is instructed to watch for signs of infection which include: increasing area of redness, fever/hot to touch or purulent drainage at the catheterization site. The patient is instructed not to soak in a bathtub for 7-10 days, but is cleared to shower. The patient is instructed to call the office or return to the ER for immediate evaluation for any shortness of breath or chest pain not relieved by NTG.      Discharge Exam: /80   Pulse 65   Temp 99 °F (37.2 °C) (Temporal)   Resp 18   Ht 1.854 m (6' 1\")   Wt 87.5 kg (193 lb)   SpO2 96%   BMI 25.46 kg/m²   Patient has been seen by Dr. Ceron: see his progress note for exam details.    Recent Results (from the past 24 hours)   EKG 12 Lead    Collection Time: 07/18/25  9:48 AM   Result Value Ref Range    Ventricular Rate 57 BPM    Atrial Rate 57 BPM    P-R Interval 164 ms    QRS Duration 88 ms    Q-T Interval 412 ms    QTc Calculation (Bazett) 401 ms    P Axis 15 degrees    R Axis -51 degrees    T Axis -8 degrees    Diagnosis       Sinus bradycardia  Left anterior fascicular block  Nonspecific T wave abnormality  When compared with ECG of 29-JUN-2013 07:01,  Left anterior fascicular block is now Present  Confirmed by Derick Foster MD ([id number]) on 7/18/2025 10:40:18 AM     POCT Glucose    Collection Time: 07/18/25  9:51 AM   Result Value Ref Range    POC Glucose 244 (H) 65 - 100 mg/dL    Performed by: Isabel    Cardiac procedure    Collection Time: 07/18/25 12:09 PM   Result Value Ref Range    Body Surface Area 2.12 m2   EKG 12 Lead    Collection Time: 07/18/25  1:07 PM   Result Value Ref Range     tablet  Commonly known as: COZAAR     metoclopramide 5 MG tablet  Commonly known as: REGLAN     metoprolol succinate 25 MG extended release tablet  Commonly known as: TOPROL XL     * Misc. Devices Misc  Omnipod 5 Xpjrh9Kfud G6 PODs (Gen 5) [id number] Use to deliver insulin, change every 72 hours     * Misc. Devices Misc  Omnipod 5 Uweqq4Pjwf G6 Intro Kit (Gen 5) [id number] Use to deliver insulin, change every 72 hours     omeprazole 40 MG delayed release capsule  Commonly known as: PRILOSEC  Take 1 capsule by mouth 2 times daily (before meals)     ondansetron 8 MG tablet  Commonly known as: ZOFRAN     * OneTouch Delica Plus Lbxtfr40F Misc  USE TO CHECK BLOOD SUGAR SIX TIMES DAILY     * Accu-Chek FastClix Lancets Misc  Use to check glucose and calibrate CGM up to three times daily, E11.9     rosuvastatin 40 MG tablet  Commonly known as: CRESTOR  Take 1 tablet by mouth nightly     sodium hypochlorite 0.5 % Soln external solution  Commonly known as: DAKINS     tamsulosin 0.4 MG capsule  Commonly known as: FLOMAX     traZODone 50 MG tablet  Commonly known as: DESYREL           * This list has 6 medication(s) that are the same as other medications prescribed for you. Read the directions carefully, and ask your doctor or other care provider to review them with you.                STOP taking these medications      Triumeq 600- MG Tabs  Generic drug: abacavir-dolutegravir-lamiVUDine            ASK your doctor about these medications      sodium bicarbonate 650 MG tablet     Tresiba FlexTouch 200 UNIT/ML Sopn  Generic drug: Insulin Degludec  Inject 32 Units into the skin daily               Where to Get Your Medications        These medications were sent to [facility] [address] [place] [address] - [address] [facility] -  [phone] - F [phone]  [address] [facility] [address]      Phone: [phone]   aspirin 81 MG chewable tablet  ticagrelor 90 MG Tabs tablet

## 2025-07-19 NOTE — PLAN OF CARE
Problem: Safety - Adult  Goal: Free from fall injury  Outcome: Progressing     Problem: Chronic Conditions and Co-morbidities  Goal: Patient's chronic conditions and co-morbidity symptoms are monitored and maintained or improved  Outcome: Progressing     Problem: Discharge Planning  Goal: Discharge to home or other facility with appropriate resources  Outcome: Progressing  Flowsheets (Taken 7/19/2025 4267)  Discharge to home or other facility with appropriate resources: Identify barriers to discharge with patient and caregiver

## 2025-07-19 NOTE — CARE COORDINATION
Pt chart reviewed for discharge planning. MSW met with pt at bedside, verified demographic information/ health insurance. Pt lives alone in one level home, states independent with ADLs, uses a walker to ambulate as needed. Pt does not drive in the community . PCP was confirmed. Pt reports no outside services in the home.  Pt to be transported home to verified address by Uber. No additional CM orders received or supportive care needs expressed at this time.       07/19/25 1115   Service Assessment   Patient Orientation Alert and Oriented   Cognition Alert   History Provided By Patient   Primary Caregiver Self   Support Systems Parent   Patient's Healthcare Decision Maker is: Legal Next of Kin   PCP Verified by CM Yes   Last Visit to PCP Within last 3 months   Prior Functional Level Independent in ADLs/IADLs   Current Functional Level Independent in ADLs/IADLs   Can patient return to prior living arrangement Yes   Ability to make needs known: Good   Family able to assist with home care needs: Yes   Would you like for me to discuss the discharge plan with any other family members/significant others, and if so, who? No   Financial Resources Medicare;Medicaid  ([facility])   Community Resources None   Social/Functional History   Lives With Alone   Type of Home House   Home Layout One level   Home Equipment Walker - Rolling   Receives Help From Family   Prior Level of Assist for ADLs Independent   Ambulation Assistance Independent   Active  No   Patient's  Info Family   Occupation Other (Comment)  (Disabled)   Discharge Planning   Type of Residence House   Living Arrangements Alone   Current Services Prior To Admission None   Potential Assistance Needed Home Care  ([facility])   DME Ordered? No   Potential Assistance Purchasing Medications No   Type of Home Care Services PT;OT;Nursing Services   Patient expects to be discharged to: House   One/Two Story Residence One story   Services At/After Discharge   Transition

## 2025-07-19 NOTE — DISCHARGE SUMMARY
[facility] CARDIOLOGY PROGRESS NOTE           7/19/2025 8:12 AM    Admit Date: 7/18/2025      Subjective:   Patient reports doing well.He reports no chest pain.    ROS:  GEN:  No fever or chills  Cardiovascular:  As noted above:no CP or palpitations.  Pulmonary:  As noted above:no SOB  Neuro:  No new focal motor or sensory loss    Objective:      Vitals:    07/18/25 1900 07/18/25 2244 07/19/25 0338 07/19/25 0711   BP: (!) 148/95 135/87 124/69 109/80   Pulse: 53 59 62 65   Resp: 15 17 17 18   Temp: 98.1 °F (36.7 °C) 98 °F (36.7 °C) 98.1 °F (36.7 °C) 99 °F (37.2 °C)   TempSrc: Temporal Temporal Temporal Temporal   SpO2: 97% 98% 93% 96%   Weight:       Height:           Physical Exam:  General-NAD  Neck- supple, no JVD  CV- regular rate and rhythm no MRG  Lung- clear bilaterally  Abd- soft, nontender, nondistended  Ext- no edema bilaterally.Right radial cath site appears normal.  Skin- warm and dry  Psychiatric:  Normal mood and affect.  Neurologic:  Alert and oriented X 3      Data Review:   Recent Labs     07/19/25  0738   WBC 11.6*   HGB 16.7   HCT 46.9          TELEMETRY:  NSR    Assessment/Plan:     Principal Problem:    Accelerating angina (HCC):Stable.S/P LAD PCI with AMADEO.Continue DAPT ( ASA& Brilinta) for 12 months.Continue Crestor and Toprol.Discharge to home.Follow up with Dr Hewitt.  Active Problems:    Primary hypertension:Stable.Continue home medications ( Norvasc,Toprol,and Cozaar).  Hyperlipidemia:Continue Crestor.              LALIT HAY MD  7/19/2025 8:12 AM

## 2025-07-19 NOTE — PROGRESS NOTES
Discharge instructions, follow up appointments and prescriptions reviewed with patient  Patient verbalize understanding. All personal belongings taken with patient. [employer] will drive patient to pharmacy, then home. Patient escorted to discharge area via wheelchair. Patient is stable at discharge.

## 2025-07-21 ENCOUNTER — CARE COORDINATION (OUTPATIENT)
Dept: CARE COORDINATION | Facility: CLINIC | Age: 60
End: 2025-07-21

## 2025-07-21 ENCOUNTER — TELEPHONE (OUTPATIENT)
Dept: CARDIAC REHAB | Age: 60
End: 2025-07-21

## 2025-07-21 NOTE — TELEPHONE ENCOUNTER
Referral to the [facility] Cardiac Rehab program received from the [facility] Post Cath Recovery unit post PCI. Pt called and he states he will not be able to exercise in the Cardiac Rehab program due to a foot would at present. Pt made aware that his referral to the Cardiac Rehab program is valid for six months from his PCI date if he decides he can participate with contact information given.

## 2025-07-21 NOTE — CARE COORDINATION
Ambulatory Care Coordination Note     7/21/2025 1:23 PM     Care Summary Note:   Outreach for High Risk Case Management  S/P stent to mLAD as of Friday 7/18  Patient had overnight stay and is now home  Has his new medications - reviewed these with patient   - brilinta and aspirin  Emphasized adherence and also stated he should plan on attending cardiac rehab when they call to set it up.    He has rec'd all equipment for Omnipod - email sent re: training     ACM: Heather Byers RN     Method of communication with provider: chart routing.    PCP/Specialist follow up:   Future Appointments         Provider Specialty Dept Phone    7/22/2025 11:10 AM Janny Casey, APRN - NP; Jenny Cole RN Wound Ostomy [phone]    9/11/2025 2:15 PM Casa Hewitt MD Cardiology [phone]    10/2/2025 2:00 PM Parul Perez PA-C Endocrinology [phone]    11/18/2025 1:00 PM (Arrive by 12:45 PM) Aric Shafer MD Neurology [phone]

## 2025-07-28 ENCOUNTER — CARE COORDINATION (OUTPATIENT)
Dept: CARE COORDINATION | Facility: CLINIC | Age: 60
End: 2025-07-28

## 2025-07-28 NOTE — CARE COORDINATION
Ambulatory Care Coordination Note     7/28/2025 12:29 PM     Care Summary Note:   Outreach for High Risk Case Management - spoke with patient  Last week was particularly rough for patient - states very fatigued with the heat.  Reports shortness of breath with exertion since stent placement, this subsides when he rests.  Reports blood sugar better - under 200 and this morning 145.  Hoping for Omnipod training this week.     ACM: Heather Byers, RN     PCP/Specialist follow up:   Future Appointments         Provider Specialty Dept Phone    9/11/2025 2:15 PM Casa Hewitt MD Cardiology [phone]    10/2/2025 2:00 PM Parul Perez PA-C Endocrinology [phone]    11/18/2025 1:00 PM (Arrive by 12:45 PM) Aric Shafer MD Neurology [phone]

## 2025-07-31 ENCOUNTER — TELEPHONE (OUTPATIENT)
Dept: ENDOCRINOLOGY | Age: 60
End: 2025-07-31

## 2025-07-31 DIAGNOSIS — Z79.4 TYPE 2 DIABETES MELLITUS WITH HYPERGLYCEMIA, WITH LONG-TERM CURRENT USE OF INSULIN (HCC): Primary | ICD-10-CM

## 2025-07-31 DIAGNOSIS — E11.65 TYPE 2 DIABETES MELLITUS WITH HYPERGLYCEMIA, WITH LONG-TERM CURRENT USE OF INSULIN (HCC): Primary | ICD-10-CM

## 2025-07-31 RX ORDER — INSULIN LISPRO 100 [IU]/ML
INJECTION, SOLUTION INTRAVENOUS; SUBCUTANEOUS
Qty: 60 ML | Refills: 3 | Status: SHIPPED | OUTPATIENT
Start: 2025-07-31

## 2025-08-01 ENCOUNTER — CARE COORDINATION (OUTPATIENT)
Dept: CARE COORDINATION | Facility: CLINIC | Age: 60
End: 2025-08-01

## 2025-08-01 NOTE — CARE COORDINATION
Ambulatory Care Coordination Note     8/1/2025 12:36 PM     Care Summary Note:   Outreach for High Risk Case Management - spoke with patient  Omnipod training is still pending, hoping for next week.  Ongoing shortness of breath (since stent/brilinta), drinking Pepsi Zero  Poor appetite, not losing weight, but feels bloated   - diarrhea   - nausea  Patient's diet is dependent on his partner who is a .  States he eats a lot of greens (eli, etc) which might contribute to his diarrhea.  Staying away from sweets.    Discussed his smoking - reports he rarely finishes a cigarette.  Perhaps this habit helps his anxiety - he agrees.   Foot wound is nearly healed, but mentions he cannot see it well.  Goes on to say that his vision is cloudy.  Advised patient to see ophthalmology soon.  Patient to call ACM when Omnipod training is scheduled.    ACM: Heather Byers RN    PCP/Specialist follow up:   Future Appointments         Provider Specialty Dept Phone    8/8/2025 11:00 AM Janny Casey APRN - NP; EanMaricel RN Wound Ostomy [phone]    9/11/2025 2:15 PM Casa Hewitt MD Cardiology [phone]    10/2/2025 2:00 PM Parul Perez PA-C Endocrinology [phone]    11/18/2025 1:00 PM (Arrive by 12:45 PM) Aric Shafer MD Neurology [phone]

## 2025-08-05 ENCOUNTER — CARE COORDINATION (OUTPATIENT)
Dept: CARE COORDINATION | Facility: CLINIC | Age: 60
End: 2025-08-05

## 2025-08-07 ENCOUNTER — CARE COORDINATION (OUTPATIENT)
Dept: CARE COORDINATION | Facility: CLINIC | Age: 60
End: 2025-08-07

## 2025-08-08 ENCOUNTER — HOSPITAL ENCOUNTER (OUTPATIENT)
Dept: WOUND CARE | Age: 60
Discharge: HOME | End: 2025-08-08
Payer: MEDICARE

## 2025-08-08 VITALS
HEART RATE: 61 BPM | WEIGHT: 195 LBS | SYSTOLIC BLOOD PRESSURE: 148 MMHG | RESPIRATION RATE: 16 BRPM | BODY MASS INDEX: 25.73 KG/M2 | DIASTOLIC BLOOD PRESSURE: 91 MMHG | TEMPERATURE: 97.2 F

## 2025-08-08 PROCEDURE — 11042 DBRDMT SUBQ TIS 1ST 20SQCM/<: CPT

## 2025-08-15 ENCOUNTER — HOSPITAL ENCOUNTER (OUTPATIENT)
Dept: WOUND CARE | Age: 60
Discharge: HOME | End: 2025-08-15
Payer: MEDICARE

## 2025-08-15 VITALS
HEART RATE: 68 BPM | RESPIRATION RATE: 17 BRPM | TEMPERATURE: 97.8 F | SYSTOLIC BLOOD PRESSURE: 105 MMHG | DIASTOLIC BLOOD PRESSURE: 70 MMHG

## 2025-08-15 DIAGNOSIS — E11.621 DIABETIC ULCER OF TOE OF RIGHT FOOT ASSOCIATED WITH TYPE 2 DIABETES MELLITUS, WITH FAT LAYER EXPOSED (HCC): ICD-10-CM

## 2025-08-15 DIAGNOSIS — E11.42 DIABETIC POLYNEUROPATHY ASSOCIATED WITH TYPE 2 DIABETES MELLITUS (HCC): Primary | ICD-10-CM

## 2025-08-15 DIAGNOSIS — L97.512 DIABETIC ULCER OF TOE OF RIGHT FOOT ASSOCIATED WITH TYPE 2 DIABETES MELLITUS, WITH FAT LAYER EXPOSED (HCC): ICD-10-CM

## 2025-08-15 PROCEDURE — 11042 DBRDMT SUBQ TIS 1ST 20SQCM/<: CPT

## 2025-08-15 RX ORDER — MUPIROCIN 2 %
OINTMENT (GRAM) TOPICAL PRN
OUTPATIENT
Start: 2025-08-15

## 2025-08-15 RX ORDER — LIDOCAINE 40 MG/G
CREAM TOPICAL PRN
OUTPATIENT
Start: 2025-08-15

## 2025-08-15 RX ORDER — LIDOCAINE HYDROCHLORIDE 40 MG/ML
SOLUTION TOPICAL PRN
OUTPATIENT
Start: 2025-08-15

## 2025-08-15 RX ORDER — BETAMETHASONE DIPROPIONATE 0.5 MG/G
CREAM TOPICAL PRN
OUTPATIENT
Start: 2025-08-15

## 2025-08-15 RX ORDER — LIDOCAINE HYDROCHLORIDE 20 MG/ML
JELLY TOPICAL PRN
Status: DISCONTINUED | OUTPATIENT
Start: 2025-08-15 | End: 2025-08-16 | Stop reason: HOSPADM

## 2025-08-15 RX ORDER — NEOMYCIN/BACITRACIN/POLYMYXINB 3.5-400-5K
OINTMENT (GRAM) TOPICAL PRN
OUTPATIENT
Start: 2025-08-15

## 2025-08-15 RX ORDER — BACITRACIN ZINC AND POLYMYXIN B SULFATE 500; 1000 [USP'U]/G; [USP'U]/G
OINTMENT TOPICAL PRN
OUTPATIENT
Start: 2025-08-15

## 2025-08-15 RX ORDER — SILVER SULFADIAZINE 10 MG/G
CREAM TOPICAL PRN
OUTPATIENT
Start: 2025-08-15

## 2025-08-15 RX ORDER — LIDOCAINE 50 MG/G
OINTMENT TOPICAL PRN
OUTPATIENT
Start: 2025-08-15

## 2025-08-15 RX ORDER — SODIUM CHLOR/HYPOCHLOROUS ACID 0.033 %
SOLUTION, IRRIGATION IRRIGATION PRN
Status: DISCONTINUED | OUTPATIENT
Start: 2025-08-15 | End: 2025-08-16 | Stop reason: HOSPADM

## 2025-08-15 RX ORDER — GINSENG 100 MG
CAPSULE ORAL PRN
OUTPATIENT
Start: 2025-08-15

## 2025-08-15 RX ORDER — SODIUM CHLOR/HYPOCHLOROUS ACID 0.033 %
SOLUTION, IRRIGATION IRRIGATION PRN
OUTPATIENT
Start: 2025-08-15

## 2025-08-15 RX ORDER — TRIAMCINOLONE ACETONIDE 1 MG/G
OINTMENT TOPICAL PRN
OUTPATIENT
Start: 2025-08-15

## 2025-08-15 RX ORDER — LIDOCAINE HYDROCHLORIDE 20 MG/ML
JELLY TOPICAL PRN
OUTPATIENT
Start: 2025-08-15

## 2025-08-15 RX ORDER — GENTAMICIN SULFATE 1 MG/G
OINTMENT TOPICAL PRN
OUTPATIENT
Start: 2025-08-15

## 2025-08-15 RX ORDER — CLOBETASOL PROPIONATE 0.5 MG/G
OINTMENT TOPICAL PRN
OUTPATIENT
Start: 2025-08-15

## 2025-08-15 RX ADMIN — Medication: at 14:46

## 2025-08-15 RX ADMIN — LIDOCAINE HYDROCHLORIDE: 20 JELLY TOPICAL at 14:46

## 2025-08-20 ENCOUNTER — CARE COORDINATION (OUTPATIENT)
Dept: CARE COORDINATION | Facility: CLINIC | Age: 60
End: 2025-08-20

## 2025-08-22 ENCOUNTER — HOSPITAL ENCOUNTER (OUTPATIENT)
Dept: WOUND CARE | Age: 60
Discharge: HOME | End: 2025-08-22
Payer: MEDICARE

## 2025-08-22 VITALS
TEMPERATURE: 97.5 F | RESPIRATION RATE: 16 BRPM | DIASTOLIC BLOOD PRESSURE: 80 MMHG | SYSTOLIC BLOOD PRESSURE: 121 MMHG | HEART RATE: 68 BPM

## 2025-08-22 DIAGNOSIS — E11.42 DIABETIC POLYNEUROPATHY ASSOCIATED WITH TYPE 2 DIABETES MELLITUS (HCC): Chronic | ICD-10-CM

## 2025-08-22 DIAGNOSIS — L97.512 DIABETIC ULCER OF TOE OF RIGHT FOOT ASSOCIATED WITH TYPE 2 DIABETES MELLITUS, WITH FAT LAYER EXPOSED (HCC): Primary | Chronic | ICD-10-CM

## 2025-08-22 DIAGNOSIS — E11.621 DIABETIC ULCER OF TOE OF RIGHT FOOT ASSOCIATED WITH TYPE 2 DIABETES MELLITUS, WITH FAT LAYER EXPOSED (HCC): Primary | Chronic | ICD-10-CM

## 2025-08-22 DIAGNOSIS — Z72.0 TOBACCO ABUSE: Chronic | ICD-10-CM

## 2025-08-22 PROCEDURE — 11042 DBRDMT SUBQ TIS 1ST 20SQCM/<: CPT

## 2025-08-22 RX ORDER — LIDOCAINE HYDROCHLORIDE 20 MG/ML
JELLY TOPICAL PRN
OUTPATIENT
Start: 2025-08-22

## 2025-08-22 RX ORDER — LIDOCAINE HYDROCHLORIDE 20 MG/ML
JELLY TOPICAL PRN
Status: DISCONTINUED | OUTPATIENT
Start: 2025-08-22 | End: 2025-08-23 | Stop reason: HOSPADM

## 2025-08-22 RX ORDER — GENTAMICIN SULFATE 1 MG/G
OINTMENT TOPICAL PRN
OUTPATIENT
Start: 2025-08-22

## 2025-08-22 RX ORDER — GINSENG 100 MG
CAPSULE ORAL PRN
OUTPATIENT
Start: 2025-08-22

## 2025-08-22 RX ORDER — BETAMETHASONE DIPROPIONATE 0.5 MG/G
CREAM TOPICAL PRN
OUTPATIENT
Start: 2025-08-22

## 2025-08-22 RX ORDER — SILVER SULFADIAZINE 10 MG/G
CREAM TOPICAL PRN
OUTPATIENT
Start: 2025-08-22

## 2025-08-22 RX ORDER — LIDOCAINE HYDROCHLORIDE 40 MG/ML
SOLUTION TOPICAL PRN
OUTPATIENT
Start: 2025-08-22

## 2025-08-22 RX ORDER — TRIAMCINOLONE ACETONIDE 1 MG/G
OINTMENT TOPICAL PRN
OUTPATIENT
Start: 2025-08-22

## 2025-08-22 RX ORDER — CLOBETASOL PROPIONATE 0.5 MG/G
OINTMENT TOPICAL PRN
OUTPATIENT
Start: 2025-08-22

## 2025-08-22 RX ORDER — BACITRACIN ZINC AND POLYMYXIN B SULFATE 500; 1000 [USP'U]/G; [USP'U]/G
OINTMENT TOPICAL PRN
OUTPATIENT
Start: 2025-08-22

## 2025-08-22 RX ORDER — LIDOCAINE 50 MG/G
OINTMENT TOPICAL PRN
OUTPATIENT
Start: 2025-08-22

## 2025-08-22 RX ORDER — SODIUM CHLOR/HYPOCHLOROUS ACID 0.033 %
SOLUTION, IRRIGATION IRRIGATION PRN
OUTPATIENT
Start: 2025-08-22

## 2025-08-22 RX ORDER — SODIUM CHLOR/HYPOCHLOROUS ACID 0.033 %
SOLUTION, IRRIGATION IRRIGATION PRN
Status: DISCONTINUED | OUTPATIENT
Start: 2025-08-22 | End: 2025-08-23 | Stop reason: HOSPADM

## 2025-08-22 RX ORDER — LIDOCAINE 40 MG/G
CREAM TOPICAL PRN
OUTPATIENT
Start: 2025-08-22

## 2025-08-22 RX ORDER — NEOMYCIN/BACITRACIN/POLYMYXINB 3.5-400-5K
OINTMENT (GRAM) TOPICAL PRN
OUTPATIENT
Start: 2025-08-22

## 2025-08-22 RX ORDER — MUPIROCIN 2 %
OINTMENT (GRAM) TOPICAL PRN
OUTPATIENT
Start: 2025-08-22

## 2025-08-22 RX ADMIN — Medication 118 ML: at 14:15

## 2025-08-22 RX ADMIN — LIDOCAINE HYDROCHLORIDE: 20 JELLY TOPICAL at 14:14

## 2025-08-22 ASSESSMENT — PAIN SCALES - GENERAL: PAINLEVEL_OUTOF10: 0

## 2025-08-29 ENCOUNTER — HOSPITAL ENCOUNTER (OUTPATIENT)
Dept: WOUND CARE | Age: 60
Discharge: HOME | End: 2025-08-29
Payer: MEDICARE

## 2025-08-29 VITALS
OXYGEN SATURATION: 99 % | HEART RATE: 61 BPM | DIASTOLIC BLOOD PRESSURE: 88 MMHG | TEMPERATURE: 97.5 F | RESPIRATION RATE: 16 BRPM | SYSTOLIC BLOOD PRESSURE: 138 MMHG

## 2025-08-29 DIAGNOSIS — E11.621 DIABETIC ULCER OF TOE OF RIGHT FOOT ASSOCIATED WITH TYPE 2 DIABETES MELLITUS, WITH FAT LAYER EXPOSED (HCC): ICD-10-CM

## 2025-08-29 DIAGNOSIS — L97.512 DIABETIC ULCER OF TOE OF RIGHT FOOT ASSOCIATED WITH TYPE 2 DIABETES MELLITUS, WITH FAT LAYER EXPOSED (HCC): ICD-10-CM

## 2025-08-29 DIAGNOSIS — E11.42 DIABETIC POLYNEUROPATHY ASSOCIATED WITH TYPE 2 DIABETES MELLITUS (HCC): Primary | ICD-10-CM

## 2025-08-29 PROCEDURE — 11042 DBRDMT SUBQ TIS 1ST 20SQCM/<: CPT

## 2025-08-29 RX ORDER — LIDOCAINE 40 MG/G
CREAM TOPICAL PRN
OUTPATIENT
Start: 2025-08-29

## 2025-08-29 RX ORDER — GENTAMICIN SULFATE 1 MG/G
OINTMENT TOPICAL PRN
OUTPATIENT
Start: 2025-08-29

## 2025-08-29 RX ORDER — CLOBETASOL PROPIONATE 0.5 MG/G
OINTMENT TOPICAL PRN
OUTPATIENT
Start: 2025-08-29

## 2025-08-29 RX ORDER — LIDOCAINE HYDROCHLORIDE 20 MG/ML
JELLY TOPICAL PRN
Status: DISCONTINUED | OUTPATIENT
Start: 2025-08-29 | End: 2025-08-30 | Stop reason: HOSPADM

## 2025-08-29 RX ORDER — NEOMYCIN/BACITRACIN/POLYMYXINB 3.5-400-5K
OINTMENT (GRAM) TOPICAL PRN
OUTPATIENT
Start: 2025-08-29

## 2025-08-29 RX ORDER — LIDOCAINE HYDROCHLORIDE 40 MG/ML
SOLUTION TOPICAL PRN
OUTPATIENT
Start: 2025-08-29

## 2025-08-29 RX ORDER — BETAMETHASONE DIPROPIONATE 0.5 MG/G
CREAM TOPICAL PRN
OUTPATIENT
Start: 2025-08-29

## 2025-08-29 RX ORDER — TRIAMCINOLONE ACETONIDE 1 MG/G
OINTMENT TOPICAL PRN
OUTPATIENT
Start: 2025-08-29

## 2025-08-29 RX ORDER — BACITRACIN ZINC AND POLYMYXIN B SULFATE 500; 1000 [USP'U]/G; [USP'U]/G
OINTMENT TOPICAL PRN
OUTPATIENT
Start: 2025-08-29

## 2025-08-29 RX ORDER — LIDOCAINE 50 MG/G
OINTMENT TOPICAL PRN
OUTPATIENT
Start: 2025-08-29

## 2025-08-29 RX ORDER — SODIUM CHLOR/HYPOCHLOROUS ACID 0.033 %
SOLUTION, IRRIGATION IRRIGATION PRN
OUTPATIENT
Start: 2025-08-29

## 2025-08-29 RX ORDER — GINSENG 100 MG
CAPSULE ORAL PRN
OUTPATIENT
Start: 2025-08-29

## 2025-08-29 RX ORDER — MUPIROCIN 2 %
OINTMENT (GRAM) TOPICAL PRN
OUTPATIENT
Start: 2025-08-29

## 2025-08-29 RX ORDER — LIDOCAINE HYDROCHLORIDE 20 MG/ML
JELLY TOPICAL PRN
OUTPATIENT
Start: 2025-08-29

## 2025-08-29 RX ORDER — SODIUM CHLOR/HYPOCHLOROUS ACID 0.033 %
SOLUTION, IRRIGATION IRRIGATION PRN
Status: DISCONTINUED | OUTPATIENT
Start: 2025-08-29 | End: 2025-08-30 | Stop reason: HOSPADM

## 2025-08-29 RX ORDER — SILVER SULFADIAZINE 10 MG/G
CREAM TOPICAL PRN
OUTPATIENT
Start: 2025-08-29

## 2025-08-29 RX ADMIN — LIDOCAINE HYDROCHLORIDE: 20 JELLY TOPICAL at 14:43

## 2025-08-29 RX ADMIN — Medication 118 ML: at 14:44

## 2025-08-29 ASSESSMENT — PAIN SCALES - GENERAL: PAINLEVEL_OUTOF10: 0

## 2025-09-05 ENCOUNTER — HOSPITAL ENCOUNTER (OUTPATIENT)
Dept: WOUND CARE | Age: 60
Discharge: HOME | End: 2025-09-05
Payer: MEDICARE

## 2025-09-05 VITALS
TEMPERATURE: 97.6 F | HEART RATE: 80 BPM | DIASTOLIC BLOOD PRESSURE: 105 MMHG | SYSTOLIC BLOOD PRESSURE: 155 MMHG | RESPIRATION RATE: 18 BRPM

## 2025-09-05 DIAGNOSIS — E11.42 DIABETIC POLYNEUROPATHY ASSOCIATED WITH TYPE 2 DIABETES MELLITUS (HCC): Primary | Chronic | ICD-10-CM

## 2025-09-05 DIAGNOSIS — E11.621 DIABETIC ULCER OF TOE OF RIGHT FOOT ASSOCIATED WITH TYPE 2 DIABETES MELLITUS, WITH FAT LAYER EXPOSED (HCC): Chronic | ICD-10-CM

## 2025-09-05 DIAGNOSIS — L97.512 DIABETIC ULCER OF TOE OF RIGHT FOOT ASSOCIATED WITH TYPE 2 DIABETES MELLITUS, WITH FAT LAYER EXPOSED (HCC): Chronic | ICD-10-CM

## 2025-09-05 PROCEDURE — 11042 DBRDMT SUBQ TIS 1ST 20SQCM/<: CPT

## 2025-09-05 RX ORDER — SILVER SULFADIAZINE 10 MG/G
CREAM TOPICAL PRN
OUTPATIENT
Start: 2025-09-05

## 2025-09-05 RX ORDER — NEOMYCIN/BACITRACIN/POLYMYXINB 3.5-400-5K
OINTMENT (GRAM) TOPICAL PRN
OUTPATIENT
Start: 2025-09-05

## 2025-09-05 RX ORDER — LIDOCAINE 50 MG/G
OINTMENT TOPICAL PRN
OUTPATIENT
Start: 2025-09-05

## 2025-09-05 RX ORDER — LIDOCAINE HYDROCHLORIDE 40 MG/ML
SOLUTION TOPICAL PRN
OUTPATIENT
Start: 2025-09-05

## 2025-09-05 RX ORDER — MUPIROCIN 2 %
OINTMENT (GRAM) TOPICAL PRN
OUTPATIENT
Start: 2025-09-05

## 2025-09-05 RX ORDER — LIDOCAINE 40 MG/G
CREAM TOPICAL PRN
OUTPATIENT
Start: 2025-09-05

## 2025-09-05 RX ORDER — SODIUM CHLOR/HYPOCHLOROUS ACID 0.033 %
SOLUTION, IRRIGATION IRRIGATION PRN
OUTPATIENT
Start: 2025-09-05

## 2025-09-05 RX ORDER — GENTAMICIN SULFATE 1 MG/G
OINTMENT TOPICAL PRN
OUTPATIENT
Start: 2025-09-05

## 2025-09-05 RX ORDER — GINSENG 100 MG
CAPSULE ORAL PRN
OUTPATIENT
Start: 2025-09-05

## 2025-09-05 RX ORDER — BETAMETHASONE DIPROPIONATE 0.5 MG/G
CREAM TOPICAL PRN
OUTPATIENT
Start: 2025-09-05

## 2025-09-05 RX ORDER — LIDOCAINE HYDROCHLORIDE 20 MG/ML
JELLY TOPICAL PRN
OUTPATIENT
Start: 2025-09-05

## 2025-09-05 RX ORDER — TRIAMCINOLONE ACETONIDE 1 MG/G
OINTMENT TOPICAL PRN
OUTPATIENT
Start: 2025-09-05

## 2025-09-05 RX ORDER — CLOBETASOL PROPIONATE 0.5 MG/G
OINTMENT TOPICAL PRN
OUTPATIENT
Start: 2025-09-05

## 2025-09-05 RX ORDER — BACITRACIN ZINC AND POLYMYXIN B SULFATE 500; 1000 [USP'U]/G; [USP'U]/G
OINTMENT TOPICAL PRN
OUTPATIENT
Start: 2025-09-05

## 2025-09-05 ASSESSMENT — PAIN SCALES - GENERAL: PAINLEVEL_OUTOF10: 0

## (undated) DEVICE — CATH BLLN ANGIO 4X8MM NC EUPHORIA RX

## (undated) DEVICE — CATH BLLN ANGIO 3X15MM NC EUPHORIA RX

## (undated) DEVICE — Device: Brand: PROWATER

## (undated) DEVICE — VALVE HEMSTAS W/ GWIRE INSRTN TOOL GRDIAN II NC

## (undated) DEVICE — CATH BLLN ANGIO 3.50X15MM NC EUPHORA RX

## (undated) DEVICE — STERILE LATEX POWDER FREE SURGICAL GLOVES WITH HYDROGEL COATING: Brand: PROTEXIS

## (undated) DEVICE — BANDAGE,GAUZE,BULKEE II,4.5"X4.1YD,STRL: Brand: MEDLINE

## (undated) DEVICE — INFLATION DEVICE: Brand: ENCORE™ 26

## (undated) DEVICE — MASTISOL ADHESIVE LIQ 2/3ML

## (undated) DEVICE — CATHETER GUIDE AD 6FR L100CM COR PERIPH GRN NYL PTFE XB L 3

## (undated) DEVICE — SURGICAL PROCEDURE PACK BASIC ST FRANCIS

## (undated) DEVICE — RADIFOCUS OPTITORQUE ANGIOGRAPHIC CATHETER: Brand: OPTITORQUE

## (undated) DEVICE — CATHETER DIAG AD 5FR L110CM 145DEG COR GRY HYDRPHLC NYL

## (undated) DEVICE — CANISTER NEG PRSS 500ML WND THER W/ TBNG NO PRSS RANG W/

## (undated) DEVICE — ELECTRODE PT RET AD L9FT HI MOIST COND ADH HYDRGEL CORDED

## (undated) DEVICE — CATH BLLN ANGIO 2.75X15MM SC EUPHORA RX

## (undated) DEVICE — TRAY,URINE METER,100% SILICONE,16FR10ML: Brand: MEDLINE

## (undated) DEVICE — GUIDEWIRE 035IN 210CM PTFE COAT FIX COR J TIP 15MM FIRM BODY

## (undated) DEVICE — T-DRAPE,EXTREMITY,STERILE: Brand: MEDLINE

## (undated) DEVICE — CATH BLLN ANGIO 3X20MM SC EUPHORA RX

## (undated) DEVICE — NEEDLE HYPO 23GA L1IN TURQ POLYPR HUB S STL REG BVL STR W/O

## (undated) DEVICE — Device: Brand: OMNIWIRE PRESSURE GUIDE WIRE

## (undated) DEVICE — DRESSING NEG PRSS M 18X12.5X3.3CM POLYUR FOR WND THER VAC

## (undated) DEVICE — CATH BLLN ANGIO 3.50X20MM NC EUPHORIA RX

## (undated) DEVICE — GUIDEWIRE VASC STR 3 CM 0.014 INX180 CM SS PROWATERFLEX PTCA

## (undated) DEVICE — CATH BLLN ANGIO 3X15MM SC EUPHORA RX

## (undated) DEVICE — Device: Brand: EAGLE EYE PLATINUM ST RX DIGITAL IVUS CATHETER

## (undated) DEVICE — PREMIUM WET SKIN PREP TRAY: Brand: MEDLINE INDUSTRIES, INC.

## (undated) DEVICE — CATH BLLN ANGIO 4X15MM SC EUPHORA RX

## (undated) DEVICE — BAND COMPR L24CM REG CLR PLAS HEMSTAT EXT HK AND LOOP RETEN

## (undated) DEVICE — HI-TORQUE WHISPER MS GUIDE WIRE .014 STRAIGHT TIP 3.0 CM X 190 CM: Brand: HI-TORQUE WHISPER

## (undated) DEVICE — SOLUTION IRRIG 1000ML 0.9% SOD CHL USP POUR PLAS BTL

## (undated) DEVICE — GLIDESHEATH SLENDER STAINLESS STEEL KIT: Brand: GLIDESHEATH SLENDER